# Patient Record
Sex: FEMALE | Race: WHITE | NOT HISPANIC OR LATINO | Employment: FULL TIME | URBAN - METROPOLITAN AREA
[De-identification: names, ages, dates, MRNs, and addresses within clinical notes are randomized per-mention and may not be internally consistent; named-entity substitution may affect disease eponyms.]

---
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
## 2019-01-02 ENCOUNTER — NON-PROVIDER VISIT (OUTPATIENT)
Dept: MEDICAL GROUP | Facility: PHYSICIAN GROUP | Age: 19
End: 2019-01-02
Payer: COMMERCIAL

## 2019-01-02 ENCOUNTER — HOSPITAL ENCOUNTER (OUTPATIENT)
Dept: LAB | Facility: MEDICAL CENTER | Age: 19
End: 2019-01-02
Attending: INTERNAL MEDICINE
Payer: COMMERCIAL

## 2019-01-02 DIAGNOSIS — E16.2 HYPOGLYCEMIA: ICD-10-CM

## 2019-01-02 DIAGNOSIS — Z30.42 ENCOUNTER FOR SURVEILLANCE OF INJECTABLE CONTRACEPTIVE: ICD-10-CM

## 2019-01-02 DIAGNOSIS — E53.8 VITAMIN B 12 DEFICIENCY: ICD-10-CM

## 2019-01-02 DIAGNOSIS — E55.9 VITAMIN D DEFICIENCY: ICD-10-CM

## 2019-01-02 LAB
25(OH)D3 SERPL-MCNC: 17 NG/ML (ref 30–100)
VIT B12 SERPL-MCNC: 1078 PG/ML (ref 211–911)

## 2019-01-02 PROCEDURE — 82306 VITAMIN D 25 HYDROXY: CPT

## 2019-01-02 PROCEDURE — 82607 VITAMIN B-12: CPT

## 2019-01-02 PROCEDURE — 36415 COLL VENOUS BLD VENIPUNCTURE: CPT

## 2019-01-02 PROCEDURE — G0481 DRUG TEST DEF 8-14 CLASSES: HCPCS

## 2019-01-02 PROCEDURE — 96372 THER/PROPH/DIAG INJ SC/IM: CPT | Performed by: NURSE PRACTITIONER

## 2019-01-02 RX ORDER — MEDROXYPROGESTERONE ACETATE 150 MG/ML
150 INJECTION, SUSPENSION INTRAMUSCULAR ONCE
Status: COMPLETED | OUTPATIENT
Start: 2019-01-02 | End: 2019-01-02

## 2019-01-02 RX ADMIN — MEDROXYPROGESTERONE ACETATE 150 MG: 150 INJECTION, SUSPENSION INTRAMUSCULAR at 15:11

## 2019-01-02 NOTE — TELEPHONE ENCOUNTER
Spoke to pt. Reports she could only wear the Zio for 4 days and is not sure it caught any fast heart rates. She continues to have them about every other day.     As per most recent visit:  After the ZioPatch (or jail through if you feel you have captured enough fast heart rate events), start metoprolol tartrate 12.5 mg once daily (1/2 of the 25 mg), can take twice a day or an whole tablet 1-2 times per day depending on your response.  If you feel worse, stop taking it and let me know.    To Dr. Jack--please advise if you would like pt to start metoprolol at this time?

## 2019-01-02 NOTE — TELEPHONE ENCOUNTER
Spoke with the patient today in regards to her Zio patch that was placed on 12/21/2018 in the [facility].  The patient stated that the monitor only lasted about 4 days and she will be sending the unit back today as the post office was closed.  I notified the patient that we would need to see the results before we could tell if she needed to have another patch.  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  The patient also stated that she was told to start a medication at the 7 day sharifa that she did not make it to. Notes to the nurse to follow up on.

## 2019-01-02 NOTE — NON-PROVIDER
Malena MckeeleathaMarguerite Bennett is a 18 y.o. here for a Depo Provera Injection.     Date of last Depo Provera Injection: 10/17/18  Current date within therapeutic range?: Yes   Urine pregnancy test done (needed if out of date range): not performed  Date of office visit:1/2/19  Date of last pap (if > 21 years old)/ GYN exam: N/A  Dx: Contraceptive use    Order and dose verified by: Nu AMBRIZ  Patient tolerated injection and no adverse effects were observed or reported: Yes    # of Administrations remaining in MAR: 0  Next injection due between 3/20/19 and 4/3/19.

## 2019-01-03 NOTE — TELEPHONE ENCOUNTER
Spoke to pt and notified of recommendation to start metoprolol as per Dr. Jack's instructions. Educated re: medication and side effects. Advised pt track HR/BP and encouraged to call w/questions or concerns.

## 2019-01-04 ENCOUNTER — TELEPHONE (OUTPATIENT)
Dept: ENDOCRINOLOGY | Facility: MEDICAL CENTER | Age: 19
End: 2019-01-04

## 2019-01-04 RX ORDER — LANCETS 30 GAUGE
EACH MISCELLANEOUS
Qty: 100 EACH | Refills: 3 | Status: SHIPPED | OUTPATIENT
Start: 2019-01-04 | End: 2022-03-21

## 2019-01-05 NOTE — TELEPHONE ENCOUNTER
VOICEMAIL  1. Caller Name: Malena Bennett                        Call Back Number: [phone] (home)     2. Message: Pt needs TS called into pharmacy. She wanted to know if she needs to change her dosage of her vitamin D?    3. Patient approves office to leave a detailed voicemail/MyChart message: yes      Dr. Salter sent in refill for TS today.     Phone Number Called: [phone] (home)     Message: Called Ptto inform her the the TS had been sent to her pharmacy. Pt was please and will get them filled. Asked her why she might need to change her Vit. D rx. She stated that she did labs on 1/2/19 and noticed that her Vit D levels have dropped and wanted to knwo if she needs to increase her dosage again. Told Pt I would ask Dr. salter and lula becerra back to her when we have more information    Left Message for patient to call back: N\A      Labs are in her chart. Dr. Salter, please advise.

## 2019-01-07 ENCOUNTER — PATIENT MESSAGE (OUTPATIENT)
Dept: MEDICAL GROUP | Facility: PHYSICIAN GROUP | Age: 19
End: 2019-01-07

## 2019-01-07 DIAGNOSIS — Z11.1 SCREENING FOR TUBERCULOSIS: ICD-10-CM

## 2019-01-07 DIAGNOSIS — E55.9 VITAMIN D DEFICIENCY: ICD-10-CM

## 2019-01-07 LAB
ACETOHEXAMIDE SERPL QL: NOT DETECTED NG/ML
CHLORPROPAMIDE SERPL QL: NOT DETECTED NG/ML
GLIMEPIRIDE SERPL QL: NOT DETECTED NG/ML
GLIPIZIDE SERPL QL: NOT DETECTED NG/ML
GLYBURIDE SERPL QL: NOT DETECTED NG/ML
NATEGLINIDE SERPL QL: NOT DETECTED NG/ML
REPAGLINIDE SERPL QL: NOT DETECTED NG/ML
TOLAZAMIDE SERPL QL: NOT DETECTED NG/ML
TOLBUTAMIDE SERPL QL: NOT DETECTED NG/ML

## 2019-01-07 RX ORDER — ERGOCALCIFEROL 1.25 MG/1
50000 CAPSULE ORAL
Qty: 18 CAP | Refills: 1 | Status: SHIPPED | OUTPATIENT
Start: 2019-01-07 | End: 2020-06-08

## 2019-01-07 NOTE — TELEPHONE ENCOUNTER
Phone Number Called: [phone] (home)      Message: Called pt, informed her that Dr. Lora changed her directions from every week to every 3 days. She will make the change aad  the new Rx.     Left Message for patient to call back: yes-Pt will call back if she has any other questions

## 2019-01-08 ENCOUNTER — HOSPITAL ENCOUNTER (OUTPATIENT)
Dept: LAB | Facility: MEDICAL CENTER | Age: 19
End: 2019-01-08
Attending: NURSE PRACTITIONER
Payer: COMMERCIAL

## 2019-01-08 DIAGNOSIS — Z11.1 SCREENING FOR TUBERCULOSIS: ICD-10-CM

## 2019-01-08 NOTE — TELEPHONE ENCOUNTER
From: Malena Bennett  To: EVENS Vo  Sent: 1/7/2019 6:03 PM PST  Subject: Non-Urgent Medical Question    So sorry to still be reaching out. I have a appointment with  but have not been seen yet, so have not established anything. Can you please order a tb blood test? I need it for nursing school. Thank you so much for everything!   -Nell

## 2019-01-09 ENCOUNTER — HOSPITAL ENCOUNTER (OUTPATIENT)
Dept: LAB | Facility: MEDICAL CENTER | Age: 19
End: 2019-01-09
Attending: NURSE PRACTITIONER
Payer: COMMERCIAL

## 2019-01-09 PROCEDURE — 86480 TB TEST CELL IMMUN MEASURE: CPT

## 2019-01-09 PROCEDURE — 0298T PR EXT ECG > 48HR TO 21 DAY REVIEW AND INTERPRETATN: CPT | Performed by: INTERNAL MEDICINE

## 2019-01-09 PROCEDURE — 0296T PR EXT ECG > 48HR TO 21 DAY RCRD W/CONECT INTL RCRD: CPT | Performed by: INTERNAL MEDICINE

## 2019-01-09 PROCEDURE — 36415 COLL VENOUS BLD VENIPUNCTURE: CPT

## 2019-01-10 ENCOUNTER — TELEPHONE (OUTPATIENT)
Dept: CARDIOLOGY | Facility: MEDICAL CENTER | Age: 19
End: 2019-01-10

## 2019-01-10 DIAGNOSIS — R55 SYNCOPE AND COLLAPSE: ICD-10-CM

## 2019-01-10 NOTE — TELEPHONE ENCOUNTER
Malena would like to repeat the Zio because it was only on for 4 days and it did not work correctly.  She did not have any symptoms during that 4 day period.  I asked her to call the company back and see if they will send her a replacement and, if not, we will proceed with getting this resolved.

## 2019-01-10 NOTE — TELEPHONE ENCOUNTER
She said that the codetag company told her that it would have to be ordered through our office, but [employer] will send it to her college address so she doesn't need to come in.  Malena is sending her college address through MY CHART.  I ordered the Zio for her and faxed it with this message to Mariann GIL

## 2019-01-11 ENCOUNTER — NON-PROVIDER VISIT (OUTPATIENT)
Dept: CARDIOLOGY | Facility: MEDICAL CENTER | Age: 19
End: 2019-01-11
Payer: COMMERCIAL

## 2019-01-11 ENCOUNTER — TELEPHONE (OUTPATIENT)
Dept: CARDIOLOGY | Facility: MEDICAL CENTER | Age: 19
End: 2019-01-11

## 2019-01-11 ENCOUNTER — PATIENT MESSAGE (OUTPATIENT)
Dept: CARDIOLOGY | Facility: MEDICAL CENTER | Age: 19
End: 2019-01-11

## 2019-01-11 DIAGNOSIS — R00.0 SINUS TACHYCARDIA: ICD-10-CM

## 2019-01-11 LAB
GAMMA INTERFERON BACKGROUND BLD IA-ACNC: 0.03 IU/ML
M TB IFN-G BLD-IMP: NEGATIVE
M TB IFN-G CD4+ BCKGRND COR BLD-ACNC: 0 IU/ML
MITOGEN IGNF BCKGRD COR BLD-ACNC: >10 IU/ML
QFT TB2 - NIL TBQ2: 0 IU/ML

## 2019-01-11 NOTE — PATIENT COMMUNICATION
Nell is coming in today to [facility] to have a Zio patch put on.  We spoke to Paz and [facility] will cover.

## 2019-01-11 NOTE — TELEPHONE ENCOUNTER
Patient is coming into the office at [facility] to have the Zio put on today (my original note from today is in a MY CHART message.  She is leaving for [place] today- and is not on Metoprolol.

## 2019-02-08 ENCOUNTER — TELEPHONE (OUTPATIENT)
Dept: ENDOCRINOLOGY | Facility: MEDICAL CENTER | Age: 19
End: 2019-02-08

## 2019-02-08 NOTE — TELEPHONE ENCOUNTER
Patient called with questions about her Tresiba and how she should be taking it. I informed her that she needs to ne taking it everyday for it to work. She said that she thinks she is supposed to be taking it differently.

## 2019-02-15 ENCOUNTER — TELEPHONE (OUTPATIENT)
Dept: ENDOCRINOLOGY | Facility: MEDICAL CENTER | Age: 19
End: 2019-02-15

## 2019-02-15 NOTE — TELEPHONE ENCOUNTER
1. Caller Name: Malena                                         Call Back Number: [phone] (home)         Patient approves a detailed voicemail message: no    Spoke to patient about blood sugar numbers she said they have been pretty high lately and was wondering about what she should be taking. She sent her BGL on [facility] so they are in media. The other night she was at 500 took the tresiba and was down to 300 by morning. Wondering what that number needs to be fore her to be taking tresiba.    Tuesday and Wednesday has low blood sugar numbers   30's. Ate something and was ok.     High priority since she has been sending messages and messaging us for this answer since 1/25.    Please let her know

## 2019-02-16 NOTE — TELEPHONE ENCOUNTER
Patient called back worries about her lows in the 30's said she did not think the Derrick was very accurate. Told about the decom. She agreed to have her info sent to [employer] to see how much it would be for her.  Sent in 2/15/19

## 2019-02-20 NOTE — TELEPHONE ENCOUNTER
Zio patch enrollment was never sent to be enrolled when the monitor was placed on the patient.  I was contacted by [employer] to let me know that the patient's monitor has been received but they cannot download the information until the enrollment has been sent through.  I enrolled the patient today and they will have the report posted in 2-3 days.Notes to the nurse.

## 2019-02-21 ENCOUNTER — TELEPHONE (OUTPATIENT)
Dept: CARDIOLOGY | Facility: MEDICAL CENTER | Age: 19
End: 2019-02-21

## 2019-02-21 ENCOUNTER — TELEPHONE (OUTPATIENT)
Dept: ENDOCRINOLOGY | Facility: MEDICAL CENTER | Age: 19
End: 2019-02-21

## 2019-02-22 PROCEDURE — 0298T PR EXT ECG > 48HR TO 21 DAY REVIEW AND INTERPRETATN: CPT | Performed by: INTERNAL MEDICINE

## 2019-02-22 PROCEDURE — 0296T PR EXT ECG > 48HR TO 21 DAY RCRD W/CONECT INTL RCRD: CPT | Performed by: INTERNAL MEDICINE

## 2019-02-22 NOTE — TELEPHONE ENCOUNTER
Spoke to pt and advised of updated Zio results. Pt verbalized understanding. Requesting report copy go to [facility] address. Mailed today.

## 2019-02-22 NOTE — TELEPHONE ENCOUNTER
Nothing concerning on her Alcira patch.  Please give her the report.  Ask her to start metoprolol to see if it helps.  Thank you

## 2019-02-22 NOTE — TELEPHONE ENCOUNTER
I talked to the mom today. She is having lows and highs.  When she was on 10 units a day she felt the best she had ever felt and no lows or highs being on the insulin.      I will have her start at 6 units each night.  Hopefully this will help her stay stable until she is seen in the office again.  We can have her talk to a nurse educator to learn what to do with low BG numbers.     Student at the [employer]

## 2019-02-25 ENCOUNTER — TELEPHONE (OUTPATIENT)
Dept: ENDOCRINOLOGY | Facility: MEDICAL CENTER | Age: 19
End: 2019-02-25

## 2019-02-25 DIAGNOSIS — R55 SYNCOPE AND COLLAPSE: ICD-10-CM

## 2019-02-26 NOTE — TELEPHONE ENCOUNTER
Spoke to the patient and she reports blood sugars fluctuating between  range.; it is not clear to me her trend. She has an appointment in March and will discuss the further details at that point

## 2019-03-04 NOTE — PROGRESS NOTES
Endocrinology Clinic Progress Note  PCP: EVENS Vo    HPI:  Malena Bennett is a 18 y.o. old patient who comes in today for review of endocrine problems.    Vitamin D deficiency:  Currently on Vitamin D 50,000 units every 3 days.     Vitamin B 12 deficiency:  Currently taking Vitamin B 12 1000 mcg IM every 10 days.    Hyperglycemia:  She has been having blood sugars that are fluctuating between LO-300. She did not want the Free Style Derrick put on again.  She states she has done it several times already. She may have [place] Diabetes or monogenic diabetes (MOD Y, maturity onset diabetes of young).  She is currently not taking Tresiba since February.    ROS:  Constitutional: No unintentional weight loss  Endo: Denies excessive thirst or frequent urination  All other systems were reviewed and were negative.    Past Medical History:  Patient Active Problem List    Diagnosis Date Noted   • Palpitations 12/17/2018   • POTS (postural orthostatic tachycardia syndrome) 12/17/2018   • Hyperglycemia 12/14/2018   • Other irritable bowel syndrome 08/10/2018   • Adrenal insufficiency (HCC) 08/10/2018   • Syncope and collapse 05/24/2018   • Diagnosis unknown 05/24/2018   • Salt craving 02/02/2018   • Hypoglycemia 02/02/2018   • Blood in stool 11/22/2017   • Urinary tract infection without hematuria 11/10/2017   • At risk for knowledge deficit Health problems 10/16/2017   • Appendicitis 07/01/2017   • Overweight, pediatric, BMI (body mass index) 95-99% for age 05/19/2017   • Biliary dyskinesia 01/30/2017   • Right upper quadrant pain 11/09/2016   • Chronic tension-type headache, not intractable 11/09/2016   • Acute gastritis without hemorrhage 11/09/2016   • Mittelschmerz 09/19/2016   • Tourette syndrome 09/19/2016   • OCD (obsessive compulsive disorder) 09/19/2016   • Factor V Leiden (HCC)    • Pelvic pain 06/28/2016       Medications:    Current Outpatient Prescriptions:   •  Semaglutide (OZEMPIC)  "0.25 or 0.5 MG/DOSE Solution Pen-injector, Inject 0.5 mg as instructed every 7 days., Disp: 6 PEN, Rfl: 3  •  vitamin D, Ergocalciferol, (DRISDOL) 79805 units Cap capsule, Take 1 Cap by mouth every 3 days., Disp: 18 Cap, Rfl: 1  •  Blood Glucose Test Strips, Test strips order: Test strips for meter. Sig: use tid and prn ssx high or low sugar #100 RF x 3, Disp: 100 Strip, Rfl: 3  •  Lancets, Lancets order: Lancets for glucometer. Sig: use tid and prn ssx high or low sugar. #100 RF x 3, Disp: 100 Each, Rfl: 3  •  Insulin Degludec (TRESIBA FLEXTOUCH) 200 UNIT/ML Solution Pen-injector, Inject 20 Units as instructed every day., Disp: 2 PEN, Rfl: 3  •  cyanocobalamin (VITAMIN B-12) 1000 MCG/ML Solution, 1 mL by Intramuscular route every 10 days., Disp: 9 mL, Rfl: 3  •  ondansetron (ZOFRAN ODT) 8 MG TABLET DISPERSIBLE, Take 1 Tab by mouth every 8 hours as needed for Nausea., Disp: 30 Tab, Rfl: 1  •  LINZESS 145 MCG Cap, Take 145 mcg by mouth every day., Disp: , Rfl:   •  glucose monitoring kit (FREESTYLE) monitoring kit, Use to measure blood glucose regularly., Disp: 1 Kit, Rfl: 0  •  clindamycin (CLEOCIN) 300 MG Cap, , Disp: , Rfl:   •  Continuous Blood Gluc Sensor (FREESTYLE VICK SENSOR SYSTEM) Misc, 1 Applicator by Does not apply route every 14 days. (Patient not taking: Reported on 3/5/2019), Disp: 2 Each, Rfl: 11  •  metoprolol (LOPRESSOR) 25 MG Tab, Take 0.5 Tabs by mouth 2 times a day. (Patient not taking: Reported on 3/5/2019), Disp: 30 Tab, Rfl: 0  •  Syringe/Needle, Disp, (SYRINGE 3CC/49MB7-4/2\") 22G X 1-1/2\" 3 ML Misc, For use with Intra-muscular or subcutaneous B 12  injection every 2 weeks., Disp: 12 Each, Rfl: 1    Labs: Reviewed    Physical Examination:  Vital signs: /68 (BP Location: Right arm, Patient Position: Sitting, BP Cuff Size: Adult)   Pulse (!) 119   Ht 1.6 m (5' 2.99\")   Wt 97.5 kg (215 lb)   SpO2 95%   BMI 38.10 kg/m²  Body mass index is 38.1 kg/m². Patient's body mass index is 38.1 " kg/m². Exercise and nutrition counseling were performed at this visit.  General: No apparent distress, cooperative  Eyes: No scleral icterus, no discharge, normal eyelids  Neck: No abnormal masses on inspection, normal thyroid exam  Resp: Normal effort, clear to auscultation bilaterally  CVS: Regular rate and rhythm, S1 S2 normal, no murmur  Extremities: No lower extremity edema  Abdomen: abdominal obesity present  Musculoskeletal: Normal digits and nails  Skin: No rash on visible skin  Psych: Alert and oriented, normal mood and affect, intact memory and able to make informed decisions.    Assessment and Plan:    1. Vitamin D deficiency  Continue vitamin D supplementation    2. Vitamin B 12 deficiency  Continue B12 supplementation    3.  Uncontrolled type 2 diabetes with hyperglycemia  Start [place] 0.25 mg once a week.  NovoLog 2 units for 50 points above 150 for correction for high blood sugars    Return in about 2 months (around 5/5/2019).    Total face to face time spent with patient equals 40 minutes. 25/40 minutes were spent on counseling the patient about the pathophysiology of monogenic/maturity onset diabetes of young, sIde effects and benefits of Vit D and Vit B12 replacement and dose ozempic therapy were discussed with patient in great detail    Thank you for allowing me to participate in the care of this patient.    Teja Lora M.D.  This note was scribed by Renee Morton RN CDE  CC:   Esha Aguilar, A.P.ESTRELLA.    This note was created using voice recognition software (Dragon). The accuracy of the dictation is limited by the abilities of the software. I have reviewed the note prior to signing, however some errors in grammar and context are still possible. If you have any questions related to this note please do not hesitate to contact our office.

## 2019-03-05 ENCOUNTER — OFFICE VISIT (OUTPATIENT)
Dept: ENDOCRINOLOGY | Facility: MEDICAL CENTER | Age: 19
End: 2019-03-05
Payer: COMMERCIAL

## 2019-03-05 VITALS
HEART RATE: 119 BPM | OXYGEN SATURATION: 95 % | BODY MASS INDEX: 38.09 KG/M2 | HEIGHT: 63 IN | SYSTOLIC BLOOD PRESSURE: 116 MMHG | WEIGHT: 215 LBS | DIASTOLIC BLOOD PRESSURE: 68 MMHG

## 2019-03-05 DIAGNOSIS — E55.9 VITAMIN D DEFICIENCY: ICD-10-CM

## 2019-03-05 DIAGNOSIS — R73.9 HYPERGLYCEMIA: ICD-10-CM

## 2019-03-05 DIAGNOSIS — E53.8 VITAMIN B 12 DEFICIENCY: ICD-10-CM

## 2019-03-05 DIAGNOSIS — E11.65 UNCONTROLLED TYPE 2 DIABETES MELLITUS WITH HYPERGLYCEMIA (HCC): ICD-10-CM

## 2019-03-05 LAB
HBA1C MFR BLD: 5.1 % (ref ?–5.8)
INT CON NEG: NORMAL
INT CON POS: NORMAL

## 2019-03-05 PROCEDURE — 99215 OFFICE O/P EST HI 40 MIN: CPT | Performed by: INTERNAL MEDICINE

## 2019-03-05 PROCEDURE — 83036 HEMOGLOBIN GLYCOSYLATED A1C: CPT | Performed by: INTERNAL MEDICINE

## 2019-03-05 RX ORDER — CLINDAMYCIN HYDROCHLORIDE 300 MG/1
CAPSULE ORAL
COMMUNITY
Start: 2018-12-19 | End: 2019-05-06

## 2019-03-22 ENCOUNTER — PATIENT MESSAGE (OUTPATIENT)
Dept: MEDICAL GROUP | Facility: PHYSICIAN GROUP | Age: 19
End: 2019-03-22

## 2019-03-22 RX ORDER — MEDROXYPROGESTERONE ACETATE 150 MG/ML
150 INJECTION, SUSPENSION INTRAMUSCULAR ONCE
Qty: 1 ML | Refills: 0 | Status: SHIPPED | OUTPATIENT
Start: 2019-03-22 | End: 2019-08-01 | Stop reason: SDUPTHER

## 2019-03-22 NOTE — PATIENT COMMUNICATION
I know this is no longer a patient of yours since she is now 18, but she has not seen  yet and she has an apt scheduled to see her in June.  im not sure what to tell the patient or who I should route this message too?  Please advise

## 2019-04-01 DIAGNOSIS — E11.649 UNCONTROLLED TYPE 2 DIABETES MELLITUS WITH HYPOGLYCEMIA WITHOUT COMA (HCC): ICD-10-CM

## 2019-05-02 NOTE — PROGRESS NOTES
Endocrinology Clinic Progress Note  PCP: Jayme Pastrana M.D.    HPI:  Malena Bennett is a 18 y.o. old patient who comes in today for review of endocrine problems.     Vitamin D deficiency:  Currently on Vitamin D 50,000 units every 3 days.      Vitamin B 12 deficiency:  Currently taking Vitamin B 12 1000 mcg IM every 10 days.     Hyperglycemia:  She has been having blood sugars that are fluctuating between LO-300.  She states she has been having more lows than highs She may have [place] Diabetes or monogenic diabetes (MOD Y, maturity onset diabetes of young).   Most Recent HbA1c:   Lab Results   Component Value Date/Time    HBA1C 5.1 03/05/2019 12:43 PM        Current Diabetes Regimen:  Ozempic 0.25 mg once a week  Humalog correction scale of 2 units for every 50 points above 150     Has Dexcom CGM, CGM downloaded and reports reviewed in great detail with patient    Hypoglycemia:  Frequent    ROS:  Constitutional: weight loss  Cardiac: No palpitations or racing heart  Resp: No shortness of breath  Neuro: No numbness or tinging in feet  Endo: No heat or cold intolerance, no polyuria or polydipsia  All other systems were reviewed and were negative.    Past Medical History:  Patient Active Problem List    Diagnosis Date Noted   • Palpitations 12/17/2018   • POTS (postural orthostatic tachycardia syndrome) 12/17/2018   • Hyperglycemia 12/14/2018   • Other irritable bowel syndrome 08/10/2018   • Adrenal insufficiency (HCC) 08/10/2018   • Syncope and collapse 05/24/2018   • Diagnosis unknown 05/24/2018   • Salt craving 02/02/2018   • Hypoglycemia 02/02/2018   • Blood in stool 11/22/2017   • Urinary tract infection without hematuria 11/10/2017   • At risk for knowledge deficit Health problems 10/16/2017   • Appendicitis 07/01/2017   • Overweight, pediatric, BMI (body mass index) 95-99% for age 05/19/2017   • Biliary dyskinesia 01/30/2017   • Right upper quadrant pain 11/09/2016   • Chronic tension-type  headache, not intractable 11/09/2016   • Acute gastritis without hemorrhage 11/09/2016   • Mittelschmerz 09/19/2016   • Tourette syndrome 09/19/2016   • OCD (obsessive compulsive disorder) 09/19/2016   • Factor V Leiden (HCC)    • Pelvic pain 06/28/2016       Past Surgical History:  Past Surgical History:   Procedure Laterality Date   • APPENDECTOMY LAPAROSCOPIC  7/1/2017    Procedure: APPENDECTOMY LAPAROSCOPIC;  Surgeon: Derick Arthur M.D.;  Location: SURGERY [facility] ORS;  Service:    • ELIGIO BY LAPAROSCOPY  1/30/2017    Procedure: ELIGIO BY LAPAROSCOPY;  Surgeon: Fina Perkins M.D.;  Location: [facility];  Service:        Allergies:  Cefdinir and Erythromycin    Social History:  Social History     Social History   • Marital status: Single     Spouse name: N/A   • Number of children: N/A   • Years of education: N/A     Occupational History   • Not on file.     Social History Main Topics   • Smoking status: Never Smoker   • Smokeless tobacco: Never Used   • Alcohol use No   • Drug use: No   • Sexual activity: No     Other Topics Concern   • Not on file     Social History Narrative   • No narrative on file       Family History:  Family History   Problem Relation Age of Onset   • Other Maternal Grandfather         Parkinson's   • Heart Disease Unknown    • Cancer Unknown         bile duct cancer   • Other Mother         sphincter of salud, pancreatic insufficiency   • Cancer Unknown         great uncle brain       Medications:    Current Outpatient Prescriptions:   •  insulin aspart (NOVOLOG) 100 UNIT/ML Solution, Inject  as instructed 3 times a day before meals., Disp: , Rfl:   •  Semaglutide (OZEMPIC) 0.25 or 0.5 MG/DOSE Solution Pen-injector, Inject 0.5 mg as instructed every 7 days. (Patient taking differently: Inject 0.25 mg as instructed every 7 days.), Disp: 6 PEN, Rfl: 3  •  vitamin D, Ergocalciferol, (DRISDOL) 10620 units Cap capsule, Take 1 Cap by mouth every 3 days., Disp: 18 Cap,  "Rfl: 1  •  cyanocobalamin (VITAMIN B-12) 1000 MCG/ML Solution, 1 mL by Intramuscular route every 10 days., Disp: 9 mL, Rfl: 3  •  LINZESS 145 MCG Cap, Take 145 mcg by mouth every day., Disp: , Rfl:   •  Insulin Pen Needle 32 G x 4 mm, 1 Each by Does not apply route 3 times a day., Disp: 100 Each, Rfl: 11  •  Glucagon, rDNA, 1 MG Kit, Use one pen prn severe hypoglycemia, Disp: 1 Kit, Rfl: 1  •  Blood Glucose Test Strips, Test strips order: Test strips for meter. Sig: use tid and prn ssx high or low sugar #100 RF x 3, Disp: 100 Strip, Rfl: 3  •  Lancets, Lancets order: Lancets for glucometer. Sig: use tid and prn ssx high or low sugar. #100 RF x 3, Disp: 100 Each, Rfl: 3  •  Insulin Degludec (TRESIBA FLEXTOUCH) 200 UNIT/ML Solution Pen-injector, Inject 20 Units as instructed every day. (Patient not taking: Reported on 5/6/2019), Disp: 2 PEN, Rfl: 3  •  Continuous Blood Gluc Sensor (FREESTYLE VICK SENSOR SYSTEM) Misc, 1 Applicator by Does not apply route every 14 days. (Patient not taking: Reported on 3/5/2019), Disp: 2 Each, Rfl: 11  •  metoprolol (LOPRESSOR) 25 MG Tab, Take 0.5 Tabs by mouth 2 times a day. (Patient not taking: Reported on 3/5/2019), Disp: 30 Tab, Rfl: 0  •  ondansetron (ZOFRAN ODT) 8 MG TABLET DISPERSIBLE, Take 1 Tab by mouth every 8 hours as needed for Nausea., Disp: 30 Tab, Rfl: 1  •  Syringe/Needle, Disp, (SYRINGE 3CC/95NO4-2/2\") 22G X 1-1/2\" 3 ML Misc, For use with Intra-muscular or subcutaneous B 12  injection every 2 weeks., Disp: 12 Each, Rfl: 1  •  glucose monitoring kit (FREESTYLE) monitoring kit, Use to measure blood glucose regularly., Disp: 1 Kit, Rfl: 0    Labs: Reviewed    Physical Examination:  Vital signs: BP (!) 98/64   Pulse 68   Ht 1.702 m (5' 7\")   Wt 93.4 kg (206 lb)   SpO2 100%   BMI 32.26 kg/m²  Body mass index is 32.26 kg/m².  General: No apparent distress, cooperative  Eyes: No scleral icterus or discharge  ENMT: Normal on external inspection of nose, lips, normal " thyroid exam  Neck: No abnormal masses on inspection  Resp: Normal effort, clear to auscultation bilaterally   CVS: Regular rate and rhythm, S1 S2 normal, no murmur   Extremities: No edema  Abdomen: abdominal obesity present  Neuro: Alert and oriented  Skin: No rash  Psych: Normal mood and affect, intact memory and able to make informed decisions    Assessment and Plan:    1. Hyperglycemia and STEW  The hyperglycemic peaks have been very nicely controlled with a GLP-1 receptor agent.  Continues to have hypoglycemia and reports to being in 40s couple of times a week.  The patient's Continuous Glucose Monitor was downloaded and reviewed in great detail with the patient.  You can find report scanned into media .  Discussed this continuous glucose monitoring data with patient in great detail.    2. Hypoglycemia and STEW  Discussed this in detail.   Reviewed glucagon in depth with both parents.  She reports to have low blood sugar in 40s at least couple of times a week.  I personally think that she will benefit from referral to [facility] where they may have dedicated experts on STEW.  We have made a diagnosis of a modified based on her clinical symptomatology and her glycemic trends however we have not done any formal testing as the testing appears to be quite expensive.  The mother states that when inquired the testing was in the cost of $8000 and they did not want to pursue it as the management is not likely to change as much.  Did speak to the patient and both parents that they will benefit from referral to [facility] and hopefully they probably have better access to MOD by testing and dedicated experts.  They both agree for it along with the patient    3. Vitamin B 12 deficiency  Continue vitamin B12    4. Vitamin D deficiency  Vitamin D    Return in about 2 months (around 7/6/2019).    Thank you for allowing me to participate in the care of this patient.    Teja Lora  05/02/19    CC:    Jayme Pastrana M.D.    This note was created using voice recognition software (Dragon). The accuracy of the dictation is limited by the abilities of the software. I have reviewed the note prior to signing, however some errors in grammar and context are still possible. If you have any questions related to this note please do not hesitate to contact our office.   This note was scribed by Lizzie Coleman RN, CDE

## 2019-05-06 ENCOUNTER — TELEPHONE (OUTPATIENT)
Dept: ENDOCRINOLOGY | Facility: MEDICAL CENTER | Age: 19
End: 2019-05-06

## 2019-05-06 ENCOUNTER — OFFICE VISIT (OUTPATIENT)
Dept: ENDOCRINOLOGY | Facility: MEDICAL CENTER | Age: 19
End: 2019-05-06
Payer: COMMERCIAL

## 2019-05-06 VITALS
DIASTOLIC BLOOD PRESSURE: 64 MMHG | WEIGHT: 206 LBS | HEIGHT: 67 IN | BODY MASS INDEX: 32.33 KG/M2 | SYSTOLIC BLOOD PRESSURE: 98 MMHG | HEART RATE: 68 BPM | OXYGEN SATURATION: 100 %

## 2019-05-06 DIAGNOSIS — E53.8 VITAMIN B 12 DEFICIENCY: ICD-10-CM

## 2019-05-06 DIAGNOSIS — E55.9 VITAMIN D DEFICIENCY: ICD-10-CM

## 2019-05-06 DIAGNOSIS — R73.9 HYPERGLYCEMIA: ICD-10-CM

## 2019-05-06 DIAGNOSIS — E16.2 HYPOGLYCEMIA: ICD-10-CM

## 2019-05-06 DIAGNOSIS — E13.9 MODY (MATURITY ONSET DIABETES MELLITUS IN YOUNG) (HCC): ICD-10-CM

## 2019-05-06 DIAGNOSIS — E13.9 OTHER SPECIFIED DIABETES MELLITUS WITHOUT COMPLICATION, WITHOUT LONG-TERM CURRENT USE OF INSULIN (HCC): ICD-10-CM

## 2019-05-06 PROCEDURE — 95251 CONT GLUC MNTR ANALYSIS I&R: CPT | Performed by: INTERNAL MEDICINE

## 2019-05-06 PROCEDURE — 99214 OFFICE O/P EST MOD 30 MIN: CPT | Performed by: INTERNAL MEDICINE

## 2019-05-06 NOTE — TELEPHONE ENCOUNTER
Was the patient seen in the last year in this department? Yes    Does patient have an active prescription for medications requested? Yes    Received Request Via: Patient     New prescription for pen needles

## 2019-05-13 ENCOUNTER — PATIENT MESSAGE (OUTPATIENT)
Dept: MEDICAL GROUP | Facility: PHYSICIAN GROUP | Age: 19
End: 2019-05-13

## 2019-06-10 DIAGNOSIS — E13.9 MODY (MATURITY ONSET DIABETES MELLITUS IN YOUNG) (HCC): ICD-10-CM

## 2019-06-13 ENCOUNTER — TELEPHONE (OUTPATIENT)
Dept: ENDOCRINOLOGY | Facility: MEDICAL CENTER | Age: 19
End: 2019-06-13

## 2019-06-13 NOTE — TELEPHONE ENCOUNTER
VOICEMAIL  1. Caller Name: Malena                      Call Back Number: [phone]    2. Message: patient is request a refill of Depo sent to the [facility] in [place]. Patient was seeing Esha but is switching care to Dr Pastrana d/t solitario on 8/12/19. Thanks!    3. Patient approves office to leave a detailed voicemail/MyChart message: yes

## 2019-06-14 RX ORDER — MEDROXYPROGESTERONE ACETATE 150 MG/ML
150 INJECTION, SUSPENSION INTRAMUSCULAR ONCE
Qty: 1 ML | Refills: 0 | OUTPATIENT
Start: 2019-06-14 | End: 2019-06-14

## 2019-06-14 NOTE — TELEPHONE ENCOUNTER
VOICEMAIL    1. Caller Name: Fifi                          Call Back Number: [phone]    2. Message: Patient's mother left a VM stating she requested a referral for her daughter to be seen at [facility] Dr. Galvan. She just received a letter from [facility] stating the referral was sent out to [facility] Dr. Salas. She wants to clarify why she was not referred to [facility].    3. Patient approves office to leave a detailed voicemail/MyChart message: N\A    I called Fifi back to let her know that Dr. Lora did order the referral to [facility] with Dr. Galvan. I am not sure why she got that information in the mail from [facility]. I did give her the referral dept line and transferred her over to get clarification.

## 2019-07-10 ENCOUNTER — TELEPHONE (OUTPATIENT)
Dept: MEDICAL GROUP | Facility: PHYSICIAN GROUP | Age: 19
End: 2019-07-10

## 2019-07-10 NOTE — TELEPHONE ENCOUNTER
VOICEMAIL  1. Caller Name: Malena                       Call Back Number: [phone]     2. Message: patient would like a refill of Depo sent to the pharmacy. Called and LM for patient to find out what pharmacy to send it to.     3. Patient approves office to leave a detailed voicemail/MyChart message: yes

## 2019-07-19 ENCOUNTER — TELEPHONE (OUTPATIENT)
Dept: ENDOCRINOLOGY | Facility: MEDICAL CENTER | Age: 19
End: 2019-07-19

## 2019-07-19 NOTE — TELEPHONE ENCOUNTER
Patient called in today complaining of higher blood sugars over the last 2 weeks and having increased difficulty controlling them with her insulin.     Patient has changed her insulin pen. We discussed increasing her insulin dosing to     2 units for every 50 above 125 and tightening her carb ration to 1:10 in lieu of 1:15.     She is in agreement at this time and [facility] Will discuss with Dr CASTANO

## 2019-07-29 ENCOUNTER — TELEPHONE (OUTPATIENT)
Dept: ENDOCRINOLOGY | Facility: MEDICAL CENTER | Age: 19
End: 2019-07-29

## 2019-07-29 DIAGNOSIS — E13.9 MODY (MATURITY ONSET DIABETES MELLITUS IN YOUNG) (HCC): ICD-10-CM

## 2019-07-29 DIAGNOSIS — E66.9 CLASS 1 OBESITY WITHOUT SERIOUS COMORBIDITY WITH BODY MASS INDEX (BMI) OF 32.0 TO 32.9 IN ADULT, UNSPECIFIED OBESITY TYPE: ICD-10-CM

## 2019-07-29 NOTE — TELEPHONE ENCOUNTER
1. Caller Name: Malena Bennett                                              Call Back Number: [phone] (home)       Patient approves a detailed voicemail message: yes    Patient question      She would like a referral to the [facility] (HIP)    She also made the changes that were suggested in the phone encounter/[employer] message on 07/19/19 and has not noticed much of a difference in her BG levels. She is still staying in the 200's and will spike in the afternoon to 300 daily. She is wondering what she should do now?    She currently has a Dexcom and is liking it. I suggested that we get her connected to ours in the office so that we can see her levels when she can not come into the office. She would like to get that set up at her next appointment on 08/12/19.        Dr. Lora: please send in a referral for the diabetic nutritionist and advise on BG levels.

## 2019-08-01 ENCOUNTER — TELEPHONE (OUTPATIENT)
Dept: URGENT CARE | Facility: PHYSICIAN GROUP | Age: 19
End: 2019-08-01

## 2019-08-01 NOTE — TELEPHONE ENCOUNTER
Patient is requesting a refill of her depo injections be sent to [facility] [address] [facility] [place], [place]. Their phone number is [phone]. Patient phone number is  [phone]. Thank you.

## 2019-08-01 NOTE — TELEPHONE ENCOUNTER
Was the patient seen in the last year in this department? Yes    Does patient have an active prescription for medications requested? No     Received Request Via: Pharmacy      Pt met protocol?: Yes, OV 8/18, due for wellness appt

## 2019-08-01 NOTE — TELEPHONE ENCOUNTER
Spoke with patient- advised patient that we sent over a rx on 6/14/19. I called pharmacy to confirm but they never received it. I called in the depo to the pharmacy in [place]

## 2019-08-02 RX ORDER — MEDROXYPROGESTERONE ACETATE 150 MG/ML
INJECTION, SUSPENSION INTRAMUSCULAR
Qty: 1 ML | Refills: 0 | Status: SHIPPED | OUTPATIENT
Start: 2019-08-02 | End: 2019-08-12

## 2019-08-12 ENCOUNTER — OFFICE VISIT (OUTPATIENT)
Dept: ENDOCRINOLOGY | Facility: MEDICAL CENTER | Age: 19
End: 2019-08-12
Payer: COMMERCIAL

## 2019-08-12 VITALS
HEIGHT: 67 IN | DIASTOLIC BLOOD PRESSURE: 72 MMHG | HEART RATE: 104 BPM | OXYGEN SATURATION: 100 % | SYSTOLIC BLOOD PRESSURE: 104 MMHG | WEIGHT: 202.6 LBS | BODY MASS INDEX: 31.8 KG/M2

## 2019-08-12 DIAGNOSIS — E53.8 VITAMIN B 12 DEFICIENCY: ICD-10-CM

## 2019-08-12 DIAGNOSIS — E10.65 UNCONTROLLED TYPE 1 DIABETES MELLITUS WITH HYPERGLYCEMIA (HCC): ICD-10-CM

## 2019-08-12 DIAGNOSIS — E55.9 VITAMIN D DEFICIENCY: ICD-10-CM

## 2019-08-12 DIAGNOSIS — E13.9 MODY (MATURITY ONSET DIABETES MELLITUS IN YOUNG) (HCC): ICD-10-CM

## 2019-08-12 DIAGNOSIS — E03.9 HYPOTHYROIDISM, UNSPECIFIED TYPE: ICD-10-CM

## 2019-08-12 DIAGNOSIS — E11.65 UNCONTROLLED TYPE 2 DIABETES MELLITUS WITH HYPERGLYCEMIA (HCC): ICD-10-CM

## 2019-08-12 LAB
HBA1C MFR BLD: 5 % (ref 0–5.6)
INT CON NEG: NORMAL
INT CON POS: NORMAL

## 2019-08-12 PROCEDURE — 99214 OFFICE O/P EST MOD 30 MIN: CPT | Performed by: INTERNAL MEDICINE

## 2019-08-12 PROCEDURE — 95251 CONT GLUC MNTR ANALYSIS I&R: CPT | Performed by: INTERNAL MEDICINE

## 2019-08-12 PROCEDURE — 83036 HEMOGLOBIN GLYCOSYLATED A1C: CPT | Performed by: INTERNAL MEDICINE

## 2019-08-12 NOTE — PROGRESS NOTES
"Endocrinology Clinic Progress Note  PCP: Jayme Pastrana M.D.    HPI:  Malena Bennett is a 18 y.o. old patient who comes in today for review of her type 2 diabetes, vitamin d deficiency and vitamin b 12 deficiency.   She is currently on Vitamin D 46089 iu per week, her most recent vitamin d level was:  17 on 1/2/19  Her most recent vitamin b 12 value was 1078 on 1/2/19, she is currently taking Vitamin B 12 injections of 1000 mg every 10 days.     Most Recent HbA1c:   Lab Results   Component Value Date/Time    HBA1C 5.0 08/12/2019 03:55 PM    Previous A1c was 5.1 on 3/5/19    Current Diabetes Regimen:  Tresiba 10 units at   Novolog using a 1:15 insulin to carbohydrate ratio and a 1 unit for every 25 points above 150 correction factor at meals   Ozempic 0 5mg per week  She is on a Dexcom G6 CGM, she did not bring her  with her to this appointment for download.  She did download and bring in several days worth of reading although they were not marked with the date.   She states her blood sugars were running extremely low up until July.  State frequently in the 40-50 range in May and June.  States starting in July her blood sugars started running higher in the mid 100 and higher with not as many lows.   Please see CGM downloads the patient brought in and scanned into her chart.       Exercise: strenuous regular exercise, aerobic > 3 hours a week, has been running several times a week.   Diet: \"healthy\" diet  in general  Following Celiac diet  Last Retinal Exam: current and records on chart  Daily Foot Exam: yes denies problems.   Routine Dental Exams: yes      ROS:  Constitutional: No weight loss  Cardiac: No palpitations or racing heart  Resp: No shortness of breath  Neuro: No numbness or tinging in feet  Endo: No heat or cold intolerance, no polyuria or polydipsia  All other systems were reviewed and were negative.    Past Medical History:  Patient Active Problem List    Diagnosis Date Noted "   • Uncontrolled type 2 diabetes mellitus with hyperglycemia (HCC) 08/12/2019   • Palpitations 12/17/2018   • POTS (postural orthostatic tachycardia syndrome) 12/17/2018   • Hyperglycemia 12/14/2018   • Other irritable bowel syndrome 08/10/2018   • Adrenal insufficiency (HCC) 08/10/2018   • Syncope and collapse 05/24/2018   • Diagnosis unknown 05/24/2018   • Salt craving 02/02/2018   • Hypoglycemia 02/02/2018   • Blood in stool 11/22/2017   • Urinary tract infection without hematuria 11/10/2017   • At risk for knowledge deficit Health problems 10/16/2017   • Appendicitis 07/01/2017   • Overweight, pediatric, BMI (body mass index) 95-99% for age 05/19/2017   • Biliary dyskinesia 01/30/2017   • Right upper quadrant pain 11/09/2016   • Chronic tension-type headache, not intractable 11/09/2016   • Acute gastritis without hemorrhage 11/09/2016   • Mittelschmerz 09/19/2016   • Tourette syndrome 09/19/2016   • OCD (obsessive compulsive disorder) 09/19/2016   • Factor V Leiden ([facility])    • Pelvic pain 06/28/2016       Past Surgical History:  Past Surgical History:   Procedure Laterality Date   • APPENDECTOMY LAPAROSCOPIC  7/1/2017    Procedure: APPENDECTOMY LAPAROSCOPIC;  Surgeon: Derick Arthur M.D.;  Location: SURGERY [facility];  Service:    • ELIGIO BY LAPAROSCOPY  1/30/2017    Procedure: ELIGIO BY LAPAROSCOPY;  Surgeon: Fina Perkins M.D.;  Location: SURGERY SAME DAY [facility] ORS;  Service:    • APPENDECTOMY         Allergies:  Cefdinir and Erythromycin    Social History:  Social History     Socioeconomic History   • Marital status: Single     Spouse name: Not on file   • Number of children: Not on file   • Years of education: Not on file   • Highest education level: Not on file   Occupational History   • Not on file   Social Needs   • Financial resource strain: Not on file   • Food insecurity:     Worry: Not on file     Inability: Not on file   • Transportation needs:     Medical: Not on file     Non-medical:  Not on file   Tobacco Use   • Smoking status: Never Smoker   • Smokeless tobacco: Never Used   Substance and Sexual Activity   • Alcohol use: No     Alcohol/week: 0.0 oz   • Drug use: No   • Sexual activity: Never   Lifestyle   • Physical activity:     Days per week: Not on file     Minutes per session: Not on file   • Stress: Not on file   Relationships   • Social connections:     Talks on phone: Not on file     Gets together: Not on file     Attends Restorationist service: Not on file     Active member of club or organization: Not on file     Attends meetings of clubs or organizations: Not on file     Relationship status: Not on file   • Intimate partner violence:     Fear of current or ex partner: Not on file     Emotionally abused: Not on file     Physically abused: Not on file     Forced sexual activity: Not on file   Other Topics Concern   • Behavioral problems Not Asked   • Interpersonal relationships Not Asked   • Sad or not enjoying activities Not Asked   • Suicidal thoughts Not Asked   • Poor school performance Not Asked   • Reading difficulties Not Asked   • Speech difficulties Not Asked   • Writing difficulties Not Asked   • Inadequate sleep Not Asked   • Excessive TV viewing Not Asked   • Excessive video game use Not Asked   • Inadequate exercise Not Asked   • Sports related Not Asked   • Poor diet Not Asked   • Family concerns for drug/alcohol abuse Not Asked   • Poor oral hygiene Not Asked   • Bike safety Not Asked   • Family concerns vehicle safety Not Asked   Social History Narrative   • Not on file       Family History:  Family History   Problem Relation Age of Onset   • Other Maternal Grandfather         Parkinson's   • Genetic Disorder Maternal Grandfather         Parkinsons, and crouzon syndrome   • Cancer Maternal Grandfather         Skin Cancer   • Heart Disease Unknown    • Cancer Unknown         bile duct cancer   • Other Mother         sphincter of salud, pancreatic insufficiency   • Cancer  Unknown         great uncle brain   • Arthritis Maternal Grandmother    • Cancer Maternal Grandmother         Skin Cancer   • Heart Disease Maternal Grandmother    • Hypertension Maternal Grandmother    • Hyperlipidemia Maternal Grandmother    • Stroke Maternal Grandmother    • Genetic Disorder Father         Factor V Liden and Tourettes   • Heart Disease Father    • Hyperlipidemia Father    • Genetic Disorder Maternal Uncle         Crouzon Syndrome   • Cancer Maternal Uncle         Bile Duct, and Skin cancer   • Cancer Maternal Aunt         Multiple Aunts and Uncles passed from cancer   • Heart Disease Maternal Aunt         Open heart surgery occuring   • Heart Disease Paternal Grandmother    • Hypertension Paternal Grandmother    • Stroke Paternal Grandmother    • Heart Disease Maternal Uncle         multiple uncles.       Medications:    Current Outpatient Medications:   •  Semaglutide (OZEMPIC) 1 MG/DOSE Solution Pen-injector, Inject 1 mg as instructed every 7 days., Disp: 6 PEN, Rfl: 3  •  NOVOLOG, insulin aspart, (NOVOLOG FLEXPEN) 100 UNIT/ML Solution Pen-injector injection, Inject 10 Units as instructed 3 times a day before meals., Disp: 3 PEN, Rfl: 3  •  vitamin D, Ergocalciferol, (DRISDOL) 73688 units Cap capsule, Take 1 Cap by mouth every 3 days., Disp: 18 Cap, Rfl: 1  •  Insulin Degludec (TRESIBA FLEXTOUCH) 200 UNIT/ML Solution Pen-injector, Inject 20 Units as instructed every day. (Patient taking differently: Inject 10 Units as instructed every day.), Disp: 2 PEN, Rfl: 3  •  cyanocobalamin (VITAMIN B-12) 1000 MCG/ML Solution, 1 mL by Intramuscular route every 10 days., Disp: 9 mL, Rfl: 3  •  ondansetron (ZOFRAN ODT) 8 MG TABLET DISPERSIBLE, Take 1 Tab by mouth every 8 hours as needed for Nausea., Disp: 30 Tab, Rfl: 1  •  glucose monitoring kit (FREESTYLE) monitoring kit, Use to measure blood glucose regularly., Disp: 1 Kit, Rfl: 0  •  insulin aspart (NOVOLOG) 100 UNIT/ML Solution, Inject  as instructed 3  "times a day before meals. Using a 1:15 cho ratio and 1:25 over 150 correction ratio at meals, Disp: , Rfl:   •  Insulin Pen Needle 32 G x 4 mm, 1 Each by Does not apply route 3 times a day., Disp: 100 Each, Rfl: 11  •  Glucagon, rDNA, 1 MG Kit, Use one pen prn severe hypoglycemia, Disp: 1 Kit, Rfl: 1  •  Blood Glucose Test Strips, Test strips order: Test strips for meter. Sig: use tid and prn ssx high or low sugar #100 RF x 3, Disp: 100 Strip, Rfl: 3  •  Lancets, Lancets order: Lancets for glucometer. Sig: use tid and prn ssx high or low sugar. #100 RF x 3, Disp: 100 Each, Rfl: 3  •  Syringe/Needle, Disp, (SYRINGE 3CC/37IE7-0/2\") 22G X 1-1/2\" 3 ML Misc, For use with Intra-muscular or subcutaneous B 12  injection every 2 weeks., Disp: 12 Each, Rfl: 1  •  LINZESS 145 MCG Cap, Take 145 mcg by mouth every day., Disp: , Rfl:     Labs: Reviewed    Physical Examination:  Vital signs: /72 (BP Location: Left arm, Patient Position: Sitting)   Pulse (!) 104   Ht 1.702 m (5' 7\")   Wt 91.9 kg (202 lb 9.6 oz)   SpO2 100%   BMI 31.73 kg/m²  Body mass index is 31.73 kg/m².  General: No apparent distress, cooperative  Eyes: No scleral icterus or discharge  ENMT: Normal on external inspection of nose, lips, normal thyroid exam  Neck: No abnormal masses on inspection  Resp: Normal effort, clear to auscultation bilaterally   CVS: Regular rate and rhythm, S1 S2 normal, no murmur   Extremities: No edema  Abdomen: abdominal obesity present  Neuro: Alert and oriented  Skin: No rash  Psych: Normal mood and affect, intact memory and able to make informed decisions    Assessment and Plan:  1. Uncontrolled type 2 diabetes mellitus with hyperglycemia (HCC)  Unclear if she has STEW vs atypical type 1 diabetes;increase ozempic to 1mg weekly to reduce hyperglycemia; glucagon for hypoglycemia as and when needed.     - Discussed diabetic diet, encouraged portion control.   - Discussed importance of testing blood sugars and keeping logs. "   - Discussed importance of daily exercise, recommended 30 minutes per day  - Reviewed medications and advised how to take.  - Discussed importance of immunizations and yearly eye exams.   - Advised daily foot  exams. Educated on signs of infection.   - Educated on need to stay well hydrated with water.  - Educated to call with any questions or problems.  The patient's Continuous Glucose Monitor download and reviewed in great detail with the patient.  You can find report scanned into media     2. Vitamin D deficiency  Cont D.     3. Vitamin B 12 deficiency  Cont B 12.       Return in about 3 months (around 11/12/2019).    Thank you for allowing me to participate in the care of this patient.    Teja Lora M.D.  08/12/19    CC:   Jayme Pastrana M.D.    This note was created using voice recognition software (Dragon). The accuracy of the dictation is limited by the abilities of the software. I have reviewed the note prior to signing, however some errors in grammar and context are still possible. If you have any questions related to this note please do not hesitate to contact our office.   This note was scribed by Lizzie Coleman RN, CDE

## 2019-08-12 NOTE — LETTER
Teja Lora M.D.  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     Thank you for your understanding in this matter. If you have any questions or concerns, please don't hesitate to call.    Sincerely,    Teja Lora M.D.  Electronically signed

## 2019-08-13 ENCOUNTER — TELEPHONE (OUTPATIENT)
Dept: ENDOCRINOLOGY | Facility: MEDICAL CENTER | Age: 19
End: 2019-08-13

## 2019-08-13 NOTE — TELEPHONE ENCOUNTER
1. Caller Name: Malena Bennett                                             Call Back Number: [phone] (home)         Patient approves a detailed voicemail message: yes    2. SPECIFIC Action To Be Taken: Please place Hep B with titer lab order. 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    3. Diagnosis/Clinical Reason for Request: For nursing school    4. Specialty & Provider Name/Lab/Imaging Location: Unknown    5. Is appointment scheduled for requested order/referral: no    Patient was informed they will receive a return phone call from the office ONLY if there are any questions before processing their request. Advised to call back if they haven't received a call from the referral department in 5 days.

## 2019-08-14 NOTE — TELEPHONE ENCOUNTER
Patient called again to see if you can put that in today since she has an appointment tomorrow to get labs done

## 2019-08-15 ENCOUNTER — NON-PROVIDER VISIT (OUTPATIENT)
Dept: HEALTH INFORMATION MANAGEMENT | Facility: MEDICAL CENTER | Age: 19
End: 2019-08-15
Payer: COMMERCIAL

## 2019-08-15 ENCOUNTER — HOSPITAL ENCOUNTER (OUTPATIENT)
Dept: LAB | Facility: MEDICAL CENTER | Age: 19
End: 2019-08-15
Attending: INTERNAL MEDICINE
Payer: COMMERCIAL

## 2019-08-15 DIAGNOSIS — E10.65 UNCONTROLLED TYPE 1 DIABETES MELLITUS WITH HYPERGLYCEMIA (HCC): ICD-10-CM

## 2019-08-15 DIAGNOSIS — E13.9 MODY (MATURITY ONSET DIABETES MELLITUS IN YOUNG) (HCC): ICD-10-CM

## 2019-08-15 DIAGNOSIS — E03.9 HYPOTHYROIDISM, UNSPECIFIED TYPE: ICD-10-CM

## 2019-08-15 DIAGNOSIS — E66.9 CLASS 1 OBESITY WITHOUT SERIOUS COMORBIDITY WITH BODY MASS INDEX (BMI) OF 32.0 TO 32.9 IN ADULT, UNSPECIFIED OBESITY TYPE: ICD-10-CM

## 2019-08-15 DIAGNOSIS — E11.65 UNCONTROLLED TYPE 2 DIABETES MELLITUS WITH HYPERGLYCEMIA (HCC): ICD-10-CM

## 2019-08-15 DIAGNOSIS — E53.8 VITAMIN B 12 DEFICIENCY: ICD-10-CM

## 2019-08-15 DIAGNOSIS — E55.9 VITAMIN D DEFICIENCY: ICD-10-CM

## 2019-08-15 LAB
25(OH)D3 SERPL-MCNC: 51 NG/ML (ref 30–100)
B-OH-BUTYR SERPL-MCNC: 0.13 MMOL/L (ref 0.02–0.27)
CREAT UR-MCNC: 188.8 MG/DL
MICROALBUMIN UR-MCNC: 2.1 MG/DL
MICROALBUMIN/CREAT UR: 11 MG/G (ref 0–30)
T3 SERPL-MCNC: 116.5 NG/DL (ref 60–181)
T3FREE SERPL-MCNC: 3.89 PG/ML (ref 2.4–4.2)
T4 FREE SERPL-MCNC: 0.92 NG/DL (ref 0.53–1.43)
THYROPEROXIDASE AB SERPL-ACNC: 0.8 IU/ML (ref 0–9)
TSH SERPL DL<=0.005 MIU/L-ACNC: 1.92 UIU/ML (ref 0.38–5.33)
VIT B12 SERPL-MCNC: 521 PG/ML (ref 211–911)

## 2019-08-15 PROCEDURE — 84681 ASSAY OF C-PEPTIDE: CPT

## 2019-08-15 PROCEDURE — 36415 COLL VENOUS BLD VENIPUNCTURE: CPT

## 2019-08-15 PROCEDURE — 84206 ASSAY OF PROINSULIN: CPT

## 2019-08-15 PROCEDURE — 82570 ASSAY OF URINE CREATININE: CPT

## 2019-08-15 PROCEDURE — 84443 ASSAY THYROID STIM HORMONE: CPT

## 2019-08-15 PROCEDURE — 82010 KETONE BODYS QUAN: CPT

## 2019-08-15 PROCEDURE — 82306 VITAMIN D 25 HYDROXY: CPT

## 2019-08-15 PROCEDURE — 82043 UR ALBUMIN QUANTITATIVE: CPT

## 2019-08-15 PROCEDURE — 97802 MEDICAL NUTRITION INDIV IN: CPT | Performed by: DIETITIAN, REGISTERED

## 2019-08-15 PROCEDURE — 80061 LIPID PANEL: CPT

## 2019-08-15 PROCEDURE — 84481 FREE ASSAY (FT-3): CPT

## 2019-08-15 PROCEDURE — 82607 VITAMIN B-12: CPT

## 2019-08-15 PROCEDURE — 84480 ASSAY TRIIODOTHYRONINE (T3): CPT

## 2019-08-15 PROCEDURE — 86341 ISLET CELL ANTIBODY: CPT

## 2019-08-15 PROCEDURE — 84439 ASSAY OF FREE THYROXINE: CPT

## 2019-08-15 PROCEDURE — 86376 MICROSOMAL ANTIBODY EACH: CPT

## 2019-08-16 LAB
CHOLEST SERPL-MCNC: 168 MG/DL (ref 100–199)
FASTING STATUS PATIENT QL REPORTED: NORMAL
HDLC SERPL-MCNC: 39 MG/DL
LDLC SERPL CALC-MCNC: 113 MG/DL
TRIGL SERPL-MCNC: 79 MG/DL (ref 0–149)

## 2019-08-16 NOTE — PROGRESS NOTES
"8/16/2019    Jayme Pastrana M.D.  18 y.o.   Time in/out: 1501 - 1602    Subjective:  -Patient wants to control DM better  -States that she has been \"high since July\"  -Has hypoglycemic events where her BG is ~40 mg/dL for 3-4 hours and she cannot bring it up, usually 2x/week  -Currently taking Novolog at 1:10 ICR and 1:25>125 Correction  -States that she has gained ~30# and would like to lose it    Nutrition Diagnosis (PES Statement)    Altered nutrition-related lab values related to endocrine dysfunction as evidenced by the patient needing to take basal and bolus insulin to control BG.    Biochemical data, medical test and procedures  Lab Results   Component Value Date/Time    HBA1C 5.0 08/12/2019 03:55 PM     Lab Results   Component Value Date/Time    CHOLSTRLTOT 168 08/15/2019 11:51 AM     (H) 08/15/2019 11:51 AM    HDL 39 (A) 08/15/2019 11:51 AM    TRIGLYCERIDE 79 08/15/2019 11:51 AM         Nutrition Intervention  Meal and Snack  Recommend a general/healthful diet    Comprehensive Nutrition education Instruction or training leading to in-depth nutrition related knowledge about:  Theraputic diet for CHO counting    Monitoring & Evaluation Plan  Behavioral-Environmental:  Behavior:  CHO count more effectively and dose insulin based upon given ICR and correction at meals    Food / Nutrient Intake:  Food intake:  Use the plate method recommendations for portions/balance at meals  Macronutrient intake:  45 grams CHO or less per meal    Physical Signs / Symptoms:  BG: fasting/ 2 hr pp:  Elimination of frequent hypoglycemic events  HbA1c profiles:  Within ADA guidelines  Weight change:  Weight loss to goal      Assessment Notes:  \"Nell\" is not CHO counting effectively and this could be why her BG have been so high for the last month and why she has low BG events that are difficult to bring up.  Her first goal is to CHO count more effectively using food labels, apps, the [employer] book, and the general " rules of thumb I gave her and dose her insulin based upon the CHO she is eating.  She admits that she is likely under-bolusing and needs to do a better job of counting CHO.    I showed her the plate method since she wants to lose ~30# and gave her goal of ~1 cup of CHO at meals, or 45 grams CHO, or less.  She will strive for this and will also work on eating more consistently because she does not eat consistently during the day d/t school and work.      She will stay in contact with me with questions.  F/u prn.

## 2019-08-18 LAB — ACETONE SERPL-MCNC: <5 MG/DL

## 2019-08-19 LAB — PROINSULIN P 12H FAST SERPL-SCNC: 2.5 PMOL/L

## 2019-08-20 LAB — GAD65 AB SER IA-ACNC: <5 IU/ML (ref 0–5)

## 2019-08-21 LAB — C PEPTIDE SERPL-MCNC: 2.7 NG/ML (ref 0.8–3.5)

## 2019-08-22 ENCOUNTER — OFFICE VISIT (OUTPATIENT)
Dept: MEDICAL GROUP | Facility: PHYSICIAN GROUP | Age: 19
End: 2019-08-22
Payer: COMMERCIAL

## 2019-08-22 VITALS
BODY MASS INDEX: 32.33 KG/M2 | SYSTOLIC BLOOD PRESSURE: 110 MMHG | WEIGHT: 206 LBS | OXYGEN SATURATION: 97 % | DIASTOLIC BLOOD PRESSURE: 80 MMHG | RESPIRATION RATE: 16 BRPM | TEMPERATURE: 100 F | HEART RATE: 117 BPM | HEIGHT: 67 IN

## 2019-08-22 DIAGNOSIS — K58.8 OTHER IRRITABLE BOWEL SYNDROME: ICD-10-CM

## 2019-08-22 DIAGNOSIS — Z90.49 HISTORY OF CHOLECYSTECTOMY: ICD-10-CM

## 2019-08-22 DIAGNOSIS — D68.318 FACTOR VIII INHIBITOR DISORDER (HCC): ICD-10-CM

## 2019-08-22 DIAGNOSIS — K90.0 CELIAC DISEASE: ICD-10-CM

## 2019-08-22 DIAGNOSIS — Z01.84 IMMUNITY STATUS TESTING: ICD-10-CM

## 2019-08-22 DIAGNOSIS — G90.A POTS (POSTURAL ORTHOSTATIC TACHYCARDIA SYNDROME): ICD-10-CM

## 2019-08-22 DIAGNOSIS — F95.2 TOURETTE SYNDROME: ICD-10-CM

## 2019-08-22 DIAGNOSIS — N94.0 MITTELSCHMERZ: ICD-10-CM

## 2019-08-22 DIAGNOSIS — E11.65 UNCONTROLLED TYPE 2 DIABETES MELLITUS WITH HYPERGLYCEMIA (HCC): ICD-10-CM

## 2019-08-22 DIAGNOSIS — G44.229 CHRONIC TENSION-TYPE HEADACHE, NOT INTRACTABLE: ICD-10-CM

## 2019-08-22 DIAGNOSIS — D68.51 FACTOR V LEIDEN (HCC): Chronic | ICD-10-CM

## 2019-08-22 DIAGNOSIS — F42.9 OBSESSIVE-COMPULSIVE DISORDER, UNSPECIFIED TYPE: ICD-10-CM

## 2019-08-22 PROBLEM — Z91.89 AT RISK FOR KNOWLEDGE DEFICIT: Status: RESOLVED | Noted: 2017-10-16 | Resolved: 2019-08-22

## 2019-08-22 PROBLEM — K82.8 BILIARY DYSKINESIA: Status: RESOLVED | Noted: 2017-01-30 | Resolved: 2019-08-22

## 2019-08-22 PROBLEM — R55 SYNCOPE AND COLLAPSE: Status: RESOLVED | Noted: 2018-05-24 | Resolved: 2019-08-22

## 2019-08-22 PROBLEM — R00.2 PALPITATIONS: Status: RESOLVED | Noted: 2018-12-17 | Resolved: 2019-08-22

## 2019-08-22 PROBLEM — K92.1 BLOOD IN STOOL: Status: RESOLVED | Noted: 2017-11-22 | Resolved: 2019-08-22

## 2019-08-22 PROBLEM — R69 DIAGNOSIS UNKNOWN: Status: RESOLVED | Noted: 2018-05-24 | Resolved: 2019-08-22

## 2019-08-22 PROBLEM — N39.0 URINARY TRACT INFECTION WITHOUT HEMATURIA: Status: RESOLVED | Noted: 2017-11-10 | Resolved: 2019-08-22

## 2019-08-22 PROBLEM — E27.40 ADRENAL INSUFFICIENCY (HCC): Status: RESOLVED | Noted: 2018-08-10 | Resolved: 2019-08-22

## 2019-08-22 PROBLEM — K37 APPENDICITIS: Status: RESOLVED | Noted: 2017-07-01 | Resolved: 2019-08-22

## 2019-08-22 PROBLEM — E66.3 OVERWEIGHT, PEDIATRIC, BMI (BODY MASS INDEX) 95-99% FOR AGE: Status: RESOLVED | Noted: 2017-05-19 | Resolved: 2019-08-22

## 2019-08-22 PROCEDURE — 99214 OFFICE O/P EST MOD 30 MIN: CPT | Performed by: FAMILY MEDICINE

## 2019-08-22 RX ORDER — MEDROXYPROGESTERONE ACETATE 150 MG/ML
INJECTION, SUSPENSION INTRAMUSCULAR
COMMUNITY
Start: 2019-08-02 | End: 2019-10-18

## 2019-08-22 RX ORDER — PROCHLORPERAZINE 25 MG/1
SUPPOSITORY RECTAL
COMMUNITY
End: 2021-02-26

## 2019-08-22 RX ORDER — BLOOD-GLUCOSE METER
KIT MISCELLANEOUS
COMMUNITY
Start: 2019-08-17 | End: 2022-03-21

## 2019-08-22 ASSESSMENT — PATIENT HEALTH QUESTIONNAIRE - PHQ9: CLINICAL INTERPRETATION OF PHQ2 SCORE: 0

## 2019-08-22 NOTE — PROGRESS NOTES
Subjective:   Malena Bennett is a 18 y.o. female here today for evaluation and management of:     Uncontrolled type 2 diabetes mellitus with hyperglycemia (HCC)  Last A1c 5.0  LDL mildly elevated at 113   patient is 18 years old BMI is 32  Patient is on on ozempic 1 mg every 7 days, Tresiba and NovoLog  Patient is followed by her endocrinologist routinely for this.  She has her eye exams done annually and she has no symptoms of neuropathy.  In addition patient was seen by her cardiologist and was medically cleared.  She does not need to follow-up with them.    Tourette syndrome  Patient is currently in nursing school at [employer], paperwork for accommodations in the academic setting provided. Scanned in chart.   She does better in fast paced clinical environments.   She is followed by Dr. Huber at the [facility]  Patient controls her symptoms with techniques that she has learned and been taught she is not currently on any medications for this.    Factor V Leiden ([facility])  Patient was diagnosed when she is about 15 or 16 this was done after her father had 2 DVTs.  Patient has not had any blood clots.  Patient is not currently on any anticoagulation or blood thinners. She is on depo and should avoid estrogen to reduce risk of clots. She does not smoke. Was evaluated by hematology in the past. Her father also has factor V.  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  Referral to hematology done today for surveillance basically to see if patient needs to be on any blood thinners.    Factor VIII inhibitor disorder (HCC)  Patient was diagnosed with factor V Leiden disorder and factor VIII disorder about 2 years ago when her father was diagnosed with factor V Leiden disorder after having 2 blood clots.  Patient has no history of blood clots.  She is currently not on aspirin or any blood thinners.  Will refer patient to hematology as she has  petechiae bilateral LE      Chronic tension-type headache, not intractable  Patient has chronic headaches.  She does get them with low blood sugars but also at other times.  She does not have photophobia or vomiting with the headaches.  More headaches and this was normal.  Patient does have some anxiety levels that are higher indicated by elevated pulse rate at today's clinic visit at 117.  She has been cleared by cardiologist in the past.  So the anxiety could also be contributing to her tension headaches.  Occasionally she takes ibuprofen and Tylenol which does help but in general she would like to avoid taking medications for headaches.    Mittelschmerz  Patient continues on Depo-Provera and this has helped immensely with pain during ovulation.    OCD (obsessive compulsive disorder)  Patient has OCD.  She has her techniques that she uses to help cope with this.  Paperwork provided for school accommodations.  Patient is not currently interested in referral to psychiatry or behavioral therapy as she manages the condition herself that she has had it for many years.    Celiac disease  This was confirmed by patient's blood work as well as biopsies when she had her EGD done by Dr. Cuellar.  Patient controls her symptoms by avoiding gluten.    Other irritable bowel syndrome  Intermittent symptoms mostly diarrhea.  Symptoms occur randomly not necessarily triggered by stress.  Patient does also have celiac disease currently no blood in the stool and no weight loss.    POTS (postural orthostatic tachycardia syndrome)  Patient's heart rate today is 117.         Current medicines (including changes today)  Current Outpatient Medications   Medication Sig Dispense Refill   • FREESTYLE LITE strip      • medroxyPROGESTERone (DEPO-PROVERA) 150 MG/ML Suspension      • Continuous Blood Gluc Sensor (DEXCOM G6 SENSOR) Misc by Does not apply route.     • Semaglutide (OZEMPIC) 1 MG/DOSE Solution Pen-injector Inject 1 mg as instructed  "every 7 days. 6 PEN 3   • NOVOLOG, insulin aspart, (NOVOLOG FLEXPEN) 100 UNIT/ML Solution Pen-injector injection Inject 10 Units as instructed 3 times a day before meals. 3 PEN 3   • insulin aspart (NOVOLOG) 100 UNIT/ML Solution Inject  as instructed 3 times a day before meals. Using a 1:15 cho ratio and 1:25 over 150 correction ratio at meals     • Insulin Pen Needle 32 G x 4 mm 1 Each by Does not apply route 3 times a day. 100 Each 11   • Glucagon, rDNA, 1 MG Kit Use one pen prn severe hypoglycemia 1 Kit 1   • vitamin D, Ergocalciferol, (DRISDOL) 10408 units Cap capsule Take 1 Cap by mouth every 3 days. 18 Cap 1   • Blood Glucose Test Strips Test strips order: Test strips for meter. Sig: use tid and prn ssx high or low sugar #100 RF x 3 100 Strip 3   • Lancets Lancets order: Lancets for glucometer. Sig: use tid and prn ssx high or low sugar. #100 RF x 3 100 Each 3   • Insulin Degludec (TRESIBA FLEXTOUCH) 200 UNIT/ML Solution Pen-injector Inject 20 Units as instructed every day. (Patient taking differently: Inject 10 Units as instructed every day.) 2 PEN 3   • cyanocobalamin (VITAMIN B-12) 1000 MCG/ML Solution 1 mL by Intramuscular route every 10 days. 9 mL 3   • ondansetron (ZOFRAN ODT) 8 MG TABLET DISPERSIBLE Take 1 Tab by mouth every 8 hours as needed for Nausea. 30 Tab 1   • Syringe/Needle, Disp, (SYRINGE 3CC/45LD7-1/2\") 22G X 1-1/2\" 3 ML Misc For use with Intra-muscular or subcutaneous B 12  injection every 2 weeks. 12 Each 1   • glucose monitoring kit (FREESTYLE) monitoring kit Use to measure blood glucose regularly. 1 Kit 0   • LINZESS 145 MCG Cap Take 145 mcg by mouth every day.       No current facility-administered medications for this visit.      She  has a past medical history of Asthma, Clotting disorder ([facility]), Factor 5 Leiden mutation, heterozygous (HCC), OCD (obsessive compulsive disorder), and Tourette disease.    ROS  No chest pain, no shortness of breath, no abdominal pain       Objective:     BP " "110/80 (BP Location: Right arm, Patient Position: Sitting, BP Cuff Size: Adult)   Pulse (!) 117   Temp 37.8 °C (100 °F) (Temporal)   Resp 16   Ht 1.689 m (5' 6.5\")   Wt 93.4 kg (206 lb)   SpO2 97%  Body mass index is 32.75 kg/m².   Physical Exam:  Constitutional: Alert, no distress.  Skin: Warm, dry, good turgor, no rashes in visible areas.  Eye: Equal, round and reactive, conjunctiva clear, lids normal.  ENMT: Lips without lesions, good dentition, oropharynx clear.  Neck: Trachea midline, no masses, no thyromegaly. No cervical or supraclavicular lymphadenopathy  Respiratory: Unlabored respiratory effort, lungs clear to auscultation, no wheezes, no ronchi.  Cardiovascular: Normal S1, S2, no murmur, no edema.  Abdomen: Soft, non-tender, no masses, no hepatosplenomegaly.  Psych: Alert and oriented x3, normal affect and mood.        Assessment and Plan:   The following treatment plan was discussed    1. Uncontrolled type 2 diabetes mellitus with hyperglycemia (HCC)  No criteria for Type 2 DM  Follow up with endocrinology    2. Tourette syndrome  Stable controlled    3. Factor V Leiden (HCC)  - REFERRAL TO HEMATOLOGY ONCOLOGY Referral to? [facility]    4. Factor VIII inhibitor disorder (HCC)  - REFERRAL TO HEMATOLOGY ONCOLOGY Referral to? [facility]    5. History of cholecystectomy  No acute changes    6. Immunity status testing  - HEPATITIS B SURF AB QUANT    7. Chronic tension-type headache, not intractable  No acute changes    8. Mittelschmerz  Well-controlled with Depo-Provera    9. Obsessive-compulsive disorder, unspecified type  Stable controlled    10. Celiac disease  Controlled with avoidance of gluten    11. Other irritable bowel syndrome  No acute changes    12. POTS (postural orthostatic tachycardia syndrome)  Chronic condition stable      Followup: Return in about 1 year (around 8/22/2020), or if symptoms worsen or fail to improve.         "

## 2019-08-22 NOTE — ASSESSMENT & PLAN NOTE
Patient was diagnosed with factor V Leiden disorder and factor VIII disorder about 2 years ago when her father was diagnosed with factor V Leiden disorder after having 2 blood clots.  Patient has no history of blood clots.  She is currently not on aspirin or any blood thinners.  Will refer patient to hematology as she has petechiae bilateral LE

## 2019-08-22 NOTE — ASSESSMENT & PLAN NOTE
This was confirmed by patient's blood work as well as biopsies when she had her EGD done by Dr. Cuellar.  Patient controls her symptoms by avoiding gluten.

## 2019-08-22 NOTE — ASSESSMENT & PLAN NOTE
Patient has chronic headaches.  She does get them with low blood sugars but also at other times.  She does not have photophobia or vomiting with the headaches.  More headaches and this was normal.  Patient does have some anxiety levels that are higher indicated by elevated pulse rate at today's clinic visit at 117.  She has been cleared by cardiologist in the past.  So the anxiety could also be contributing to her tension headaches.  Occasionally she takes ibuprofen and Tylenol which does help but in general she would like to avoid taking medications for headaches.

## 2019-08-22 NOTE — LETTER
August 22, 2019 August 22, 2019    To:    [employer]   [employer]    Malena Bennett is my patient in clinic. Her disability does not prevent her from working in a high-intensity clinical placement. Tics are mitigated when Malena is engaged in fast-paced environments and situations where she feels the most comfortable fast paced.     Tics are exacerbated in response to certain environmental triggers due to sensory integration issues seen with Tourette's Syndrome. Malena is cognizant of various triggers and knows from past experience that a nursing home setting is one of her triggers.     Patient has no cardiac issues per her cardiologist.     Thank you for your understanding.     Please contact me with any questions.                   Jayme Pastrana M.D.

## 2019-08-22 NOTE — ASSESSMENT & PLAN NOTE
Patient has OCD.  She has her techniques that she uses to help cope with this.  Paperwork provided for school accommodations.  Patient is not currently interested in referral to psychiatry or behavioral therapy as she manages the condition herself that she has had it for many years.

## 2019-08-22 NOTE — ASSESSMENT & PLAN NOTE
Intermittent symptoms mostly diarrhea.  Symptoms occur randomly not necessarily triggered by stress.  Patient does also have celiac disease currently no blood in the stool and no weight loss.

## 2019-08-22 NOTE — ASSESSMENT & PLAN NOTE
Last A1c 5.0  LDL mildly elevated at 113   patient is 18 years old BMI is 32  Patient is on on ozempic 1 mg every 7 days, Tresiba and NovoLog  Patient is followed by her endocrinologist routinely for this.  She has her eye exams done annually and she has no symptoms of neuropathy.  In addition patient was seen by her cardiologist and was medically cleared.  She does not need to follow-up with them.

## 2019-08-22 NOTE — ASSESSMENT & PLAN NOTE
Patient is currently in nursing school at [employer], paperwork for accommodations in the academic setting provided. Scanned in chart.   She does better in fast paced clinical environments.   She is followed by Dr. Huber at the [facility]  Patient controls her symptoms with techniques that she has learned and been taught she is not currently on any medications for this.

## 2019-08-22 NOTE — ASSESSMENT & PLAN NOTE
Patient was diagnosed when she is about 15 or 16 this was done after her father had 2 DVTs.  Patient has not had any blood clots.  Patient is not currently on any anticoagulation or blood thinners. She is on depo and should avoid estrogen to reduce risk of clots. She does not smoke. Was evaluated by hematology in the past. Her father also has factor V.  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  Referral to hematology done today for surveillance basically to see if patient needs to be on any blood thinners.

## 2019-08-23 ENCOUNTER — HOSPITAL ENCOUNTER (OUTPATIENT)
Dept: LAB | Facility: MEDICAL CENTER | Age: 19
End: 2019-08-23
Attending: FAMILY MEDICINE
Payer: COMMERCIAL

## 2019-08-23 LAB — HBV SURFACE AB SERPL IA-ACNC: >1000 MIU/ML (ref 0–10)

## 2019-08-23 PROCEDURE — 86706 HEP B SURFACE ANTIBODY: CPT

## 2019-08-23 PROCEDURE — 36415 COLL VENOUS BLD VENIPUNCTURE: CPT

## 2019-09-11 ENCOUNTER — TELEPHONE (OUTPATIENT)
Dept: ENDOCRINOLOGY | Facility: MEDICAL CENTER | Age: 19
End: 2019-09-11

## 2019-09-11 NOTE — TELEPHONE ENCOUNTER
FINAL PRIOR AUTHORIZATION STATUS:    1.  Name of Medication & Dose: Ozempic 1mg     2. Prior Auth Status (if approved--length of approval):  No PA required    3. Action Taken: Pharmacy Notified: yes    Documentation was scanned into media and attached to this telephone encounter.

## 2019-09-23 ENCOUNTER — TELEPHONE (OUTPATIENT)
Dept: ENDOCRINOLOGY | Facility: MEDICAL CENTER | Age: 19
End: 2019-09-23

## 2019-09-23 NOTE — TELEPHONE ENCOUNTER
1. Caller Name: Malena Bennett                                         Call Back Number: [phone] (home)       Patient approves a detailed voicemail message: yes    BS readings   She has noticed more and more lows and she is unsure of what to do at this point. She has increased her eating but she feels like she is constantly eating and her levels are still going low. She is going below 80 and she stated that she is having a harder time feeling when she is going low. She is also having highs in the middle of the night around 3am-4am. She is unsure of what to do when that happens at night. She has been watching what she eats an was seen at [facility] but she needs some direction on what to do about her constant lows.   I told her that I would send a message to the provider and his nurses and we will get back to her as soon as we can.

## 2019-09-24 ENCOUNTER — TELEPHONE (OUTPATIENT)
Dept: ENDOCRINOLOGY | Facility: MEDICAL CENTER | Age: 19
End: 2019-09-24

## 2019-09-24 NOTE — TELEPHONE ENCOUNTER
1. Caller Name: Malena                                         Call Back Number: [phone] (home)         Patient approves a detailed voicemail message: no    PAtient called frustrated that she has been off of all insulin completely for a few weeks and still experiencing 8-12 lows (going below 80) during the day. Please call her when you get a chance.

## 2019-09-25 NOTE — TELEPHONE ENCOUNTER
Spoke to the patient over the cell phone in detail.  She states that despite being off insulin for several days her blood sugar keeps dropping to below 80 and also below 40 range.  Have advised her to take glucagon as and when needed.  She also states that she has stopped Ozempic for the last few weeks.  I advised her to obtain and/or establish care from the endocrinologist in [facility].  Patient currently is in [place] and not in our area.  She goes to school in [place].  Discussed the need to have an endocrinologist to help her out and while she is in [place].    I have sent the prescription for nasal glucagon to her [facility] pharmacy.  From there her parents will be able to ship it to her place in [place]    Patient also states that all the testing for STEW by was being sent to the center in [place] who is trying to do an extensive STEW work up.     She states that that she has entered the code that we had given to her in her DEXCOM; so that we can see her continuous glucose monitoring data over the past few days and we will look at it once we have access to it and try to figure out the trends.    And advised to go to the urgent care or emergency care if needed for low blood sugar

## 2019-10-18 ENCOUNTER — NON-PROVIDER VISIT (OUTPATIENT)
Dept: MEDICAL GROUP | Facility: PHYSICIAN GROUP | Age: 19
End: 2019-10-18
Payer: COMMERCIAL

## 2019-10-18 DIAGNOSIS — Z30.42 ENCOUNTER FOR SURVEILLANCE OF INJECTABLE CONTRACEPTIVE: ICD-10-CM

## 2019-10-18 LAB
INT CON NEG: NEGATIVE
INT CON POS: POSITIVE
POC URINE PREGNANCY TEST: NORMAL

## 2019-10-18 PROCEDURE — 81025 URINE PREGNANCY TEST: CPT | Performed by: NURSE PRACTITIONER

## 2019-10-18 PROCEDURE — 96372 THER/PROPH/DIAG INJ SC/IM: CPT | Performed by: NURSE PRACTITIONER

## 2019-10-18 RX ORDER — MEDROXYPROGESTERONE ACETATE 150 MG/ML
150 INJECTION, SUSPENSION INTRAMUSCULAR ONCE
Status: COMPLETED | OUTPATIENT
Start: 2019-10-18 | End: 2019-10-18

## 2019-10-18 RX ADMIN — MEDROXYPROGESTERONE ACETATE 150 MG: 150 INJECTION, SUSPENSION INTRAMUSCULAR at 14:48

## 2019-12-20 ENCOUNTER — TELEPHONE (OUTPATIENT)
Dept: ENDOCRINOLOGY | Facility: MEDICAL CENTER | Age: 19
End: 2019-12-20

## 2019-12-20 DIAGNOSIS — E10.9 KETOSIS-PRONE DIABETES MELLITUS (HCC): ICD-10-CM

## 2019-12-20 DIAGNOSIS — E27.49 SECONDARY ADRENAL INSUFFICIENCY (HCC): ICD-10-CM

## 2019-12-20 DIAGNOSIS — E66.9 CLASS 1 OBESITY WITHOUT SERIOUS COMORBIDITY WITH BODY MASS INDEX (BMI) OF 32.0 TO 32.9 IN ADULT, UNSPECIFIED OBESITY TYPE: ICD-10-CM

## 2019-12-20 DIAGNOSIS — E11.65 UNCONTROLLED TYPE 2 DIABETES MELLITUS WITH HYPERGLYCEMIA (HCC): ICD-10-CM

## 2019-12-20 DIAGNOSIS — E13.9 MODY (MATURITY ONSET DIABETES MELLITUS IN YOUNG) (HCC): ICD-10-CM

## 2019-12-20 NOTE — TELEPHONE ENCOUNTER
Patient would like oral tolerance glucose test, insulin antibodies, Achr antibody ,  isolate cell antibodies,  JANEEN  antibodies and c peptide ordered. Patient wants these labs  ordered before insurance rolls over next month. Patient states these labs are ordered by a doctor in [place] and that Dr. Lora is aware she sees this Endocrinologist as well. I've scanned the lab request also.     Thank You!     [phone]

## 2019-12-23 NOTE — TELEPHONE ENCOUNTER
Phone Number Called: [phone] (home)     Call outcome: left message for patient to call back regarding message below    Message: Contacted patient to let her know labs were ordered by Dr. Kelley because Dr. Lora is out of the office. I let the patient know that if she went to a [facility] lab they were there ready for her and if she preferred going elsewhere I needed to know the name and address of the facility so that I can fax it to them.

## 2019-12-30 ENCOUNTER — HOSPITAL ENCOUNTER (OUTPATIENT)
Dept: LAB | Facility: MEDICAL CENTER | Age: 19
End: 2019-12-30
Attending: INTERNAL MEDICINE
Payer: COMMERCIAL

## 2019-12-30 ENCOUNTER — HOSPITAL ENCOUNTER (OUTPATIENT)
Facility: MEDICAL CENTER | Age: 19
End: 2019-12-30
Attending: INTERNAL MEDICINE
Payer: COMMERCIAL

## 2019-12-30 DIAGNOSIS — E10.9 KETOSIS-PRONE DIABETES MELLITUS (HCC): ICD-10-CM

## 2019-12-30 DIAGNOSIS — E66.9 CLASS 1 OBESITY WITHOUT SERIOUS COMORBIDITY WITH BODY MASS INDEX (BMI) OF 32.0 TO 32.9 IN ADULT, UNSPECIFIED OBESITY TYPE: ICD-10-CM

## 2019-12-30 DIAGNOSIS — E11.65 UNCONTROLLED TYPE 2 DIABETES MELLITUS WITH HYPERGLYCEMIA (HCC): ICD-10-CM

## 2019-12-30 DIAGNOSIS — E27.49 SECONDARY ADRENAL INSUFFICIENCY (HCC): ICD-10-CM

## 2019-12-30 LAB
ALBUMIN SERPL BCP-MCNC: 4.5 G/DL (ref 3.2–4.9)
ALBUMIN/GLOB SERPL: 1.3 G/DL
ALP SERPL-CCNC: 79 U/L (ref 30–99)
ALT SERPL-CCNC: 24 U/L (ref 2–50)
ANION GAP SERPL CALC-SCNC: 9 MMOL/L (ref 0–11.9)
APTT PPP: 25.6 SEC (ref 24.7–36)
AST SERPL-CCNC: 18 U/L (ref 12–45)
BASOPHILS # BLD AUTO: 0.5 % (ref 0–1.8)
BASOPHILS # BLD: 0.03 K/UL (ref 0–0.12)
BILIRUB SERPL-MCNC: 0.5 MG/DL (ref 0.1–1.5)
BUN SERPL-MCNC: 12 MG/DL (ref 8–22)
CALCIUM SERPL-MCNC: 9.4 MG/DL (ref 8.5–10.5)
CHLORIDE SERPL-SCNC: 105 MMOL/L (ref 96–112)
CO2 SERPL-SCNC: 25 MMOL/L (ref 20–33)
CREAT SERPL-MCNC: 0.85 MG/DL (ref 0.5–1.4)
EOSINOPHIL # BLD AUTO: 0.11 K/UL (ref 0–0.51)
EOSINOPHIL NFR BLD: 1.9 % (ref 0–6.9)
ERYTHROCYTE [DISTWIDTH] IN BLOOD BY AUTOMATED COUNT: 41.1 FL (ref 35.9–50)
FIBRINOGEN PPP-MCNC: 402 MG/DL (ref 215–460)
GLOBULIN SER CALC-MCNC: 3.5 G/DL (ref 1.9–3.5)
GLUCOSE 1.5H P CHAL SERPL-MCNC: 96 MG/DL (ref 65–139)
GLUCOSE 1H P CHAL SERPL-MCNC: 91 MG/DL (ref 65–199)
GLUCOSE 2H P CHAL SERPL-MCNC: 101 MG/DL (ref 65–139)
GLUCOSE 30M P CHAL SERPL-MCNC: 120 MG/DL (ref 65–199)
GLUCOSE BS SERPL-MCNC: 94 MG/DL (ref 65–99)
GLUCOSE SERPL-MCNC: 93 MG/DL (ref 65–99)
HCT VFR BLD AUTO: 45.1 % (ref 37–47)
HGB BLD-MCNC: 14.9 G/DL (ref 12–16)
IMM GRANULOCYTES # BLD AUTO: 0.02 K/UL (ref 0–0.11)
IMM GRANULOCYTES NFR BLD AUTO: 0.3 % (ref 0–0.9)
INR PPP: 1.01 (ref 0.87–1.13)
LYMPHOCYTES # BLD AUTO: 1.65 K/UL (ref 1–4.8)
LYMPHOCYTES NFR BLD: 28.6 % (ref 22–41)
MCH RBC QN AUTO: 30.6 PG (ref 27–33)
MCHC RBC AUTO-ENTMCNC: 33 G/DL (ref 33.6–35)
MCV RBC AUTO: 92.6 FL (ref 81.4–97.8)
MEDICATIONS NOTED 1688: NORMAL
MONOCYTES # BLD AUTO: 0.32 K/UL (ref 0–0.85)
MONOCYTES NFR BLD AUTO: 5.5 % (ref 0–13.4)
NEUTROPHILS # BLD AUTO: 3.64 K/UL (ref 2–7.15)
NEUTROPHILS NFR BLD: 63.2 % (ref 44–72)
NRBC # BLD AUTO: 0 K/UL
NRBC BLD-RTO: 0 /100 WBC
PLATELET # BLD AUTO: 241 K/UL (ref 164–446)
PLT FUNCTION COL/EPI  1661: 112 SEC (ref 83–170)
PMV BLD AUTO: 10.5 FL (ref 9–12.9)
POTASSIUM SERPL-SCNC: 3.8 MMOL/L (ref 3.6–5.5)
PROT SERPL-MCNC: 8 G/DL (ref 6–8.2)
PROTHROMBIN TIME: 13.5 SEC (ref 12–14.6)
RBC # BLD AUTO: 4.87 M/UL (ref 4.2–5.4)
SODIUM SERPL-SCNC: 139 MMOL/L (ref 135–145)
WBC # BLD AUTO: 5.8 K/UL (ref 4.8–10.8)

## 2019-12-30 PROCEDURE — 85610 PROTHROMBIN TIME: CPT

## 2019-12-30 PROCEDURE — 85246 CLOT FACTOR VIII VW ANTIGEN: CPT

## 2019-12-30 PROCEDURE — 83519 RIA NONANTIBODY: CPT

## 2019-12-30 PROCEDURE — 36415 COLL VENOUS BLD VENIPUNCTURE: CPT

## 2019-12-30 PROCEDURE — 84681 ASSAY OF C-PEPTIDE: CPT

## 2019-12-30 PROCEDURE — 86337 INSULIN ANTIBODIES: CPT

## 2019-12-30 PROCEDURE — 85576 BLOOD PLATELET AGGREGATION: CPT

## 2019-12-30 PROCEDURE — 82951 GLUCOSE TOLERANCE TEST (GTT): CPT

## 2019-12-30 PROCEDURE — 85240 CLOT FACTOR VIII AHG 1 STAGE: CPT

## 2019-12-30 PROCEDURE — 85025 COMPLETE CBC W/AUTO DIFF WBC: CPT

## 2019-12-30 PROCEDURE — 85384 FIBRINOGEN ACTIVITY: CPT

## 2019-12-30 PROCEDURE — 85245 CLOT FACTOR VIII VW RISTOCTN: CPT

## 2019-12-30 PROCEDURE — 86341 ISLET CELL ANTIBODY: CPT

## 2019-12-30 PROCEDURE — 80053 COMPREHEN METABOLIC PANEL: CPT

## 2019-12-30 PROCEDURE — 85730 THROMBOPLASTIN TIME PARTIAL: CPT

## 2019-12-30 PROCEDURE — 82952 GTT-ADDED SAMPLES: CPT

## 2020-01-01 LAB
ACHR BIND AB SER-SCNC: 0.3 NMOL/L (ref 0–0.4)
C PEPTIDE SERPL-MCNC: 2.8 NG/ML (ref 0.8–3.5)

## 2020-01-02 ENCOUNTER — TELEPHONE (OUTPATIENT)
Dept: MEDICAL GROUP | Facility: PHYSICIAN GROUP | Age: 20
End: 2020-01-02

## 2020-01-02 LAB
FACT VIII ACT/NOR PPP: 136 % (ref 56–191)
GAD65 AB SER IA-ACNC: <5 IU/ML (ref 0–5)
VWF AG ACT/NOR PPP IA: 127 % (ref 52–214)
VWF:RCO ACT/NOR PPP PL AGG: 112 % (ref 51–215)

## 2020-01-02 NOTE — TELEPHONE ENCOUNTER
Patient saw Hem/onc and she did not like the Doctor. Can we refer her to a new doctor? She saw [facility] last time. Thanks

## 2020-01-03 ENCOUNTER — NON-PROVIDER VISIT (OUTPATIENT)
Dept: MEDICAL GROUP | Facility: PHYSICIAN GROUP | Age: 20
End: 2020-01-03
Payer: COMMERCIAL

## 2020-01-03 DIAGNOSIS — D68.318 FACTOR VIII INHIBITOR DISORDER (HCC): ICD-10-CM

## 2020-01-03 DIAGNOSIS — D68.51 FACTOR V LEIDEN (HCC): Chronic | ICD-10-CM

## 2020-01-03 LAB — INSULIN HUMAN AB SER-ACNC: <0.4 U/ML (ref 0–0.4)

## 2020-01-03 PROCEDURE — 96372 THER/PROPH/DIAG INJ SC/IM: CPT | Performed by: NURSE PRACTITIONER

## 2020-01-03 RX ORDER — MEDROXYPROGESTERONE ACETATE 150 MG/ML
150 INJECTION, SUSPENSION INTRAMUSCULAR ONCE
Status: CANCELLED | OUTPATIENT
Start: 2020-01-03 | End: 2020-01-03

## 2020-01-03 RX ORDER — MEDROXYPROGESTERONE ACETATE 150 MG/ML
150 INJECTION, SUSPENSION INTRAMUSCULAR ONCE
Status: COMPLETED | OUTPATIENT
Start: 2020-01-03 | End: 2020-01-03

## 2020-01-03 RX ADMIN — MEDROXYPROGESTERONE ACETATE 150 MG: 150 INJECTION, SUSPENSION INTRAMUSCULAR at 11:49

## 2020-01-03 NOTE — NON-PROVIDER
Malena YakelinMarguerite Pateljimmie is a 19 y.o. here for a Depo Provera Injection.     Date of last Depo Provera Injection: 10/18/19  Current date within therapeutic range?: Yes   Urine pregnancy test done (needed if out of date range): not performed  Date of office visit:1/3/20  Date of last pap (if > 21 years old)/ GYN exam: na  Dx: Contraceptive use    Order and dose verified by: Chance  Patient tolerated injection and no adverse effects were observed or reported: Yes    # of Administrations remaining in MAR: 0  Next injection due between 3/17/20 and 4/21/20.

## 2020-01-06 ENCOUNTER — NON-PROVIDER VISIT (OUTPATIENT)
Dept: URGENT CARE | Facility: CLINIC | Age: 20
End: 2020-01-06

## 2020-01-06 DIAGNOSIS — Z11.1 PPD SCREENING TEST: ICD-10-CM

## 2020-01-06 PROCEDURE — 86580 TB INTRADERMAL TEST: CPT | Performed by: FAMILY MEDICINE

## 2020-01-08 ENCOUNTER — NON-PROVIDER VISIT (OUTPATIENT)
Dept: URGENT CARE | Facility: PHYSICIAN GROUP | Age: 20
End: 2020-01-08

## 2020-01-08 DIAGNOSIS — Z11.1 PPD SCREENING TEST: ICD-10-CM

## 2020-01-08 LAB — TB WHEAL 3D P 5 TU DIAM: NEGATIVE MM

## 2020-01-09 ENCOUNTER — OFFICE VISIT (OUTPATIENT)
Dept: HEMATOLOGY ONCOLOGY | Facility: MEDICAL CENTER | Age: 20
End: 2020-01-09
Payer: COMMERCIAL

## 2020-01-09 VITALS
OXYGEN SATURATION: 97 % | HEART RATE: 66 BPM | RESPIRATION RATE: 14 BRPM | SYSTOLIC BLOOD PRESSURE: 116 MMHG | DIASTOLIC BLOOD PRESSURE: 64 MMHG | HEIGHT: 66 IN | WEIGHT: 213.74 LBS | TEMPERATURE: 97.1 F | BODY MASS INDEX: 34.35 KG/M2

## 2020-01-09 DIAGNOSIS — R23.3 EASY BRUISABILITY: ICD-10-CM

## 2020-01-09 PROCEDURE — 99204 OFFICE O/P NEW MOD 45 MIN: CPT | Performed by: INTERNAL MEDICINE

## 2020-01-09 NOTE — LETTER
[facility]   [address] [place] [address], [address]  [place] [address]  Phone: [phone]  Fax: [phone]              Toshaisacc Ashley Bennett  2000    Encounter Date: 1/9/2020    Hakan Milner M.D.          PROGRESS NOTE:  No notes on file      Jayme Pastrana M.D.  [address] [place] [id number]  VIA In Basket

## 2020-01-09 NOTE — LETTER
[facility]   [address], [address]  [place] [address]  Phone: [phone]  Fax: [phone]              Malena Bennett  2000    Encounter Date: 1/9/2020    Hakan Milner M.D.          PROGRESS NOTE:  01/09/20    Referring Provider: Jayme Pastrana M.D.  [address] [place] [address]     Reason for Consultation/CC:    Factor V Leiden heterozygosity.  Elevated factor VIII.  Recently has been the has developed easy bruisability and petechiae.    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  Not clear whether she has type 1 diabetes or some other type.  She is being followed at [facility], and also [facility] to try to better characterize and manage her diabetes.  She has drastic elevations and falls of her sugar.  She has lost consciousness because of this.  She was only diagnosed in the spring 2018.  Controls her sugars with an insulin pen.    In 2015 2016 patient's father had 2 blood clots.  He was found to have factor V Leiden.  Patient herself was tested.  She was found to be heterozygous for factor V Leiden and also had to have an elevated factor VIII.  He has not personally had any venous thromboembolic disease.  Uses Depo-Provera mainly to control her menstruation.  No family history of bleeding.    In September 2019 she was noted to have some petechiae.  These were in various places including arms legs shoulder and chest.  He said she gets an area of redness, which progresses into petechiae, which progresses into a bruise.  This occurs spontaneously.  This is occurred multiple times since September.  These last 1 or 2 weeks.  He says they occur spontaneously in many cases.  But even very light trauma can cause it.  Being in the shower can cause it.  Being in heavy rain can cause it.  She says she has about 2 years of periodic bright red blood per rectum.  This is being followed by GI.  She reports  she had a colonoscopy and endoscopy.  She reports that by endoscopy they discovered celiac disease and lots of irritation.  No abnormalities per her report were found on the colonoscopy.  She no longer menstruates because of the Depakote.  She has been having periodic nosebleeds in September or October of 2019.. Also bleeds when she brushes her teeth, which is new.    She is never had her labs done while the petechiae or bruises were present.  Viewing her labs in the EMR, we have platelet counts going back as far as June 2016.  If consistently been in the mid 200,000 range.  The remainder of her CBC and differential are unremarkable.  On December 30, 2019 she did have a PT PTT and fibrinogen performed.  These were all normal.  At that same time she also had von Willebrand screen done.  Von Willebrand factor activity and antigen were both within the normal range.  Factor VIII activity at that time was 136, also in the normal range.  Looking back she has slightly elevated factor VIII level in August 2016 of 172 (upper limit of normal 145).  She also had a PFA-100 done on December 30, 2019 and was normal.    She recently saw Dr. Mnotesinos.  This was about 2 weeks ago.  Have no documentation.  Patient reports her diagnosis was scratching to hard. This is especially the case because she says she does not itch and does not scratch.  Neither she nor her mother believe that this is the correct diagnosis and they are here for second opinion.      Her only medications are Depo-Provera, vitamin D, vitamin B12 injections, and Zofran.  Does not know why she has nausea.  She reports a history of B12 deficiency, as well as vitamin D deficiency.    Review of Systems   Constitutional:        No fevers, chills, night sweats, or weight loss   HENT:        No ear pain, sore throat, or lumps in neck   Eyes:        No yellowing of the sclera.   Respiratory:        No SOB or cough   Cardiovascular:        No chest pain.  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 No LE edema   Gastrointestinal:        [place], vomiting, diarrhea X 2 years.  Improved with diagnosis of celiac disease and gluten free diet, but still present.   Genitourinary:        No dysuria, blood in urine, or flank pain   Musculoskeletal:        No muscle aches or atrophy   Neurological:        No changes to strength, sensation, or coordination   Endo/Heme/Allergies:        No lumps under the arm or in the groin   Psychiatric/Behavioral:        No depressed mood or hallucinations        Allergies   Allergen Reactions   • Cefdinir Rash     rash   • Erythromycin Vomiting and Nausea       Past Medical History:   Diagnosis Date   • Asthma     resolved   • Clotting disorder (HCC)    • Factor 5 Leiden mutation, heterozygous (HCC)    • OCD (obsessive compulsive disorder)    • Tourette disease        Past Surgical History:   Procedure Laterality Date   • APPENDECTOMY LAPAROSCOPIC  7/1/2017    Procedure: APPENDECTOMY LAPAROSCOPIC;  Surgeon: Derick Arthur M.D.;  Location: SURGERY [facility];  Service:    • ELIGIO BY LAPAROSCOPY  1/30/2017    Procedure: ELIGIO BY LAPAROSCOPY;  Surgeon: Fina Perkins M.D.;  Location: SURGERY SAME DAY [facility];  Service:    • APPENDECTOMY           Current Outpatient Medications:   •  FREESTYLE LITE strip, , Disp: , Rfl:   •  Continuous Blood Gluc Sensor (DEXCOM G6 SENSOR) Misc, by Does not apply route., Disp: , Rfl:   •  NOVOLOG, insulin aspart, (NOVOLOG FLEXPEN) 100 UNIT/ML Solution Pen-injector injection, Inject 10 Units as instructed 3 times a day before meals., Disp: 3 PEN, Rfl: 3  •  insulin aspart (NOVOLOG) 100 UNIT/ML Solution, Inject  as instructed 3 times a day before meals. Using a 1:15 cho ratio and 1:25 over 150 correction ratio at meals, Disp: , Rfl:   •  Insulin Pen Needle 32 G x 4 mm, 1 Each by Does not apply route 3 times a day., Disp: 100 Each, Rfl: 11  •   "Glucagon, rDNA, 1 MG Kit, Use one pen prn severe hypoglycemia, Disp: 1 Kit, Rfl: 1  •  vitamin D, Ergocalciferol, (DRISDOL) 55691 units Cap capsule, Take 1 Cap by mouth every 3 days., Disp: 18 Cap, Rfl: 1  •  Blood Glucose Test Strips, Test strips order: Test strips for meter. Sig: use tid and prn ssx high or low sugar #100 RF x 3, Disp: 100 Strip, Rfl: 3  •  Lancets, Lancets order: Lancets for glucometer. Sig: use tid and prn ssx high or low sugar. #100 RF x 3, Disp: 100 Each, Rfl: 3  •  cyanocobalamin (VITAMIN B-12) 1000 MCG/ML Solution, 1 mL by Intramuscular route every 10 days., Disp: 9 mL, Rfl: 3  •  ondansetron (ZOFRAN ODT) 8 MG TABLET DISPERSIBLE, Take 1 Tab by mouth every 8 hours as needed for Nausea., Disp: 30 Tab, Rfl: 1  •  Syringe/Needle, Disp, (SYRINGE 3CC/75AI3-6/2\") 22G X 1-1/2\" 3 ML Misc, For use with Intra-muscular or subcutaneous B 12  injection every 2 weeks., Disp: 12 Each, Rfl: 1  •  glucose monitoring kit (FREESTYLE) monitoring kit, Use to measure blood glucose regularly., Disp: 1 Kit, Rfl: 0  •  Semaglutide (OZEMPIC) 1 MG/DOSE Solution Pen-injector, Inject 1 mg as instructed every 7 days. (Patient not taking: Reported on 1/9/2020), Disp: 6 PEN, Rfl: 3  •  Insulin Degludec (TRESIBA FLEXTOUCH) 200 UNIT/ML Solution Pen-injector, Inject 20 Units as instructed every day. (Patient not taking: Reported on 1/9/2020), Disp: 2 PEN, Rfl: 3  •  LINZESS 145 MCG Cap, Take 145 mcg by mouth every day., Disp: , Rfl:      Social History     Socioeconomic History   • Marital status: Single     Spouse name: Not on file   • Number of children: Not on file   • Years of education: Not on file   • Highest education level: Not on file   Occupational History   • Not on file   Social Needs   • Financial resource strain: Not on file   • Food insecurity:     Worry: Not on file     Inability: Not on file   • Transportation needs:     Medical: Not on file     Non-medical: Not on file   Tobacco Use   • Smoking status: Never " Smoker   • Smokeless tobacco: Never Used   Substance and Sexual Activity   • Alcohol use: No     Alcohol/week: 0.0 oz   • Drug use: No   • Sexual activity: Never   Lifestyle   • Physical activity:     Days per week: Not on file     Minutes per session: Not on file   • Stress: Not on file   Relationships   • Social connections:     Talks on phone: Not on file     Gets together: Not on file     Attends Hindu service: Not on file     Active member of club or organization: Not on file     Attends meetings of clubs or organizations: Not on file     Relationship status: Not on file   • Intimate partner violence:     Fear of current or ex partner: Not on file     Emotionally abused: Not on file     Physically abused: Not on file     Forced sexual activity: Not on file   Other Topics Concern   • Behavioral problems Not Asked   • Interpersonal relationships Not Asked   • Sad or not enjoying activities Not Asked   • Suicidal thoughts Not Asked   • Poor school performance Not Asked   • Reading difficulties Not Asked   • Speech difficulties Not Asked   • Writing difficulties Not Asked   • Inadequate sleep Not Asked   • Excessive TV viewing Not Asked   • Excessive video game use Not Asked   • Inadequate exercise Not Asked   • Sports related Not Asked   • Poor diet Not Asked   • Family concerns for drug/alcohol abuse Not Asked   • Poor oral hygiene Not Asked   • Bike safety Not Asked   • Family concerns vehicle safety Not Asked   Social History Narrative   • Not on file       Family History   Problem Relation Age of Onset   • Other Maternal Grandfather         Parkinson's   • Genetic Disorder Maternal Grandfather         Parkinsons, and crouzon syndrome   • Cancer Maternal Grandfather         Skin Cancer   • Heart Disease Other    • Cancer Other         bile duct cancer   • Other Mother         sphincter of salud, pancreatic insufficiency   • Cancer Other         great uncle brain   • Arthritis Maternal Grandmother    • Cancer  "Maternal Grandmother         Skin Cancer   • Heart Disease Maternal Grandmother    • Hypertension Maternal Grandmother    • Hyperlipidemia Maternal Grandmother    • Stroke Maternal Grandmother    • Genetic Disorder Father         Factor V Liden and Tourettes   • Heart Disease Father    • Hyperlipidemia Father    • Genetic Disorder Maternal Uncle         Crouzon Syndrome   • Cancer Maternal Uncle         Bile Duct, and Skin cancer   • Cancer Maternal Aunt         Multiple Aunts and Uncles passed from cancer   • Heart Disease Maternal Aunt         Open heart surgery occuring   • Heart Disease Paternal Grandmother    • Hypertension Paternal Grandmother    • Stroke Paternal Grandmother    • Heart Disease Maternal Uncle         multiple uncles.         Vitals:    /64 (BP Location: Right arm, Patient Position: Sitting, BP Cuff Size: Adult)   Pulse 66   Temp 36.2 °C (97.1 °F) (Temporal)   Resp 14   Ht 1.676 m (5' 6\")   Wt 97 kg (213 lb 11.8 oz)   SpO2 97%   BMI 34.50 kg/m²        Physical Exam   Constitutional: She is oriented to person, place, and time and well-developed, well-nourished, and in no distress.   HENT:   Head: Normocephalic and atraumatic.   Mouth/Throat: Oropharynx is clear and moist.   Eyes: Pupils are equal, round, and reactive to light. No scleral icterus.   Neck: Neck supple.   Cardiovascular: Normal rate and regular rhythm.   No murmur heard.  No lower extremity edema.   Pulmonary/Chest: Effort normal. She has no wheezes. She has no rales.   Abdominal: Soft. She exhibits no distension and no mass. There is no tenderness. There is no rebound and no guarding.   Right sided glucose monitor attached.   Genitourinary:    Genitourinary Comments: No CVA tenderness, no suprapubic tenderness     Musculoskeletal:      Comments: No muscular atrophy or tenderness   Lymphadenopathy:        Head (right side): No submental, no submandibular, no tonsillar, no preauricular, no posterior auricular and no " occipital adenopathy present.        Head (left side): No submental, no submandibular, no tonsillar, no preauricular, no posterior auricular and no occipital adenopathy present.     She has no cervical adenopathy.        Right cervical: No superficial cervical, no deep cervical and no posterior cervical adenopathy present.       Left cervical: No superficial cervical, no deep cervical and no posterior cervical adenopathy present.     She has no axillary adenopathy.        Right axillary: No pectoral and no lateral adenopathy present.        Left axillary: No pectoral and no lateral adenopathy present.       Right: No inguinal, no supraclavicular and no epitrochlear adenopathy present.        Left: No inguinal, no supraclavicular and no epitrochlear adenopathy present.   Neurological: She is alert and oriented to person, place, and time. Gait normal. Coordination normal.   Grossly normal strength   Skin: Skin is dry. No erythema.   No bruising or petechiae.  She does have  blanchable, confluent erythema of the upper chest and neck, without papules or other raised entities.   Psychiatric:   Affect appropriate, no evident anxiety.  Thought nontangential.        Labs:     Results for DOMINIQUE GRAMAJO (MRN [id number]) as of 1/9/2020 14:51   Ref. Range 12/30/2019 09:05   WBC Latest Ref Range: 4.8 - 10.8 K/uL 5.8   RBC Latest Ref Range: 4.20 - 5.40 M/uL 4.87   Hemoglobin Latest Ref Range: 12.0 - 16.0 g/dL 14.9   Hematocrit Latest Ref Range: 37.0 - 47.0 % 45.1   MCV Latest Ref Range: 81.4 - 97.8 fL 92.6   MCH Latest Ref Range: 27.0 - 33.0 pg 30.6   MCHC Latest Ref Range: 33.6 - 35.0 g/dL 33.0 (L)   RDW Latest Ref Range: 35.9 - 50.0 fL 41.1   Platelet Count Latest Ref Range: 164 - 446 K/uL 241   MPV Latest Ref Range: 9.0 - 12.9 fL 10.5   Neutrophils-Polys Latest Ref Range: 44.00 - 72.00 % 63.20   Neutrophils (Absolute) Latest Ref Range: 2.00 - 7.15 K/uL 3.64   Lymphocytes Latest Ref Range: 22.00 - 41.00 % 28.60      Lymphs (Absolute) Latest Ref Range: 1.00 - 4.80 K/uL 1.65   Monocytes Latest Ref Range: 0.00 - 13.40 % 5.50   Monos (Absolute) Latest Ref Range: 0.00 - 0.85 K/uL 0.32   Eosinophils Latest Ref Range: 0.00 - 6.90 % 1.90   Eos (Absolute) Latest Ref Range: 0.00 - 0.51 K/uL 0.11   Basophils Latest Ref Range: 0.00 - 1.80 % 0.50   Baso (Absolute) Latest Ref Range: 0.00 - 0.12 K/uL 0.03   Immature Granulocytes Latest Ref Range: 0.00 - 0.90 % 0.30   Immature Granulocytes (abs) Latest Ref Range: 0.00 - 0.11 K/uL 0.02   Nucleated RBC Latest Units: /100 WBC 0.00   NRBC (Absolute) Latest Units: K/uL 0.00         Radiology:     12/28/19    Negative MRI of abdomen.  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  She also has a diagnosis of celiac disease.  In September 2019 she began to develop a constellation of symptoms which included a rash, followed by what she and others reportedly have described as petechiae, followed next by confluence of the so-called petechiae into bruising. She did show me some pictures of these.  I am able to appreciate an erythematous rash.  I am able to appreciate the bruises.  However I do not appreciate any findings that look to me to be petechiae.     She says she has about 2 years of periodic bright red blood per rectum.  This is being followed by GI.  She reports she had a colonoscopy and endoscopy.  She reports that by endoscopy they discovered celiac disease and lots of irritation.  No abnormalities per her report were found on the colonoscopy.  She no longer menstruates because of the  DepotProvera.  She has been having periodic nosebleeds since September or October of 2019. Also gum bleeding when she brushes her teeth, which is new.     She was recently evaluated by Dr. Montesinos.   "This apparently included lab work which showed normal PT, normal PTT, normal fibrinogen, normal PFA-100, normal von Willebrand factor antigen, normal von Willebrand factor activity, slightly elevated factor VIII. Per patient's report, Dr. Henry felt that her rashes/petechiae/brusing was due to scratching too hard.  But patient reports that she neither itches or scratches.    On exam today I appreciated a confluent rash on her upper chest and neck which is erythematous without any palpable abnormality.  This is blanchable.  Appreciated no petechiae.  No bruising.  No adenopathy.  No splenomegaly.   CT of the abdomen done 11/27/17 showed no splenomegaly or adenopathy.    Based on notes in the chart, I thought she was here for management of contraceptives in the setting of factor V Leiden heterozygosity.  However patient and her mother state they are here follow-up on the petechiae and bruising.      Assessment:      Easy brusibility  Bleeding per rectum  Gum bleeding  Petechiae  Heterozygous Factor V Leiden  Diabetes of unknown classification  Celiac Disease  Vitamin B12 deficiency,secondary to celiac disease  Vitamin D deficiency  Progestin Contraceptive    Plan:     1. Easy bruisability, possible petechiae, and bleeding.  Extensive lab work-up has already been done.  See summary above.  Patient has no petechiae or bruising on exam today.  I am not convinced, based on the images she showed me, that she had petechiae.  In any case, I will complete the bruising/bleeding evaluation with factor XIII level, euglobulin clot lysis, and vitamin C.  Does have diabetes diagnosed at age 17 which may be autoimmune.  She does have celiac disease. Given that history, I will send off SHANI, antiphospholipid antibody panel, and cryoglobulins in case this is a vasculitic process.   I am somewhat skeptical about the latter because she says the \"petechiae\" are not palpable.  However she does an erythematous, blanchable \"rash\" on her chest " and neck.  HHT unlikely given GI evaluation as above.    2. Contraception.  Presently she is on Depo-Provera.  Recommendations for the acceptability of hormonal contraception in patients with no personal history of VTE, but a personal and family history of factor V Leiden, are somewhat equivocal.  It is probably prudent to avoid estrogens, which she has done.  Some experts suggest that oral progestin only contraceptives or progestin rings are safer than depot formulations.   Says she takes these more to control her menses than for contraception.  I recommend she discuss this with her gynecologist.    3. Follow up.  She will be out of town till the end of February.  Schedule her follow-up then.  Will call her in the interim with results of studies ordered.        Orders Placed This Encounter   • SHANI W/REFLEX IF POSITIVE   • ANTICARDIOLIPIN AB, IGG, QN   • ANTICARDIOLIPIN AB, IGM, QN   • BETA-2 GLYCOPROTEIN I AB,G/M   • LUPUS ANTICOAGULANT   • VITAMIN C SERUM   • FACTOR XIII   • MISCELLANEOUS LAB TEST ([facility]/Other)   • CRYOGLOBULIN QL SERUM RFLX         ADDENDUM  1/21/20:    Did discuss the patient's lab work with her.  Overall, including the blood work that was done before she came to see me, and the studies that I ordered, there is no indication of bleeding diastases.  She had normal PT, PTT, platelet count, PFA-100, von Willebrand screen, euglobulin clot lysis, and factor XIII level.  Vitamin C level was slightly low at 21 (lower limit of normal 23).  I did encourage her to increase her intake of vitamin C. She is diabetic, so orange juice or other fruit juices may not be a good idea.  She could discuss that with her endocrinologist. She could supplement with pills.    Because I question whether or not the lesions she had (none of which were visible to me at the time of exam) were petechiae, I did send off some studies to rule out vasculitis.  Cryoglobulin was negative.  Antiphospholipid antibody studies negative.   However SHANI was markedly elevated.  I did offer to refer her to rheumatology, though I acknowledged that I do not have a working relationship with any of the rheumatologists in the area.  She opted instead to talk to her primary care doctor in hopes of getting a referral from them.    She has no further follow-up scheduled with hematology.  We will be happy to see her back if she or her other providers think there is any indication.        Please note that this dictation was created using voice recognition software. I have made every reasonable attempt to correct obvious errors, but there may there may nonetheless be errors of grammar and possibly content that I did not discover before finalizing the note. If you have any questions related to this note, please do not hesitate to contact our office.    Hakan Milner M.D.      Jayme Pastrana M.D.  [address] [place] [address]  VIA In Basket

## 2020-01-09 NOTE — PROGRESS NOTES
01/09/20    Referring Provider: Jayme Pastrana M.D.  [address] [place] [address]     Reason for Consultation/CC:    Factor V Leiden heterozygosity.  Elevated factor VIII.  Recently has been the has developed easy bruisability and petechiae.    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  Not clear whether she has type 1 diabetes or some other type.  She is being followed at [facility], and also [facility] to try to better characterize and manage her diabetes.  She has drastic elevations and falls of her sugar.  She has lost consciousness because of this.  She was only diagnosed in the spring 2018.  Controls her sugars with an insulin pen.    In 2015 2016 patient's father had 2 blood clots.  He was found to have factor V Leiden.  Patient herself was tested.  She was found to be heterozygous for factor V Leiden and also had to have an elevated factor VIII.  He has not personally had any venous thromboembolic disease.  Uses Depo-Provera mainly to control her menstruation.  No family history of bleeding.    In September 2019 she was noted to have some petechiae.  These were in various places including arms legs shoulder and chest.  He said she gets an area of redness, which progresses into petechiae, which progresses into a bruise.  This occurs spontaneously.  This is occurred multiple times since September.  These last 1 or 2 weeks.  He says they occur spontaneously in many cases.  But even very light trauma can cause it.  Being in the shower can cause it.  Being in heavy rain can cause it.  She says she has about 2 years of periodic bright red blood per rectum.  This is being followed by GI.  She reports she had a colonoscopy and endoscopy.  She reports that by endoscopy they discovered celiac disease and lots of irritation.  No abnormalities per her report were found on the colonoscopy.  She no longer menstruates because of the Depakote.  She  has been having periodic nosebleeds in September or October of 2019.. Also bleeds when she brushes her teeth, which is new.    She is never had her labs done while the petechiae or bruises were present.  Viewing her labs in the EMR, we have platelet counts going back as far as June 2016.  If consistently been in the mid 200,000 range.  The remainder of her CBC and differential are unremarkable.  On December 30, 2019 she did have a PT PTT and fibrinogen performed.  These were all normal.  At that same time she also had von Willebrand screen done.  Von Willebrand factor activity and antigen were both within the normal range.  Factor VIII activity at that time was 136, also in the normal range.  Looking back she has slightly elevated factor VIII level in August 2016 of 172 (upper limit of normal 145).  She also had a PFA-100 done on December 30, 2019 and was normal.    She recently saw Dr. Montesinos.  This was about 2 weeks ago.  Have no documentation.  Patient reports her diagnosis was scratching to hard. This is especially the case because she says she does not itch and does not scratch.  Neither she nor her mother believe that this is the correct diagnosis and they are here for second opinion.      Her only medications are Depo-Provera, vitamin D, vitamin B12 injections, and Zofran.  Does not know why she has nausea.  She reports a history of B12 deficiency, as well as vitamin D deficiency.    Review of Systems   Constitutional:        No fevers, chills, night sweats, or weight loss   HENT:        No ear pain, sore throat, or lumps in neck   Eyes:        No yellowing of the sclera.   Respiratory:        No SOB or cough   Cardiovascular:        No chest pain.  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 No LE edema   Gastrointestinal:        Nuasea, vomiting, diarrhea X 2 years.  Improved with diagnosis of celiac disease and gluten free diet,  but still present.   Genitourinary:        No dysuria, blood in urine, or flank pain   Musculoskeletal:        No muscle aches or atrophy   Neurological:        No changes to strength, sensation, or coordination   Endo/Heme/Allergies:        No lumps under the arm or in the groin   Psychiatric/Behavioral:        No depressed mood or hallucinations        Allergies   Allergen Reactions   • Cefdinir Rash     rash   • Erythromycin Vomiting and Nausea       Past Medical History:   Diagnosis Date   • Asthma     resolved   • Clotting disorder (HCC)    • Factor 5 Leiden mutation, heterozygous (HCC)    • OCD (obsessive compulsive disorder)    • Tourette disease        Past Surgical History:   Procedure Laterality Date   • APPENDECTOMY LAPAROSCOPIC  7/1/2017    Procedure: APPENDECTOMY LAPAROSCOPIC;  Surgeon: Derick Arthur M.D.;  Location: SURGERY [facility];  Service:    • ELIGIO BY LAPAROSCOPY  1/30/2017    Procedure: ELIGIO BY LAPAROSCOPY;  Surgeon: Fina Perkins M.D.;  Location: SURGERY SAME DAY [facility];  Service:    • APPENDECTOMY           Current Outpatient Medications:   •  FREESTYLE LITE strip, , Disp: , Rfl:   •  Continuous Blood Gluc Sensor (DEXCOM G6 SENSOR) Misc, by Does not apply route., Disp: , Rfl:   •  NOVOLOG, insulin aspart, (NOVOLOG FLEXPEN) 100 UNIT/ML Solution Pen-injector injection, Inject 10 Units as instructed 3 times a day before meals., Disp: 3 PEN, Rfl: 3  •  insulin aspart (NOVOLOG) 100 UNIT/ML Solution, Inject  as instructed 3 times a day before meals. Using a 1:15 cho ratio and 1:25 over 150 correction ratio at meals, Disp: , Rfl:   •  Insulin Pen Needle 32 G x 4 mm, 1 Each by Does not apply route 3 times a day., Disp: 100 Each, Rfl: 11  •  Glucagon, rDNA, 1 MG Kit, Use one pen prn severe hypoglycemia, Disp: 1 Kit, Rfl: 1  •  vitamin D, Ergocalciferol, (DRISDOL) 09760 units Cap capsule, Take 1 Cap by mouth every 3 days., Disp: 18 Cap, Rfl: 1  •  Blood Glucose Test Strips, Test  "strips order: Test strips for meter. Sig: use tid and prn ssx high or low sugar #100 RF x 3, Disp: 100 Strip, Rfl: 3  •  Lancets, Lancets order: Lancets for glucometer. Sig: use tid and prn ssx high or low sugar. #100 RF x 3, Disp: 100 Each, Rfl: 3  •  cyanocobalamin (VITAMIN B-12) 1000 MCG/ML Solution, 1 mL by Intramuscular route every 10 days., Disp: 9 mL, Rfl: 3  •  ondansetron (ZOFRAN ODT) 8 MG TABLET DISPERSIBLE, Take 1 Tab by mouth every 8 hours as needed for Nausea., Disp: 30 Tab, Rfl: 1  •  Syringe/Needle, Disp, (SYRINGE 3CC/55AQ6-2/2\") 22G X 1-1/2\" 3 ML Misc, For use with Intra-muscular or subcutaneous B 12  injection every 2 weeks., Disp: 12 Each, Rfl: 1  •  glucose monitoring kit (FREESTYLE) monitoring kit, Use to measure blood glucose regularly., Disp: 1 Kit, Rfl: 0  •  Semaglutide (OZEMPIC) 1 MG/DOSE Solution Pen-injector, Inject 1 mg as instructed every 7 days. (Patient not taking: Reported on 1/9/2020), Disp: 6 PEN, Rfl: 3  •  Insulin Degludec (TRESIBA FLEXTOUCH) 200 UNIT/ML Solution Pen-injector, Inject 20 Units as instructed every day. (Patient not taking: Reported on 1/9/2020), Disp: 2 PEN, Rfl: 3  •  LINZESS 145 MCG Cap, Take 145 mcg by mouth every day., Disp: , Rfl:      Social History     Socioeconomic History   • Marital status: Single     Spouse name: Not on file   • Number of children: Not on file   • Years of education: Not on file   • Highest education level: Not on file   Occupational History   • Not on file   Social Needs   • Financial resource strain: Not on file   • Food insecurity:     Worry: Not on file     Inability: Not on file   • Transportation needs:     Medical: Not on file     Non-medical: Not on file   Tobacco Use   • Smoking status: Never Smoker   • Smokeless tobacco: Never Used   Substance and Sexual Activity   • Alcohol use: No     Alcohol/week: 0.0 oz   • Drug use: No   • Sexual activity: Never   Lifestyle   • Physical activity:     Days per week: Not on file     Minutes " per session: Not on file   • Stress: Not on file   Relationships   • Social connections:     Talks on phone: Not on file     Gets together: Not on file     Attends Protestant service: Not on file     Active member of club or organization: Not on file     Attends meetings of clubs or organizations: Not on file     Relationship status: Not on file   • Intimate partner violence:     Fear of current or ex partner: Not on file     Emotionally abused: Not on file     Physically abused: Not on file     Forced sexual activity: Not on file   Other Topics Concern   • Behavioral problems Not Asked   • Interpersonal relationships Not Asked   • Sad or not enjoying activities Not Asked   • Suicidal thoughts Not Asked   • Poor school performance Not Asked   • Reading difficulties Not Asked   • Speech difficulties Not Asked   • Writing difficulties Not Asked   • Inadequate sleep Not Asked   • Excessive TV viewing Not Asked   • Excessive video game use Not Asked   • Inadequate exercise Not Asked   • Sports related Not Asked   • Poor diet Not Asked   • Family concerns for drug/alcohol abuse Not Asked   • Poor oral hygiene Not Asked   • Bike safety Not Asked   • Family concerns vehicle safety Not Asked   Social History Narrative   • Not on file       Family History   Problem Relation Age of Onset   • Other Maternal Grandfather         Parkinson's   • Genetic Disorder Maternal Grandfather         Parkinsons, and crouzon syndrome   • Cancer Maternal Grandfather         Skin Cancer   • Heart Disease Other    • Cancer Other         bile duct cancer   • Other Mother         sphincter of salud, pancreatic insufficiency   • Cancer Other         great uncle brain   • Arthritis Maternal Grandmother    • Cancer Maternal Grandmother         Skin Cancer   • Heart Disease Maternal Grandmother    • Hypertension Maternal Grandmother    • Hyperlipidemia Maternal Grandmother    • Stroke Maternal Grandmother    • Genetic Disorder Father         Factor V  "Lea and Tourmercedez   • Heart Disease Father    • Hyperlipidemia Father    • Genetic Disorder Maternal Uncle         Crouzon Syndrome   • Cancer Maternal Uncle         Bile Duct, and Skin cancer   • Cancer Maternal Aunt         Multiple Aunts and Uncles passed from cancer   • Heart Disease Maternal Aunt         Open heart surgery occuring   • Heart Disease Paternal Grandmother    • Hypertension Paternal Grandmother    • Stroke Paternal Grandmother    • Heart Disease Maternal Uncle         multiple uncles.         Vitals:    /64 (BP Location: Right arm, Patient Position: Sitting, BP Cuff Size: Adult)   Pulse 66   Temp 36.2 °C (97.1 °F) (Temporal)   Resp 14   Ht 1.676 m (5' 6\")   Wt 97 kg (213 lb 11.8 oz)   SpO2 97%   BMI 34.50 kg/m²       Physical Exam   Constitutional: She is oriented to person, place, and time and well-developed, well-nourished, and in no distress.   HENT:   Head: Normocephalic and atraumatic.   Mouth/Throat: Oropharynx is clear and moist.   Eyes: Pupils are equal, round, and reactive to light. No scleral icterus.   Neck: Neck supple.   Cardiovascular: Normal rate and regular rhythm.   No murmur heard.  No lower extremity edema.   Pulmonary/Chest: Effort normal. She has no wheezes. She has no rales.   Abdominal: Soft. She exhibits no distension and no mass. There is no tenderness. There is no rebound and no guarding.   Right sided glucose monitor attached.   Genitourinary:    Genitourinary Comments: No CVA tenderness, no suprapubic tenderness     Musculoskeletal:      Comments: No muscular atrophy or tenderness   Lymphadenopathy:        Head (right side): No submental, no submandibular, no tonsillar, no preauricular, no posterior auricular and no occipital adenopathy present.        Head (left side): No submental, no submandibular, no tonsillar, no preauricular, no posterior auricular and no occipital adenopathy present.     She has no cervical adenopathy.        Right cervical: No " superficial cervical, no deep cervical and no posterior cervical adenopathy present.       Left cervical: No superficial cervical, no deep cervical and no posterior cervical adenopathy present.     She has no axillary adenopathy.        Right axillary: No pectoral and no lateral adenopathy present.        Left axillary: No pectoral and no lateral adenopathy present.       Right: No inguinal, no supraclavicular and no epitrochlear adenopathy present.        Left: No inguinal, no supraclavicular and no epitrochlear adenopathy present.   Neurological: She is alert and oriented to person, place, and time. Gait normal. Coordination normal.   Grossly normal strength   Skin: Skin is dry. No erythema.   No bruising or petechiae.  She does have  blanchable, confluent erythema of the upper chest and neck, without papules or other raised entities.   Psychiatric:   Affect appropriate, no evident anxiety.  Thought nontangential.        Labs:     Results for DOMINIQUE GRAMAJO (MRN [id number]) as of 1/9/2020 14:51   Ref. Range 12/30/2019 09:05   WBC Latest Ref Range: 4.8 - 10.8 K/uL 5.8   RBC Latest Ref Range: 4.20 - 5.40 M/uL 4.87   Hemoglobin Latest Ref Range: 12.0 - 16.0 g/dL 14.9   Hematocrit Latest Ref Range: 37.0 - 47.0 % 45.1   MCV Latest Ref Range: 81.4 - 97.8 fL 92.6   MCH Latest Ref Range: 27.0 - 33.0 pg 30.6   MCHC Latest Ref Range: 33.6 - 35.0 g/dL 33.0 (L)   RDW Latest Ref Range: 35.9 - 50.0 fL 41.1   Platelet Count Latest Ref Range: 164 - 446 K/uL 241   MPV Latest Ref Range: 9.0 - 12.9 fL 10.5   Neutrophils-Polys Latest Ref Range: 44.00 - 72.00 % 63.20   Neutrophils (Absolute) Latest Ref Range: 2.00 - 7.15 K/uL 3.64   Lymphocytes Latest Ref Range: 22.00 - 41.00 % 28.60   Lymphs (Absolute) Latest Ref Range: 1.00 - 4.80 K/uL 1.65   Monocytes Latest Ref Range: 0.00 - 13.40 % 5.50   Monos (Absolute) Latest Ref Range: 0.00 - 0.85 K/uL 0.32   Eosinophils Latest Ref Range: 0.00 - 6.90 % 1.90   Eos (Absolute) Latest  Ref Range: 0.00 - 0.51 K/uL 0.11   Basophils Latest Ref Range: 0.00 - 1.80 % 0.50   Baso (Absolute) Latest Ref Range: 0.00 - 0.12 K/uL 0.03   Immature Granulocytes Latest Ref Range: 0.00 - 0.90 % 0.30   Immature Granulocytes (abs) Latest Ref Range: 0.00 - 0.11 K/uL 0.02   Nucleated RBC Latest Units: /100 WBC 0.00   NRBC (Absolute) Latest Units: K/uL 0.00         Radiology:     12/28/19    Negative MRI of abdomen.  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  She also has a diagnosis of celiac disease.  In September 2019 she began to develop a constellation of symptoms which included a rash, followed by what she and others reportedly have described as petechiae, followed next by confluence of the so-called petechiae into bruising. She did show me some pictures of these.  I am able to appreciate an erythematous rash.  I am able to appreciate the bruises.  However I do not appreciate any findings that look to me to be petechiae.     She says she has about 2 years of periodic bright red blood per rectum.  This is being followed by GI.  She reports she had a colonoscopy and endoscopy.  She reports that by endoscopy they discovered celiac disease and lots of irritation.  No abnormalities per her report were found on the colonoscopy.  She no longer menstruates because of the  DepotProvera.  She has been having periodic nosebleeds since September or October of 2019. Also gum bleeding when she brushes her teeth, which is new.     She was recently evaluated by Dr. Montesinos.  This apparently included lab work which showed normal PT, normal PTT, normal fibrinogen, normal PFA-100, normal von Willebrand factor antigen, normal von Willebrand factor activity, slightly elevated factor VIII. Per patient's report, Dr. Henry  "felt that her rashes/petechiae/brusing was due to scratching too hard.  But patient reports that she neither itches or scratches.    On exam today I appreciated a confluent rash on her upper chest and neck which is erythematous without any palpable abnormality.  This is blanchable.  Appreciated no petechiae.  No bruising.  No adenopathy.  No splenomegaly.   CT of the abdomen done 11/27/17 showed no splenomegaly or adenopathy.    Based on notes in the chart, I thought she was here for management of contraceptives in the setting of factor V Leiden heterozygosity.  However patient and her mother state they are here follow-up on the petechiae and bruising.      Assessment:      Easy brusibility  Bleeding per rectum  Gum bleeding  Petechiae  Heterozygous Factor V Leiden  Diabetes of unknown classification  Celiac Disease  Vitamin B12 deficiency,secondary to celiac disease  Vitamin D deficiency  Progestin Contraceptive    Plan:     1. Easy bruisability, possible petechiae, and bleeding.  Extensive lab work-up has already been done.  See summary above.  Patient has no petechiae or bruising on exam today.  I am not convinced, based on the images she showed me, that she had petechiae.  In any case, I will complete the bruising/bleeding evaluation with factor XIII level, euglobulin clot lysis, and vitamin C.  Does have diabetes diagnosed at age 17 which may be autoimmune.  She does have celiac disease. Given that history, I will send off SHANI, antiphospholipid antibody panel, and cryoglobulins in case this is a vasculitic process.   I am somewhat skeptical about the latter because she says the \"petechiae\" are not palpable.  However she does an erythematous, blanchable \"rash\" on her chest and neck.  HHT unlikely given GI evaluation as above.    2. Contraception.  Presently she is on Depo-Provera.  Recommendations for the acceptability of hormonal contraception in patients with no personal history of VTE, but a personal and " family history of factor V Leiden, are somewhat equivocal.  It is probably prudent to avoid estrogens, which she has done.  Some experts suggest that oral progestin only contraceptives or progestin rings are safer than depot formulations.   Says she takes these more to control her menses than for contraception.  I recommend she discuss this with her gynecologist.    3. Follow up.  She will be out of town till the end of February.  Schedule her follow-up then.  Will call her in the interim with results of studies ordered.        Orders Placed This Encounter   • SHANI W/REFLEX IF POSITIVE   • ANTICARDIOLIPIN AB, IGG, QN   • ANTICARDIOLIPIN AB, IGM, QN   • BETA-2 GLYCOPROTEIN I AB,G/M   • LUPUS ANTICOAGULANT   • VITAMIN C SERUM   • FACTOR XIII   • MISCELLANEOUS LAB TEST ([facility]/Other)   • CRYOGLOBULIN QL SERUM RFLX         ADDENDUM  1/21/20:    Did discuss the patient's lab work with her.  Overall, including the blood work that was done before she came to see me, and the studies that I ordered, there is no indication of bleeding diastases.  She had normal PT, PTT, platelet count, PFA-100, von Willebrand screen, euglobulin clot lysis, and factor XIII level.  Vitamin C level was slightly low at 21 (lower limit of normal 23).  I did encourage her to increase her intake of vitamin C. She is diabetic, so orange juice or other fruit juices may not be a good idea.  She could discuss that with her endocrinologist. She could supplement with pills.    Because I question whether or not the lesions she had (none of which were visible to me at the time of exam) were petechiae, I did send off some studies to rule out vasculitis.  Cryoglobulin was negative.  Antiphospholipid antibody studies negative.  However SHANI was markedly elevated.  I did offer to refer her to rheumatology, though I acknowledged that I do not have a working relationship with any of the rheumatologists in the area.  She opted instead to talk to her primary care  doctor in hopes of getting a referral from them.    She has no further follow-up scheduled with hematology.  We will be happy to see her back if she or her other providers think there is any indication.        Please note that this dictation was created using voice recognition software. I have made every reasonable attempt to correct obvious errors, but there may there may nonetheless be errors of grammar and possibly content that I did not discover before finalizing the note. If you have any questions related to this note, please do not hesitate to contact our office.    Hakan Milner M.D.

## 2020-01-10 ENCOUNTER — TELEPHONE (OUTPATIENT)
Dept: ENDOCRINOLOGY | Facility: MEDICAL CENTER | Age: 20
End: 2020-01-10

## 2020-01-10 DIAGNOSIS — E13.9 MODY (MATURITY ONSET DIABETES MELLITUS IN YOUNG) (HCC): ICD-10-CM

## 2020-01-10 NOTE — TELEPHONE ENCOUNTER
VOICEMAIL 01/07/2020  1. Caller Name: Malena Bennett                      Call Back Number: [phone] (home)     2. Message: Novolog is no longer covered. Need a new Rx for Humalog sent in.     3. Patient approves office to leave a detailed voicemail/Subitechart message: yes    Was the patient seen in the last year in this department? Yes LOV: 08/12/19    Does patient have an active prescription for medications requested? No     Received Request Via: Patient     Please send in a new Rx for Humalog to replace previous Rx for Novolog

## 2020-01-11 ENCOUNTER — HOSPITAL ENCOUNTER (OUTPATIENT)
Dept: LAB | Facility: MEDICAL CENTER | Age: 20
End: 2020-01-11
Attending: INTERNAL MEDICINE
Payer: COMMERCIAL

## 2020-01-11 ENCOUNTER — HOSPITAL ENCOUNTER (OUTPATIENT)
Dept: LAB | Facility: MEDICAL CENTER | Age: 20
End: 2020-01-11
Payer: COMMERCIAL

## 2020-01-11 DIAGNOSIS — R23.3 EASY BRUISABILITY: ICD-10-CM

## 2020-01-11 PROCEDURE — 84681 ASSAY OF C-PEPTIDE: CPT | Mod: 91

## 2020-01-11 PROCEDURE — 86235 NUCLEAR ANTIGEN ANTIBODY: CPT

## 2020-01-11 PROCEDURE — 83525 ASSAY OF INSULIN: CPT | Mod: 91

## 2020-01-11 PROCEDURE — 82952 GTT-ADDED SAMPLES: CPT

## 2020-01-11 PROCEDURE — 82951 GLUCOSE TOLERANCE TEST (GTT): CPT

## 2020-01-11 PROCEDURE — 86038 ANTINUCLEAR ANTIBODIES: CPT

## 2020-01-11 PROCEDURE — 86146 BETA-2 GLYCOPROTEIN ANTIBODY: CPT

## 2020-01-11 PROCEDURE — 85613 RUSSELL VIPER VENOM DILUTED: CPT

## 2020-01-11 PROCEDURE — 85730 THROMBOPLASTIN TIME PARTIAL: CPT

## 2020-01-11 PROCEDURE — 85291 CLOT FACTOR XIII FIBRIN SCRN: CPT

## 2020-01-11 PROCEDURE — 86337 INSULIN ANTIBODIES: CPT

## 2020-01-11 PROCEDURE — 85520 HEPARIN ASSAY: CPT

## 2020-01-11 PROCEDURE — 36415 COLL VENOUS BLD VENIPUNCTURE: CPT

## 2020-01-11 PROCEDURE — 82595 ASSAY OF CRYOGLOBULIN: CPT

## 2020-01-11 PROCEDURE — 85360 EUGLOBULIN LYSIS: CPT

## 2020-01-11 PROCEDURE — 86225 DNA ANTIBODY NATIVE: CPT

## 2020-01-11 PROCEDURE — 85610 PROTHROMBIN TIME: CPT

## 2020-01-11 PROCEDURE — 86341 ISLET CELL ANTIBODY: CPT | Mod: 91

## 2020-01-11 PROCEDURE — 86039 ANTINUCLEAR ANTIBODIES (ANA): CPT

## 2020-01-11 PROCEDURE — 86147 CARDIOLIPIN ANTIBODY EA IG: CPT

## 2020-01-11 PROCEDURE — 82180 ASSAY OF ASCORBIC ACID: CPT

## 2020-01-12 LAB
APTT PPP: 25.8 SEC (ref 24.7–36)
INR PPP: 1.02 (ref 0.87–1.13)
LA PPP-IMP: NORMAL
PROTHROMBIN TIME: 13.6 SEC (ref 12–14.6)
SCREEN DRVVT: 44 SEC (ref 28–48)
UFH PPP CHRO-ACNC: <0.1 U/ML

## 2020-01-13 LAB
B2 GLYCOPROT1 IGG SERPL IA-ACNC: 0 SGU (ref 0–20)
B2 GLYCOPROT1 IGM SERPL IA-ACNC: 6 SMU (ref 0–20)
C PEPTIDE SERPL-MCNC: 2.3 NG/ML (ref 0.8–3.5)
C PEPTIDE SERPL-MCNC: 7.2 NG/ML (ref 0.8–3.5)
C PEPTIDE SERPL-MCNC: 8.2 NG/ML (ref 0.8–3.5)
CARDIOLIPIN IGG SER IA-ACNC: 0 GPL (ref 0–14)
CARDIOLIPIN IGM SER IA-ACNC: 12 MPL (ref 0–12)
GLUCOSE 1H P CHAL SERPL-MCNC: 86 MG/DL (ref 65–199)
GLUCOSE 2H P CHAL SERPL-MCNC: 86 MG/DL (ref 65–139)
GLUCOSE BS SERPL-MCNC: 89 MG/DL (ref 65–99)
INSULIN 1H P CHAL SERPL-ACNC: 122 UIU/ML (ref 29–88)
INSULIN 2H P CHAL SERPL-ACNC: 83 UIU/ML (ref 22–79)
INSULIN P FAST SERPL-ACNC: 21 UIU/ML (ref 3–19)
NUCLEAR IGG SER QL IA: DETECTED
PANC ISLET CELL AB TITR SER: NORMAL {TITER}

## 2020-01-14 LAB
ANA INTERPRETIVE COMMENT Q5143: ABNORMAL
ANA PATTERN Q5144: ABNORMAL
ANA TITER Q5145: ABNORMAL
ANTINUCLEAR ANTIBODY (ANA), HEP-2, IGG Q5142: DETECTED
ENA JO1 AB TITR SER: 5 AU/ML (ref 0–40)
ENA SCL70 IGG SER QL: 4 AU/ML (ref 0–40)
ENA SM IGG SER-ACNC: 0 AU/ML (ref 0–40)
ENA SS-B IGG SER IA-ACNC: 0 AU/ML (ref 0–40)
ISLET CELL512 AB SER IA-ACNC: <5.4 U/ML (ref 0–7.4)
SSA52 R0ENA AB IGG Q0420: 1 AU/ML (ref 0–40)
SSA60 R0ENA AB IGG Q0419: 0 AU/ML (ref 0–40)
U1 SNRNP IGG SER QL: 1 AU/ML (ref 0–40)

## 2020-01-15 LAB
DSDNA AB TITR SER CLIF: NORMAL {TITER}
FACT XIII CLOT DIS 24H PPP QL: NORMAL
FACT XIII CLOT DIS 24H PPP QL: NORMAL

## 2020-01-16 LAB
CRYOGLOB SER QL 3D COLD INC: NORMAL
INSULIN HUMAN AB SER-ACNC: <0.4 U/ML (ref 0–0.4)
TEST NAME 95000: NORMAL

## 2020-01-18 LAB — MISCELLANEOUS LAB RESULT MISCLAB: NORMAL

## 2020-01-19 LAB — VIT C SERPL-MCNC: 21 UMOL/L (ref 23–114)

## 2020-01-23 ENCOUNTER — TELEPHONE (OUTPATIENT)
Dept: MEDICAL GROUP | Facility: PHYSICIAN GROUP | Age: 20
End: 2020-01-23

## 2020-01-24 DIAGNOSIS — R53.83 FATIGUE, UNSPECIFIED TYPE: ICD-10-CM

## 2020-01-24 DIAGNOSIS — R76.8 ANA POSITIVE: ICD-10-CM

## 2020-01-24 NOTE — TELEPHONE ENCOUNTER
Patient would like a referral to a Rheumatologist Dr Hopper for her lupus. Patient would also like to see what other rheumatologist she recommends for back up. Thanks

## 2020-01-25 NOTE — TELEPHONE ENCOUNTER
Patient did discuss the labs with oncology and they recommend that she be referred to a rheumatologist. Patient has been having some pain, fatigued and sleeping around 23 hours a day. I advised patient to schedule an appointment

## 2020-01-25 NOTE — TELEPHONE ENCOUNTER
Phone Number Called: [phone]    Call outcome: spoke to patient regarding message below    Message: Please advise patient SHANI is elevated but lupus specific labs: dsDNA and Lopez ab are negative.   No indications of lupus on her labs or history.   The oncologist ordered these labs so the patient should follow up with them in March as scheduled for explanation and interpretation of these labs.   Jayme Pastrana M.D.

## 2020-01-28 ENCOUNTER — TELEPHONE (OUTPATIENT)
Dept: MEDICAL GROUP | Facility: PHYSICIAN GROUP | Age: 20
End: 2020-01-28

## 2020-01-29 NOTE — TELEPHONE ENCOUNTER
I have called and LVM to inform patient we placed a referral however patient encouraged to schedule appointment

## 2020-01-29 NOTE — TELEPHONE ENCOUNTER
I've placed the ref to rheum since oncology recommended this to the patient, but yes I do need to see her for an appointment.   Jayme Pastrana M.D.    Routing comment

## 2020-02-12 ENCOUNTER — TELEPHONE (OUTPATIENT)
Dept: HEMATOLOGY ONCOLOGY | Facility: MEDICAL CENTER | Age: 20
End: 2020-02-12

## 2020-02-12 NOTE — TELEPHONE ENCOUNTER
Patient called in to cancel future appt. Had to change from Dr. Milner to Dr. Milton.  Patient is does not want to be seen until a permanent provider is in the office.

## 2020-02-26 ENCOUNTER — TELEPHONE (OUTPATIENT)
Dept: ENDOCRINOLOGY | Facility: MEDICAL CENTER | Age: 20
End: 2020-02-26

## 2020-02-26 NOTE — TELEPHONE ENCOUNTER
1. Caller Name: Malena Bennett                        Call Back Number: [phone] (home)         How would the patient prefer to be contacted with a response: Phone call do NOT leave a detailed message    Patient called stating she would like to have a Contour Next one order, Patient explained that she has had good feedback in regards to this item.   We have a few meters in office can we give her one?  Please advise.  Thank you

## 2020-03-02 ENCOUNTER — TELEPHONE (OUTPATIENT)
Dept: ENDOCRINOLOGY | Facility: MEDICAL CENTER | Age: 20
End: 2020-03-02

## 2020-03-02 DIAGNOSIS — E11.65 UNCONTROLLED TYPE 2 DIABETES MELLITUS WITH HYPERGLYCEMIA (HCC): ICD-10-CM

## 2020-03-03 ENCOUNTER — OFFICE VISIT (OUTPATIENT)
Dept: MEDICAL GROUP | Facility: PHYSICIAN GROUP | Age: 20
End: 2020-03-03
Payer: COMMERCIAL

## 2020-03-03 VITALS
HEART RATE: 96 BPM | HEIGHT: 67 IN | SYSTOLIC BLOOD PRESSURE: 122 MMHG | BODY MASS INDEX: 32.18 KG/M2 | RESPIRATION RATE: 16 BRPM | OXYGEN SATURATION: 96 % | DIASTOLIC BLOOD PRESSURE: 86 MMHG | WEIGHT: 205 LBS | TEMPERATURE: 99 F

## 2020-03-03 DIAGNOSIS — E55.9 VITAMIN D DEFICIENCY: ICD-10-CM

## 2020-03-03 DIAGNOSIS — R76.8 ELEVATED ANTINUCLEAR ANTIBODY (ANA) LEVEL: ICD-10-CM

## 2020-03-03 DIAGNOSIS — K90.0 CELIAC DISEASE: ICD-10-CM

## 2020-03-03 DIAGNOSIS — E53.8 VITAMIN B 12 DEFICIENCY: ICD-10-CM

## 2020-03-03 DIAGNOSIS — E11.65 UNCONTROLLED TYPE 2 DIABETES MELLITUS WITH HYPERGLYCEMIA (HCC): ICD-10-CM

## 2020-03-03 DIAGNOSIS — Z23 NEED FOR VACCINATION: ICD-10-CM

## 2020-03-03 DIAGNOSIS — D68.51 FACTOR V LEIDEN (HCC): Chronic | ICD-10-CM

## 2020-03-03 LAB
HBA1C MFR BLD: 5 % (ref 0–5.6)
INT CON NEG: NORMAL
INT CON POS: NORMAL

## 2020-03-03 PROCEDURE — 90471 IMMUNIZATION ADMIN: CPT | Performed by: FAMILY MEDICINE

## 2020-03-03 PROCEDURE — 99214 OFFICE O/P EST MOD 30 MIN: CPT | Mod: 25 | Performed by: FAMILY MEDICINE

## 2020-03-03 PROCEDURE — 90732 PPSV23 VACC 2 YRS+ SUBQ/IM: CPT | Performed by: FAMILY MEDICINE

## 2020-03-03 PROCEDURE — 83036 HEMOGLOBIN GLYCOSYLATED A1C: CPT | Performed by: FAMILY MEDICINE

## 2020-03-03 PROCEDURE — 92250 FUNDUS PHOTOGRAPHY W/I&R: CPT | Mod: TC | Performed by: FAMILY MEDICINE

## 2020-03-03 RX ORDER — ONDANSETRON 8 MG/1
8 TABLET, ORALLY DISINTEGRATING ORAL EVERY 8 HOURS PRN
Qty: 30 TAB | Refills: 3 | Status: SHIPPED | OUTPATIENT
Start: 2020-03-03 | End: 2021-02-26 | Stop reason: SDUPTHER

## 2020-03-03 RX ORDER — AMITRIPTYLINE HYDROCHLORIDE 25 MG/1
25 TABLET, FILM COATED ORAL NIGHTLY PRN
Qty: 90 TAB | Refills: 3 | Status: SHIPPED | OUTPATIENT
Start: 2020-03-03 | End: 2020-03-26

## 2020-03-03 ASSESSMENT — PATIENT HEALTH QUESTIONNAIRE - PHQ9: CLINICAL INTERPRETATION OF PHQ2 SCORE: 0

## 2020-03-03 ASSESSMENT — FIBROSIS 4 INDEX: FIB4 SCORE: 0.3

## 2020-03-03 NOTE — PROGRESS NOTES
Subjective:   Malena Bennett is a 19 y.o. female here today for evaluation and management of:     Factor V Leiden (CMS-HCC)  Continues with no DVTs, does not smoke,   Uses depo for contraception avoids estrogen. Has gyn referral  Does not use any blood thinners or anticoagulation.       Celiac disease  Controlled with diet.       Elevated antinuclear antibody (SHANI) level  Was evaluated with labs by her hematologist for lupus  Shani positive 1:320 and lupus anticoagulant negative. She has episodes of a malar butterfly rash on face and chest.   Also with wrist and knee pain, chronic fatigue.   Gets a rash if she goes in the sun for too long.          Current medicines (including changes today)  Current Outpatient Medications   Medication Sig Dispense Refill   • amitriptyline (ELAVIL) 25 MG Tab Take 1 Tab by mouth at bedtime as needed. 90 Tab 3   • ondansetron (ZOFRAN ODT) 8 MG TABLET DISPERSIBLE Take 1 Tab by mouth every 8 hours as needed for Nausea. 30 Tab 3   • Blood Glucose Test Strips Test strips order: Test strips for meter. Sig: use tid and prn ssx high or low sugar #100 RF x 3 400 Strip 3   • insulin lispro, Human, (HUMALOG KWIKPEN) 100 UNIT/ML injection PEN Inject 2-10 Units as instructed 3 times a day before meals. 15 mL 6   • FREESTYLE LITE strip      • Continuous Blood Gluc Sensor (DEXCOM G6 SENSOR) Misc by Does not apply route.     • NOVOLOG, insulin aspart, (NOVOLOG FLEXPEN) 100 UNIT/ML Solution Pen-injector injection Inject 10 Units as instructed 3 times a day before meals. 3 PEN 3   • insulin aspart (NOVOLOG) 100 UNIT/ML Solution Inject  as instructed 3 times a day before meals. Using a 1:15 cho ratio and 1:25 over 150 correction ratio at meals     • Insulin Pen Needle 32 G x 4 mm 1 Each by Does not apply route 3 times a day. 100 Each 11   • Glucagon, rDNA, 1 MG Kit Use one pen prn severe hypoglycemia 1 Kit 1   • vitamin D, Ergocalciferol, (DRISDOL) 94355 units Cap capsule Take 1 Cap by  "mouth every 3 days. 18 Cap 1   • Lancets Lancets order: Lancets for glucometer. Sig: use tid and prn ssx high or low sugar. #100 RF x 3 100 Each 3   • cyanocobalamin (VITAMIN B-12) 1000 MCG/ML Solution 1 mL by Intramuscular route every 10 days. 9 mL 3   • Syringe/Needle, Disp, (SYRINGE 3CC/90VV6-8/2\") 22G X 1-1/2\" 3 ML Misc For use with Intra-muscular or subcutaneous B 12  injection every 2 weeks. 12 Each 1   • glucose monitoring kit (FREESTYLE) monitoring kit Use to measure blood glucose regularly. 1 Kit 0     No current facility-administered medications for this visit.      She  has a past medical history of Asthma, Clotting disorder (HCC), Factor 5 Leiden mutation, heterozygous (HCC), OCD (obsessive compulsive disorder), and Tourette disease.    ROS  No chest pain, no shortness of breath, no abdominal pain       Objective:     /86   Pulse 96   Temp 37.2 °C (99 °F) (Temporal)   Resp 16   Ht 1.702 m (5' 7\")   Wt 93 kg (205 lb)   SpO2 96%  Body mass index is 32.11 kg/m².   Physical Exam:  Constitutional: Alert, no distress.  Skin: Warm, dry, good turgor, no rashes in visible areas.  Eye: Equal, round and reactive, conjunctiva clear, lids normal.  ENMT: Lips without lesions, good dentition, oropharynx clear.  Neck: Trachea midline, no masses, no thyromegaly. No cervical or supraclavicular lymphadenopathy  Respiratory: Unlabored respiratory effort, lungs clear to auscultation, no wheezes, no ronchi.  Cardiovascular: Normal S1, S2, no murmur, no edema.  Abdomen: Soft, non-tender, no masses, no hepatosplenomegaly.  Psych: Alert and oriented x3, normal affect and mood.        Assessment and Plan:   The following treatment plan was discussed    1. Uncontrolled type 2 diabetes mellitus with hyperglycemia (HCC)  Controlled. A1c in clinic is 5  - Diabetic Monofilament LE Exam  - POCT A1C    2. Need for vaccination  - Pneumovax Vaccine (PPSV23)    3. Factor V Leiden (HCC)  Stable.     4. Celiac disease  Controlled " with diet.     5. Elevated antinuclear antibody (SHANI) level  Follow up with rheumatology as scheduled patient has an appointment in May.       Followup: Return in about 6 months (around 9/3/2020) for DM2, factor V, celiac, elevated SHANI.

## 2020-03-03 NOTE — ASSESSMENT & PLAN NOTE
Continues with no DVTs, does not smoke,   Uses depo for contraception avoids estrogen. Has gyn referral  Does not use any blood thinners or anticoagulation.

## 2020-03-03 NOTE — ASSESSMENT & PLAN NOTE
Was evaluated with labs by her hematologist for lupus  Valorie positive 1:320 and lupus anticoagulant negative. She has episodes of a malar butterfly rash on face and chest.   Also with wrist and knee pain, chronic fatigue.   Gets a rash if she goes in the sun for too long.    Low Risk

## 2020-03-03 NOTE — TELEPHONE ENCOUNTER
Received request via: Patient    Was the patient seen in the last year in this department? Yes    Does the patient have an active prescription (recently filled or refills available) for medication(s) requested? No     Need Contour next one Test strips sent to pharmacy

## 2020-03-16 ENCOUNTER — HOSPITAL ENCOUNTER (OUTPATIENT)
Dept: LAB | Facility: MEDICAL CENTER | Age: 20
End: 2020-03-16
Attending: FAMILY MEDICINE
Payer: COMMERCIAL

## 2020-03-16 DIAGNOSIS — E55.9 VITAMIN D DEFICIENCY: ICD-10-CM

## 2020-03-16 DIAGNOSIS — E53.8 VITAMIN B 12 DEFICIENCY: ICD-10-CM

## 2020-03-16 LAB
25(OH)D3 SERPL-MCNC: 22 NG/ML (ref 30–100)
VIT B12 SERPL-MCNC: 366 PG/ML (ref 211–911)

## 2020-03-16 PROCEDURE — 36415 COLL VENOUS BLD VENIPUNCTURE: CPT

## 2020-03-16 PROCEDURE — 82607 VITAMIN B-12: CPT

## 2020-03-16 PROCEDURE — 82306 VITAMIN D 25 HYDROXY: CPT

## 2020-03-17 ENCOUNTER — TELEPHONE (OUTPATIENT)
Dept: ENDOCRINOLOGY | Facility: MEDICAL CENTER | Age: 20
End: 2020-03-17

## 2020-03-17 NOTE — RESULT ENCOUNTER NOTE
Malena,  Your vitamin D is low so take a vitamin D supplement daily, if you're taking one you can double it.  B12 level is normal.  Jayme Pastrana M.D.

## 2020-03-17 NOTE — TELEPHONE ENCOUNTER
Patient called wanted to talk to RN of Dr. Lora about her lab results for Vit 12 - Vit d 25 Hydroxy  Forward to Renee to review results okay to leave a detailed voicemail for pt.    Thanks

## 2020-03-24 ENCOUNTER — NON-PROVIDER VISIT (OUTPATIENT)
Dept: MEDICAL GROUP | Facility: PHYSICIAN GROUP | Age: 20
End: 2020-03-24
Payer: COMMERCIAL

## 2020-03-24 ENCOUNTER — HOSPITAL ENCOUNTER (OUTPATIENT)
Dept: LAB | Facility: MEDICAL CENTER | Age: 20
End: 2020-03-24
Attending: INTERNAL MEDICINE
Payer: COMMERCIAL

## 2020-03-24 VITALS — BODY MASS INDEX: 32.18 KG/M2 | WEIGHT: 205 LBS | HEIGHT: 67 IN

## 2020-03-24 LAB
ALBUMIN SERPL BCP-MCNC: 4.7 G/DL (ref 3.2–4.9)
ALBUMIN/GLOB SERPL: 1.4 G/DL
ALP SERPL-CCNC: 89 U/L (ref 30–99)
ALT SERPL-CCNC: 21 U/L (ref 2–50)
ANION GAP SERPL CALC-SCNC: 14 MMOL/L (ref 7–16)
APPEARANCE UR: CLEAR
AST SERPL-CCNC: 21 U/L (ref 12–45)
BASOPHILS # BLD AUTO: 0.6 % (ref 0–1.8)
BASOPHILS # BLD: 0.04 K/UL (ref 0–0.12)
BILIRUB SERPL-MCNC: 0.4 MG/DL (ref 0.1–1.5)
BILIRUB UR QL STRIP.AUTO: NEGATIVE
BUN SERPL-MCNC: 10 MG/DL (ref 8–22)
C3 SERPL-MCNC: 143.8 MG/DL (ref 87–200)
C4 SERPL-MCNC: 24.5 MG/DL (ref 19–52)
CALCIUM SERPL-MCNC: 9.6 MG/DL (ref 8.5–10.5)
CHLORIDE SERPL-SCNC: 103 MMOL/L (ref 96–112)
CO2 SERPL-SCNC: 21 MMOL/L (ref 20–33)
COLOR UR: YELLOW
CREAT SERPL-MCNC: 0.75 MG/DL (ref 0.5–1.4)
CREAT UR-MCNC: 163.7 MG/DL
EOSINOPHIL # BLD AUTO: 0.05 K/UL (ref 0–0.51)
EOSINOPHIL NFR BLD: 0.7 % (ref 0–6.9)
ERYTHROCYTE [DISTWIDTH] IN BLOOD BY AUTOMATED COUNT: 38.5 FL (ref 35.9–50)
GLOBULIN SER CALC-MCNC: 3.4 G/DL (ref 1.9–3.5)
GLUCOSE SERPL-MCNC: 144 MG/DL (ref 65–99)
GLUCOSE UR STRIP.AUTO-MCNC: NEGATIVE MG/DL
HCT VFR BLD AUTO: 41.8 % (ref 37–47)
HGB BLD-MCNC: 14.1 G/DL (ref 12–16)
IMM GRANULOCYTES # BLD AUTO: 0.01 K/UL (ref 0–0.11)
IMM GRANULOCYTES NFR BLD AUTO: 0.1 % (ref 0–0.9)
KETONES UR STRIP.AUTO-MCNC: ABNORMAL MG/DL
LEUKOCYTE ESTERASE UR QL STRIP.AUTO: NEGATIVE
LYMPHOCYTES # BLD AUTO: 1.79 K/UL (ref 1–4.8)
LYMPHOCYTES NFR BLD: 26.8 % (ref 22–41)
MCH RBC QN AUTO: 30.5 PG (ref 27–33)
MCHC RBC AUTO-ENTMCNC: 33.7 G/DL (ref 33.6–35)
MCV RBC AUTO: 90.3 FL (ref 81.4–97.8)
MICRO URNS: ABNORMAL
MONOCYTES # BLD AUTO: 0.4 K/UL (ref 0–0.85)
MONOCYTES NFR BLD AUTO: 6 % (ref 0–13.4)
NEUTROPHILS # BLD AUTO: 4.38 K/UL (ref 2–7.15)
NEUTROPHILS NFR BLD: 65.8 % (ref 44–72)
NITRITE UR QL STRIP.AUTO: NEGATIVE
NRBC # BLD AUTO: 0 K/UL
NRBC BLD-RTO: 0 /100 WBC
PH UR STRIP.AUTO: 5.5 [PH] (ref 5–8)
PLATELET # BLD AUTO: 262 K/UL (ref 164–446)
PMV BLD AUTO: 10.2 FL (ref 9–12.9)
POTASSIUM SERPL-SCNC: 3.7 MMOL/L (ref 3.6–5.5)
PROT SERPL-MCNC: 8.1 G/DL (ref 6–8.2)
PROT UR QL STRIP: NEGATIVE MG/DL
PROT UR-MCNC: 6 MG/DL (ref 0–15)
RBC # BLD AUTO: 4.63 M/UL (ref 4.2–5.4)
RBC UR QL AUTO: NEGATIVE
SODIUM SERPL-SCNC: 138 MMOL/L (ref 135–145)
SP GR UR STRIP.AUTO: 1.02
UROBILINOGEN UR STRIP.AUTO-MCNC: 0.2 MG/DL
WBC # BLD AUTO: 6.7 K/UL (ref 4.8–10.8)

## 2020-03-24 PROCEDURE — 36415 COLL VENOUS BLD VENIPUNCTURE: CPT

## 2020-03-24 PROCEDURE — 85025 COMPLETE CBC W/AUTO DIFF WBC: CPT

## 2020-03-24 PROCEDURE — 80053 COMPREHEN METABOLIC PANEL: CPT

## 2020-03-24 PROCEDURE — 86160 COMPLEMENT ANTIGEN: CPT

## 2020-03-24 PROCEDURE — 81003 URINALYSIS AUTO W/O SCOPE: CPT

## 2020-03-24 PROCEDURE — 82570 ASSAY OF URINE CREATININE: CPT

## 2020-03-24 PROCEDURE — 84156 ASSAY OF PROTEIN URINE: CPT

## 2020-03-24 RX ORDER — MEDROXYPROGESTERONE ACETATE 150 MG/ML
150 INJECTION, SUSPENSION INTRAMUSCULAR ONCE
Status: CANCELLED | OUTPATIENT
Start: 2020-03-24 | End: 2020-03-24

## 2020-03-24 ASSESSMENT — FIBROSIS 4 INDEX: FIB4 SCORE: 0.3

## 2020-03-24 NOTE — PROGRESS NOTES
Malena Chulae Sabrina is a 19 y.o. here for a Depo Provera Injection.     Date of last Depo Provera Injection: 1/3/2020  Current date within therapeutic range?: Yes   Urine pregnancy test done (needed if out of date range): no  Date of office visit:3/3/2020  Date of last pap (if > 21 years old)/ GYN exam: NA  Dx: Contraceptive use    Order and dose verified by: AG  Patient tolerated injection and no adverse effects were observed or reported: Yes    # of Administrations remaining in MAR: 0  Next injection due between 6/9/20 and 6/23/20.

## 2020-03-26 RX ORDER — AMITRIPTYLINE HYDROCHLORIDE 50 MG/1
50 TABLET, FILM COATED ORAL
Qty: 90 TAB | Refills: 3 | Status: SHIPPED | OUTPATIENT
Start: 2020-03-26 | End: 2021-01-15

## 2020-03-27 DIAGNOSIS — E53.8 VITAMIN B12 DEFICIENCY: ICD-10-CM

## 2020-03-27 RX ORDER — CYANOCOBALAMIN 1000 UG/ML
INJECTION, SOLUTION INTRAMUSCULAR; SUBCUTANEOUS
Qty: 12 ML | Refills: 0 | Status: SHIPPED | OUTPATIENT
Start: 2020-03-27 | End: 2020-09-14

## 2020-03-27 NOTE — TELEPHONE ENCOUNTER
Received request via: Pharmacy    Was the patient seen in the last year in this department? Yes    Does the patient have an active prescription (recently filled or refills available) for medication(s) requested? No       cyanocobalamin (VITAMIN B-12) 1000 MCG/ML Solution        Sig: Inject 1 mL by Intramuscular route every 14 days.

## 2020-05-11 ENCOUNTER — TELEMEDICINE (OUTPATIENT)
Dept: ENDOCRINOLOGY | Facility: MEDICAL CENTER | Age: 20
End: 2020-05-11
Payer: COMMERCIAL

## 2020-05-11 VITALS — TEMPERATURE: 97.8 F

## 2020-05-11 DIAGNOSIS — E10.65 UNCONTROLLED TYPE 1 DIABETES MELLITUS WITH HYPERGLYCEMIA (HCC): ICD-10-CM

## 2020-05-11 DIAGNOSIS — E55.9 VITAMIN D DEFICIENCY: ICD-10-CM

## 2020-05-11 DIAGNOSIS — E11.65 UNCONTROLLED TYPE 2 DIABETES MELLITUS WITH HYPERGLYCEMIA (HCC): ICD-10-CM

## 2020-05-11 DIAGNOSIS — E53.8 VITAMIN B12 DEFICIENCY: ICD-10-CM

## 2020-05-11 PROCEDURE — 99214 OFFICE O/P EST MOD 30 MIN: CPT | Mod: 25,95,CR | Performed by: INTERNAL MEDICINE

## 2020-05-11 PROCEDURE — 95251 CONT GLUC MNTR ANALYSIS I&R: CPT | Mod: 95,CR | Performed by: INTERNAL MEDICINE

## 2020-05-11 RX ORDER — HYDROXYCHLOROQUINE SULFATE 200 MG/1
400 TABLET, FILM COATED ORAL DAILY
COMMUNITY
End: 2021-08-10 | Stop reason: SDUPTHER

## 2020-05-11 NOTE — PROGRESS NOTES
Endocrinology Clinic Progress Note  PCP: Jayme Pastrana M.D.  This encounter was conducted via Zoom .   Verbal consent was obtained. Patient's identity was verified.  HPI:  Malena Bennett is a 19 y.o. old patient who is seen today for review of her endocrine problems.  States she was recently diagnosed with Lupus without organ involvement and started on hydroxychloroquine 400 mg per day   Vitamin D deficiency: currently on [place] 19536 iu every 3 days with food.      Ref. Range 3/16/2020 14:35   25-Hydroxy   Vitamin D 25 Latest Ref Range: 30 - 100 ng/mL 22 (L)     Vitamin B 12 deficiency: currently on Vitamin B 12 5000 mcg SL daily.      Ref. Range 3/16/2020 14:35   Vitamin B12 -True Cobalamin Latest Ref Range: 211 - 911 pg/mL 366       Most Recent HbA1c:   Lab Results   Component Value Date/Time    HBA1C 5.0 03/03/2020 02:11 PM        Current Diabetes Regimen:  Novolog using a 1:15 cho ratio.  Using 1:20 above 180 correction    Patient using the Dexcom G6 CGM, reports downloaded and scanned into media.     Hypoglycemia:  None          ROS:  Constitutional: No weight loss  Cardiac: No palpitations or racing heart  Resp: No shortness of breath  Neuro: No numbness or tinging in feet  Endo: No heat or cold intolerance, no polyuria or polydipsia  All other systems were reviewed and were negative.    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   • History of cholecystectomy 08/22/2019   • Celiac disease 08/22/2019   • Uncontrolled type 2 diabetes mellitus with hyperglycemia (HCC) 08/12/2019   • POTS (postural orthostatic tachycardia syndrome) 12/17/2018   • Hyperglycemia 12/14/2018   • Other irritable bowel syndrome 08/10/2018   • Salt craving 02/02/2018   • Hypoglycemia 02/02/2018   • Chronic tension-type headache, not intractable 11/09/2016   •  Merritt 09/19/2016   • Tourette syndrome 09/19/2016   • OCD (obsessive compulsive disorder) 09/19/2016   • Factor V Leiden (HCC)        Past Surgical History:  Past Surgical History:   Procedure Laterality Date   • APPENDECTOMY LAPAROSCOPIC  7/1/2017    Procedure: APPENDECTOMY LAPAROSCOPIC;  Surgeon: Derick Arthur M.D.;  Location: SURGERY [facility];  Service:    • ELIGIO BY LAPAROSCOPY  1/30/2017    Procedure: ELIGIO BY LAPAROSCOPY;  Surgeon: Fina Perkins M.D.;  Location: SURGERY SAME DAY [facility];  Service:    • APPENDECTOMY         Allergies:  Cefdinir and Erythromycin    Social History:  Social History     Socioeconomic History   • Marital status: Single     Spouse name: Not on file   • Number of children: Not on file   • Years of education: Not on file   • Highest education level: Not on file   Occupational History   • Not on file   Social Needs   • Financial resource strain: Not on file   • Food insecurity     Worry: Not on file     Inability: Not on file   • Transportation needs     Medical: Not on file     Non-medical: Not on file   Tobacco Use   • Smoking status: Never Smoker   • Smokeless tobacco: Never Used   Substance and Sexual Activity   • Alcohol use: No     Alcohol/week: 0.0 oz   • Drug use: No   • Sexual activity: Never   Lifestyle   • Physical activity     Days per week: Not on file     Minutes per session: Not on file   • Stress: Not on file   Relationships   • Social connections     Talks on phone: Not on file     Gets together: Not on file     Attends Judaism service: Not on file     Active member of club or organization: Not on file     Attends meetings of clubs or organizations: Not on file     Relationship status: Not on file   • Intimate partner violence     Fear of current or ex partner: Not on file     Emotionally abused: Not on file     Physically abused: Not on file     Forced sexual activity: Not on file   Other Topics Concern   • Behavioral problems Not Asked   •  Interpersonal relationships Not Asked   • Sad or not enjoying activities Not Asked   • Suicidal thoughts Not Asked   • Poor school performance Not Asked   • Reading difficulties Not Asked   • Speech difficulties Not Asked   • Writing difficulties Not Asked   • Inadequate sleep Not Asked   • Excessive TV viewing Not Asked   • Excessive video game use Not Asked   • Inadequate exercise Not Asked   • Sports related Not Asked   • Poor diet Not Asked   • Family concerns for drug/alcohol abuse Not Asked   • Poor oral hygiene Not Asked   • Bike safety Not Asked   • Family concerns vehicle safety Not Asked   Social History Narrative   • Not on file       Family History:  Family History   Problem Relation Age of Onset   • Other Maternal Grandfather         Parkinson's   • Genetic Disorder Maternal Grandfather         Parkinsons, and crouzon syndrome   • Cancer Maternal Grandfather         Skin Cancer   • Heart Disease Other    • Cancer Other         bile duct cancer   • Other Mother         sphincter of salud, pancreatic insufficiency   • Cancer Other         great uncle brain   • Arthritis Maternal Grandmother    • Cancer Maternal Grandmother         Skin Cancer   • Heart Disease Maternal Grandmother    • Hypertension Maternal Grandmother    • Hyperlipidemia Maternal Grandmother    • Stroke Maternal Grandmother    • Genetic Disorder Father         Factor V Liden and Tourettes   • Heart Disease Father    • Hyperlipidemia Father    • Genetic Disorder Maternal Uncle         Crouzon Syndrome   • Cancer Maternal Uncle         Bile Duct, and Skin cancer   • Cancer Maternal Aunt         Multiple Aunts and Uncles passed from cancer   • Heart Disease Maternal Aunt         Open heart surgery occuring   • Heart Disease Paternal Grandmother    • Hypertension Paternal Grandmother    • Stroke Paternal Grandmother    • Heart Disease Maternal Uncle         multiple uncles.       Medications:    Current Outpatient Medications:   •   "hydroxychloroquine (PLAQUENIL) 200 MG Tab, Take 400 mg by mouth every day., Disp: , Rfl:   •  cyanocobalamin (VITAMIN B-12) 1000 MCG/ML Solution, Inject 1 mL by Intramuscular route every 14 days., Disp: 12 mL, Rfl: 0  •  amitriptyline (ELAVIL) 50 MG Tab, Take 1 Tab by mouth every bedtime., Disp: 90 Tab, Rfl: 3  •  ondansetron (ZOFRAN ODT) 8 MG TABLET DISPERSIBLE, Take 1 Tab by mouth every 8 hours as needed for Nausea., Disp: 30 Tab, Rfl: 3  •  insulin lispro, Human, (HUMALOG KWIKPEN) 100 UNIT/ML injection PEN, Inject 2-10 Units as instructed 3 times a day before meals., Disp: 15 mL, Rfl: 6  •  vitamin D, Ergocalciferol, (DRISDOL) 42722 units Cap capsule, Take 1 Cap by mouth every 3 days., Disp: 18 Cap, Rfl: 1  •  Blood Glucose Test Strips, Test strips order: Test strips for meter. Sig: use tid and prn ssx high or low sugar #100 RF x 3, Disp: 400 Strip, Rfl: 3  •  FREESTYLE LITE strip, , Disp: , Rfl:   •  Continuous Blood Gluc Sensor (DEXCOM G6 SENSOR) Misc, by Does not apply route., Disp: , Rfl:   •  Insulin Pen Needle 32 G x 4 mm, 1 Each by Does not apply route 3 times a day., Disp: 100 Each, Rfl: 11  •  Glucagon, rDNA, 1 MG Kit, Use one pen prn severe hypoglycemia, Disp: 1 Kit, Rfl: 1  •  Lancets, Lancets order: Lancets for glucometer. Sig: use tid and prn ssx high or low sugar. #100 RF x 3, Disp: 100 Each, Rfl: 3  •  Syringe/Needle, Disp, (SYRINGE 3CC/52SI0-3/2\") 22G X 1-1/2\" 3 ML Misc, For use with Intra-muscular or subcutaneous B 12  injection every 2 weeks., Disp: 12 Each, Rfl: 1  •  glucose monitoring kit (FREESTYLE) monitoring kit, Use to measure blood glucose regularly., Disp: 1 Kit, Rfl: 0    Labs: Reviewed    Physical Examination:  Vital signs: Temp 36.6 °C (97.8 °F)  There is no height or weight on file to calculate BMI.  General: No apparent distress, cooperative  Eyes: No scleral icterus or discharge  ENMT: Normal on external inspection of nose, lips, normal thyroid on inspection  Neck: No abnormal " masses on inspection  Resp: Normal effort  Extremities: No visible edema  Abdomen: visible abdominal obesity  Neuro: Alert and oriented  Skin: No visible rash  Psych: Normal mood and affect, intact memory and able to make informed decisions      Assessment and Plan:  1. Uncontrolled type 2 diabetes mellitus with hyperglycemia (HCC)  Continue current regimen.   The patient's Continuous Glucose Monitor was downloaded and reviewed in great detail with the patient.  You can find report scanned into media     2. Vitamin D deficiency  Cont vit d with food as per HPI    3. Vitamin B12 deficiency  Continue B 12.      Return in about 3 months (around 8/11/2020).    Thank you for allowing me to participate in the care of this patient.    Teja Lora M.D.  05/11/20    CC:   Jayme Pastrana M.D.    This note was created using voice recognition software (Dragon). The accuracy of the dictation is limited by the abilities of the software. I have reviewed the note prior to signing, however some errors in grammar and context are still possible. If you have any questions related to this note please do not hesitate to contact our office.

## 2020-06-07 DIAGNOSIS — E55.9 VITAMIN D DEFICIENCY: ICD-10-CM

## 2020-06-08 RX ORDER — ERGOCALCIFEROL 1.25 MG/1
CAPSULE ORAL
Qty: 12 CAP | Refills: 0 | Status: SHIPPED | OUTPATIENT
Start: 2020-06-08 | End: 2020-09-02

## 2020-06-08 NOTE — TELEPHONE ENCOUNTER
Received request via: Pharmacy    Was the patient seen in the last year in this department? Yes    Does the patient have an active prescription (recently filled or refills available) for medication(s) requested? No       vitamin D, Ergocalciferol, (DRISDOL) 1.25 MG (33802 UT) Cap capsule         Sig: TAKE ONE CAPSULE BY MOUTH WEEKLY

## 2020-06-17 ENCOUNTER — NON-PROVIDER VISIT (OUTPATIENT)
Dept: MEDICAL GROUP | Facility: PHYSICIAN GROUP | Age: 20
End: 2020-06-17
Payer: COMMERCIAL

## 2020-06-17 ENCOUNTER — HOSPITAL ENCOUNTER (OUTPATIENT)
Dept: LAB | Facility: MEDICAL CENTER | Age: 20
End: 2020-06-17
Attending: INTERNAL MEDICINE
Payer: COMMERCIAL

## 2020-06-17 DIAGNOSIS — E55.9 VITAMIN D DEFICIENCY: ICD-10-CM

## 2020-06-17 DIAGNOSIS — E53.8 VITAMIN B12 DEFICIENCY: ICD-10-CM

## 2020-06-17 DIAGNOSIS — E10.65 UNCONTROLLED TYPE 1 DIABETES MELLITUS WITH HYPERGLYCEMIA (HCC): ICD-10-CM

## 2020-06-17 DIAGNOSIS — Z30.42 ENCOUNTER FOR SURVEILLANCE OF INJECTABLE CONTRACEPTIVE: ICD-10-CM

## 2020-06-17 DIAGNOSIS — E11.65 UNCONTROLLED TYPE 2 DIABETES MELLITUS WITH HYPERGLYCEMIA (HCC): ICD-10-CM

## 2020-06-17 LAB
25(OH)D3 SERPL-MCNC: 33 NG/ML (ref 30–100)
ANION GAP SERPL CALC-SCNC: 14 MMOL/L (ref 7–16)
BUN SERPL-MCNC: 8 MG/DL (ref 8–22)
CALCIUM SERPL-MCNC: 9.5 MG/DL (ref 8.5–10.5)
CHLORIDE SERPL-SCNC: 102 MMOL/L (ref 96–112)
CO2 SERPL-SCNC: 22 MMOL/L (ref 20–33)
CREAT SERPL-MCNC: 0.67 MG/DL (ref 0.5–1.4)
CREAT UR-MCNC: 62.12 MG/DL
FASTING STATUS PATIENT QL REPORTED: NORMAL
GLUCOSE SERPL-MCNC: 90 MG/DL (ref 65–99)
INT CON NEG: NEGATIVE
INT CON POS: POSITIVE
MICROALBUMIN UR-MCNC: <1.2 MG/DL
MICROALBUMIN/CREAT UR: NORMAL MG/G (ref 0–30)
POC URINE PREGNANCY TEST: NORMAL
POTASSIUM SERPL-SCNC: 3.9 MMOL/L (ref 3.6–5.5)
SODIUM SERPL-SCNC: 138 MMOL/L (ref 135–145)
VIT B12 SERPL-MCNC: 791 PG/ML (ref 211–911)

## 2020-06-17 PROCEDURE — 83036 HEMOGLOBIN GLYCOSYLATED A1C: CPT

## 2020-06-17 PROCEDURE — 86341 ISLET CELL ANTIBODY: CPT

## 2020-06-17 PROCEDURE — 96372 THER/PROPH/DIAG INJ SC/IM: CPT | Performed by: NURSE PRACTITIONER

## 2020-06-17 PROCEDURE — 82570 ASSAY OF URINE CREATININE: CPT

## 2020-06-17 PROCEDURE — 80048 BASIC METABOLIC PNL TOTAL CA: CPT

## 2020-06-17 PROCEDURE — 82043 UR ALBUMIN QUANTITATIVE: CPT

## 2020-06-17 PROCEDURE — 36415 COLL VENOUS BLD VENIPUNCTURE: CPT

## 2020-06-17 PROCEDURE — 84681 ASSAY OF C-PEPTIDE: CPT

## 2020-06-17 PROCEDURE — 82607 VITAMIN B-12: CPT

## 2020-06-17 PROCEDURE — 81025 URINE PREGNANCY TEST: CPT | Performed by: NURSE PRACTITIONER

## 2020-06-17 PROCEDURE — 82306 VITAMIN D 25 HYDROXY: CPT

## 2020-06-17 RX ORDER — MEDROXYPROGESTERONE ACETATE 150 MG/ML
150 INJECTION, SUSPENSION INTRAMUSCULAR ONCE
Status: COMPLETED | OUTPATIENT
Start: 2020-06-17 | End: 2020-06-17

## 2020-06-17 RX ADMIN — MEDROXYPROGESTERONE ACETATE 150 MG: 150 INJECTION, SUSPENSION INTRAMUSCULAR at 15:41

## 2020-06-17 NOTE — PROGRESS NOTES
Malena PhanMosesMarguerite Bennett is a 19 y.o. female here for a non-provider visit for depo injection.    Reason for injection: birth control  Order in MAR?: Yes  Patient supplied?:No  Minimum interval has been met for this injection (per MAR order): Yes    Order and dose verified by: AA  Patient tolerated injection and no adverse effects were observed or reported: Yes    # of Administrations remaining in MAR: 0

## 2020-06-18 LAB
EST. AVERAGE GLUCOSE BLD GHB EST-MCNC: 97 MG/DL
HBA1C MFR BLD: 5 % (ref 0–5.6)

## 2020-06-19 LAB — C PEPTIDE SERPL-MCNC: 2 NG/ML (ref 0.8–3.5)

## 2020-06-20 LAB — GAD65 AB SER IA-ACNC: <5 IU/ML (ref 0–5)

## 2020-06-22 LAB — ISLET CELL512 AB SER IA-ACNC: <5.4 U/ML (ref 0–7.4)

## 2020-07-17 ENCOUNTER — TELEPHONE (OUTPATIENT)
Dept: MEDICAL GROUP | Facility: PHYSICIAN GROUP | Age: 20
End: 2020-07-17

## 2020-07-17 NOTE — TELEPHONE ENCOUNTER
Patient called and LVM stating that she has had an increase in pain. Should she see her PCP or Rheumatoid doc? Patient has had no appetite as well.   I called and LVM for patient to call back. Patient needs an appointment with Dr Pastrana

## 2020-08-17 ENCOUNTER — APPOINTMENT (OUTPATIENT)
Dept: ENDOCRINOLOGY | Facility: MEDICAL CENTER | Age: 20
End: 2020-08-17
Attending: INTERNAL MEDICINE
Payer: COMMERCIAL

## 2020-09-01 DIAGNOSIS — E55.9 VITAMIN D DEFICIENCY: ICD-10-CM

## 2020-09-01 NOTE — TELEPHONE ENCOUNTER
Received request via: Pharmacy    Was the patient seen in the last year in this department? Yes    Does the patient have an active prescription (recently filled or refills available) for medication(s) requested? No     Vitamin D 50,000 Unit Cap Stri        Will file in chart as: vitamin D, Ergocalciferol, (DRISDOL) 1.25 MG (16222 UT) Cap capsule   Sig: TAKE ONE CAPSULE BY MOUTH EVERY WEEK    Disp:  12 Cap    Refills:  0   Start: 9/1/2020   Class: Normal   Non-formulary For: Vitamin D deficiency   Last ordered: 2 months ago by Teja Lora M.D. Last refill: 6/8/2020   Rx #: [id number]

## 2020-09-02 RX ORDER — ERGOCALCIFEROL 1.25 MG/1
CAPSULE ORAL
Qty: 12 CAP | Refills: 0 | Status: SHIPPED | OUTPATIENT
Start: 2020-09-02 | End: 2020-12-15 | Stop reason: SDUPTHER

## 2020-09-08 ENCOUNTER — HOSPITAL ENCOUNTER (OUTPATIENT)
Dept: LAB | Facility: MEDICAL CENTER | Age: 20
End: 2020-09-08
Attending: INTERNAL MEDICINE
Payer: COMMERCIAL

## 2020-09-08 LAB
ALBUMIN SERPL BCP-MCNC: 4.7 G/DL (ref 3.2–4.9)
ALBUMIN/GLOB SERPL: 1.6 G/DL
ALP SERPL-CCNC: 83 U/L (ref 30–99)
ALT SERPL-CCNC: 18 U/L (ref 2–50)
ANION GAP SERPL CALC-SCNC: 13 MMOL/L (ref 7–16)
APPEARANCE UR: CLEAR
AST SERPL-CCNC: 15 U/L (ref 12–45)
BASOPHILS # BLD AUTO: 0.8 % (ref 0–1.8)
BASOPHILS # BLD: 0.06 K/UL (ref 0–0.12)
BILIRUB SERPL-MCNC: 0.3 MG/DL (ref 0.1–1.5)
BILIRUB UR QL STRIP.AUTO: NEGATIVE
BUN SERPL-MCNC: 12 MG/DL (ref 8–22)
C3 SERPL-MCNC: 139.4 MG/DL (ref 87–200)
C4 SERPL-MCNC: 23.9 MG/DL (ref 19–52)
CALCIUM SERPL-MCNC: 9.7 MG/DL (ref 8.5–10.5)
CHLORIDE SERPL-SCNC: 101 MMOL/L (ref 96–112)
CO2 SERPL-SCNC: 22 MMOL/L (ref 20–33)
COLOR UR: YELLOW
CREAT SERPL-MCNC: 0.79 MG/DL (ref 0.5–1.4)
CREAT UR-MCNC: 262.99 MG/DL
EOSINOPHIL # BLD AUTO: 0.1 K/UL (ref 0–0.51)
EOSINOPHIL NFR BLD: 1.4 % (ref 0–6.9)
ERYTHROCYTE [DISTWIDTH] IN BLOOD BY AUTOMATED COUNT: 38.3 FL (ref 35.9–50)
GLOBULIN SER CALC-MCNC: 3 G/DL (ref 1.9–3.5)
GLUCOSE SERPL-MCNC: 89 MG/DL (ref 65–99)
GLUCOSE UR STRIP.AUTO-MCNC: NEGATIVE MG/DL
HCT VFR BLD AUTO: 43.5 % (ref 37–47)
HGB BLD-MCNC: 14.7 G/DL (ref 12–16)
IMM GRANULOCYTES # BLD AUTO: 0.01 K/UL (ref 0–0.11)
IMM GRANULOCYTES NFR BLD AUTO: 0.1 % (ref 0–0.9)
KETONES UR STRIP.AUTO-MCNC: NEGATIVE MG/DL
LEUKOCYTE ESTERASE UR QL STRIP.AUTO: NEGATIVE
LYMPHOCYTES # BLD AUTO: 2.43 K/UL (ref 1–4.8)
LYMPHOCYTES NFR BLD: 34.1 % (ref 22–41)
MCH RBC QN AUTO: 30.6 PG (ref 27–33)
MCHC RBC AUTO-ENTMCNC: 33.8 G/DL (ref 33.6–35)
MCV RBC AUTO: 90.6 FL (ref 81.4–97.8)
MICRO URNS: NORMAL
MONOCYTES # BLD AUTO: 0.65 K/UL (ref 0–0.85)
MONOCYTES NFR BLD AUTO: 9.1 % (ref 0–13.4)
NEUTROPHILS # BLD AUTO: 3.88 K/UL (ref 2–7.15)
NEUTROPHILS NFR BLD: 54.5 % (ref 44–72)
NITRITE UR QL STRIP.AUTO: NEGATIVE
NRBC # BLD AUTO: 0 K/UL
NRBC BLD-RTO: 0 /100 WBC
PH UR STRIP.AUTO: 6 [PH] (ref 5–8)
PLATELET # BLD AUTO: 251 K/UL (ref 164–446)
PMV BLD AUTO: 11.1 FL (ref 9–12.9)
POTASSIUM SERPL-SCNC: 3.8 MMOL/L (ref 3.6–5.5)
PROT SERPL-MCNC: 7.7 G/DL (ref 6–8.2)
PROT UR QL STRIP: NEGATIVE MG/DL
PROT UR-MCNC: 18 MG/DL (ref 0–15)
PROT/CREAT UR: 68 MG/G (ref 10–107)
RBC # BLD AUTO: 4.8 M/UL (ref 4.2–5.4)
RBC UR QL AUTO: NEGATIVE
SODIUM SERPL-SCNC: 136 MMOL/L (ref 135–145)
SP GR UR REFRACTOMETRY: 1.02
UROBILINOGEN UR STRIP.AUTO-MCNC: 0.2 MG/DL
WBC # BLD AUTO: 7.1 K/UL (ref 4.8–10.8)

## 2020-09-08 PROCEDURE — 81003 URINALYSIS AUTO W/O SCOPE: CPT

## 2020-09-08 PROCEDURE — 80053 COMPREHEN METABOLIC PANEL: CPT

## 2020-09-08 PROCEDURE — 85025 COMPLETE CBC W/AUTO DIFF WBC: CPT

## 2020-09-08 PROCEDURE — 36415 COLL VENOUS BLD VENIPUNCTURE: CPT

## 2020-09-08 PROCEDURE — 86225 DNA ANTIBODY NATIVE: CPT

## 2020-09-08 PROCEDURE — 86160 COMPLEMENT ANTIGEN: CPT

## 2020-09-10 LAB — DSDNA AB TITR SER CLIF: NORMAL {TITER}

## 2020-09-14 DIAGNOSIS — E53.8 VITAMIN B12 DEFICIENCY: ICD-10-CM

## 2020-09-14 RX ORDER — CYANOCOBALAMIN 1000 UG/ML
INJECTION, SOLUTION INTRAMUSCULAR; SUBCUTANEOUS
Qty: 12 ML | Refills: 0 | Status: SHIPPED | OUTPATIENT
Start: 2020-09-14

## 2020-09-14 NOTE — TELEPHONE ENCOUNTER
Received request via: Pharmacy    Was the patient seen in the last year in this department? Yes    Does the patient have an active prescription (recently filled or refills available) for medication(s) requested? No     cyanocobalamin (VITAMIN B-12) 1000 MCG/ML Solution    Sig: Inject 1 mL intramuscularly every 14 days.

## 2020-09-21 ENCOUNTER — HOSPITAL ENCOUNTER (OUTPATIENT)
Dept: RADIOLOGY | Facility: MEDICAL CENTER | Age: 20
End: 2020-09-21
Attending: INTERNAL MEDICINE
Payer: COMMERCIAL

## 2020-09-21 DIAGNOSIS — M54.50 LOW BACK PAIN, UNSPECIFIED BACK PAIN LATERALITY, UNSPECIFIED CHRONICITY, UNSPECIFIED WHETHER SCIATICA PRESENT: ICD-10-CM

## 2020-09-21 DIAGNOSIS — M53.3 DISORDER OF SACRUM: ICD-10-CM

## 2020-09-21 PROCEDURE — 72202 X-RAY EXAM SI JOINTS 3/> VWS: CPT

## 2020-10-22 ENCOUNTER — EMPLOYEE HEALTH (OUTPATIENT)
Dept: OCCUPATIONAL MEDICINE | Facility: CLINIC | Age: 20
End: 2020-10-22

## 2020-10-22 ENCOUNTER — HOSPITAL ENCOUNTER (OUTPATIENT)
Facility: MEDICAL CENTER | Age: 20
End: 2020-10-22
Attending: PREVENTIVE MEDICINE
Payer: COMMERCIAL

## 2020-10-22 ENCOUNTER — EH NON-PROVIDER (OUTPATIENT)
Dept: OCCUPATIONAL MEDICINE | Facility: CLINIC | Age: 20
End: 2020-10-22

## 2020-10-22 VITALS
WEIGHT: 200 LBS | TEMPERATURE: 99.4 F | SYSTOLIC BLOOD PRESSURE: 108 MMHG | HEIGHT: 66 IN | HEART RATE: 109 BPM | BODY MASS INDEX: 32.14 KG/M2 | RESPIRATION RATE: 16 BRPM | DIASTOLIC BLOOD PRESSURE: 68 MMHG | OXYGEN SATURATION: 98 %

## 2020-10-22 DIAGNOSIS — Z02.1 PRE-EMPLOYMENT DRUG SCREENING: Primary | ICD-10-CM

## 2020-10-22 DIAGNOSIS — Z02.1 PRE-EMPLOYMENT HEALTH SCREENING EXAMINATION: ICD-10-CM

## 2020-10-22 DIAGNOSIS — Z02.1 PRE-EMPLOYMENT DRUG SCREENING: ICD-10-CM

## 2020-10-22 LAB
AMP AMPHETAMINE: NORMAL
BAR BARBITURATES: NORMAL
BZO BENZODIAZEPINES: NORMAL
COC COCAINE: NORMAL
INT CON NEG: NORMAL
INT CON POS: NORMAL
MDMA ECSTASY: NORMAL
MET METHAMPHETAMINES: NORMAL
MTD METHADONE: NORMAL
OPI OPIATES: NORMAL
OXY OXYCODONE: NORMAL
PCP PHENCYCLIDINE: NORMAL
POC URINE DRUG SCREEN OCDRS: NEGATIVE
THC: NORMAL

## 2020-10-22 PROCEDURE — 90686 IIV4 VACC NO PRSV 0.5 ML IM: CPT | Performed by: NURSE PRACTITIONER

## 2020-10-22 PROCEDURE — 94375 RESPIRATORY FLOW VOLUME LOOP: CPT | Performed by: PREVENTIVE MEDICINE

## 2020-10-22 PROCEDURE — 80305 DRUG TEST PRSMV DIR OPT OBS: CPT | Performed by: PREVENTIVE MEDICINE

## 2020-10-22 PROCEDURE — 8915 PR COMPREHENSIVE PHYSICAL: Performed by: PREVENTIVE MEDICINE

## 2020-10-22 PROCEDURE — 86480 TB TEST CELL IMMUN MEASURE: CPT | Performed by: PREVENTIVE MEDICINE

## 2020-10-22 ASSESSMENT — VISUAL ACUITY
OS_CC: 20/25
OD_CC: 20/25

## 2020-10-22 ASSESSMENT — FIBROSIS 4 INDEX: FIB4 SCORE: 0.28

## 2020-10-26 ENCOUNTER — TELEPHONE (OUTPATIENT)
Dept: ENDOCRINOLOGY | Facility: MEDICAL CENTER | Age: 20
End: 2020-10-26

## 2020-10-26 LAB
GAMMA INTERFERON BACKGROUND BLD IA-ACNC: 0.02 IU/ML
M TB IFN-G BLD-IMP: NEGATIVE
M TB IFN-G CD4+ BCKGRND COR BLD-ACNC: 0 IU/ML
MITOGEN IGNF BCKGRD COR BLD-ACNC: >10 IU/ML
QFT TB2 - NIL TBQ2: 0 IU/ML

## 2020-10-27 NOTE — TELEPHONE ENCOUNTER
VOICEMAIL  1. Caller Name: Malena Bennett                      Call Back Number: [phone]    2. Message: Patient left a message stating she is very confused on how to dose herself for insulin. Her numbers are either 60 or below after diner because she didn't eat enough or they are above 400 because she ate too much. She would like a call ASAP.    3. Patient approves office to leave a detailed voicemail/MyChart message: yes

## 2020-10-27 NOTE — TELEPHONE ENCOUNTER
Talked with Malena, she states she has been having high blood sugars after 11 am.  She doses her insulin and then often eats more or less then planned.  She would like to take her insulin after she eats.  I encouraged her to take her insulin as soon as possible.  I have emailed her the share code again to allow us to look at her Dexcom.  She may need to change to a 1:10 gram carbohydrate ratio at dinner if her blood sugars remain high.

## 2020-10-28 ENCOUNTER — EH NON-PROVIDER (OUTPATIENT)
Dept: OCCUPATIONAL MEDICINE | Facility: CLINIC | Age: 20
End: 2020-10-28

## 2020-10-28 DIAGNOSIS — Z71.85 IMMUNIZATION COUNSELING: ICD-10-CM

## 2020-10-28 PROCEDURE — 99999 PR NO CHARGE: CPT | Performed by: PREVENTIVE MEDICINE

## 2020-12-14 ENCOUNTER — OFFICE VISIT (OUTPATIENT)
Dept: ENDOCRINOLOGY | Facility: MEDICAL CENTER | Age: 20
End: 2020-12-14
Attending: INTERNAL MEDICINE
Payer: COMMERCIAL

## 2020-12-14 VITALS
HEART RATE: 98 BPM | HEIGHT: 67 IN | OXYGEN SATURATION: 98 % | SYSTOLIC BLOOD PRESSURE: 118 MMHG | WEIGHT: 190 LBS | DIASTOLIC BLOOD PRESSURE: 68 MMHG | BODY MASS INDEX: 29.82 KG/M2

## 2020-12-14 DIAGNOSIS — Z79.4 LONG-TERM INSULIN USE (HCC): ICD-10-CM

## 2020-12-14 DIAGNOSIS — E11.65 UNCONTROLLED TYPE 2 DIABETES MELLITUS WITH HYPERGLYCEMIA (HCC): ICD-10-CM

## 2020-12-14 DIAGNOSIS — E55.9 VITAMIN D DEFICIENCY: ICD-10-CM

## 2020-12-14 DIAGNOSIS — Z79.4 CONTROLLED TYPE 2 DIABETES MELLITUS WITHOUT COMPLICATION, WITH LONG-TERM CURRENT USE OF INSULIN (HCC): ICD-10-CM

## 2020-12-14 DIAGNOSIS — E11.9 CONTROLLED TYPE 2 DIABETES MELLITUS WITHOUT COMPLICATION, WITH LONG-TERM CURRENT USE OF INSULIN (HCC): ICD-10-CM

## 2020-12-14 LAB
HBA1C MFR BLD: 5 % (ref 0–5.6)
INT CON NEG: NORMAL
INT CON POS: NORMAL

## 2020-12-14 PROCEDURE — 99214 OFFICE O/P EST MOD 30 MIN: CPT | Performed by: INTERNAL MEDICINE

## 2020-12-14 PROCEDURE — 99213 OFFICE O/P EST LOW 20 MIN: CPT | Performed by: INTERNAL MEDICINE

## 2020-12-14 PROCEDURE — 83036 HEMOGLOBIN GLYCOSYLATED A1C: CPT | Performed by: INTERNAL MEDICINE

## 2020-12-14 RX ORDER — TIZANIDINE 4 MG/1
8 TABLET ORAL 3 TIMES DAILY
COMMUNITY
End: 2022-03-21

## 2020-12-14 ASSESSMENT — FIBROSIS 4 INDEX: FIB4 SCORE: 0.3

## 2020-12-15 DIAGNOSIS — E55.9 VITAMIN D DEFICIENCY: ICD-10-CM

## 2020-12-15 RX ORDER — ERGOCALCIFEROL 1.25 MG/1
50000 CAPSULE ORAL
Qty: 12 CAP | Refills: 0 | Status: SHIPPED | OUTPATIENT
Start: 2020-12-15 | End: 2022-02-03 | Stop reason: SDUPTHER

## 2020-12-15 NOTE — PROGRESS NOTES
CHIEF COMPLAINT: Patient is here for follow up of Type 2 Diabetes Mellitus    HPI:     Malena Bennett is a 20 y.o. female with Type 2 Diabetes Mellitus here for follow up.    Labs from 12/14/2020 HbA1c is 5.0%    She was previously seen by Dr. Lora.  I just spoke with the diabetes nurse after her visit, Renee Morton, explained to me that the patient has had some issues in the past with possible malingering with regards to her sugars.    She is not really a type I diabetic because her C-peptide levels are detectable.  But she has insisted on being on insulin      Currently she reports that she is on Tresiba 10 units daily Humalog with a carb ratio 15 for breakfast and lunch and 12 for dinner correction is 1 unit for every 30 above 150.    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  I originally suggested an insulin pump or inhaled insulin to to her today but after I spoke with Renee Morton our diabetes educator she informed me that patient has had issues with reliability with regards to the information that she provides      She has a history of vitamin D deficiency but we do not have updated labs        BG Diary:12/14/20  Please see Dexcom G6 CGM download    Weight has been stable    Diabetes Complications   Retinopathy: No known retinopathy.  Last eye exam: Patient is overdue for an eye exam  Neuropathy: Denies paresthesias or numbness in hands or feet. Denies any foot wounds.  Exercise: Minimal.  Diet: Fair.  Patient's medications, allergies, and social histories were reviewed and updated as appropriate.    ROS:     CONS:     No fever, no chills   EYES:     No diplopia, no blurry vision   CV:           No chest pain, no palpitations    PULM:     No SOB, no cough, no hemoptysis.   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)  2019-08: History of cholecystectomy  2019-08: Celiac disease  2019-08: Uncontrolled type 2 diabetes mellitus with hyperglycemia   ([facility])  2018-12: Palpitations  2018-12: POTS (postural orthostatic tachycardia syndrome)  2018-12: Type 1 diabetes mellitus with hypoglycemia unawareness ([facility])  2018-12: Hyperglycemia  2018-08: Other irritable bowel syndrome  2018-08: Adrenal insufficiency ([facility])  2018-05: Syncope and collapse  2018-05: Diagnosis unknown  2018-02: Salt craving  2018-02: Hypoglycemia  2017-11: Blood in stool  2017-11: Urinary tract infection without hematuria  2017-10: At risk for knowledge deficit Health problems  2017-07: Appendicitis  2017-05: Overweight, pediatric, BMI (body mass index) 95-99% for age  2017-01: Biliary dyskinesia  2016-11: Right upper quadrant pain  2016-11: Chronic tension-type headache, not intractable  2016-11: Acute gastritis without hemorrhage  2016-09: Mittelschmerz  2016-09: Tourette syndrome  2016-09: OCD (obsessive compulsive disorder)  2016-06: Pelvic pain  Factor V Leiden ([facility])      Past Surgical History:  Past Surgical History:   Procedure Laterality Date   • APPENDECTOMY LAPAROSCOPIC  7/1/2017    Procedure: APPENDECTOMY LAPAROSCOPIC;  Surgeon: Derick Arthur M.D.;  Location: SURGERY [facility];  Service:    • ELIGIO BY LAPAROSCOPY  1/30/2017    Procedure: ELIGIO BY LAPAROSCOPY;  Surgeon: Fina Perkins M.D.;  Location: SURGERY SAME DAY [facility] [place];  Service:    • APPENDECTOMY          Allergies:  Cefdinir and Erythromycin     Social History:  Social History     Tobacco Use   • Smoking status: Never Smoker   • Smokeless tobacco: Never  "Used   Substance Use Topics   • Alcohol use: No     Alcohol/week: 0.0 oz     Frequency: Never     Drinks per session: Patient refused     Binge frequency: Never   • Drug use: No        Family History:   family history includes Arthritis in her maternal grandmother; Cancer in her maternal aunt, maternal grandfather, maternal grandmother, maternal uncle, and other family members; Genetic Disorder in her father, maternal grandfather, and maternal uncle; Heart Disease in her father, maternal aunt, maternal grandmother, maternal uncle, paternal grandmother, and another family member; Hyperlipidemia in her father and maternal grandmother; Hypertension in her maternal grandmother and paternal grandmother; Other in her maternal grandfather and mother; Stroke in her maternal grandmother and paternal grandmother.      PHYSICAL EXAM:   OBJECTIVE:  Vital signs: /68 (BP Location: Left arm, Patient Position: Sitting, BP Cuff Size: Adult)   Pulse 98   Ht 1.702 m (5' 7\")   Wt 86.2 kg (190 lb)   SpO2 98%   BMI 29.76 kg/m²   GENERAL: Well-developed, well-nourished in no apparent distress.   EYE:  No ocular asymmetry, PERRLA  HENT: Pink, moist mucous membranes.    NECK: No thyromegaly.   CARDIOVASCULAR:  No murmurs  LUNGS: Clear breath sounds  ABDOMEN: Soft, nontender   EXTREMITIES: No clubbing, cyanosis, or edema.   NEUROLOGICAL: No gross focal motor abnormalities   LYMPH: No cervical adenopathy seen  SKIN: No rashes, lesions.     Labs:  Lab Results   Component Value Date/Time    HBA1C 5.0 06/17/2020 02:13 PM        Lab Results   Component Value Date/Time    WBC 7.1 09/08/2020 02:41 PM    RBC 4.80 09/08/2020 02:41 PM    HEMOGLOBIN 14.7 09/08/2020 02:41 PM    MCV 90.6 09/08/2020 02:41 PM    MCH 30.6 09/08/2020 02:41 PM    MCHC 33.8 09/08/2020 02:41 PM    RDW 38.3 09/08/2020 02:41 PM    MPV 11.1 09/08/2020 02:41 PM       Lab Results   Component Value Date/Time    SODIUM 136 09/08/2020 02:41 PM    POTASSIUM 3.8 09/08/2020 " 02:41 PM    CHLORIDE 101 09/08/2020 02:41 PM    CO2 22 09/08/2020 02:41 PM    ANION 13.0 09/08/2020 02:41 PM    GLUCOSE 89 09/08/2020 02:41 PM    BUN 12 09/08/2020 02:41 PM    CREATININE 0.79 09/08/2020 02:41 PM    CALCIUM 9.7 09/08/2020 02:41 PM    ASTSGOT 15 09/08/2020 02:41 PM    ALTSGPT 18 09/08/2020 02:41 PM    TBILIRUBIN 0.3 09/08/2020 02:41 PM    ALBUMIN 4.7 09/08/2020 02:41 PM    TOTPROTEIN 7.7 09/08/2020 02:41 PM    GLOBULIN 3.0 09/08/2020 02:41 PM    AGRATIO 1.6 09/08/2020 02:41 PM       Lab Results   Component Value Date/Time    CHOLSTRLTOT 168 08/15/2019 1151    TRIGLYCERIDE 79 08/15/2019 1151    HDL 39 (A) 08/15/2019 1151     (H) 08/15/2019 1151       Lab Results   Component Value Date/Time    MALBCRT see below 06/17/2020 02:13 PM    MICROALBUR <1.2 06/17/2020 02:13 PM        Lab Results   Component Value Date/Time    TSHULTRASEN 1.920 08/15/2019 1151     No results found for: FREEDIR  Lab Results   Component Value Date/Time    FREET3 3.89 08/15/2019 1151     No results found for: THYSTIMIG        ASSESSMENT/PLAN:     1. Controlled type 2 diabetes mellitus without complication, with long-term current use of insulin (HCC)  Well-controlled,  Unclear if she is a good candidate for a pump especially when her C-peptide levels are detectable and she does not really have type 1 diabetes but has type 2 diabetes.     I spoke with the diabetes nurse after the patient's visit was concluded and she expressed to me some reservations about the patient and possible history of malingering    It is unclear to me why she is on basal bolus therapy even though her C-peptide levels are detectable.  I am getting an updated C-peptide and lipid panel and TSH and vitamin D today and I will update her I will see her again in 4 months with repeat labs      2. Vitamin D deficiency  Controlled continue current medications we will repeat calcium 25-hydroxy vitamin levels in 3 months    3. Long-term insulin use (HCC)  Patient  is on long-term insulin therapy for diabetes      Return in about 4 months (around 4/14/2021).      This patient during there office visit today was started on a new medication.  Side effects of the new medication were discussed with the patient today in the office.     Thank you kindly for allowing me to participate in the diabetes care plan for this patient.    Derick Kelley MD, [facility], [facility]  12/14/20    CC:   Jayme Pastrana M.D.

## 2020-12-17 DIAGNOSIS — Z23 NEED FOR VACCINATION: ICD-10-CM

## 2020-12-19 ENCOUNTER — APPOINTMENT (OUTPATIENT)
Dept: FAMILY PLANNING/WOMEN'S HEALTH CLINIC | Facility: IMMUNIZATION CENTER | Age: 20
End: 2020-12-19
Attending: FAMILY MEDICINE
Payer: COMMERCIAL

## 2020-12-19 ENCOUNTER — IMMUNIZATION (OUTPATIENT)
Dept: FAMILY PLANNING/WOMEN'S HEALTH CLINIC | Facility: IMMUNIZATION CENTER | Age: 20
End: 2020-12-19

## 2020-12-19 DIAGNOSIS — Z23 ENCOUNTER FOR VACCINATION: Primary | ICD-10-CM

## 2020-12-19 DIAGNOSIS — Z23 NEED FOR VACCINATION: ICD-10-CM

## 2020-12-19 PROCEDURE — 91300 PFIZER SARS-COV-2 VACCINE: CPT

## 2020-12-19 PROCEDURE — 0001A PFIZER SARS-COV-2 VACCINE: CPT

## 2021-01-06 DIAGNOSIS — Z79.4 CONTROLLED TYPE 2 DIABETES MELLITUS WITHOUT COMPLICATION, WITH LONG-TERM CURRENT USE OF INSULIN (HCC): ICD-10-CM

## 2021-01-06 DIAGNOSIS — E11.9 CONTROLLED TYPE 2 DIABETES MELLITUS WITHOUT COMPLICATION, WITH LONG-TERM CURRENT USE OF INSULIN (HCC): ICD-10-CM

## 2021-01-06 RX ORDER — PROCHLORPERAZINE 25 MG/1
1 SUPPOSITORY RECTAL CONTINUOUS
Qty: 1 EACH | Refills: 1 | Status: SHIPPED | OUTPATIENT
Start: 2021-01-06 | End: 2022-03-21

## 2021-01-06 RX ORDER — PROCHLORPERAZINE 25 MG/1
1 SUPPOSITORY RECTAL
Qty: 2 EACH | Refills: 2 | Status: SHIPPED | OUTPATIENT
Start: 2021-01-06 | End: 2022-03-21

## 2021-01-06 RX ORDER — PROCHLORPERAZINE 25 MG/1
1 SUPPOSITORY RECTAL
Qty: 9 EACH | Refills: 3 | Status: SHIPPED | OUTPATIENT
Start: 2021-01-06 | End: 2022-03-21

## 2021-01-06 RX ORDER — INSULIN GLARGINE 100 [IU]/ML
30 INJECTION, SOLUTION SUBCUTANEOUS EVERY EVENING
Qty: 15 ML | Refills: 11 | Status: SHIPPED | OUTPATIENT
Start: 2021-01-06 | End: 2022-03-08

## 2021-01-12 ENCOUNTER — IMMUNIZATION (OUTPATIENT)
Dept: FAMILY PLANNING/WOMEN'S HEALTH CLINIC | Facility: IMMUNIZATION CENTER | Age: 21
End: 2021-01-12
Attending: FAMILY MEDICINE
Payer: COMMERCIAL

## 2021-01-12 ENCOUNTER — HOSPITAL ENCOUNTER (OUTPATIENT)
Dept: LAB | Facility: MEDICAL CENTER | Age: 21
End: 2021-01-12
Attending: INTERNAL MEDICINE
Payer: COMMERCIAL

## 2021-01-12 DIAGNOSIS — E11.9 CONTROLLED TYPE 2 DIABETES MELLITUS WITHOUT COMPLICATION, WITH LONG-TERM CURRENT USE OF INSULIN (HCC): ICD-10-CM

## 2021-01-12 DIAGNOSIS — Z23 ENCOUNTER FOR VACCINATION: Primary | ICD-10-CM

## 2021-01-12 DIAGNOSIS — E55.9 VITAMIN D DEFICIENCY: ICD-10-CM

## 2021-01-12 DIAGNOSIS — Z79.4 CONTROLLED TYPE 2 DIABETES MELLITUS WITHOUT COMPLICATION, WITH LONG-TERM CURRENT USE OF INSULIN (HCC): ICD-10-CM

## 2021-01-12 DIAGNOSIS — Z79.4 LONG-TERM INSULIN USE (HCC): ICD-10-CM

## 2021-01-12 LAB
25(OH)D3 SERPL-MCNC: 22 NG/ML (ref 30–100)
ALBUMIN SERPL BCP-MCNC: 4.7 G/DL (ref 3.2–4.9)
ALBUMIN/GLOB SERPL: 1.6 G/DL
ALP SERPL-CCNC: 87 U/L (ref 30–99)
ALT SERPL-CCNC: 22 U/L (ref 2–50)
ANION GAP SERPL CALC-SCNC: 8 MMOL/L (ref 7–16)
AST SERPL-CCNC: 19 U/L (ref 12–45)
BILIRUB SERPL-MCNC: 0.5 MG/DL (ref 0.1–1.5)
BUN SERPL-MCNC: 15 MG/DL (ref 8–22)
CALCIUM SERPL-MCNC: 9.4 MG/DL (ref 8.5–10.5)
CHLORIDE SERPL-SCNC: 102 MMOL/L (ref 96–112)
CHOLEST SERPL-MCNC: 166 MG/DL (ref 100–199)
CO2 SERPL-SCNC: 23 MMOL/L (ref 20–33)
CREAT SERPL-MCNC: 0.68 MG/DL (ref 0.5–1.4)
GLOBULIN SER CALC-MCNC: 2.9 G/DL (ref 1.9–3.5)
GLUCOSE SERPL-MCNC: 91 MG/DL (ref 65–99)
HDLC SERPL-MCNC: 46 MG/DL
LDLC SERPL CALC-MCNC: 103 MG/DL
POTASSIUM SERPL-SCNC: 3.7 MMOL/L (ref 3.6–5.5)
PROT SERPL-MCNC: 7.6 G/DL (ref 6–8.2)
SODIUM SERPL-SCNC: 133 MMOL/L (ref 135–145)
T4 FREE SERPL-MCNC: 1.23 NG/DL (ref 0.93–1.7)
TRIGL SERPL-MCNC: 86 MG/DL (ref 0–149)
TSH SERPL DL<=0.005 MIU/L-ACNC: 3.09 UIU/ML (ref 0.38–5.33)

## 2021-01-12 PROCEDURE — 86341 ISLET CELL ANTIBODY: CPT

## 2021-01-12 PROCEDURE — 83036 HEMOGLOBIN GLYCOSYLATED A1C: CPT

## 2021-01-12 PROCEDURE — 80061 LIPID PANEL: CPT

## 2021-01-12 PROCEDURE — 36415 COLL VENOUS BLD VENIPUNCTURE: CPT

## 2021-01-12 PROCEDURE — 84439 ASSAY OF FREE THYROXINE: CPT

## 2021-01-12 PROCEDURE — 0002A PFIZER SARS-COV-2 VACCINE: CPT | Performed by: FAMILY MEDICINE

## 2021-01-12 PROCEDURE — 91300 PFIZER SARS-COV-2 VACCINE: CPT | Performed by: FAMILY MEDICINE

## 2021-01-12 PROCEDURE — 84681 ASSAY OF C-PEPTIDE: CPT

## 2021-01-12 PROCEDURE — 82306 VITAMIN D 25 HYDROXY: CPT

## 2021-01-12 PROCEDURE — 80053 COMPREHEN METABOLIC PANEL: CPT

## 2021-01-12 PROCEDURE — 84443 ASSAY THYROID STIM HORMONE: CPT

## 2021-01-13 ENCOUNTER — HOSPITAL ENCOUNTER (OUTPATIENT)
Dept: LAB | Facility: MEDICAL CENTER | Age: 21
End: 2021-01-13
Attending: INTERNAL MEDICINE
Payer: COMMERCIAL

## 2021-01-13 LAB
ALBUMIN SERPL BCP-MCNC: 4.7 G/DL (ref 3.2–4.9)
ALBUMIN/GLOB SERPL: 1.6 G/DL
ALP SERPL-CCNC: 86 U/L (ref 30–99)
ALT SERPL-CCNC: 24 U/L (ref 2–50)
ANION GAP SERPL CALC-SCNC: 10 MMOL/L (ref 7–16)
APPEARANCE UR: CLEAR
AST SERPL-CCNC: 21 U/L (ref 12–45)
BASOPHILS # BLD AUTO: 0.6 % (ref 0–1.8)
BASOPHILS # BLD: 0.06 K/UL (ref 0–0.12)
BILIRUB SERPL-MCNC: 0.4 MG/DL (ref 0.1–1.5)
BILIRUB UR QL STRIP.AUTO: NEGATIVE
BUN SERPL-MCNC: 14 MG/DL (ref 8–22)
C3 SERPL-MCNC: 138.9 MG/DL (ref 87–200)
C4 SERPL-MCNC: 18.1 MG/DL (ref 19–52)
CALCIUM SERPL-MCNC: 9.6 MG/DL (ref 8.5–10.5)
CHLORIDE SERPL-SCNC: 103 MMOL/L (ref 96–112)
CO2 SERPL-SCNC: 24 MMOL/L (ref 20–33)
COLOR UR: YELLOW
CREAT SERPL-MCNC: 0.64 MG/DL (ref 0.5–1.4)
CREAT UR-MCNC: 33.26 MG/DL
CREAT UR-MCNC: 34.11 MG/DL
EOSINOPHIL # BLD AUTO: 0.15 K/UL (ref 0–0.51)
EOSINOPHIL NFR BLD: 1.6 % (ref 0–6.9)
ERYTHROCYTE [DISTWIDTH] IN BLOOD BY AUTOMATED COUNT: 39.9 FL (ref 35.9–50)
EST. AVERAGE GLUCOSE BLD GHB EST-MCNC: 100 MG/DL
GLOBULIN SER CALC-MCNC: 3 G/DL (ref 1.9–3.5)
GLUCOSE SERPL-MCNC: 89 MG/DL (ref 65–99)
GLUCOSE UR STRIP.AUTO-MCNC: NEGATIVE MG/DL
HBA1C MFR BLD: 5.1 % (ref 0–5.6)
HCT VFR BLD AUTO: 42.7 % (ref 37–47)
HGB BLD-MCNC: 14.2 G/DL (ref 12–16)
IMM GRANULOCYTES # BLD AUTO: 0.03 K/UL (ref 0–0.11)
IMM GRANULOCYTES NFR BLD AUTO: 0.3 % (ref 0–0.9)
KETONES UR STRIP.AUTO-MCNC: NEGATIVE MG/DL
LEUKOCYTE ESTERASE UR QL STRIP.AUTO: NEGATIVE
LYMPHOCYTES # BLD AUTO: 3.05 K/UL (ref 1–4.8)
LYMPHOCYTES NFR BLD: 32.7 % (ref 22–41)
MCH RBC QN AUTO: 30.7 PG (ref 27–33)
MCHC RBC AUTO-ENTMCNC: 33.3 G/DL (ref 33.6–35)
MCV RBC AUTO: 92.4 FL (ref 81.4–97.8)
MICRO URNS: NORMAL
MICROALBUMIN UR-MCNC: <1.2 MG/DL
MICROALBUMIN/CREAT UR: NORMAL MG/G (ref 0–30)
MONOCYTES # BLD AUTO: 0.78 K/UL (ref 0–0.85)
MONOCYTES NFR BLD AUTO: 8.4 % (ref 0–13.4)
NEUTROPHILS # BLD AUTO: 5.25 K/UL (ref 2–7.15)
NEUTROPHILS NFR BLD: 56.4 % (ref 44–72)
NITRITE UR QL STRIP.AUTO: NEGATIVE
NRBC # BLD AUTO: 0 K/UL
NRBC BLD-RTO: 0 /100 WBC
PH UR STRIP.AUTO: 6 [PH] (ref 5–8)
PLATELET # BLD AUTO: 249 K/UL (ref 164–446)
PMV BLD AUTO: 10.9 FL (ref 9–12.9)
POTASSIUM SERPL-SCNC: 4.1 MMOL/L (ref 3.6–5.5)
PROT SERPL-MCNC: 7.7 G/DL (ref 6–8.2)
PROT UR QL STRIP: NEGATIVE MG/DL
PROT UR-MCNC: <4 MG/DL (ref 0–15)
PROT/CREAT UR: NORMAL MG/G (ref 10–107)
RBC # BLD AUTO: 4.62 M/UL (ref 4.2–5.4)
RBC UR QL AUTO: NEGATIVE
SODIUM SERPL-SCNC: 137 MMOL/L (ref 135–145)
SP GR UR STRIP.AUTO: 1.01
UROBILINOGEN UR STRIP.AUTO-MCNC: 0.2 MG/DL
WBC # BLD AUTO: 9.3 K/UL (ref 4.8–10.8)

## 2021-01-13 PROCEDURE — 81003 URINALYSIS AUTO W/O SCOPE: CPT

## 2021-01-13 PROCEDURE — 85025 COMPLETE CBC W/AUTO DIFF WBC: CPT

## 2021-01-13 PROCEDURE — 82043 UR ALBUMIN QUANTITATIVE: CPT

## 2021-01-13 PROCEDURE — 80053 COMPREHEN METABOLIC PANEL: CPT

## 2021-01-13 PROCEDURE — 86225 DNA ANTIBODY NATIVE: CPT

## 2021-01-13 PROCEDURE — 82570 ASSAY OF URINE CREATININE: CPT

## 2021-01-13 PROCEDURE — 84156 ASSAY OF PROTEIN URINE: CPT

## 2021-01-13 PROCEDURE — 36415 COLL VENOUS BLD VENIPUNCTURE: CPT

## 2021-01-13 PROCEDURE — 87086 URINE CULTURE/COLONY COUNT: CPT

## 2021-01-13 PROCEDURE — 86160 COMPLEMENT ANTIGEN: CPT | Mod: 91

## 2021-01-14 LAB
C PEPTIDE SERPL-MCNC: 1.3 NG/ML (ref 0.8–3.5)
DSDNA AB TITR SER CLIF: NORMAL {TITER}

## 2021-01-15 ENCOUNTER — OFFICE VISIT (OUTPATIENT)
Dept: MEDICAL GROUP | Facility: PHYSICIAN GROUP | Age: 21
End: 2021-01-15
Payer: COMMERCIAL

## 2021-01-15 ENCOUNTER — TELEPHONE (OUTPATIENT)
Dept: ENDOCRINOLOGY | Facility: MEDICAL CENTER | Age: 21
End: 2021-01-15

## 2021-01-15 VITALS
BODY MASS INDEX: 31.27 KG/M2 | HEIGHT: 67 IN | SYSTOLIC BLOOD PRESSURE: 110 MMHG | OXYGEN SATURATION: 96 % | TEMPERATURE: 99.2 F | WEIGHT: 199.2 LBS | HEART RATE: 79 BPM | DIASTOLIC BLOOD PRESSURE: 90 MMHG | RESPIRATION RATE: 12 BRPM

## 2021-01-15 DIAGNOSIS — E53.8 VITAMIN B12 DEFICIENCY: ICD-10-CM

## 2021-01-15 DIAGNOSIS — G89.29 OTHER CHRONIC PAIN: ICD-10-CM

## 2021-01-15 DIAGNOSIS — M35.3 POLYMYALGIA (HCC): ICD-10-CM

## 2021-01-15 DIAGNOSIS — E11.9 CONTROLLED TYPE 2 DIABETES MELLITUS WITHOUT COMPLICATION, WITH LONG-TERM CURRENT USE OF INSULIN (HCC): ICD-10-CM

## 2021-01-15 DIAGNOSIS — E55.9 VITAMIN D DEFICIENCY: ICD-10-CM

## 2021-01-15 DIAGNOSIS — Z79.4 CONTROLLED TYPE 2 DIABETES MELLITUS WITHOUT COMPLICATION, WITH LONG-TERM CURRENT USE OF INSULIN (HCC): ICD-10-CM

## 2021-01-15 DIAGNOSIS — R11.0 NAUSEA: ICD-10-CM

## 2021-01-15 LAB
BACTERIA UR CULT: NORMAL
SIGNIFICANT IND 70042: NORMAL
SITE SITE: NORMAL
SOURCE SOURCE: NORMAL

## 2021-01-15 PROCEDURE — 99214 OFFICE O/P EST MOD 30 MIN: CPT | Performed by: NURSE PRACTITIONER

## 2021-01-15 RX ORDER — DULOXETIN HYDROCHLORIDE 30 MG/1
30 CAPSULE, DELAYED RELEASE ORAL 2 TIMES DAILY
Qty: 60 CAP | Refills: 1 | Status: SHIPPED | OUTPATIENT
Start: 2021-01-15 | End: 2021-08-10 | Stop reason: SDUPTHER

## 2021-01-15 RX ORDER — METOCLOPRAMIDE 5 MG/1
5 TABLET ORAL EVERY 12 HOURS
Qty: 30 TAB | Refills: 0 | Status: SHIPPED | OUTPATIENT
Start: 2021-01-15 | End: 2021-02-28

## 2021-01-15 RX ORDER — KETOROLAC TROMETHAMINE 10 MG/1
10 TABLET, FILM COATED ORAL EVERY 12 HOURS
Qty: 28 TAB | Refills: 0 | Status: SHIPPED | OUTPATIENT
Start: 2021-01-15 | End: 2022-03-21

## 2021-01-15 ASSESSMENT — PATIENT HEALTH QUESTIONNAIRE - PHQ9: CLINICAL INTERPRETATION OF PHQ2 SCORE: 0

## 2021-01-15 ASSESSMENT — FIBROSIS 4 INDEX: FIB4 SCORE: 0.36

## 2021-01-15 NOTE — PROGRESS NOTES
Chief Complaint   Patient presents with   • Establish Care       HISTORY OF PRESENT ILLNESS: Patient is a 20 y.o. female, established patient who presents today to discuss medical problems as listed below:    Health Maintenance:  COMPLETED     No problem-specific Assessment & Plan notes found for this encounter.      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[facility]) 08/22/2019   • History of cholecystectomy 08/22/2019   • Celiac disease 08/22/2019   • Uncontrolled type 2 diabetes mellitus with hyperglycemia ([facility]) 08/12/2019   • POTS (postural orthostatic tachycardia syndrome) 12/17/2018   • Hyperglycemia 12/14/2018   • Other irritable bowel syndrome 08/10/2018   • Salt craving 02/02/2018   • Hypoglycemia 02/02/2018   • Chronic tension-type headache, not intractable 11/09/2016   • Mittelschmerz 09/19/2016   • Tourette syndrome 09/19/2016   • OCD (obsessive compulsive disorder) 09/19/2016   • Factor V Leiden ([facility])         Allergies: Cefdinir and Erythromycin    Current Outpatient Medications   Medication Sig Dispense Refill   • ketorolac (TORADOL) 10 MG Tab Take 1 Tab by mouth every 12 hours. 28 Tab 0   • metoclopramide (REGLAN) 5 MG tablet Take 1 Tab by mouth every 12 hours. 30 Tab 0   • insulin glargine (LANTUS SOLOSTAR) 100 UNIT/ML Solution Pen-injector injection Inject 30 Units under the skin every evening. 15 mL 11   • Continuous Blood Gluc  (DEXCOM G6 ) Device 1 Applicator continuous. 1 Each 1   • Continuous Blood Gluc Sensor (DEXCOM G6 SENSOR) Misc 1 Applicator every 10 days. 9 Each 3   • Continuous Blood Gluc Transmit (DEXCOM G6 TRANSMITTER) Misc 1 Applicator every 3 months. 2 Each 2   • vitamin D, Ergocalciferol, (DRISDOL) 1.25 MG (12716 UT) Cap  "capsule Take 1 Cap by mouth every 7 days. 12 Cap 0   • tizanidine (ZANAFLEX) 6 MG capsule Take 8 mg by mouth 3 times a day. Taking 8mg     • cyanocobalamin (VITAMIN B-12) 1000 MCG/ML Solution Inject 1 mL intramuscularly every 14 days. 12 mL 0   • hydroxychloroquine (PLAQUENIL) 200 MG Tab Take 400 mg by mouth every day.     • amitriptyline (ELAVIL) 50 MG Tab Take 1 Tab by mouth every bedtime. 90 Tab 3   • ondansetron (ZOFRAN ODT) 8 MG TABLET DISPERSIBLE Take 1 Tab by mouth every 8 hours as needed for Nausea. 30 Tab 3   • Blood Glucose Test Strips Test strips order: Test strips for meter. Sig: use tid and prn ssx high or low sugar #100 RF x 3 400 Strip 3   • insulin lispro, Human, (HUMALOG KWIKPEN) 100 UNIT/ML injection PEN Inject 2-10 Units as instructed 3 times a day before meals. 15 mL 6   • FREESTYLE LITE strip      • Continuous Blood Gluc Sensor (DEXCOM G6 SENSOR) Misc by Does not apply route.     • Insulin Pen Needle 32 G x 4 mm 1 Each by Does not apply route 3 times a day. 100 Each 11   • Glucagon, rDNA, 1 MG Kit Use one pen prn severe hypoglycemia 1 Kit 1   • Lancets Lancets order: Lancets for glucometer. Sig: use tid and prn ssx high or low sugar. #100 RF x 3 100 Each 3   • Syringe/Needle, Disp, (SYRINGE 3CC/26QZ4-9/2\") 22G X 1-1/2\" 3 ML Misc For use with Intra-muscular or subcutaneous B 12  injection every 2 weeks. 12 Each 1   • glucose monitoring kit (FREESTYLE) monitoring kit Use to measure blood glucose regularly. 1 Kit 0     No current facility-administered medications for this visit.        Social History     Tobacco Use   • Smoking status: Never Smoker   • Smokeless tobacco: Never Used   Substance Use Topics   • Alcohol use: No     Alcohol/week: 0.0 oz     Frequency: Never     Drinks per session: Patient refused     Binge frequency: Never   • Drug use: No     Social History     Social History Narrative   • Not on file       Family History   Problem Relation Age of Onset   • Other Maternal Grandfather  "        Parkinson's   • Genetic Disorder Maternal Grandfather         Parkinsons, and crouzon syndrome   • Cancer Maternal Grandfather         Skin Cancer   • Heart Disease Other    • Cancer Other         bile duct cancer   • Other Mother         sphincter of salud, pancreatic insufficiency   • Cancer Other         great uncle brain   • Arthritis Maternal Grandmother    • Cancer Maternal Grandmother         Skin Cancer   • Heart Disease Maternal Grandmother    • Hypertension Maternal Grandmother    • Hyperlipidemia Maternal Grandmother    • Stroke Maternal Grandmother    • Genetic Disorder Father         Factor V Liden and Tourettes   • Heart Disease Father    • Hyperlipidemia Father    • Genetic Disorder Maternal Uncle         Crouzon Syndrome   • Cancer Maternal Uncle         Bile Duct, and Skin cancer   • Cancer Maternal Aunt         Multiple Aunts and Uncles passed from cancer   • Heart Disease Maternal Aunt         Open heart surgery occuring   • Heart Disease Paternal Grandmother    • Hypertension Paternal Grandmother    • Stroke Paternal Grandmother    • Heart Disease Maternal Uncle         multiple uncles.       Allergies, past medical history, past surgical history, family history, social history reviewed and updated.    Review of Systems:     - Constitutional: Negative for fever, chills, unexpected weight change, and fatigue/generalized weakness.     - Respiratory: Negative for cough, sputum production, chest congestion, dyspnea, wheezing, and crackles.      - Cardiovascular: Negative for chest pain, palpitations, orthopnea, and bilateral lower extremity edema.     - Musculoskeletal: Generalized body aches.   - Psychiatric/Behavioral: Negative for depression, suicidal/homicidal ideation and memory loss.      All other systems reviewed and are negative    Exam:    /90 (BP Location: Right arm, Patient Position: Sitting, BP Cuff Size: Adult)   Pulse 79   Temp 37.3 °C (99.2 °F) (Temporal)   Resp 12   Ht  "1.702 m (5' 7\")   Wt 90.4 kg (199 lb 3.2 oz)   SpO2 96%   BMI 31.20 kg/m²  Body mass index is 31.2 kg/m².    Physical Exam:  Constitutional: Well-developed and well-nourished. Not diaphoretic. Cardiovascular: Regular rate and rhythm, S1 and S2 without murmur, rubs, or gallops.    Chest: Effort normal. Clear to auscultation throughout. No adventitious sounds.   Musculoskeletal: All major joints AROM full in all directions without pain.  Neurological: Alert and oriented x 3.  Psychiatric:  Behavior, mood, and affect are appropriate.  MA/nursing note and vitals reviewed.    Patient was seen for 25 minutes face to face of which > 50% of appointment time was spent on counseling and coordination of care regarding the above.     Assessment/Plan:  1. Other chronic pain  Uncontrolled, stable.  Trial of Toradol and Cymbalta.  Also discussed importance of nonpharmacological stress control such as meditation, deep breathing and stretching.  Also discussed the importance of healthy nutrition, avoiding excessive sugars.  Discussed anti-inflammatory supplements such as billy, bone broth, turmeric and flaxseed.  - ketorolac (TORADOL) 10 MG Tab; Take 1 Tab by mouth every 12 hours.  Dispense: 28 Tab; Refill: 0  - REFERRAL TO PHYSIATRY (PMR)  - DULoxetine (CYMBALTA) 30 MG Cap DR Particles; Take 1 Cap by mouth 2 times a day.  Dispense: 60 Cap; Refill: 1    2. Nausea  - metoclopramide (REGLAN) 5 MG tablet; Take 1 Tab by mouth every 12 hours.  Dispense: 30 Tab; Refill: 0    3. Polymyalgia (HCC)  As discussed in 1  - ketorolac (TORADOL) 10 MG Tab; Take 1 Tab by mouth every 12 hours.  Dispense: 28 Tab; Refill: 0  - REFERRAL TO PHYSIATRY (PMR)  - DULoxetine (CYMBALTA) 30 MG Cap DR Particles; Take 1 Cap by mouth 2 times a day.  Dispense: 60 Cap; Refill: 1  Discussed with patient possible alternative diagnoses, pt is to take all medications as prescribed. If symptoms persist FU w/PCP, if symptoms worsen go to emergency room. If " experiencing any side effects from prescribed medications reports to the office immediately or go to emergency room.  Reviewed indication, dosage, usage and potential adverse effects of prescribed medications. Reviewed risks and benefits of treatment plan. Patient verbalizes understanding of all instruction and verbally agrees to plan.    No follow-ups on file.

## 2021-01-16 NOTE — TELEPHONE ENCOUNTER
Pt called stating she received a call from you  Regarding her insulin pump. She is under the impression we are not sending it to [facility], but [facility]. She stated it cant go to [facility]. Pt. Would like to talk about this process with you. She has a couple of follow up questions as well on a lab you ordered a couple months ago.

## 2021-01-16 NOTE — ASSESSMENT & PLAN NOTE
New problem.  Patient is here for intermittent chronic muscular and joint pains.  She describes her pain is generalized.  Associated symptom is nausea without vomiting.  She is an established patient with Dr. Pastrana.  She was unable to see her primary care and thus she is here for pain control.  Patient has multiple comorbidities including type 1 diabetes which is well controlled with recent A1c of 5.1 and lupus for which she is seeing rheumatology, Dr. Charles.  She does not have a diagnosis of fibromyalgia.  She is currently on hydroxychloroquine Zanaflex and Elavil.  She does not feel like these medications are helpful.  She has been taking Toradol in the past and would like a refill on this today.  Her recent CMP is WNL.  She is interested in other alternatives.  She denies fever, CP, dyspnea, dizziness, headaches or peripheral edema, no GI symptoms.  Patient is utilizing nonpharmacological pain control such as stretching, deep breathing and healthy dietary interventions.

## 2021-01-17 LAB — GAD65 AB SER IA-ACNC: <5 IU/ML (ref 0–5)

## 2021-02-26 ENCOUNTER — OFFICE VISIT (OUTPATIENT)
Dept: MEDICAL GROUP | Facility: PHYSICIAN GROUP | Age: 21
End: 2021-02-26
Payer: COMMERCIAL

## 2021-02-26 ENCOUNTER — APPOINTMENT (OUTPATIENT)
Dept: RADIOLOGY | Facility: MEDICAL CENTER | Age: 21
End: 2021-02-26
Attending: EMERGENCY MEDICINE
Payer: COMMERCIAL

## 2021-02-26 ENCOUNTER — HOSPITAL ENCOUNTER (EMERGENCY)
Facility: MEDICAL CENTER | Age: 21
End: 2021-02-26
Attending: EMERGENCY MEDICINE
Payer: COMMERCIAL

## 2021-02-26 VITALS
SYSTOLIC BLOOD PRESSURE: 130 MMHG | DIASTOLIC BLOOD PRESSURE: 60 MMHG | HEIGHT: 66 IN | BODY MASS INDEX: 33.11 KG/M2 | OXYGEN SATURATION: 96 % | HEART RATE: 117 BPM | RESPIRATION RATE: 12 BRPM | TEMPERATURE: 99.2 F | WEIGHT: 206 LBS

## 2021-02-26 VITALS
SYSTOLIC BLOOD PRESSURE: 102 MMHG | RESPIRATION RATE: 18 BRPM | HEART RATE: 94 BPM | BODY MASS INDEX: 32.77 KG/M2 | WEIGHT: 203.93 LBS | DIASTOLIC BLOOD PRESSURE: 69 MMHG | TEMPERATURE: 98 F | OXYGEN SATURATION: 95 % | HEIGHT: 66 IN

## 2021-02-26 DIAGNOSIS — R10.9 FLANK PAIN: ICD-10-CM

## 2021-02-26 DIAGNOSIS — M54.6 ACUTE MIDLINE THORACIC BACK PAIN: ICD-10-CM

## 2021-02-26 DIAGNOSIS — R11.0 NAUSEA WITHOUT VOMITING: ICD-10-CM

## 2021-02-26 DIAGNOSIS — R00.0 TACHYCARDIA: ICD-10-CM

## 2021-02-26 DIAGNOSIS — Z91.89 AT RISK FOR DEHYDRATION: ICD-10-CM

## 2021-02-26 DIAGNOSIS — R10.11 RIGHT UPPER QUADRANT PAIN: ICD-10-CM

## 2021-02-26 LAB
ALBUMIN SERPL BCP-MCNC: 4.3 G/DL (ref 3.2–4.9)
ALBUMIN/GLOB SERPL: 1.3 G/DL
ALP SERPL-CCNC: 103 U/L (ref 30–99)
ALT SERPL-CCNC: 33 U/L (ref 2–50)
AMYLASE SERPL-CCNC: 36 U/L (ref 20–103)
ANION GAP SERPL CALC-SCNC: 10 MMOL/L (ref 7–16)
APPEARANCE UR: NORMAL
AST SERPL-CCNC: 21 U/L (ref 12–45)
BASOPHILS # BLD AUTO: 0.5 % (ref 0–1.8)
BASOPHILS # BLD: 0.05 K/UL (ref 0–0.12)
BILIRUB SERPL-MCNC: 0.3 MG/DL (ref 0.1–1.5)
BILIRUB UR STRIP-MCNC: NEGATIVE MG/DL
BUN SERPL-MCNC: 12 MG/DL (ref 8–22)
CALCIUM SERPL-MCNC: 9.7 MG/DL (ref 8.5–10.5)
CHLORIDE SERPL-SCNC: 103 MMOL/L (ref 96–112)
CO2 SERPL-SCNC: 24 MMOL/L (ref 20–33)
COLOR UR AUTO: NORMAL
CREAT SERPL-MCNC: 0.67 MG/DL (ref 0.5–1.4)
D DIMER PPP IA.FEU-MCNC: 0.44 UG/ML (FEU) (ref 0–0.5)
EOSINOPHIL # BLD AUTO: 0.07 K/UL (ref 0–0.51)
EOSINOPHIL NFR BLD: 0.7 % (ref 0–6.9)
ERYTHROCYTE [DISTWIDTH] IN BLOOD BY AUTOMATED COUNT: 39.1 FL (ref 35.9–50)
ERYTHROCYTE [SEDIMENTATION RATE] IN BLOOD BY WESTERGREN METHOD: 7 MM/HOUR (ref 0–20)
GLOBULIN SER CALC-MCNC: 3.4 G/DL (ref 1.9–3.5)
GLUCOSE BLD-MCNC: 92 MG/DL (ref 65–99)
GLUCOSE SERPL-MCNC: 95 MG/DL (ref 65–99)
GLUCOSE UR STRIP.AUTO-MCNC: NEGATIVE MG/DL
HCG SERPL QL: NEGATIVE
HCT VFR BLD AUTO: 43.9 % (ref 37–47)
HGB BLD-MCNC: 14.9 G/DL (ref 12–16)
IMM GRANULOCYTES # BLD AUTO: 0.05 K/UL (ref 0–0.11)
IMM GRANULOCYTES NFR BLD AUTO: 0.5 % (ref 0–0.9)
KETONES UR STRIP.AUTO-MCNC: NEGATIVE MG/DL
LEUKOCYTE ESTERASE UR QL STRIP.AUTO: NEGATIVE
LIPASE SERPL-CCNC: 30 U/L (ref 11–82)
LYMPHOCYTES # BLD AUTO: 2.42 K/UL (ref 1–4.8)
LYMPHOCYTES NFR BLD: 23.8 % (ref 22–41)
MCH RBC QN AUTO: 30.7 PG (ref 27–33)
MCHC RBC AUTO-ENTMCNC: 33.9 G/DL (ref 33.6–35)
MCV RBC AUTO: 90.5 FL (ref 81.4–97.8)
MONOCYTES # BLD AUTO: 0.84 K/UL (ref 0–0.85)
MONOCYTES NFR BLD AUTO: 8.3 % (ref 0–13.4)
NEUTROPHILS # BLD AUTO: 6.72 K/UL (ref 2–7.15)
NEUTROPHILS NFR BLD: 66.2 % (ref 44–72)
NITRITE UR QL STRIP.AUTO: NEGATIVE
NRBC # BLD AUTO: 0 K/UL
NRBC BLD-RTO: 0 /100 WBC
PH UR STRIP.AUTO: 5.5 [PH] (ref 5–8)
PLATELET # BLD AUTO: 267 K/UL (ref 164–446)
PMV BLD AUTO: 10 FL (ref 9–12.9)
POTASSIUM SERPL-SCNC: 4.2 MMOL/L (ref 3.6–5.5)
PROT SERPL-MCNC: 7.7 G/DL (ref 6–8.2)
PROT UR QL STRIP: NEGATIVE MG/DL
RBC # BLD AUTO: 4.85 M/UL (ref 4.2–5.4)
RBC UR QL AUTO: NEGATIVE
SODIUM SERPL-SCNC: 137 MMOL/L (ref 135–145)
SP GR UR STRIP.AUTO: >=1.03
UROBILINOGEN UR STRIP-MCNC: 0.2 MG/DL
WBC # BLD AUTO: 10.2 K/UL (ref 4.8–10.8)

## 2021-02-26 PROCEDURE — 81002 URINALYSIS NONAUTO W/O SCOPE: CPT | Performed by: NURSE PRACTITIONER

## 2021-02-26 PROCEDURE — 99214 OFFICE O/P EST MOD 30 MIN: CPT | Performed by: NURSE PRACTITIONER

## 2021-02-26 PROCEDURE — 74177 CT ABD & PELVIS W/CONTRAST: CPT

## 2021-02-26 PROCEDURE — 700105 HCHG RX REV CODE 258: Performed by: EMERGENCY MEDICINE

## 2021-02-26 PROCEDURE — 700117 HCHG RX CONTRAST REV CODE 255: Performed by: EMERGENCY MEDICINE

## 2021-02-26 PROCEDURE — 82150 ASSAY OF AMYLASE: CPT

## 2021-02-26 PROCEDURE — 83690 ASSAY OF LIPASE: CPT

## 2021-02-26 PROCEDURE — 96375 TX/PRO/DX INJ NEW DRUG ADDON: CPT

## 2021-02-26 PROCEDURE — 700111 HCHG RX REV CODE 636 W/ 250 OVERRIDE (IP): Performed by: EMERGENCY MEDICINE

## 2021-02-26 PROCEDURE — 85652 RBC SED RATE AUTOMATED: CPT

## 2021-02-26 PROCEDURE — 96374 THER/PROPH/DIAG INJ IV PUSH: CPT

## 2021-02-26 PROCEDURE — 700101 HCHG RX REV CODE 250: Performed by: EMERGENCY MEDICINE

## 2021-02-26 PROCEDURE — 85025 COMPLETE CBC W/AUTO DIFF WBC: CPT

## 2021-02-26 PROCEDURE — 80053 COMPREHEN METABOLIC PANEL: CPT

## 2021-02-26 PROCEDURE — 84703 CHORIONIC GONADOTROPIN ASSAY: CPT

## 2021-02-26 PROCEDURE — 99284 EMERGENCY DEPT VISIT MOD MDM: CPT

## 2021-02-26 PROCEDURE — 85379 FIBRIN DEGRADATION QUANT: CPT

## 2021-02-26 PROCEDURE — 82962 GLUCOSE BLOOD TEST: CPT

## 2021-02-26 RX ORDER — OXYCODONE HYDROCHLORIDE AND ACETAMINOPHEN 5; 325 MG/1; MG/1
1 TABLET ORAL EVERY 4 HOURS PRN
Qty: 20 TABLET | Refills: 0 | Status: SHIPPED | OUTPATIENT
Start: 2021-02-26 | End: 2021-03-01

## 2021-02-26 RX ORDER — PROMETHAZINE HYDROCHLORIDE 25 MG/1
25 TABLET ORAL EVERY 6 HOURS PRN
Qty: 15 TABLET | Refills: 0 | Status: SHIPPED | OUTPATIENT
Start: 2021-02-26 | End: 2022-03-21

## 2021-02-26 RX ORDER — ONDANSETRON 8 MG/1
8 TABLET, ORALLY DISINTEGRATING ORAL EVERY 8 HOURS PRN
Qty: 30 TABLET | Refills: 1 | Status: SHIPPED | OUTPATIENT
Start: 2021-02-26 | End: 2022-02-02 | Stop reason: SDUPTHER

## 2021-02-26 RX ORDER — MORPHINE SULFATE 4 MG/ML
4 INJECTION, SOLUTION INTRAMUSCULAR; INTRAVENOUS ONCE
Status: COMPLETED | OUTPATIENT
Start: 2021-02-26 | End: 2021-02-26

## 2021-02-26 RX ORDER — LIDOCAINE 50 MG/G
1 PATCH TOPICAL EVERY 24 HOURS
Qty: 10 PATCH | Refills: 0 | Status: SHIPPED | OUTPATIENT
Start: 2021-02-26 | End: 2022-03-08

## 2021-02-26 RX ORDER — LIDOCAINE 50 MG/G
1 PATCH TOPICAL ONCE
Status: DISCONTINUED | OUTPATIENT
Start: 2021-02-26 | End: 2021-02-27 | Stop reason: HOSPADM

## 2021-02-26 RX ORDER — SODIUM CHLORIDE 9 MG/ML
1000 INJECTION, SOLUTION INTRAVENOUS ONCE
Status: COMPLETED | OUTPATIENT
Start: 2021-02-26 | End: 2021-02-26

## 2021-02-26 RX ORDER — ONDANSETRON 2 MG/ML
4 INJECTION INTRAMUSCULAR; INTRAVENOUS ONCE
Status: COMPLETED | OUTPATIENT
Start: 2021-02-26 | End: 2021-02-26

## 2021-02-26 RX ADMIN — ONDANSETRON 4 MG: 2 INJECTION INTRAMUSCULAR; INTRAVENOUS at 17:13

## 2021-02-26 RX ADMIN — SODIUM CHLORIDE 1000 ML: 9 INJECTION, SOLUTION INTRAVENOUS at 20:37

## 2021-02-26 RX ADMIN — MORPHINE SULFATE 4 MG: 4 INJECTION INTRAVENOUS at 17:13

## 2021-02-26 RX ADMIN — LIDOCAINE 1 PATCH: 50 PATCH TOPICAL at 21:30

## 2021-02-26 RX ADMIN — IOHEXOL 100 ML: 350 INJECTION, SOLUTION INTRAVENOUS at 19:20

## 2021-02-26 ASSESSMENT — FIBROSIS 4 INDEX
FIB4 SCORE: 0.36
FIB4 SCORE: 0.36

## 2021-02-26 NOTE — LETTER
February 26, 2021       Patient: Malena Bennett   YOB: 2000   Date of Visit: 2/26/2021         To Whom It May Concern:    Patient was evaluated in the clinic today.  I recommend Ms. Malena Bennett remain out of work until she is symptom free.    If you have any questions or concerns, please don't hesitate to call [phone]          Sincerely,          DWAYNE Wilkerson.  Electronically Signed

## 2021-02-26 NOTE — ASSESSMENT & PLAN NOTE
New problem.  Acute onset of pain in mid thoracic region started yesterday morning. Stabbing pain 8/10, waxes and wanes, worse after food intake. Associated symptoms nausea w/out vomiting. Denies fever, CP, SOB, coughing, no urinary symptoms, no constipation or diarrhea, no epigastric tenderness. Hx cholecystectomy. She is feeling worse. She is unable to tolerate food or fluid intake. Pt is well controlled type 1 diabetic, sugars have been stable.  She denies any falls or recent trauma.  No previous history of kidney stones.  New medication was chloroquine started 2.5 mos ago by rheumatology.  Vital signs today are /60, pulse 117, respirations of 12, afebrile.

## 2021-02-26 NOTE — PROGRESS NOTES
Chief Complaint   Patient presents with   • Back Pain     Thorasic back pain and rib pain, takes breath away.        HISTORY OF PRESENT ILLNESS: Patient is a 20 y.o. female, established patient who presents today to discuss medical problems as listed below:    Acute midline thoracic back pain  New problem.  Acute onset of pain in mid thoracic region started yesterday morning. Stabbing pain 8/10, waxes and wanes, worse after food intake. Associated symptoms nausea w/out vomiting. Denies fever, CP, SOB, coughing, no urinary symptoms, no constipation or diarrhea, no epigastric tenderness. Hx cholecystectomy. She is feeling worse. She is unable to tolerate food or fluid intake. Pt is well controlled type 1 diabetic, sugars have been stable.  She denies any falls or recent trauma.  No previous history of kidney stones.  New medication was chloroquine started 2.5 mos ago by rheumatology.  Vital signs today are /60, pulse 117, respirations of 12, afebrile.      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   • History of cholecystectomy 08/22/2019   • Celiac disease 08/22/2019   • Uncontrolled type 2 diabetes mellitus with hyperglycemia ([facility]) 08/12/2019   • POTS (postural orthostatic tachycardia syndrome) 12/17/2018   • Hyperglycemia 12/14/2018   • Other irritable bowel syndrome 08/10/2018   • Salt craving 02/02/2018   • Hypoglycemia 02/02/2018   • Chronic tension-type headache, not intractable 11/09/2016   • Mittelschmerz 09/19/2016   • Tourette syndrome 09/19/2016   • OCD (obsessive compulsive disorder) 09/19/2016   • Factor V Leiden ([facility])         Allergies: Cefdinir and  Erythromycin    Current Outpatient Medications   Medication Sig Dispense Refill   • methotrexate 2.5 MG Tab      • ondansetron (ZOFRAN ODT) 8 MG TABLET DISPERSIBLE Take 1 tablet by mouth every 8 hours as needed for Nausea. 30 tablet 1   • ketorolac (TORADOL) 10 MG Tab Take 1 Tab by mouth every 12 hours. 28 Tab 0   • metoclopramide (REGLAN) 5 MG tablet Take 1 Tab by mouth every 12 hours. 30 Tab 0   • DULoxetine (CYMBALTA) 30 MG Cap DR Particles Take 1 Cap by mouth 2 times a day. 60 Cap 1   • insulin glargine (LANTUS SOLOSTAR) 100 UNIT/ML Solution Pen-injector injection Inject 30 Units under the skin every evening. 15 mL 11   • Continuous Blood Gluc  (DEXCOM G6 ) Device 1 Applicator continuous. 1 Each 1   • Continuous Blood Gluc Sensor (DEXCOM G6 SENSOR) Misc 1 Applicator every 10 days. 9 Each 3   • Continuous Blood Gluc Transmit (DEXCOM G6 TRANSMITTER) Misc 1 Applicator every 3 months. 2 Each 2   • vitamin D, Ergocalciferol, (DRISDOL) 1.25 MG (61354 UT) Cap capsule Take 1 Cap by mouth every 7 days. 12 Cap 0   • tizanidine (ZANAFLEX) 6 MG capsule Take 8 mg by mouth 3 times a day. Taking 8mg     • cyanocobalamin (VITAMIN B-12) 1000 MCG/ML Solution Inject 1 mL intramuscularly every 14 days. 12 mL 0   • hydroxychloroquine (PLAQUENIL) 200 MG Tab Take 400 mg by mouth every day.     • Blood Glucose Test Strips Test strips order: Test strips for meter. Sig: use tid and prn ssx high or low sugar #100 RF x 3 400 Strip 3   • insulin lispro, Human, (HUMALOG KWIKPEN) 100 UNIT/ML injection PEN Inject 2-10 Units as instructed 3 times a day before meals. 15 mL 6   • FREESTYLE LITE strip      • Insulin Pen Needle 32 G x 4 mm 1 Each by Does not apply route 3 times a day. 100 Each 11   • Glucagon, rDNA, 1 MG Kit Use one pen prn severe hypoglycemia 1 Kit 1   • Lancets Lancets order: Lancets for glucometer. Sig: use tid and prn ssx high or low sugar. #100 RF x 3 100 Each 3   • Syringe/Needle, Disp, (SYRINGE  "3CC/38OM2-7/2\") 22G X 1-1/2\" 3 ML Misc For use with Intra-muscular or subcutaneous B 12  injection every 2 weeks. 12 Each 1   • glucose monitoring kit (FREESTYLE) monitoring kit Use to measure blood glucose regularly. 1 Kit 0   • Continuous Blood Gluc Sensor (DEXCOM G6 SENSOR) Misc by Does not apply route.       No current facility-administered medications for this visit.       Social History     Tobacco Use   • Smoking status: Never Smoker   • Smokeless tobacco: Never Used   Substance Use Topics   • Alcohol use: No     Alcohol/week: 0.0 oz   • Drug use: No     Social History     Social History Narrative   • Not on file       Family History   Problem Relation Age of Onset   • Other Maternal Grandfather         Parkinson's   • Genetic Disorder Maternal Grandfather         Parkinsons, and crouzon syndrome   • Cancer Maternal Grandfather         Skin Cancer   • Heart Disease Other    • Cancer Other         bile duct cancer   • Other Mother         sphincter of salud, pancreatic insufficiency   • Cancer Other         great uncle brain   • Arthritis Maternal Grandmother    • Cancer Maternal Grandmother         Skin Cancer   • Heart Disease Maternal Grandmother    • Hypertension Maternal Grandmother    • Hyperlipidemia Maternal Grandmother    • Stroke Maternal Grandmother    • Genetic Disorder Father         Factor V Liden and Tourettes   • Heart Disease Father    • Hyperlipidemia Father    • Genetic Disorder Maternal Uncle         Crouzon Syndrome   • Cancer Maternal Uncle         Bile Duct, and Skin cancer   • Cancer Maternal Aunt         Multiple Aunts and Uncles passed from cancer   • Heart Disease Maternal Aunt         Open heart surgery occuring   • Heart Disease Paternal Grandmother    • Hypertension Paternal Grandmother    • Stroke Paternal Grandmother    • Heart Disease Maternal Uncle         multiple uncles.       Allergies, past medical history, past surgical history, family history, social history reviewed and " "updated.    Review of Systems:     - Constitutional: Negative for fever, chills, unexpected weight change, and fatigue/generalized weakness.     - Respiratory: Negative for cough, sputum production, chest congestion, dyspnea, wheezing, and crackles.      - Cardiovascular: Negative for chest pain, palpitations, orthopnea, and bilateral lower extremity edema.     - Gastrointestinal: Nausea without vomiting, back thoracic pain radiating into right upper abdominal quadrant.  Negative for heartburn, vomiting, abdominal pain, hematochezia, melena, diarrhea, constipation, and greasy/foul-smelling stools.     - Genitourinary: Negative for dysuria, polyuria, hematuria, pyuria, urinary urgency, and urinary incontinence.    - Musculoskeletal: Midthoracic back pain radiating to RUQ.     - Psychiatric/Behavioral: Negative for depression, suicidal/homicidal ideation and memory loss.      All other systems reviewed and are negative    Exam:    /60   Pulse (!) 117   Temp 37.3 °C (99.2 °F) (Temporal)   Resp 12   Ht 1.676 m (5' 6\")   Wt 93.4 kg (206 lb)   SpO2 96%   BMI 33.25 kg/m²  Body mass index is 33.25 kg/m².    Physical Exam:  Constitutional: Well-developed and well-nourished. Not diaphoretic. No distress.   Cardiovascular: Regular rate and rhythm, S1 and S2 without murmur, rubs, or gallops.    Chest: CVA tenderness in the right side.  Effort normal. Clear to auscultation throughout. No adventitious sounds.   Abdomen: Mild tenderness in RUQ.  No epigastric tenderness.  Soft, and without distention. Active bowel sounds in all four quadrants. No rebound, guarding, masses or HSM.  Musculoskeletal: All major joints AROM full in all directions without pain.  Neurological: Alert and oriented x 3.   Psychiatric:  Behavior, mood, and affect are appropriate.  MA/nursing note and vitals reviewed.    LABS: 1/13/1  results reviewed and discussed with the patient, questions answered.    My total time spent caring for the " patient on the day of the encounter was 25 minutes.   This does not include time spent on separately billable procedures/tests.     Assessment/Plan:  1. Acute midline thoracic back pain  Uncontrolled.  Given acute presentation and associated symptoms of nausea inability to tolerate fluids and foods patient is at high risk for dehydration.  She advised to go to ED, she declines at this time.  ER precautions explained to patient, if feels worse in the next 24 hours go to ED.  DDx includes but not limited to nephrolithiasis, MS radiculopathy, shingles (less likely, no rash, tingling, unilateral?),  peptic ulcer, pancreatitis (less likely). Will obtain abd CT, if not diagnostic will obtain dx thoracic spine.  - AMYLASE; Future  - ASPARTATE AMINO-DANIEL; Future  - CBC WITH DIFFERENTIAL; Future  - Comp Metabolic Panel; Future  - CRP HIGH SENSITIVE (CARDIAC); Future  - LIPASE; Future  - Lipid Profile; Future  - POCT Urinalysis    2. Nausea without vomiting  As discussed in 1  - ondansetron (ZOFRAN ODT) 8 MG TABLET DISPERSIBLE; Take 1 tablet by mouth every 8 hours as needed for Nausea.  Dispense: 30 tablet; Refill: 1  - POCT Urinalysis    3. At risk for dehydration  ER precautions given.  As discussed in 1    4. Tachycardia  ER precautions given.  As discussed in 1    5. Right upper quadrant pain  - CT-ABDOMEN W/O; Future  - POCT Urinalysis       Discussed with patient possible alternative diagnoses, pt is to take all medications as prescribed. If symptoms persist FU w/PCP, if symptoms worsen go to emergency room. If experiencing any side effects from prescribed medications reports to the office immediately or go to emergency room.  Reviewed indication, dosage, usage and potential adverse effects of prescribed medications. Reviewed risks and benefits of treatment plan. Patient verbalizes understanding of all instruction and verbally agrees to plan.    No follow-ups on file. annual

## 2021-02-27 NOTE — ED TRIAGE NOTES
"Malena Bennett  20 y.o.  Chief Complaint   Patient presents with   • Sent by MD     pt sent by primary MD for r/t pancreatitis; pt c/o mid back pain radiating into upper abd since yesterday; pt states it \"takes her breath away\" and is made worse with eating; pt has hx of cholecystectomy   • N/V     pt c/o unable to keep fluids or food down; \"feels dehydrated\"   • Headache   pt is type 1 diabetic, states blood glucose levels are well controlled  "

## 2021-02-27 NOTE — ED PROVIDER NOTES
"ED Provider Note    Scribed for Gael Weir M.D. by Tan French. 2/26/2021, 4:29 PM.    Primary care provider: NADER Wilkerson  Means of arrival: Walk-In  History obtained from: Patient  History limited by: None    CHIEF COMPLAINT  Chief Complaint   Patient presents with    Sent by MD     pt sent by primary MD for r/t pancreatitis; pt c/o mid back pain radiating into upper abd since yesterday; pt states it \"takes her breath away\" and is made worse with eating; pt has hx of cholecystectomy    N/V     pt c/o unable to keep fluids or food down; \"feels dehydrated\"    Headache       HPI  Malena Bennett is a 20 y.o. female who presents to the Emergency Department for evaluation of vomiting, onset yesterday. Patient notes associated back that radiates into her upper right abdomen and diarrhea. Patient states that she is unable to eat anything and that her pain is 7 out of 10 in severity adding that her pain \"takes my breath away\". Denies fever, chills, shortness of breath, pain with breathing, chest pain. Patient notes that eating is exacerbating her pain. Notes medical history of cholecystectomy and appendectomy. Notes medical history that includes Type 1 diabetes, Lupus and factor 5 Leiden mutation. Notes controlled glucose levels. Patient was seen for her symptoms and was sent to the ED for further evaluation. Notes familial history of pulmonary embolism. States that she is allergic to Cefdinir and Erythromycin. Patient states that she is currently taking Cymbalta.     REVIEW OF SYSTEMS  As above otherwise all other systems are negative    PAST MEDICAL HISTORY   has a past medical history of Asthma, Clotting disorder (HCC), Factor 5 Leiden mutation, heterozygous (HCC), OCD (obsessive compulsive disorder), and Tourette disease.    SURGICAL HISTORY   has a past surgical history that includes tavo by laparoscopy (1/30/2017); appendectomy laparoscopic (7/1/2017); and appendectomy.    SOCIAL " "HISTORY  Social History     Tobacco Use    Smoking status: Never Smoker    Smokeless tobacco: Never Used   Substance Use Topics    Alcohol use: No     Alcohol/week: 0.0 oz    Drug use: No      Social History     Substance and Sexual Activity   Drug Use No       FAMILY HISTORY  Family History   Problem Relation Age of Onset    Other Maternal Grandfather         Parkinson's    Genetic Disorder Maternal Grandfather         Parkinsons, and crouzon syndrome    Cancer Maternal Grandfather         Skin Cancer    Heart Disease Other     Cancer Other         bile duct cancer    Other Mother         sphincter of salud, pancreatic insufficiency    Cancer Other         great uncle brain    Arthritis Maternal Grandmother     Cancer Maternal Grandmother         Skin Cancer    Heart Disease Maternal Grandmother     Hypertension Maternal Grandmother     Hyperlipidemia Maternal Grandmother     Stroke Maternal Grandmother     Genetic Disorder Father         Factor V Liden and Tourettes    Heart Disease Father     Hyperlipidemia Father     Genetic Disorder Maternal Uncle         Crouzon Syndrome    Cancer Maternal Uncle         Bile Duct, and Skin cancer    Cancer Maternal Aunt         Multiple Aunts and Uncles passed from cancer    Heart Disease Maternal Aunt         Open heart surgery occuring    Heart Disease Paternal Grandmother     Hypertension Paternal Grandmother     Stroke Paternal Grandmother     Heart Disease Maternal Uncle         multiple uncles.       CURRENT MEDICATIONS  Home Medications    **Home medications have not yet been reviewed for this encounter**         ALLERGIES  Allergies   Allergen Reactions    Cefdinir Rash     rash    Erythromycin Vomiting and Nausea       PHYSICAL EXAM  VITAL SIGNS: /80   Pulse (!) 120   Temp 36.7 °C (98 °F) (Temporal)   Resp 18   Ht 1.676 m (5' 6\")   Wt 92.5 kg (203 lb 14.8 oz)   SpO2 98%   BMI 32.91 kg/m²     Constitutional: Well developed, Well nourished, No acute distress, " Non-toxic appearance.   HENT: Mucous membranes dry, Normocephalic, Atraumatic, Bilateral external ears normal,  No oral exudates, Nose normal.   Eyes:conjunctiva is normal, there are no signs of exudate.   Neck: Supple, no meningeal signs.  Lymphatic: No lymphadenopathy noted.   Cardiovascular: Tachycardic rate and normal rhythm without murmurs gallops or rubs.   Thorax & Lungs: No respiratory distress. Breathing comfortably. Lungs are clear to auscultation bilaterally, there are no wheezes no rales. Chest wall is tender as described below  Abdomen: Tenderness in epigastric region and RUQ, right chest wall in the rib area as well no rebound, Negative homins sign, Soft,  nondistended. Bowel sounds are present.   Skin: Warm, Dry, No erythema,   Back: No tenderness, No CVA tenderness.  Musculoskeletal: Good range of motion in all major joints. No tenderness to palpation or major deformities noted. Intact distal pulses, no clubbing, no cyanosis, no edema,   Neurologic: Alert & oriented x 3, Moving all extremities. No gross abnormalities.    Psychiatric: Affect normal, Judgment normal, Mood normal.     LABS  Results for orders placed or performed during the hospital encounter of 02/26/21   CBC WITH DIFFERENTIAL   Result Value Ref Range    WBC 10.2 4.8 - 10.8 K/uL    RBC 4.85 4.20 - 5.40 M/uL    Hemoglobin 14.9 12.0 - 16.0 g/dL    Hematocrit 43.9 37.0 - 47.0 %    MCV 90.5 81.4 - 97.8 fL    MCH 30.7 27.0 - 33.0 pg    MCHC 33.9 33.6 - 35.0 g/dL    RDW 39.1 35.9 - 50.0 fL    Platelet Count 267 164 - 446 K/uL    MPV 10.0 9.0 - 12.9 fL    Neutrophils-Polys 66.20 44.00 - 72.00 %    Lymphocytes 23.80 22.00 - 41.00 %    Monocytes 8.30 0.00 - 13.40 %    Eosinophils 0.70 0.00 - 6.90 %    Basophils 0.50 0.00 - 1.80 %    Immature Granulocytes 0.50 0.00 - 0.90 %    Nucleated RBC 0.00 /100 WBC    Neutrophils (Absolute) 6.72 2.00 - 7.15 K/uL    Lymphs (Absolute) 2.42 1.00 - 4.80 K/uL    Monos (Absolute) 0.84 0.00 - 0.85 K/uL    Eos  (Absolute) 0.07 0.00 - 0.51 K/uL    Baso (Absolute) 0.05 0.00 - 0.12 K/uL    Immature Granulocytes (abs) 0.05 0.00 - 0.11 K/uL    NRBC (Absolute) 0.00 K/uL   COMP METABOLIC PANEL   Result Value Ref Range    Sodium 137 135 - 145 mmol/L    Potassium 4.2 3.6 - 5.5 mmol/L    Chloride 103 96 - 112 mmol/L    Co2 24 20 - 33 mmol/L    Anion Gap 10.0 7.0 - 16.0    Glucose 95 65 - 99 mg/dL    Bun 12 8 - 22 mg/dL    Creatinine 0.67 0.50 - 1.40 mg/dL    Calcium 9.7 8.5 - 10.5 mg/dL    AST(SGOT) 21 12 - 45 U/L    ALT(SGPT) 33 2 - 50 U/L    Alkaline Phosphatase 103 (H) 30 - 99 U/L    Total Bilirubin 0.3 0.1 - 1.5 mg/dL    Albumin 4.3 3.2 - 4.9 g/dL    Total Protein 7.7 6.0 - 8.2 g/dL    Globulin 3.4 1.9 - 3.5 g/dL    A-G Ratio 1.3 g/dL   LIPASE   Result Value Ref Range    Lipase 30 11 - 82 U/L   HCG QUAL SERUM   Result Value Ref Range    Beta-Hcg Qualitative Serum Negative Negative   D-DIMER   Result Value Ref Range    D-Dimer Screen 0.44 0.00 - 0.50 ug/mL (FEU)   Sed Rate   Result Value Ref Range    Sed Rate Westergren 7 0 - 20 mm/hour   AMYLASE   Result Value Ref Range    Amylase 36 20 - 103 U/L   ESTIMATED GFR   Result Value Ref Range    GFR If African American >60 >60 mL/min/1.73 m 2    GFR If Non African American >60 >60 mL/min/1.73 m 2   ACCU-CHEK GLUCOSE   Result Value Ref Range    Glucose - Accu-Ck 92 65 - 99 mg/dL     All labs reviewed by me.    RADIOLOGY  CT-ABDOMEN-PELVIS WITH   Final Result      Negative contrast-enhanced CT of the abdomen and pelvis.      Status post cholecystectomy, appendectomy and IUD appears well positioned        The radiologist's interpretation of all radiological studies have been reviewed by me.    COURSE & MEDICAL DECISION MAKING  Pertinent Labs & Imaging studies reviewed. (See chart for details)    4:29 PM - Patient seen and examined at bedside. Discussed that I would like to evaluate with blood testing. Discussed the CTs I plan on ordering given the results of the laboratory testing.  Patient understanding and consenting to this. Patient given the opportunity to ask questions at this time. She would like to have her blood sugar evaluated and may require insulin. Patient will be treated with morphine 4mg/ml injection 4mg and Zofran injection 4mg. Ordered UA culture if indicated, HCG qual, Lipase, CMP, CBC w/, sed rate, Amylase and D-dimer to evaluate her symptoms. The differential diagnoses include but are not limited to: PE vs. Pancreatitis vs. UTI vs. Lupus flare.     6:47 PM - Ordered CT abdoment pelvis w/    6:51 PM - Ordered Accucheck glucose.     7:28 PM - Discussed the patient's returned labs. Discussed that her d-dimer and WBC is normal. Does not appear to be a PE at this time. Patient informed of my plan to scan her abdomen using CT. At this time the patient states that the side of her abdomen is hurting her in addition she would like some ice chips.     7:57 PM - Ordered NS infusion 1000ml    8:38 PM - Patient was reevaluated at bedside. Discussed lab and radiology results with the patient and informed them of the findings. Discussed that we are waiting on urine at this time. Patient states that she had one completed at her PCP. The patient is feeling improved but states that she is still feeling pain. Discussed that we can try lidocaine for treatment of her pain as well as narcotic pain medication to be taken sparingly and as needed. Discussed proper use of each medication. Patient instructed to follow up with her rheumatologist and primary care physician. Patient given the opportunity to ask questions at this time.     9:05 PM - Ordered Lidoderm 5% patch 1 patch. Patient to be discharged following medical intervention.     HYDRATION: Based on the patient's presentation of dehydration,  the patient was given IV fluids. IV Hydration was used because oral hydration is unable to be done due to the patient's symptoms. Upon recheck following hydration, the patient was improved.    Decision  Making:  Patient presents emerge department for evaluation.  Clinically the patient has tenderness within the right flank right abdominal region.  Based on her history is very possible she might have pancreatitis intra-abdominal infection.  CT scan laboratory studies were drawn.  They all appear normal.  At this point could very well just be musculoskeletal.  I will start the patient with a lidocaine patch and pain medications.  I recommended for the patient to follow the primary care physician for further outpatient treatment and care.    I reviewed prescription monitoring program for patient's narcotic use before prescribing a scheduled drug.The patient will not drink alcohol nor drive with prescribed medications.    I reviewed prescription monitoring program for patient's narcotic use before prescribing a scheduled drug.The patient will not drink alcohol nor drive with prescribed medications      In prescribing controlled substances to this patient, I certify that I have obtained and reviewed the medical history this patient I have also made a good olivia effort to obtain applicable records from other providers who have treated the patient and records did not demonstrate any increased risk of substance abuse that would prevent me from prescribing controlled substances.     I have conducted a physical exam and documented it. I have reviewed Ms. Bennett’s prescription history as maintained by the [place] Prescription Monitoring Program.     I have assessed the patient’s risk for abuse, dependency, and addiction using the validated Opioid Risk Tool available at https://www.mdcalc.com/gdryde-iskq-dvvj-ort-narcotic-abuse.     Given the above, I believe the benefits of controlled substance therapy outweigh the risks. The reasons for prescribing controlled substances include in my professional opinion, controlled substances are a reasonable choice for this patient. Accordingly, I have discussed the risk and benefits,  treatment plan, and alternative therapies with the patient. The patient has been consented for the medication and understands the risks.    The patient will return for new or worsening symptoms and is stable at the time of discharge.    The patient is referred to a primary physician for blood pressure management, diabetic screening, and for all other preventative health concerns.    DISPOSITION:  Patient will be discharged home in stable condition.    FOLLOW UP:  No follow-up provider specified.    OUTPATIENT MEDICATIONS:  New Prescriptions    LIDOCAINE (LIDODERM) 5 % PATCH    Place 1 Patch on the skin every 24 hours.    OXYCODONE-ACETAMINOPHEN (PERCOCET) 5-325 MG TAB    Take 1 tablet by mouth every four hours as needed for up to 3 days.    PROMETHAZINE (PHENERGAN) 25 MG TAB    Take 1 tablet by mouth every 6 hours as needed for Nausea/Vomiting.          FINAL IMPRESSION  1. Flank pain          Tan DILLON (Kathy), am scribing for, and in the presence of, Gael Weir M.D..    Electronically signed by: Tan French (Kathy), 2/26/2021    Gael DILLON M.D. personally performed the services described in this documentation, as scribed by Tan French in my presence, and it is both accurate and complete. C    The note accurately reflects work and decisions made by me.  Gael Weir M.D.  2/26/2021  10:51 PM

## 2021-02-27 NOTE — ED NOTES
Rounded on pt, pt is alert and orientated, speaking in full sentences, responds to commands and answers appropriately.  Resp are even and unlabored.  No behavioral indicators of pain. Patient updated on wait status, answered questions, addressed needs, ensured call light in reach.

## 2021-02-28 ENCOUNTER — OFFICE VISIT (OUTPATIENT)
Dept: URGENT CARE | Facility: PHYSICIAN GROUP | Age: 21
End: 2021-02-28
Payer: COMMERCIAL

## 2021-02-28 VITALS
SYSTOLIC BLOOD PRESSURE: 116 MMHG | TEMPERATURE: 97.7 F | RESPIRATION RATE: 16 BRPM | BODY MASS INDEX: 32.33 KG/M2 | OXYGEN SATURATION: 94 % | WEIGHT: 206 LBS | HEART RATE: 95 BPM | HEIGHT: 67 IN | DIASTOLIC BLOOD PRESSURE: 78 MMHG

## 2021-02-28 DIAGNOSIS — R07.81 RIB PAIN ON RIGHT SIDE: ICD-10-CM

## 2021-02-28 PROCEDURE — 99213 OFFICE O/P EST LOW 20 MIN: CPT | Performed by: PHYSICIAN ASSISTANT

## 2021-02-28 ASSESSMENT — FIBROSIS 4 INDEX: FIB4 SCORE: 0.29

## 2021-02-28 NOTE — PROGRESS NOTES
Chief Complaint   Patient presents with   • Pain     brused rib, discomfort feeling (R) side. pain since monday.        HISTORY OF PRESENT ILLNESS: Patient is a 20 y.o. female who presents today for the following:    Patient comes in for evaluation of pain on the ribs on the right side of her trunk, starting in the thoracic region, wrapping around to the anterior ribs, under the right breast.  She started having pain 6 days ago.  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  She has worsening pain with deep inspiration, coughing, hiccups, and pressure in the area.  She was seen in the emergency department 2 days ago and had a negative D-dimer.    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   • History of cholecystectomy 08/22/2019   • Celiac disease 08/22/2019   • Uncontrolled type 2 diabetes mellitus with hyperglycemia ([facility]) 08/12/2019   • POTS (postural orthostatic tachycardia syndrome) 12/17/2018   • Hyperglycemia 12/14/2018   • Other irritable bowel syndrome 08/10/2018   • Salt craving 02/02/2018   • Hypoglycemia 02/02/2018   • Chronic tension-type headache, not intractable 11/09/2016   • Mittelschmerz 09/19/2016   • Tourette syndrome 09/19/2016   • OCD (obsessive compulsive disorder) 09/19/2016   • Factor V Leiden ([facility])        Allergies:Cefdinir and Erythromycin    Current Outpatient Medications Ordered in Epic   Medication Sig Dispense Refill   • methotrexate 2.5 MG Tab      • ondansetron (ZOFRAN ODT) 8 MG TABLET DISPERSIBLE Take 1 tablet by mouth every 8 hours  as needed for Nausea. 30 tablet 1   • oxyCODONE-acetaminophen (PERCOCET) 5-325 MG Tab Take 1 tablet by mouth every four hours as needed for up to 3 days. 20 tablet 0   • lidocaine (LIDODERM) 5 % Patch Place 1 Patch on the skin every 24 hours. 10 Patch 0   • promethazine (PHENERGAN) 25 MG Tab Take 1 tablet by mouth every 6 hours as needed for Nausea/Vomiting. 15 tablet 0   • ketorolac (TORADOL) 10 MG Tab Take 1 Tab by mouth every 12 hours. 28 Tab 0   • DULoxetine (CYMBALTA) 30 MG Cap DR Particles Take 1 Cap by mouth 2 times a day. 60 Cap 1   • insulin glargine (LANTUS SOLOSTAR) 100 UNIT/ML Solution Pen-injector injection Inject 30 Units under the skin every evening. 15 mL 11   • Continuous Blood Gluc  (DEXCOM G6 ) Device 1 Applicator continuous. 1 Each 1   • Continuous Blood Gluc Sensor (DEXCOM G6 SENSOR) Misc 1 Applicator every 10 days. 9 Each 3   • Continuous Blood Gluc Transmit (DEXCOM G6 TRANSMITTER) Misc 1 Applicator every 3 months. 2 Each 2   • vitamin D, Ergocalciferol, (DRISDOL) 1.25 MG (56510 UT) Cap capsule Take 1 Cap by mouth every 7 days. 12 Cap 0   • tizanidine (ZANAFLEX) 6 MG capsule Take 8 mg by mouth 3 times a day. Taking 8mg     • cyanocobalamin (VITAMIN B-12) 1000 MCG/ML Solution Inject 1 mL intramuscularly every 14 days. 12 mL 0   • hydroxychloroquine (PLAQUENIL) 200 MG Tab Take 400 mg by mouth every day.     • Blood Glucose Test Strips Test strips order: Test strips for meter. Sig: use tid and prn ssx high or low sugar #100 RF x 3 400 Strip 3   • insulin lispro, Human, (HUMALOG KWIKPEN) 100 UNIT/ML injection PEN Inject 2-10 Units as instructed 3 times a day before meals. 15 mL 6   • FREESTYLE LITE strip      • Insulin Pen Needle 32 G x 4 mm 1 Each by Does not apply route 3 times a day. 100 Each 11   • Glucagon, rDNA, 1 MG Kit Use one pen prn severe hypoglycemia 1 Kit 1   • Lancets Lancets order: Lancets for glucometer. Sig: use tid and prn ssx high or low sugar. #100 RF x 3 100  "Each 3   • Syringe/Needle, Disp, (SYRINGE 3CC/86UB0-0/2\") 22G X 1-1/2\" 3 ML Misc For use with Intra-muscular or subcutaneous B 12  injection every 2 weeks. 12 Each 1   • glucose monitoring kit (FREESTYLE) monitoring kit Use to measure blood glucose regularly. 1 Kit 0     No current Epic-ordered facility-administered medications on file.       Past Medical History:   Diagnosis Date   • Asthma     resolved   • Clotting disorder (HCC)    • Factor 5 Leiden mutation, heterozygous (HCC)    • OCD (obsessive compulsive disorder)    • Tourette disease        Social History     Tobacco Use   • Smoking status: Never Smoker   • Smokeless tobacco: Never Used   Substance Use Topics   • Alcohol use: No     Alcohol/week: 0.0 oz   • Drug use: No       Family Status   Relation Name Status   • MGFa Nik Pete (Not Specified)   • OTHER  (Not Specified)   • OTHER  (Not Specified)   • Mo  (Not Specified)   • OTHER  (Not Specified)   • MGMo Emilie (Not Specified)   • Christiano Velazquez (Not Specified)   • Elsie Pete (Not Specified)   • Irene Pickering (Not Specified)   • PGMo Yoly Bennett (Not Specified)   • Elsie Patel (Not Specified)     Family History   Problem Relation Age of Onset   • Other Maternal Grandfather         Parkinson's   • Genetic Disorder Maternal Grandfather         Parkinsons, and crouzon syndrome   • Cancer Maternal Grandfather         Skin Cancer   • Heart Disease Other    • Cancer Other         bile duct cancer   • Other Mother         sphincter of salud, pancreatic insufficiency   • Cancer Other         great uncle brain   • Arthritis Maternal Grandmother    • Cancer Maternal Grandmother         Skin Cancer   • Heart Disease Maternal Grandmother    • Hypertension Maternal Grandmother    • Hyperlipidemia Maternal Grandmother    • Stroke Maternal Grandmother    • Genetic Disorder Father         Factor V Liden and Tourettes   • Heart Disease Father    • Hyperlipidemia Father    • Genetic Disorder Maternal " "Uncle         Crouzon Syndrome   • Cancer Maternal Uncle         Bile Duct, and Skin cancer   • Cancer Maternal Aunt         Multiple Aunts and Uncles passed from cancer   • Heart Disease Maternal Aunt         Open heart surgery occuring   • Heart Disease Paternal Grandmother    • Hypertension Paternal Grandmother    • Stroke Paternal Grandmother    • Heart Disease Maternal Uncle         multiple uncles.       Review of Systems:    Constitutional ROS: No unexpected change in weight, No weakness, No fatigue  Eye ROS: No recent significant change in vision, No eye pain, redness, discharge  Ear ROS: No drainage, No tinnitus or vertigo, No recent change in hearing  Mouth/Throat ROS: No teeth or gum problems, No bleeding gums, No tongue complaints  Neck ROS: No swollen glands, No significant pain in neck  Pulmonary ROS: No chronic cough, sputum, or hemoptysis, No dyspnea on exertion, No wheezing  Cardiovascular ROS: No diaphoresis, No edema, No palpitations  Musculoskeletal/Extremities ROS: Positive for rib pain  Hematologic/Lymphatic ROS: No chills, No night sweats, No weight loss  Skin/Integumentary ROS: No edema, No evidence of rash, No itching      Exam:  /78   Pulse 95   Temp 36.5 °C (97.7 °F) (Temporal)   Resp 16   Ht 1.702 m (5' 7\")   Wt 93.4 kg (206 lb)   SpO2 94%   General: Well developed, well nourished. No distress.    HEENT: Head is grossly normal.  Pulmonary: Unlabored respiratory effort.   Neurologic: Grossly nonfocal. No facial asymmetry noted.  Musculoskeletal: Tenderness noted on the ribs on the right side around the right breast extending to the mid thoracic region.  No soft tissue swelling, ecchymosis, or rashes noted in the area of pain.  Skin: Warm, dry, good turgor. No rashes in visible areas.   Psych: Normal mood. Alert and oriented to person, place and time.    Assessment/Plan:  Patient had a negative D-dimer 2 days ago, at which time she was having the same symptoms.  Discussed this " is likely musculoskeletal and recommend ketorolac as directed, which she already has a prescription for.  Consider using prednisone but patient is a type I diabetic.  Follow up for worsening or persistent symptoms.  1. Rib pain on right side

## 2021-03-03 ENCOUNTER — TELEMEDICINE (OUTPATIENT)
Dept: MEDICAL GROUP | Facility: PHYSICIAN GROUP | Age: 21
End: 2021-03-03
Payer: COMMERCIAL

## 2021-03-03 ENCOUNTER — HOSPITAL ENCOUNTER (OUTPATIENT)
Dept: RADIOLOGY | Facility: MEDICAL CENTER | Age: 21
End: 2021-03-03
Attending: NURSE PRACTITIONER
Payer: COMMERCIAL

## 2021-03-03 VITALS
OXYGEN SATURATION: 94 % | HEIGHT: 67 IN | HEART RATE: 128 BPM | SYSTOLIC BLOOD PRESSURE: 94 MMHG | DIASTOLIC BLOOD PRESSURE: 50 MMHG | WEIGHT: 190 LBS | BODY MASS INDEX: 29.82 KG/M2

## 2021-03-03 DIAGNOSIS — Z09 HOSPITAL DISCHARGE FOLLOW-UP: ICD-10-CM

## 2021-03-03 DIAGNOSIS — Z87.09 HISTORY OF ASTHMA: ICD-10-CM

## 2021-03-03 DIAGNOSIS — R06.02 SOB (SHORTNESS OF BREATH): ICD-10-CM

## 2021-03-03 DIAGNOSIS — M79.10 MUSCLE TENDERNESS: ICD-10-CM

## 2021-03-03 PROCEDURE — 71046 X-RAY EXAM CHEST 2 VIEWS: CPT

## 2021-03-03 PROCEDURE — 99214 OFFICE O/P EST MOD 30 MIN: CPT | Mod: 95,CR | Performed by: NURSE PRACTITIONER

## 2021-03-03 RX ORDER — ALBUTEROL SULFATE 90 UG/1
2 AEROSOL, METERED RESPIRATORY (INHALATION) EVERY 6 HOURS PRN
Qty: 8.5 G | Refills: 1 | Status: SHIPPED | OUTPATIENT
Start: 2021-03-03 | End: 2022-03-08

## 2021-03-03 RX ORDER — HYDROCODONE BITARTRATE AND ACETAMINOPHEN 5; 325 MG/1; MG/1
1 TABLET ORAL EVERY 4 HOURS PRN
COMMUNITY
End: 2021-08-10

## 2021-03-03 ASSESSMENT — FIBROSIS 4 INDEX: FIB4 SCORE: 0.29

## 2021-03-04 ENCOUNTER — TELEPHONE (OUTPATIENT)
Dept: MEDICAL GROUP | Facility: PHYSICIAN GROUP | Age: 21
End: 2021-03-04

## 2021-03-04 NOTE — TELEPHONE ENCOUNTER
Phone Number Called: [phone]    Call outcome: Left detailed message for patient. Informed to call back with any additional questions.    Message: Called to inform patient that her chest xray was normal      ----- Message from NADER Wilkerson sent at 3/4/2021 10:42 AM PST -----  Please let patient know her CXR is normal
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
## 2021-03-23 ENCOUNTER — APPOINTMENT (OUTPATIENT)
Dept: ENDOCRINOLOGY | Facility: MEDICAL CENTER | Age: 21
End: 2021-03-23
Attending: INTERNAL MEDICINE
Payer: COMMERCIAL

## 2021-04-02 ENCOUNTER — OFFICE VISIT (OUTPATIENT)
Dept: MEDICAL GROUP | Facility: PHYSICIAN GROUP | Age: 21
End: 2021-04-02
Payer: COMMERCIAL

## 2021-04-02 VITALS
TEMPERATURE: 98.1 F | RESPIRATION RATE: 20 BRPM | HEIGHT: 67 IN | BODY MASS INDEX: 32.43 KG/M2 | OXYGEN SATURATION: 95 % | HEART RATE: 78 BPM | WEIGHT: 206.6 LBS | SYSTOLIC BLOOD PRESSURE: 100 MMHG | DIASTOLIC BLOOD PRESSURE: 60 MMHG

## 2021-04-02 DIAGNOSIS — E10.9 CONTROLLED DIABETES MELLITUS TYPE 1 WITHOUT COMPLICATIONS (HCC): ICD-10-CM

## 2021-04-02 PROCEDURE — 99395 PREV VISIT EST AGE 18-39: CPT | Performed by: NURSE PRACTITIONER

## 2021-04-02 ASSESSMENT — FIBROSIS 4 INDEX: FIB4 SCORE: 0.29

## 2021-04-02 NOTE — ASSESSMENT & PLAN NOTE
Chronic problem.  Initially diagnosed with a fine at 14.  This has been well controlled but the latest A1c is 5.1 from January 2021.  She has good control, she does admit to intermittent hypoglycemic episode approximately 4 times per week.  She is being followed by endocrinology, Dr. Lora.  Patient was on continuous glucose monitor, Dexcom and insulin pump from [facility].  Last ophthalmology exam in July 2020, normal, per patient.  Denies neuropathy.

## 2021-04-03 NOTE — PROGRESS NOTES
Chief Complaint   Patient presents with   • Establish Care       HISTORY OF PRESENT ILLNESS: Patient is a 20 y.o. female, established patient who presents today to discuss medical problems as listed below:    Health Maintenance:  COMPLETED     Controlled type 1 diabetes mellitus (HCC)  Chronic problem.  Initially diagnosed with a fine at 14.  This has been well controlled but the latest A1c is 5.1 from January 2021.  She has good control, she does admit to intermittent hypoglycemic episode approximately 4 times per week.  She is being followed by endocrinology, Dr. Lora.  Patient was on continuous glucose monitor, Dexcom and insulin pump from [facility].  Last ophthalmology exam in July 2020, normal, per patient.  Denies neuropathy.      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   • History of cholecystectomy 08/22/2019   • Celiac disease 08/22/2019   • Controlled type 1 diabetes mellitus (HCC) 08/12/2019   • POTS (postural orthostatic tachycardia syndrome) 12/17/2018   • Hyperglycemia 12/14/2018   • Other irritable bowel syndrome 08/10/2018   • Salt craving 02/02/2018   • Hypoglycemia 02/02/2018   • Chronic tension-type headache, not intractable 11/09/2016   • Mittelschmerz 09/19/2016   • Tourette syndrome 09/19/2016   • OCD (obsessive compulsive disorder) 09/19/2016   • Factor V Leiden ([facility])         Allergies: Cefdinir and Erythromycin    Current Outpatient Medications   Medication Sig Dispense Refill   •  HYDROcodone-acetaminophen (NORCO) 5-325 MG Tab per tablet Take 1 tablet by mouth every four hours as needed.     • albuterol 108 (90 Base) MCG/ACT Aero Soln inhalation aerosol Inhale 2 Puffs every 6 hours as needed for Shortness of Breath. 8.5 g 1   • methotrexate 2.5 MG Tab      • ondansetron (ZOFRAN ODT) 8 MG TABLET DISPERSIBLE Take 1 tablet by mouth every 8 hours as needed for Nausea. 30 tablet 1   • lidocaine (LIDODERM) 5 % Patch Place 1 Patch on the skin every 24 hours. 10 Patch 0   • promethazine (PHENERGAN) 25 MG Tab Take 1 tablet by mouth every 6 hours as needed for Nausea/Vomiting. 15 tablet 0   • ketorolac (TORADOL) 10 MG Tab Take 1 Tab by mouth every 12 hours. 28 Tab 0   • DULoxetine (CYMBALTA) 30 MG Cap DR Particles Take 1 Cap by mouth 2 times a day. 60 Cap 1   • insulin glargine (LANTUS SOLOSTAR) 100 UNIT/ML Solution Pen-injector injection Inject 30 Units under the skin every evening. 15 mL 11   • Continuous Blood Gluc  (DEXCOM G6 ) Device 1 Applicator continuous. 1 Each 1   • Continuous Blood Gluc Sensor (DEXCOM G6 SENSOR) Misc 1 Applicator every 10 days. 9 Each 3   • Continuous Blood Gluc Transmit (DEXCOM G6 TRANSMITTER) Misc 1 Applicator every 3 months. 2 Each 2   • vitamin D, Ergocalciferol, (DRISDOL) 1.25 MG (15761 UT) Cap capsule Take 1 Cap by mouth every 7 days. 12 Cap 0   • tizanidine (ZANAFLEX) 6 MG capsule Take 8 mg by mouth 3 times a day. Taking 8mg     • cyanocobalamin (VITAMIN B-12) 1000 MCG/ML Solution Inject 1 mL intramuscularly every 14 days. 12 mL 0   • hydroxychloroquine (PLAQUENIL) 200 MG Tab Take 400 mg by mouth every day.     • Blood Glucose Test Strips Test strips order: Test strips for meter. Sig: use tid and prn ssx high or low sugar #100 RF x 3 400 Strip 3   • insulin lispro, Human, (HUMALOG KWIKPEN) 100 UNIT/ML injection PEN Inject 2-10 Units as instructed 3 times a day before meals. 15 mL 6   • FREESTYLE LITE strip      • Insulin Pen Needle 32 G x 4 mm 1 Each  "by Does not apply route 3 times a day. 100 Each 11   • Glucagon, rDNA, 1 MG Kit Use one pen prn severe hypoglycemia 1 Kit 1   • Lancets Lancets order: Lancets for glucometer. Sig: use tid and prn ssx high or low sugar. #100 RF x 3 100 Each 3   • Syringe/Needle, Disp, (SYRINGE 3CC/69EL2-7/2\") 22G X 1-1/2\" 3 ML Misc For use with Intra-muscular or subcutaneous B 12  injection every 2 weeks. 12 Each 1   • glucose monitoring kit (FREESTYLE) monitoring kit Use to measure blood glucose regularly. 1 Kit 0     No current facility-administered medications for this visit.       Social History     Tobacco Use   • Smoking status: Never Smoker   • Smokeless tobacco: Never Used   Substance Use Topics   • Alcohol use: No     Alcohol/week: 0.0 oz   • Drug use: No     Social History     Social History Narrative   • Not on file       Family History   Problem Relation Age of Onset   • Other Maternal Grandfather         Parkinson's   • Genetic Disorder Maternal Grandfather         Parkinsons, and crouzon syndrome   • Cancer Maternal Grandfather         Skin Cancer   • Heart Disease Other    • Cancer Other         bile duct cancer   • Other Mother         sphincter of salud, pancreatic insufficiency   • Cancer Mother 53        cervical ca   • Cancer Other         great uncle brain   • Arthritis Maternal Grandmother    • Cancer Maternal Grandmother         Skin Cancer   • Heart Disease Maternal Grandmother    • Hypertension Maternal Grandmother    • Hyperlipidemia Maternal Grandmother    • Stroke Maternal Grandmother    • Genetic Disorder Father         Factor V Liden and Tourettes   • Heart Disease Father    • Hyperlipidemia Father    • Genetic Disorder Maternal Uncle         Crouzon Syndrome   • Cancer Maternal Uncle         Bile Duct, and Skin cancer   • Cancer Maternal Aunt         Multiple Aunts and Uncles passed from cancer   • Heart Disease Maternal Aunt         Open heart surgery occuring   • Heart Disease Paternal Grandmother    • " "Hypertension Paternal Grandmother    • Stroke Paternal Grandmother    • Heart Disease Maternal Uncle         multiple uncles.       Allergies, past medical history, past surgical history, family history, social history reviewed and updated.    Review of Systems:     - Constitutional: Negative for fever, chills, unexpected weight change, and fatigue/generalized weakness.     - HEENT: Negative for headaches, vision changes, hearing changes, ear pain, ear discharge, rhinorrhea, sinus congestion, sore throat, and neck pain.      - Respiratory: Negative for cough, sputum production, chest congestion, dyspnea, wheezing, and crackles.      - Cardiovascular: Negative for chest pain, palpitations, orthopnea, and bilateral lower extremity edema.     - Gastrointestinal: Negative for heartburn, nausea, vomiting, abdominal pain, hematochezia, melena, diarrhea, constipation, and greasy/foul-smelling stools.     - Genitourinary: Negative for dysuria, polyuria, hematuria, pyuria, urinary urgency, and urinary incontinence.    - Musculoskeletal: Negative for myalgias, back pain, and joint pain.     - Skin: Negative for rash, itching, cyanotic skin color change.     - Neurological: Negative for dizziness, tingling, tremors, focal sensory deficit, focal weakness and headaches.     - Endo/Heme/Allergies: Does not bruise/bleed easily.     - Psychiatric/Behavioral: Negative for depression, suicidal/homicidal ideation and memory loss.      All other systems reviewed and are negative    Exam:    /60   Pulse 78   Temp 36.7 °C (98.1 °F) (Temporal)   Resp 20   Ht 1.702 m (5' 7\")   Wt 93.7 kg (206 lb 9.6 oz)   SpO2 95%   BMI 32.36 kg/m²  Body mass index is 32.36 kg/m².    Physical Exam:  Constitutional: Well-developed and well-nourished. Not diaphoretic. No distress.   Skin: Skin is warm and dry. No rash noted.  Head: Atraumatic without lesions.  Eyes: Conjunctivae and extraocular motions are normal. Pupils are equal, round, and " reactive to light. No scleral icterus.   Ears:  External ears unremarkable. Tympanic membranes clear and intact.  Nose: Nares patent. Septum midline. Turbinates without erythema nor edema. No discharge.   Mouth/Throat: Dentition is normal. Tongue normal. Oropharynx is clear and moist. Posterior pharynx without erythema or exudates.  Neck: Supple, trachea midline. Normal range of motion. No thyromegaly present. No lymphadenopathy--cervical or supraclavicular.  Cardiovascular: Regular rate and rhythm, S1 and S2 without murmur, rubs, or gallops.    Chest: Effort normal. Clear to auscultation throughout. No adventitious sounds. No CVA tenderness.  Abdomen: Soft, non tender, and without distention. Active bowel sounds in all four quadrants. No rebound, guarding, masses or HSM.  : Negative for dysuria, polyuria, hematuria, pyuria, urinary urgency, and urinary incontinence.  Extremities: No cyanosis, clubbing, erythema, nor edema. Distal pulses intact and symmetric.   Musculoskeletal: All major joints AROM full in all directions without pain.  Neurological: Alert and oriented x 3. DTRs 2+/3 and symmetric. No cranial nerve deficit. 5/5 myotomes. Sensation intact. Negative Rhomberg.  Psychiatric:  Behavior, mood, and affect are appropriate.  MA/nursing note and vitals reviewed.    LABS: 2/26/21  results reviewed and discussed with the patient, questions answered.    Assessment/Plan:  1. Controlled diabetes mellitus type 1 without complications (HCC)  Well-controlled on current regimen.  Followed by endocrinology.  - HEMOGLOBIN A1C; Future  - TSH; Future  - FREE THYROXINE; Future  - VITAMIN D,25 HYDROXY; Future  - VITAMIN B12; Future       Discussed with patient possible alternative diagnoses, pt is to take all medications as prescribed. If symptoms persist FU w/PCP, if symptoms worsen go to emergency room. If experiencing any side effects from prescribed medications reports to the office immediately or go to emergency  room.  Reviewed indication, dosage, usage and potential adverse effects of prescribed medications. Reviewed risks and benefits of treatment plan. Patient verbalizes understanding of all instruction and verbally agrees to plan.    No follow-ups on file. annually

## 2021-04-22 ENCOUNTER — HOSPITAL ENCOUNTER (OUTPATIENT)
Dept: LAB | Facility: MEDICAL CENTER | Age: 21
End: 2021-04-22
Attending: INTERNAL MEDICINE
Payer: COMMERCIAL

## 2021-04-22 ENCOUNTER — HOSPITAL ENCOUNTER (OUTPATIENT)
Dept: LAB | Facility: MEDICAL CENTER | Age: 21
End: 2021-04-22
Attending: NURSE PRACTITIONER
Payer: COMMERCIAL

## 2021-04-22 DIAGNOSIS — E10.9 CONTROLLED DIABETES MELLITUS TYPE 1 WITHOUT COMPLICATIONS (HCC): ICD-10-CM

## 2021-04-22 LAB
ALBUMIN SERPL BCP-MCNC: 4.5 G/DL (ref 3.2–4.9)
ALT SERPL-CCNC: 55 U/L (ref 2–50)
APPEARANCE UR: CLEAR
AST SERPL-CCNC: 37 U/L (ref 12–45)
BASOPHILS # BLD AUTO: 0.8 % (ref 0–1.8)
BASOPHILS # BLD: 0.06 K/UL (ref 0–0.12)
BILIRUB UR QL STRIP.AUTO: NEGATIVE
COLOR UR: YELLOW
CREAT SERPL-MCNC: 0.8 MG/DL (ref 0.5–1.4)
EOSINOPHIL # BLD AUTO: 0.11 K/UL (ref 0–0.51)
EOSINOPHIL NFR BLD: 1.4 % (ref 0–6.9)
ERYTHROCYTE [DISTWIDTH] IN BLOOD BY AUTOMATED COUNT: 39.7 FL (ref 35.9–50)
EST. AVERAGE GLUCOSE BLD GHB EST-MCNC: 103 MG/DL
GLUCOSE UR STRIP.AUTO-MCNC: NEGATIVE MG/DL
HBA1C MFR BLD: 5.2 % (ref 4–5.6)
HCT VFR BLD AUTO: 40.9 % (ref 37–47)
HGB BLD-MCNC: 14 G/DL (ref 12–16)
IMM GRANULOCYTES # BLD AUTO: 0.01 K/UL (ref 0–0.11)
IMM GRANULOCYTES NFR BLD AUTO: 0.1 % (ref 0–0.9)
KETONES UR STRIP.AUTO-MCNC: NEGATIVE MG/DL
LEUKOCYTE ESTERASE UR QL STRIP.AUTO: NEGATIVE
LYMPHOCYTES # BLD AUTO: 2.78 K/UL (ref 1–4.8)
LYMPHOCYTES NFR BLD: 35.8 % (ref 22–41)
MCH RBC QN AUTO: 31.3 PG (ref 27–33)
MCHC RBC AUTO-ENTMCNC: 34.2 G/DL (ref 33.6–35)
MCV RBC AUTO: 91.5 FL (ref 81.4–97.8)
MICRO URNS: NORMAL
MONOCYTES # BLD AUTO: 0.74 K/UL (ref 0–0.85)
MONOCYTES NFR BLD AUTO: 9.5 % (ref 0–13.4)
NEUTROPHILS # BLD AUTO: 4.07 K/UL (ref 2–7.15)
NEUTROPHILS NFR BLD: 52.4 % (ref 44–72)
NITRITE UR QL STRIP.AUTO: NEGATIVE
NRBC # BLD AUTO: 0 K/UL
NRBC BLD-RTO: 0 /100 WBC
PH UR STRIP.AUTO: 5.5 [PH] (ref 5–8)
PLATELET # BLD AUTO: 240 K/UL (ref 164–446)
PMV BLD AUTO: 10.6 FL (ref 9–12.9)
PROT UR QL STRIP: NEGATIVE MG/DL
RBC # BLD AUTO: 4.47 M/UL (ref 4.2–5.4)
RBC UR QL AUTO: NEGATIVE
SP GR UR STRIP.AUTO: >=1.03
UROBILINOGEN UR STRIP.AUTO-MCNC: 0.2 MG/DL
WBC # BLD AUTO: 7.8 K/UL (ref 4.8–10.8)

## 2021-04-22 PROCEDURE — 84439 ASSAY OF FREE THYROXINE: CPT

## 2021-04-22 PROCEDURE — 82565 ASSAY OF CREATININE: CPT

## 2021-04-22 PROCEDURE — 82570 ASSAY OF URINE CREATININE: CPT

## 2021-04-22 PROCEDURE — 85025 COMPLETE CBC W/AUTO DIFF WBC: CPT

## 2021-04-22 PROCEDURE — 36415 COLL VENOUS BLD VENIPUNCTURE: CPT

## 2021-04-22 PROCEDURE — 82607 VITAMIN B-12: CPT

## 2021-04-22 PROCEDURE — 84156 ASSAY OF PROTEIN URINE: CPT

## 2021-04-22 PROCEDURE — 86225 DNA ANTIBODY NATIVE: CPT

## 2021-04-22 PROCEDURE — 81003 URINALYSIS AUTO W/O SCOPE: CPT

## 2021-04-22 PROCEDURE — 84460 ALANINE AMINO (ALT) (SGPT): CPT

## 2021-04-22 PROCEDURE — 84450 TRANSFERASE (AST) (SGOT): CPT

## 2021-04-22 PROCEDURE — 86160 COMPLEMENT ANTIGEN: CPT

## 2021-04-22 PROCEDURE — 82306 VITAMIN D 25 HYDROXY: CPT

## 2021-04-22 PROCEDURE — 82040 ASSAY OF SERUM ALBUMIN: CPT

## 2021-04-22 PROCEDURE — 83036 HEMOGLOBIN GLYCOSYLATED A1C: CPT

## 2021-04-22 PROCEDURE — 84443 ASSAY THYROID STIM HORMONE: CPT

## 2021-04-23 ENCOUNTER — TELEPHONE (OUTPATIENT)
Dept: MEDICAL GROUP | Facility: PHYSICIAN GROUP | Age: 21
End: 2021-04-23

## 2021-04-23 LAB
C3 SERPL-MCNC: 142 MG/DL (ref 87–200)
C4 SERPL-MCNC: 20 MG/DL (ref 19–52)
CREAT UR-MCNC: 277.94 MG/DL
PROT UR-MCNC: 12 MG/DL (ref 0–15)
PROT/CREAT UR: 43 MG/G (ref 10–107)
T4 FREE SERPL-MCNC: 1.16 NG/DL (ref 0.93–1.7)
TSH SERPL DL<=0.005 MIU/L-ACNC: 3.14 UIU/ML (ref 0.38–5.33)
VIT B12 SERPL-MCNC: 547 PG/ML (ref 211–911)

## 2021-04-23 NOTE — TELEPHONE ENCOUNTER
Phone Number Called: [phone] (home)       Call outcome: Left detailed message for patient. Informed to call back with any additional questions.    Message: Called to inform patient that her labs look good

## 2021-04-24 LAB
25(OH)D3 SERPL-MCNC: 22 NG/ML (ref 30–80)
DSDNA AB TITR SER CLIF: NORMAL {TITER}

## 2021-06-07 ENCOUNTER — HOSPITAL ENCOUNTER (OUTPATIENT)
Dept: LAB | Facility: MEDICAL CENTER | Age: 21
End: 2021-06-07
Attending: INTERNAL MEDICINE
Payer: COMMERCIAL

## 2021-06-07 LAB
ALBUMIN SERPL BCP-MCNC: 4.4 G/DL (ref 3.2–4.9)
ALP SERPL-CCNC: 109 U/L (ref 30–99)
ALT SERPL-CCNC: 55 U/L (ref 2–50)
AST SERPL-CCNC: 33 U/L (ref 12–45)
BILIRUB CONJ SERPL-MCNC: <0.2 MG/DL (ref 0.1–0.5)
BILIRUB INDIRECT SERPL-MCNC: ABNORMAL MG/DL (ref 0–1)
BILIRUB SERPL-MCNC: 0.4 MG/DL (ref 0.1–1.5)
CREAT UR-MCNC: 113.35 MG/DL
EST. AVERAGE GLUCOSE BLD GHB EST-MCNC: 100 MG/DL
GLUCOSE SERPL-MCNC: 61 MG/DL (ref 65–99)
HBA1C MFR BLD: 5.1 % (ref 4–5.6)
MICROALBUMIN UR-MCNC: <1.2 MG/DL
MICROALBUMIN/CREAT UR: NORMAL MG/G (ref 0–30)
PROT SERPL-MCNC: 7.7 G/DL (ref 6–8.2)
T3 SERPL-MCNC: 151 NG/DL (ref 60–181)
T3FREE SERPL-MCNC: 3.73 PG/ML (ref 2–4.4)
T4 FREE SERPL-MCNC: 1.17 NG/DL (ref 0.93–1.7)
TSH SERPL DL<=0.005 MIU/L-ACNC: 2.17 UIU/ML (ref 0.38–5.33)
VIT B12 SERPL-MCNC: 582 PG/ML (ref 211–911)

## 2021-06-07 PROCEDURE — 82043 UR ALBUMIN QUANTITATIVE: CPT

## 2021-06-07 PROCEDURE — 82607 VITAMIN B-12: CPT

## 2021-06-07 PROCEDURE — 84439 ASSAY OF FREE THYROXINE: CPT

## 2021-06-07 PROCEDURE — 84481 FREE ASSAY (FT-3): CPT

## 2021-06-07 PROCEDURE — 82570 ASSAY OF URINE CREATININE: CPT

## 2021-06-07 PROCEDURE — 82306 VITAMIN D 25 HYDROXY: CPT

## 2021-06-07 PROCEDURE — 82947 ASSAY GLUCOSE BLOOD QUANT: CPT

## 2021-06-07 PROCEDURE — 84480 ASSAY TRIIODOTHYRONINE (T3): CPT

## 2021-06-07 PROCEDURE — 36415 COLL VENOUS BLD VENIPUNCTURE: CPT

## 2021-06-07 PROCEDURE — 84443 ASSAY THYROID STIM HORMONE: CPT

## 2021-06-07 PROCEDURE — 83036 HEMOGLOBIN GLYCOSYLATED A1C: CPT

## 2021-06-07 PROCEDURE — 80076 HEPATIC FUNCTION PANEL: CPT

## 2021-06-07 PROCEDURE — 86376 MICROSOMAL ANTIBODY EACH: CPT

## 2021-06-07 PROCEDURE — 84681 ASSAY OF C-PEPTIDE: CPT

## 2021-06-09 LAB
25(OH)D3 SERPL-MCNC: 18 NG/ML (ref 30–80)
C PEPTIDE SERPL-MCNC: 0.4 NG/ML (ref 0.8–3.5)
THYROPEROXIDASE AB SERPL-ACNC: 0.9 IU/ML (ref 0–9)

## 2021-06-29 ENCOUNTER — TELEPHONE (OUTPATIENT)
Dept: ENDOCRINOLOGY | Facility: MEDICAL CENTER | Age: 21
End: 2021-06-29

## 2021-06-29 NOTE — TELEPHONE ENCOUNTER
1. Caller Name: Sachin Stephenson called to verify pt Diagnosis and it was verified they will be sending out a DWO for us to fill out and fax back to them for supplies.

## 2021-08-10 ENCOUNTER — OFFICE VISIT (OUTPATIENT)
Dept: MEDICAL GROUP | Facility: PHYSICIAN GROUP | Age: 21
End: 2021-08-10
Payer: COMMERCIAL

## 2021-08-10 VITALS
WEIGHT: 223 LBS | HEIGHT: 67 IN | OXYGEN SATURATION: 97 % | TEMPERATURE: 98.3 F | BODY MASS INDEX: 35 KG/M2 | HEART RATE: 85 BPM | DIASTOLIC BLOOD PRESSURE: 70 MMHG | SYSTOLIC BLOOD PRESSURE: 100 MMHG | RESPIRATION RATE: 16 BRPM

## 2021-08-10 DIAGNOSIS — Z79.4 CONTROLLED TYPE 2 DIABETES MELLITUS WITHOUT COMPLICATION, WITH LONG-TERM CURRENT USE OF INSULIN (HCC): ICD-10-CM

## 2021-08-10 DIAGNOSIS — M35.9 CONNECTIVE TISSUE DISORDER (HCC): ICD-10-CM

## 2021-08-10 DIAGNOSIS — M35.3 POLYMYALGIA (HCC): ICD-10-CM

## 2021-08-10 DIAGNOSIS — Z23 NEED FOR VACCINATION: ICD-10-CM

## 2021-08-10 DIAGNOSIS — G89.29 OTHER CHRONIC PAIN: ICD-10-CM

## 2021-08-10 DIAGNOSIS — M32.9 LUPUS (HCC): ICD-10-CM

## 2021-08-10 DIAGNOSIS — E10.9 CONTROLLED DIABETES MELLITUS TYPE 1 WITHOUT COMPLICATIONS (HCC): ICD-10-CM

## 2021-08-10 DIAGNOSIS — E11.9 CONTROLLED TYPE 2 DIABETES MELLITUS WITHOUT COMPLICATION, WITH LONG-TERM CURRENT USE OF INSULIN (HCC): ICD-10-CM

## 2021-08-10 PROCEDURE — 99213 OFFICE O/P EST LOW 20 MIN: CPT | Mod: 25 | Performed by: NURSE PRACTITIONER

## 2021-08-10 PROCEDURE — 90471 IMMUNIZATION ADMIN: CPT | Performed by: NURSE PRACTITIONER

## 2021-08-10 PROCEDURE — 90621 MENB-FHBP VACC 2/3 DOSE IM: CPT | Performed by: NURSE PRACTITIONER

## 2021-08-10 RX ORDER — HYDROXYCHLOROQUINE SULFATE 200 MG/1
400 TABLET, FILM COATED ORAL DAILY
Qty: 90 TABLET | Refills: 0 | Status: ON HOLD | OUTPATIENT
Start: 2021-08-10 | End: 2022-03-28 | Stop reason: SDUPTHER

## 2021-08-10 RX ORDER — DULOXETIN HYDROCHLORIDE 30 MG/1
30 CAPSULE, DELAYED RELEASE ORAL 2 TIMES DAILY
Qty: 180 CAPSULE | Refills: 3 | Status: SHIPPED | OUTPATIENT
Start: 2021-08-10 | End: 2022-03-08

## 2021-08-10 ASSESSMENT — FIBROSIS 4 INDEX: FIB4 SCORE: 0.39

## 2021-08-10 NOTE — PROGRESS NOTES
Chief Complaint   Patient presents with   • Referral Needed     rhuematology, physciatry        HISTORY OF PRESENT ILLNESS: Patient is a 20 y.o. female, established patient who presents today to discuss medical problems as listed below:    Health Maintenance:  COMPLETED     Controlled type 2 diabetes mellitus without complication, with long-term current use of insulin (HCC)  Chronic problem.  Well-controlled with recent A1c 5.1 from 6/7/2021.  Patient is on a pump, followed by endocrinology, Dr. Lora.    Lupus (HCC)  Chronic problem.  Previously followed by rheumatology, .  On Plaquenil 400 mg daily.  Needing refills today.  Patient is requesting referral to rheumatology for second opinion.  Recent CMP WNL from 4/2021.    Polymyalgia ([facility])  Chronic problem.  Previously followed by physiatry, Dr. Cristobal Jewell, was not covered by insurance for a while.  Now patient has different insurance and would like a referral to physiatry.  Patient takes Cymbalta 30 mg twice daily, requesting refills today.      Patient Active Problem List    Diagnosis Date Noted   • Lupus (HCC) 08/10/2021   • Polymyalgia ([facility]) 08/10/2021   • SOB (shortness of breath) 03/03/2021   • History of asthma 03/03/2021   • Muscle tenderness 03/03/2021   • Hospital discharge follow-up 03/03/2021   • Acute midline thoracic back pain 02/26/2021   • Other chronic pain 01/15/2021   • Controlled type 2 diabetes mellitus without complication, with long-term current use of insulin ([facility]) 12/14/2020   • Vitamin D deficiency 12/14/2020   • Elevated antinuclear antibody (SHANI) level 03/03/2020   • Long term current use of insulin ([facility]) 12/17/2019   • Type 1 diabetes mellitus ([facility]) 12/17/2019   • 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   • History of cholecystectomy 08/22/2019   • Celiac disease 08/22/2019   • Controlled type 1 diabetes mellitus ([facility]) 08/12/2019   • POTS (postural orthostatic tachycardia syndrome) 12/17/2018   • Hyperglycemia  12/14/2018   • Other irritable bowel syndrome 08/10/2018   • Salt craving 02/02/2018   • Hypoglycemia 02/02/2018   • Chronic tension-type headache, not intractable 11/09/2016   • Mittelschmerz 09/19/2016   • Tourette syndrome 09/19/2016   • OCD (obsessive compulsive disorder) 09/19/2016   • Factor V Leiden (HCC)         Allergies: Cefdinir and Erythromycin    Current Outpatient Medications   Medication Sig Dispense Refill   • hydroxychloroquine (PLAQUENIL) 200 MG Tab Take 2 Tablets by mouth every day. 90 tablet 0   • DULoxetine (CYMBALTA) 30 MG Cap DR Particles Take 1 capsule by mouth 2 times a day. 180 capsule 3   • albuterol 108 (90 Base) MCG/ACT Aero Soln inhalation aerosol Inhale 2 Puffs every 6 hours as needed for Shortness of Breath. 8.5 g 1   • methotrexate 2.5 MG Tab      • ondansetron (ZOFRAN ODT) 8 MG TABLET DISPERSIBLE Take 1 tablet by mouth every 8 hours as needed for Nausea. 30 tablet 1   • lidocaine (LIDODERM) 5 % Patch Place 1 Patch on the skin every 24 hours. 10 Patch 0   • promethazine (PHENERGAN) 25 MG Tab Take 1 tablet by mouth every 6 hours as needed for Nausea/Vomiting. 15 tablet 0   • ketorolac (TORADOL) 10 MG Tab Take 1 Tab by mouth every 12 hours. 28 Tab 0   • insulin glargine (LANTUS SOLOSTAR) 100 UNIT/ML Solution Pen-injector injection Inject 30 Units under the skin every evening. 15 mL 11   • Continuous Blood Gluc  (DEXCOM G6 ) Device 1 Applicator continuous. 1 Each 1   • Continuous Blood Gluc Sensor (DEXCOM G6 SENSOR) Misc 1 Applicator every 10 days. 9 Each 3   • Continuous Blood Gluc Transmit (DEXCOM G6 TRANSMITTER) Misc 1 Applicator every 3 months. 2 Each 2   • vitamin D, Ergocalciferol, (DRISDOL) 1.25 MG (18455 UT) Cap capsule Take 1 Cap by mouth every 7 days. 12 Cap 0   • tizanidine (ZANAFLEX) 6 MG capsule Take 8 mg by mouth 3 times a day. Taking 8mg     • cyanocobalamin (VITAMIN B-12) 1000 MCG/ML Solution Inject 1 mL intramuscularly every 14 days. 12 mL 0   • Blood  "Glucose Test Strips Test strips order: Test strips for meter. Sig: use tid and prn ssx high or low sugar #100 RF x 3 400 Strip 3   • insulin lispro, Human, (HUMALOG KWIKPEN) 100 UNIT/ML injection PEN Inject 2-10 Units as instructed 3 times a day before meals. 15 mL 6   • FREESTYLE LITE strip      • Insulin Pen Needle 32 G x 4 mm 1 Each by Does not apply route 3 times a day. 100 Each 11   • Glucagon, rDNA, 1 MG Kit Use one pen prn severe hypoglycemia 1 Kit 1   • Lancets Lancets order: Lancets for glucometer. Sig: use tid and prn ssx high or low sugar. #100 RF x 3 100 Each 3   • Syringe/Needle, Disp, (SYRINGE 3CC/93LB0-5/2\") 22G X 1-1/2\" 3 ML Misc For use with Intra-muscular or subcutaneous B 12  injection every 2 weeks. 12 Each 1   • glucose monitoring kit (FREESTYLE) monitoring kit Use to measure blood glucose regularly. 1 Kit 0   • HUMALOG 100 UNIT/ML        No current facility-administered medications for this visit.       Social History     Tobacco Use   • Smoking status: Never Smoker   • Smokeless tobacco: Never Used   Vaping Use   • Vaping Use: Never used   Substance Use Topics   • Alcohol use: No     Alcohol/week: 0.0 oz   • Drug use: No     Social History     Social History Narrative   • Not on file       Family History   Problem Relation Age of Onset   • Other Maternal Grandfather         Parkinson's   • Genetic Disorder Maternal Grandfather         Parkinsons, and crouzon syndrome   • Cancer Maternal Grandfather         Skin Cancer   • Heart Disease Other    • Cancer Other         bile duct cancer   • Other Mother         sphincter of salud, pancreatic insufficiency   • Cancer Mother 53        cervical ca   • Cancer Other         great uncle brain   • Arthritis Maternal Grandmother    • Cancer Maternal Grandmother         Skin Cancer   • Heart Disease Maternal Grandmother    • Hypertension Maternal Grandmother    • Hyperlipidemia Maternal Grandmother    • Stroke Maternal Grandmother    • Genetic Disorder " "Father         Factor V Liden and Tourettes   • Heart Disease Father    • Hyperlipidemia Father    • Genetic Disorder Maternal Uncle         Crouzon Syndrome   • Cancer Maternal Uncle         Bile Duct, and Skin cancer   • Cancer Maternal Aunt         Multiple Aunts and Uncles passed from cancer   • Heart Disease Maternal Aunt         Open heart surgery occuring   • Heart Disease Paternal Grandmother    • Hypertension Paternal Grandmother    • Stroke Paternal Grandmother    • Heart Disease Maternal Uncle         multiple uncles.       Allergies, past medical history, past surgical history, family history, social history reviewed and updated.    Review of Systems:     - Constitutional: Negative for fever, chills, unexpected weight change, and fatigue/generalized weakness.     - Respiratory: Negative for cough, sputum production, chest congestion, dyspnea, wheezing, and crackles.      - Cardiovascular: Negative for chest pain, palpitations, orthopnea, and bilateral lower extremity edema.     - Psychiatric/Behavioral: Negative for depression, suicidal/homicidal ideation and memory loss.      All other systems reviewed and are negative    Exam:    /70   Pulse 85   Temp 36.8 °C (98.3 °F) (Temporal)   Resp 16   Ht 1.702 m (5' 7\")   Wt 101 kg (223 lb)   SpO2 97%   BMI 34.93 kg/m²  Body mass index is 34.93 kg/m².    Physical Exam:  Constitutional: Well-developed and well-nourished. Not diaphoretic. No distress.   Cardiovascular: Regular rate and rhythm, S1 and S2 without murmur, rubs, or gallops.    Chest: Effort normal. Clear to auscultation throughout. No adventitious sounds.   Neurological: Alert and oriented x 3.   Psychiatric:  Behavior, mood, and affect are appropriate.  MA/nursing note and vitals reviewed.    LABS:   results reviewed and discussed with the patient, questions answered.    My total time spent caring for the patient on the day of the encounter was 15 minutes.   This does not include time " spent on separately billable procedures/tests.     Assessment/Plan:  1. Controlled diabetes mellitus type 1 without complications (HCC)  Well-controlled.  Followed by endocrinology  - Diabetic Monofilament LE Exam    2. Lupus (HCC)  Well-controlled.  Continue current regimen.  Refills for Plaquenil given to avoid interruption in symptom control until patient is established with rheumatology.  - REFERRAL TO RHEUMATOLOGY  - hydroxychloroquine (PLAQUENIL) 200 MG Tab; Take 2 Tablets by mouth every day.  Dispense: 90 tablet; Refill: 0  - REFERRAL TO OUTPATIENT INTERVENTIONAL PAIN CLINIC    3. Connective tissue disorder (HCC)  As discussed in 2  - REFERRAL TO RHEUMATOLOGY  - hydroxychloroquine (PLAQUENIL) 200 MG Tab; Take 2 Tablets by mouth every day.  Dispense: 90 tablet; Refill: 0  - REFERRAL TO OUTPATIENT INTERVENTIONAL PAIN CLINIC    4. Other chronic pain  Stable on current regimen.  Refills given.  Referral placed.  - REFERRAL TO OUTPATIENT INTERVENTIONAL PAIN CLINIC  - DULoxetine (CYMBALTA) 30 MG Cap DR Particles; Take 1 capsule by mouth 2 times a day.  Dispense: 180 capsule; Refill: 3    5. Polymyalgia (HCC)  As discussed in 4  - DULoxetine (CYMBALTA) 30 MG Cap DR Particles; Take 1 capsule by mouth 2 times a day.  Dispense: 180 capsule; Refill: 3    6. Need for vaccination  - Meningococcal Vaccine Serogroup B 2-3 Dose (TRUMENBA)       Discussed with patient possible alternative diagnoses, pt is to take all medications as prescribed. If symptoms persist FU w/PCP, if symptoms worsen go to emergency room. If experiencing any side effects from prescribed medications reports to the office immediately or go to emergency room.  Reviewed indication, dosage, usage and potential adverse effects of prescribed medications. Reviewed risks and benefits of treatment plan. Patient verbalizes understanding of all instruction and verbally agrees to plan.    No follow-ups on file. annual

## 2021-08-10 NOTE — ASSESSMENT & PLAN NOTE
Chronic problem.  Previously followed by rheumatology, .  On Plaquenil 400 mg daily.  Needing refills today.  Patient is requesting referral to rheumatology for second opinion.  Recent CMP WNL from 4/2021.

## 2021-08-10 NOTE — ASSESSMENT & PLAN NOTE
Chronic problem.  Well-controlled with recent A1c 5.1 from 6/7/2021.  Patient is on a pump, followed by endocrinology, Dr. Lora.

## 2021-08-10 NOTE — ASSESSMENT & PLAN NOTE
Chronic problem.  Previously followed by physiatry, Dr. Cristobal Jewell, was not covered by insurance for a while.  Now patient has different insurance and would like a referral to physiatry.  Patient takes Cymbalta 30 mg twice daily, requesting refills today.

## 2021-09-27 NOTE — MR AVS SNAPSHOT
Malena Bennett   2017 1:30 PM   Non-Provider Visit   MRN: [id number]    Department:  [facility]   Dept Phone:  [phone]    Description:  Female : 2000   Provider:  [facility]           Reason for Visit     Immunizations HPV      Allergies as of 2017     Allergen Noted Reactions    Erythromycin 2017   Vomiting, Nausea      Vital Signs     Last Menstrual Period Smoking Status                2017 (Approximate) Never Smoker           Basic Information     Date Of Birth Sex Race Ethnicity Preferred Language    2000 Female White Non- English      Problem List              ICD-10-CM Priority Class Noted - Resolved    Pelvic pain R10.2   2016 - Present    Factor V Leiden (CMS-HCC) (Chronic) D68.51   Unknown - Present    Mittelschmerz N94.0   2016 - Present    Tourette syndrome F95.2   2016 - Present    OCD (obsessive compulsive disorder) F42.9   2016 - Present    Right upper quadrant pain R10.11   2016 - Present    Chronic tension-type headache, not intractable G44.229   2016 - Present    Acute gastritis without hemorrhage K29.00   2016 - Present    Biliary dyskinesia K82.8   2017 - Present    Overweight, pediatric, BMI (body mass index) 95-99% for age E66.3, Z68.54   2017 - Present    Appendicitis K37   2017 - Present      Health Maintenance        Date Due Completion Dates    IMM HEP A VACCINE (2 of 2 - Standard Series) 10/23/2010 2010    IMM VARICELLA (CHICKENPOX) VACCINE (1 of 2 - 2 Dose Adolescent Series) 2012 ---    IMM HPV VACCINE (2 of 3 - Female 3 Dose Series) 2017    IMM INFLUENZA (1) 2017 ---    IMM DTaP/Tdap/Td Vaccine (7 - Td) 2022, 2005, 2001, 2000, 2000, 2000            Current Immunizations     Dtap Vaccine 2005, 2001, 2000, 2000, 2000    HPV 9-VALENT VACCINE (GARDASIL 9) 2017    Blood pressure rechecked after visit    153/65  Mesha Nieves CMA Rheumatology  9/27/2021 9:23 AM                                RAPID3 (0-30) Cumulative Score  3.7          RAPID3 Weighted Score (divide #4 by 3 and that is the weighted score)  1.2            Hepatitis A Vaccine, Ped/Adol 4/23/2010    Hepatitis B Vaccine Non-Recombivax (Ped/Adol) 1/9/2001, 5/2/2000, 2/29/2000    IPV 6/13/2005, 8/9/2001, 5/2/2000, 2/29/2000    MMR Vaccine 6/13/2005, 8/9/2001    Meningococcal Conjugate Vaccine MCV4 (Menactra) 5/19/2017, 8/12/2014    Tdap Vaccine 8/23/2012      Below and/or attached are the medications your provider expects you to take. Review all of your home medications and newly ordered medications with your provider and/or pharmacist. Follow medication instructions as directed by your provider and/or pharmacist. Please keep your medication list with you and share with your provider. Update the information when medications are discontinued, doses are changed, or new medications (including over-the-counter products) are added; and carry medication information at all times in the event of emergency situations     Allergies:  ERYTHROMYCIN - Vomiting,Nausea               Medications  Valid as of: July 28, 2017 -  2:07 PM    Generic Name Brand Name Tablet Size Instructions for use    Ibuprofen (Tab) MOTRIN 200 MG Take 400 mg by mouth every 6 hours as needed.        Ondansetron (TABLET DISPERSIBLE) ZOFRAN ODT 4 MG Take 4 mg by mouth every 8 hours as needed for Nausea/Vomiting.        Ondansetron HCl (Tab) ZOFRAN 4 MG Take 1 Tab by mouth every four hours as needed for Nausea/Vomiting.        OxyCODONE HCl (Solution) ROXICODONE 5 MG/5ML Take 5 mL by mouth every four hours as needed.        .                 Medicines prescribed today were sent to:     [facility] # [place], [place] - [address] [facility] [address]    Phone: [phone] Fax: [phone]    Open 24 Hours?: No      Medication refill instructions:       If your prescription bottle indicates you have medication refills left, it is not necessary to call your provider’s office. Please contact your pharmacy and they will refill your medication.    If your prescription bottle indicates  you do not have any refills left, you may request refills at any time through one of the following ways: The online [email or web address] system (except Urgent Care), by calling your provider’s office, or by asking your pharmacy to contact your provider’s office with a refill request. Medication refills are processed only during regular business hours and may not be available until the next business day. Your provider may request additional information or to have a follow-up visit with you prior to refilling your medication.   *Please Note: Medication refills are assigned a new Rx number when refilled electronically. Your pharmacy may indicate that no refills were authorized even though a new prescription for the same medication is available at the pharmacy. Please request the medicine by name with the pharmacy before contacting your provider for a refill.           [email or web address] Access Code: Activation code not generated  Current [email or web address] Status: Active

## 2022-01-20 ENCOUNTER — HOSPITAL ENCOUNTER (OUTPATIENT)
Facility: MEDICAL CENTER | Age: 22
End: 2022-01-20
Attending: NURSE PRACTITIONER
Payer: COMMERCIAL

## 2022-01-20 ENCOUNTER — EMPLOYEE HEALTH (OUTPATIENT)
Dept: OCCUPATIONAL MEDICINE | Facility: CLINIC | Age: 22
End: 2022-01-20

## 2022-01-20 ENCOUNTER — EH NON-PROVIDER (OUTPATIENT)
Dept: OCCUPATIONAL MEDICINE | Facility: CLINIC | Age: 22
End: 2022-01-20

## 2022-01-20 DIAGNOSIS — Z02.1 PRE-EMPLOYMENT DRUG SCREENING: ICD-10-CM

## 2022-01-20 DIAGNOSIS — Z02.1 PRE-EMPLOYMENT HEALTH SCREENING EXAMINATION: Primary | ICD-10-CM

## 2022-01-20 DIAGNOSIS — Z02.1 PRE-EMPLOYMENT HEALTH SCREENING EXAMINATION: ICD-10-CM

## 2022-01-20 PROCEDURE — 8915 PR COMPREHENSIVE PHYSICAL: Performed by: NURSE PRACTITIONER

## 2022-01-20 PROCEDURE — 94375 RESPIRATORY FLOW VOLUME LOOP: CPT | Performed by: NURSE PRACTITIONER

## 2022-01-20 PROCEDURE — 86480 TB TEST CELL IMMUN MEASURE: CPT | Performed by: NURSE PRACTITIONER

## 2022-01-20 PROCEDURE — 80305 DRUG TEST PRSMV DIR OPT OBS: CPT | Performed by: NURSE PRACTITIONER

## 2022-02-02 DIAGNOSIS — R11.0 NAUSEA WITHOUT VOMITING: ICD-10-CM

## 2022-02-02 RX ORDER — ONDANSETRON 8 MG/1
8 TABLET, ORALLY DISINTEGRATING ORAL EVERY 8 HOURS PRN
Qty: 30 TABLET | Refills: 1 | Status: ON HOLD | OUTPATIENT
Start: 2022-02-02 | End: 2022-03-28 | Stop reason: SDUPTHER

## 2022-02-03 DIAGNOSIS — E55.9 VITAMIN D DEFICIENCY: ICD-10-CM

## 2022-02-03 RX ORDER — ERGOCALCIFEROL 1.25 MG/1
50000 CAPSULE ORAL
Qty: 12 CAPSULE | Refills: 0 | Status: SHIPPED | OUTPATIENT
Start: 2022-02-03 | End: 2022-03-21

## 2022-02-03 NOTE — TELEPHONE ENCOUNTER
Received request via: Pharmacy    Was the patient seen in the last year in this department? Yes    Does the patient have an active prescription (recently filled or refills available) for medication(s) requested? No     Last office visit: 08/10/2021

## 2022-03-08 ENCOUNTER — HOSPITAL ENCOUNTER (OUTPATIENT)
Dept: LAB | Facility: MEDICAL CENTER | Age: 22
End: 2022-03-08
Attending: INTERNAL MEDICINE

## 2022-03-08 ENCOUNTER — HOSPITAL ENCOUNTER (OUTPATIENT)
Dept: LAB | Facility: MEDICAL CENTER | Age: 22
End: 2022-03-08
Attending: NURSE PRACTITIONER

## 2022-03-08 ENCOUNTER — OFFICE VISIT (OUTPATIENT)
Dept: MEDICAL GROUP | Facility: PHYSICIAN GROUP | Age: 22
End: 2022-03-08

## 2022-03-08 VITALS
DIASTOLIC BLOOD PRESSURE: 86 MMHG | RESPIRATION RATE: 16 BRPM | OXYGEN SATURATION: 98 % | SYSTOLIC BLOOD PRESSURE: 112 MMHG | TEMPERATURE: 97.8 F | HEIGHT: 67 IN | BODY MASS INDEX: 28.98 KG/M2 | HEART RATE: 91 BPM

## 2022-03-08 DIAGNOSIS — G89.29 OTHER CHRONIC PAIN: ICD-10-CM

## 2022-03-08 DIAGNOSIS — M35.3 POLYMYALGIA (HCC): ICD-10-CM

## 2022-03-08 DIAGNOSIS — R68.89 FLU-LIKE SYMPTOMS: ICD-10-CM

## 2022-03-08 DIAGNOSIS — Z00.00 ANNUAL PHYSICAL EXAM: ICD-10-CM

## 2022-03-08 DIAGNOSIS — M79.2 NEUROPATHIC PAIN: ICD-10-CM

## 2022-03-08 DIAGNOSIS — R05.9 COUGH: ICD-10-CM

## 2022-03-08 DIAGNOSIS — G89.4 CHRONIC PAIN SYNDROME: ICD-10-CM

## 2022-03-08 DIAGNOSIS — Z23 NEED FOR VACCINATION: ICD-10-CM

## 2022-03-08 LAB
25(OH)D3 SERPL-MCNC: 14 NG/ML (ref 30–100)
ALBUMIN SERPL BCP-MCNC: 4.9 G/DL (ref 3.2–4.9)
ALBUMIN/GLOB SERPL: 1.5 G/DL
ALP SERPL-CCNC: 129 U/L (ref 30–99)
ALT SERPL-CCNC: 84 U/L (ref 2–50)
ALT SERPL-CCNC: 84 U/L (ref 2–50)
ANION GAP SERPL CALC-SCNC: 14 MMOL/L (ref 7–16)
APPEARANCE UR: ABNORMAL
AST SERPL-CCNC: 61 U/L (ref 12–45)
AST SERPL-CCNC: 63 U/L (ref 12–45)
BACTERIA #/AREA URNS HPF: NEGATIVE /HPF
BILIRUB SERPL-MCNC: 0.3 MG/DL (ref 0.1–1.5)
BILIRUB UR QL STRIP.AUTO: NEGATIVE
BUN SERPL-MCNC: 12 MG/DL (ref 8–22)
C3 SERPL-MCNC: 183.2 MG/DL (ref 87–200)
C3 SERPL-MCNC: 187.1 MG/DL (ref 87–200)
C4 SERPL-MCNC: 30.6 MG/DL (ref 19–52)
C4 SERPL-MCNC: 31 MG/DL (ref 19–52)
CALCIUM SERPL-MCNC: 9.6 MG/DL (ref 8.5–10.5)
CHLORIDE SERPL-SCNC: 101 MMOL/L (ref 96–112)
CO2 SERPL-SCNC: 24 MMOL/L (ref 20–33)
COLOR UR: YELLOW
CREAT SERPL-MCNC: 0.67 MG/DL (ref 0.5–1.4)
CREAT SERPL-MCNC: 0.68 MG/DL (ref 0.5–1.4)
CREAT UR-MCNC: 61.7 MG/DL
EPI CELLS #/AREA URNS HPF: ABNORMAL /HPF
FERRITIN SERPL-MCNC: 108 NG/ML (ref 10–291)
FOLATE SERPL-MCNC: 5.9 NG/ML
GLOBULIN SER CALC-MCNC: 3.2 G/DL (ref 1.9–3.5)
GLUCOSE SERPL-MCNC: 88 MG/DL (ref 65–99)
GLUCOSE UR STRIP.AUTO-MCNC: NEGATIVE MG/DL
HYALINE CASTS #/AREA URNS LPF: ABNORMAL /LPF
IRON SATN MFR SERPL: 24 % (ref 15–55)
IRON SERPL-MCNC: 87 UG/DL (ref 40–170)
KETONES UR STRIP.AUTO-MCNC: NEGATIVE MG/DL
LEUKOCYTE ESTERASE UR QL STRIP.AUTO: ABNORMAL
MICRO URNS: ABNORMAL
NITRITE UR QL STRIP.AUTO: NEGATIVE
PH UR STRIP.AUTO: 7 [PH] (ref 5–8)
POTASSIUM SERPL-SCNC: 4.2 MMOL/L (ref 3.6–5.5)
PROT SERPL-MCNC: 8.1 G/DL (ref 6–8.2)
PROT UR QL STRIP: NEGATIVE MG/DL
PROT UR-MCNC: 5 MG/DL (ref 0–15)
PROT/CREAT UR: 81 MG/G (ref 10–107)
RBC # URNS HPF: ABNORMAL /HPF
RBC UR QL AUTO: NEGATIVE
SODIUM SERPL-SCNC: 139 MMOL/L (ref 135–145)
SP GR UR STRIP.AUTO: 1.01
T4 FREE SERPL-MCNC: 1.23 NG/DL (ref 0.93–1.7)
TIBC SERPL-MCNC: 367 UG/DL (ref 250–450)
TSH SERPL DL<=0.005 MIU/L-ACNC: 1.27 UIU/ML (ref 0.38–5.33)
UIBC SERPL-MCNC: 280 UG/DL (ref 110–370)
UROBILINOGEN UR STRIP.AUTO-MCNC: 0.2 MG/DL
VIT B12 SERPL-MCNC: 473 PG/ML (ref 211–911)
WBC #/AREA URNS HPF: ABNORMAL /HPF

## 2022-03-08 PROCEDURE — 36415 COLL VENOUS BLD VENIPUNCTURE: CPT

## 2022-03-08 PROCEDURE — 90686 IIV4 VACC NO PRSV 0.5 ML IM: CPT | Performed by: NURSE PRACTITIONER

## 2022-03-08 PROCEDURE — 86160 COMPLEMENT ANTIGEN: CPT | Mod: 91

## 2022-03-08 PROCEDURE — 83550 IRON BINDING TEST: CPT

## 2022-03-08 PROCEDURE — 86160 COMPLEMENT ANTIGEN: CPT

## 2022-03-08 PROCEDURE — 82565 ASSAY OF CREATININE: CPT

## 2022-03-08 PROCEDURE — 84443 ASSAY THYROID STIM HORMONE: CPT

## 2022-03-08 PROCEDURE — 84460 ALANINE AMINO (ALT) (SGPT): CPT

## 2022-03-08 PROCEDURE — 84439 ASSAY OF FREE THYROXINE: CPT

## 2022-03-08 PROCEDURE — 90471 IMMUNIZATION ADMIN: CPT | Performed by: NURSE PRACTITIONER

## 2022-03-08 PROCEDURE — 86225 DNA ANTIBODY NATIVE: CPT

## 2022-03-08 PROCEDURE — 80053 COMPREHEN METABOLIC PANEL: CPT

## 2022-03-08 PROCEDURE — 82607 VITAMIN B-12: CPT

## 2022-03-08 PROCEDURE — 84450 TRANSFERASE (AST) (SGOT): CPT

## 2022-03-08 PROCEDURE — 82570 ASSAY OF URINE CREATININE: CPT

## 2022-03-08 PROCEDURE — 83036 HEMOGLOBIN GLYCOSYLATED A1C: CPT

## 2022-03-08 PROCEDURE — 82746 ASSAY OF FOLIC ACID SERUM: CPT

## 2022-03-08 PROCEDURE — 84156 ASSAY OF PROTEIN URINE: CPT

## 2022-03-08 PROCEDURE — 82728 ASSAY OF FERRITIN: CPT

## 2022-03-08 PROCEDURE — 82306 VITAMIN D 25 HYDROXY: CPT

## 2022-03-08 PROCEDURE — 83540 ASSAY OF IRON: CPT

## 2022-03-08 PROCEDURE — 81001 URINALYSIS AUTO W/SCOPE: CPT

## 2022-03-08 PROCEDURE — 99214 OFFICE O/P EST MOD 30 MIN: CPT | Mod: 25 | Performed by: NURSE PRACTITIONER

## 2022-03-08 RX ORDER — FLUTICASONE PROPIONATE 50 MCG
1 SPRAY, SUSPENSION (ML) NASAL
Qty: 16 G | Refills: 1 | Status: SHIPPED | OUTPATIENT
Start: 2022-03-08 | End: 2022-03-21

## 2022-03-08 RX ORDER — GUAIFENESIN 600 MG/1
600 TABLET, EXTENDED RELEASE ORAL EVERY 12 HOURS
Qty: 60 TABLET | Refills: 1 | Status: SHIPPED | OUTPATIENT
Start: 2022-03-08 | End: 2022-03-18

## 2022-03-08 RX ORDER — BENZONATATE 100 MG/1
100 CAPSULE ORAL 3 TIMES DAILY PRN
Qty: 60 CAPSULE | Refills: 0 | Status: SHIPPED | OUTPATIENT
Start: 2022-03-08 | End: 2022-03-21

## 2022-03-08 RX ORDER — GABAPENTIN 100 MG/1
100 CAPSULE ORAL 4 TIMES DAILY
Qty: 120 CAPSULE | Refills: 0 | Status: SHIPPED | OUTPATIENT
Start: 2022-03-08 | End: 2022-03-30

## 2022-03-08 RX ORDER — DULOXETIN HYDROCHLORIDE 60 MG/1
60 CAPSULE, DELAYED RELEASE ORAL DAILY
Qty: 90 CAPSULE | Refills: 3 | Status: SHIPPED | OUTPATIENT
Start: 2022-03-08 | End: 2022-04-25

## 2022-03-08 ASSESSMENT — PATIENT HEALTH QUESTIONNAIRE - PHQ9: CLINICAL INTERPRETATION OF PHQ2 SCORE: 0

## 2022-03-08 NOTE — LETTER
March 8, 2022       Patient: Malena Bennett   YOB: 2000   Date of Visit: 3/8/2022         To Whom It May Concern:    My patient, Malena Bennett was seen and examined in the office today. I recommend she stays home for the next week.  She is okay to return to work without restrictions if symptom-free for at least 24 hours.     If you have any questions or concerns, please don't hesitate to call [phone]          Sincerely,          DWAYNE Wilkerson.  Electronically Signed

## 2022-03-08 NOTE — ASSESSMENT & PLAN NOTE
Chronic problem.  Previously followed by Dr.Nels Zelaya physiatry.  Has not been seen by a new physiatrist yet.  Needing a new referral for physiatry and refill on Cymbalta.  Currently on 60 mg daily.

## 2022-03-08 NOTE — PROGRESS NOTES
Chief Complaint   Patient presents with   • Requesting Labs   • Referral Needed     Rheumatology         HISTORY OF PRESENT ILLNESS: Patient is a 21 y.o. female, established patient who presents today to discuss medical problems as listed below:    Health Maintenance:  COMPLETED     Flu-like symptoms  New problem.  Flulike symptoms including productive cough, chest congestion x 1 wk, no improvement. Denies CP or dyspnea, no wheezing, no sinus congestion, ear pressure headache, no fevers, no GI symptoms.  Patient is a nurse working in peds, exposures to respiratory symptoms.  Tested for Covid x2 and negative, the most recent 2 days ago.  Tried OTC cough drops, did not find that effective.    Polymyalgia (HCC)  Chronic problem.  Previously followed by Dr.Nels Zelaya physiatry.  Has not been seen by a new physiatrist yet.  Needing a new referral for physiatry and refill on Cymbalta.  Currently on 60 mg daily.    Other chronic pain  Chronic generalized pains, well controlled type I, followed by rheumatology Dr. Charles in previously by physiatry.  On Cymbalta 60 mg daily.  Follows healthy lifestyle in terms of diet and physical activity.      Patient Active Problem List    Diagnosis Date Noted   • Cough 03/08/2022   • Flu-like symptoms 03/08/2022   • Lupus (HCC) 08/10/2021   • Polymyalgia (HCC) 08/10/2021   • SOB (shortness of breath) 03/03/2021   • History of asthma 03/03/2021   • Muscle tenderness 03/03/2021   • Hospital discharge follow-up 03/03/2021   • Acute midline thoracic back pain 02/26/2021   • Other chronic pain 01/15/2021   • Controlled type 2 diabetes mellitus without complication, with long-term current use of insulin ([facility]) 12/14/2020   • Vitamin D deficiency 12/14/2020   • Elevated antinuclear antibody (SHANI) level 03/03/2020   • Long term current use of insulin ([facility]) 12/17/2019   • Type 1 diabetes mellitus ([facility]) 12/17/2019   • 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   • History of cholecystectomy  08/22/2019   • Celiac disease 08/22/2019   • Controlled type 1 diabetes mellitus (HCC) 08/12/2019   • POTS (postural orthostatic tachycardia syndrome) 12/17/2018   • Hyperglycemia 12/14/2018   • Other irritable bowel syndrome 08/10/2018   • Salt craving 02/02/2018   • Hypoglycemia 02/02/2018   • Chronic tension-type headache, not intractable 11/09/2016   • [place] 09/19/2016   • Tourette syndrome 09/19/2016   • OCD (obsessive compulsive disorder) 09/19/2016   • Factor V Leiden ([facility])         Allergies: Cefdinir and Erythromycin    Current Outpatient Medications   Medication Sig Dispense Refill   • benzonatate (TESSALON) 100 MG Cap Take 1 Capsule by mouth 3 times a day as needed for Cough. 60 Capsule 0   • guaiFENesin ER (MUCINEX) 600 MG TABLET SR 12 HR Take 1 Tablet by mouth every 12 hours for 10 days. 60 Tablet 1   • fluticasone (FLONASE) 50 MCG/ACT nasal spray Administer 1 Spray into affected nostril(S) at bedtime. 16 g 1   • gabapentin (NEURONTIN) 100 MG Cap Take 1 Capsule by mouth 4 times a day. 120 Capsule 0   • DULoxetine (CYMBALTA) 60 MG Cap DR Particles delayed-release capsule Take 1 Capsule by mouth every day. 90 Capsule 3   • vitamin D2, Ergocalciferol, (DRISDOL) 1.25 MG (12157 UT) Cap capsule Take 1 Capsule by mouth every 7 days. 12 Capsule 0   • ondansetron (ZOFRAN ODT) 8 MG TABLET DISPERSIBLE Take 1 Tablet by mouth every 8 hours as needed for Nausea. 30 Tablet 1   • HUMALOG 100 UNIT/ML      • hydroxychloroquine (PLAQUENIL) 200 MG Tab Take 2 Tablets by mouth every day. 90 tablet 0   • promethazine (PHENERGAN) 25 MG Tab Take 1 tablet by mouth every 6 hours as needed for Nausea/Vomiting. 15 tablet 0   • ketorolac (TORADOL) 10 MG Tab Take 1 Tab by mouth every 12 hours. 28 Tab 0   • Continuous Blood Gluc  (DEXCOM G6 ) Device 1 Applicator continuous. 1 Each 1   • Continuous Blood Gluc Sensor (DEXCOM G6 SENSOR) Misc 1 Applicator every 10 days. 9 Each 3   • Continuous Blood Gluc Transmit  "(DEXCOM G6 TRANSMITTER) Misc 1 Applicator every 3 months. 2 Each 2   • tizanidine (ZANAFLEX) 6 MG capsule Take 8 mg by mouth 3 times a day. Taking 8mg     • cyanocobalamin (VITAMIN B-12) 1000 MCG/ML Solution Inject 1 mL intramuscularly every 14 days. 12 mL 0   • Blood Glucose Test Strips Test strips order: Test strips for meter. Sig: use tid and prn ssx high or low sugar #100 RF x 3 400 Strip 3   • FREESTYLE LITE strip      • Glucagon, rDNA, 1 MG Kit Use one pen prn severe hypoglycemia 1 Kit 1   • Lancets Lancets order: Lancets for glucometer. Sig: use tid and prn ssx high or low sugar. #100 RF x 3 100 Each 3   • Syringe/Needle, Disp, (SYRINGE 3CC/15KT2-2/2\") 22G X 1-1/2\" 3 ML Misc For use with Intra-muscular or subcutaneous B 12  injection every 2 weeks. (Patient taking differently: For use with Intra-muscular or subcutaneous B 12  injection every 2 weeks.) 12 Each 1   • glucose monitoring kit (FREESTYLE) monitoring kit Use to measure blood glucose regularly. 1 Kit 0   • methotrexate 2.5 MG Tab  (Patient not taking: Reported on 3/8/2022)       No current facility-administered medications for this visit.       Social History     Tobacco Use   • Smoking status: Never Smoker   • Smokeless tobacco: Never Used   Vaping Use   • Vaping Use: Never used   Substance Use Topics   • Alcohol use: No     Alcohol/week: 0.0 oz   • Drug use: No     Social History     Social History Narrative   • Not on file       Family History   Problem Relation Age of Onset   • Other Maternal Grandfather         Parkinson's   • Genetic Disorder Maternal Grandfather         Parkinsons, and crouzon syndrome   • Cancer Maternal Grandfather         Skin Cancer   • Heart Disease Other    • Cancer Other         bile duct cancer   • Other Mother         sphincter of salud, pancreatic insufficiency   • Cancer Mother 53        cervical ca   • Cancer Other         great uncle brain   • Arthritis Maternal Grandmother    • Cancer Maternal Grandmother         " "Skin Cancer   • Heart Disease Maternal Grandmother    • Hypertension Maternal Grandmother    • Hyperlipidemia Maternal Grandmother    • Stroke Maternal Grandmother    • Genetic Disorder Father         Factor V Liden and Tourettes   • Heart Disease Father    • Hyperlipidemia Father    • Genetic Disorder Maternal Uncle         Crouzon Syndrome   • Cancer Maternal Uncle         Bile Duct, and Skin cancer   • Cancer Maternal Aunt         Multiple Aunts and Uncles passed from cancer   • Heart Disease Maternal Aunt         Open heart surgery occuring   • Heart Disease Paternal Grandmother    • Hypertension Paternal Grandmother    • Stroke Paternal Grandmother    • Heart Disease Maternal Uncle         multiple uncles.       Allergies, past medical history, past surgical history, family history, social history reviewed and updated.    Review of Systems:     - Constitutional: Negative for fever, chills, unexpected weight change, and fatigue/generalized weakness.     - HEENT: sinus congestion. Negative for headaches, vision changes, hearing changes, ear pain, ear discharge, rhinorrhea, sore throat, and neck pain.      - Respiratory: cough, chest congestion. Negative for sputum production, dyspnea, wheezing, and crackles.      - Cardiovascular: Negative for chest pain, palpitations, orthopnea, and bilateral lower extremity edema.     - Musculoskeletal: Generalized polymyalgia    - Psychiatric/Behavioral: Negative for depression, suicidal/homicidal ideation and memory loss.      All other systems reviewed and are negative    Exam:    /86   Pulse 91   Temp 36.6 °C (97.8 °F) (Temporal)   Resp 16   Ht 1.702 m (5' 7\")   SpO2 98%   BMI 28.98 kg/m²  Body mass index is 28.98 kg/m².    Physical Exam:  Constitutional: Well-developed and well-nourished. Not diaphoretic. No distress.   Cardiovascular: Regular rate and rhythm, S1 and S2 without murmur, rubs, or gallops.    Chest: Effort normal. Clear to auscultation throughout. " No adventitious sounds.   Neurological: Alert and oriented x 3.   Psychiatric:  Behavior, mood, and affect are appropriate.  MA/nursing note and vitals reviewed.     Assessment/Plan:  1. Need for vaccination  - INFLUENZA VACCINE QUAD INJ (PF)    2. Cough  3. Flu-like symptoms  Uncontrolled, stable.  Discussed supportive care, warm fluids and rest, supplements such as zinc, vitamin C, steam baths or showers, steam inhalations essential oils such as peppermint, Vicks vapor rubs.  Advised to avoid work during treatment.  Will treat with Tessalon Perles, Mucinex and Flonase.  Cough can last anywhere from 2 to 6 weeks.  If no improvement come back for evaluation.  Avoid dairy as it is mucus producing.  - benzonatate (TESSALON) 100 MG Cap; Take 1 Capsule by mouth 3 times a day as needed for Cough.  Dispense: 60 Capsule; Refill: 0  - guaiFENesin ER (MUCINEX) 600 MG TABLET SR 12 HR; Take 1 Tablet by mouth every 12 hours for 10 days.  Dispense: 60 Tablet; Refill: 1  - fluticasone (FLONASE) 50 MCG/ACT nasal spray; Administer 1 Spray into affected nostril(S) at bedtime.  Dispense: 16 g; Refill: 1    4. Polymyalgia (HCC)  - Referral to Rheumatology  - Referral to Physiatry (PMR)    5. Chronic pain syndrome  - Referral to Physiatry (PMR)  - DULoxetine (CYMBALTA) 60 MG Cap DR Particles delayed-release capsule; Take 1 Capsule by mouth every day.  Dispense: 90 Capsule; Refill: 3    6. Neuropathic pain  Uncontrolled, stable.  Trial of Neurontin.  - gabapentin (NEURONTIN) 100 MG Cap; Take 1 Capsule by mouth 4 times a day.  Dispense: 120 Capsule; Refill: 0    7. Other chronic pain  - DULoxetine (CYMBALTA) 60 MG Cap DR Particles delayed-release capsule; Take 1 Capsule by mouth every day.  Dispense: 90 Capsule; Refill: 3    8. Annual physical exam  - HEMOGLOBIN A1C; Future  - CBC WITHOUT DIFFERENTIAL; Future  - Comp Metabolic Panel; Future  - Lipid Profile; Future  - VITAMIN D,25 HYDROXY; Future  - TSH; Future  - FREE THYROXINE;  Future  - VITAMIN B12; Future  - FERRITIN; Future  - IRON/TOTAL IRON BIND; Future  - FOLATE; Future  - COMPLEMENT C3+C4 SERUM; Future       Discussed with patient possible alternative diagnoses, pt is to take all medications as prescribed. If symptoms persist FU w/PCP, if symptoms worsen go to emergency room. If experiencing any side effects from prescribed medications reports to the office immediately or go to emergency room.  Reviewed indication, dosage, usage and potential adverse effects of prescribed medications. Reviewed risks and benefits of treatment plan. Patient verbalizes understanding of all instruction and verbally agrees to plan.    No follow-ups on file. 1 mos

## 2022-03-08 NOTE — ASSESSMENT & PLAN NOTE
New problem.  Flulike symptoms including productive cough, chest congestion x 1 wk, no improvement. Denies CP or dyspnea, no wheezing, no sinus congestion, ear pressure headache, no fevers, no GI symptoms.  Patient is a nurse working in peds, exposures to respiratory symptoms.  Tested for Covid x2 and negative, the most recent 2 days ago.  Tried OTC cough drops, did not find that effective.

## 2022-03-08 NOTE — ASSESSMENT & PLAN NOTE
Chronic generalized pains, well controlled type I, followed by rheumatology Dr. Charles in previously by physiatry.  On Cymbalta 60 mg daily.  Follows healthy lifestyle in terms of diet and physical activity.

## 2022-03-09 ENCOUNTER — EH NON-PROVIDER (OUTPATIENT)
Dept: OCCUPATIONAL MEDICINE | Facility: CLINIC | Age: 22
End: 2022-03-09

## 2022-03-09 LAB
EST. AVERAGE GLUCOSE BLD GHB EST-MCNC: 97 MG/DL
HBA1C MFR BLD: 5 % (ref 4–5.6)

## 2022-03-10 LAB — DSDNA AB TITR SER CLIF: 5 IU (ref 0–24)

## 2022-03-21 ENCOUNTER — OFFICE VISIT (OUTPATIENT)
Dept: URGENT CARE | Facility: PHYSICIAN GROUP | Age: 22
End: 2022-03-21

## 2022-03-21 ENCOUNTER — HOSPITAL ENCOUNTER (INPATIENT)
Facility: MEDICAL CENTER | Age: 22
LOS: 5 days | DRG: 368 | End: 2022-03-28
Attending: EMERGENCY MEDICINE | Admitting: INTERNAL MEDICINE

## 2022-03-21 VITALS
WEIGHT: 190 LBS | BODY MASS INDEX: 30.53 KG/M2 | SYSTOLIC BLOOD PRESSURE: 112 MMHG | TEMPERATURE: 97.2 F | HEIGHT: 66 IN | DIASTOLIC BLOOD PRESSURE: 70 MMHG | OXYGEN SATURATION: 97 % | HEART RATE: 85 BPM | RESPIRATION RATE: 16 BRPM

## 2022-03-21 DIAGNOSIS — R11.0 NAUSEA: ICD-10-CM

## 2022-03-21 DIAGNOSIS — E87.6 HYPOKALEMIA: ICD-10-CM

## 2022-03-21 DIAGNOSIS — K92.0 SYMPTOM OF BLOOD IN VOMIT: ICD-10-CM

## 2022-03-21 DIAGNOSIS — E10.9 TYPE 1 DIABETES MELLITUS ON INSULIN THERAPY (HCC): ICD-10-CM

## 2022-03-21 DIAGNOSIS — R11.2 NON-INTRACTABLE VOMITING WITH NAUSEA, UNSPECIFIED VOMITING TYPE: ICD-10-CM

## 2022-03-21 DIAGNOSIS — K92.2 UPPER GI BLEED: ICD-10-CM

## 2022-03-21 DIAGNOSIS — R11.0 NAUSEA WITHOUT VOMITING: ICD-10-CM

## 2022-03-21 DIAGNOSIS — E16.2 HYPOGLYCEMIA: ICD-10-CM

## 2022-03-21 DIAGNOSIS — M35.9 CONNECTIVE TISSUE DISORDER (HCC): ICD-10-CM

## 2022-03-21 DIAGNOSIS — R10.10 UPPER ABDOMINAL PAIN: ICD-10-CM

## 2022-03-21 DIAGNOSIS — M32.9 LUPUS (HCC): ICD-10-CM

## 2022-03-21 LAB
ABO + RH BLD: NORMAL
ABO GROUP BLD: NORMAL
ALBUMIN SERPL BCP-MCNC: 4.4 G/DL (ref 3.2–4.9)
ALBUMIN/GLOB SERPL: 1.2 G/DL
ALP SERPL-CCNC: 126 U/L (ref 30–99)
ALT SERPL-CCNC: 42 U/L (ref 2–50)
ANION GAP SERPL CALC-SCNC: 10 MMOL/L (ref 7–16)
ANION GAP SERPL CALC-SCNC: 13 MMOL/L (ref 7–16)
APTT PPP: 26.1 SEC (ref 24.7–36)
AST SERPL-CCNC: 29 U/L (ref 12–45)
BASOPHILS # BLD AUTO: 0.5 % (ref 0–1.8)
BASOPHILS # BLD: 0.03 K/UL (ref 0–0.12)
BILIRUB SERPL-MCNC: 0.3 MG/DL (ref 0.1–1.5)
BLD GP AB SCN SERPL QL: NORMAL
BUN SERPL-MCNC: 10 MG/DL (ref 8–22)
BUN SERPL-MCNC: 15 MG/DL (ref 8–22)
CALCIUM SERPL-MCNC: 8.5 MG/DL (ref 8.4–10.2)
CALCIUM SERPL-MCNC: 9.4 MG/DL (ref 8.4–10.2)
CHLORIDE SERPL-SCNC: 102 MMOL/L (ref 96–112)
CHLORIDE SERPL-SCNC: 102 MMOL/L (ref 96–112)
CO2 SERPL-SCNC: 23 MMOL/L (ref 20–33)
CO2 SERPL-SCNC: 24 MMOL/L (ref 20–33)
CREAT SERPL-MCNC: 0.58 MG/DL (ref 0.5–1.4)
CREAT SERPL-MCNC: 0.81 MG/DL (ref 0.5–1.4)
EOSINOPHIL # BLD AUTO: 0.08 K/UL (ref 0–0.51)
EOSINOPHIL NFR BLD: 1.2 % (ref 0–6.9)
ERYTHROCYTE [DISTWIDTH] IN BLOOD BY AUTOMATED COUNT: 38.6 FL (ref 35.9–50)
FLUAV RNA SPEC QL NAA+PROBE: NEGATIVE
FLUBV RNA SPEC QL NAA+PROBE: NEGATIVE
GFR SERPLBLD CREATININE-BSD FMLA CKD-EPI: 105 ML/MIN/1.73 M 2
GFR SERPLBLD CREATININE-BSD FMLA CKD-EPI: 131 ML/MIN/1.73 M 2
GLOBULIN SER CALC-MCNC: 3.7 G/DL (ref 1.9–3.5)
GLUCOSE BLD STRIP.AUTO-MCNC: 100 MG/DL (ref 65–99)
GLUCOSE BLD STRIP.AUTO-MCNC: 101 MG/DL (ref 65–99)
GLUCOSE BLD STRIP.AUTO-MCNC: 56 MG/DL (ref 65–99)
GLUCOSE BLD STRIP.AUTO-MCNC: 68 MG/DL (ref 65–99)
GLUCOSE BLD STRIP.AUTO-MCNC: 86 MG/DL (ref 65–99)
GLUCOSE BLD STRIP.AUTO-MCNC: 90 MG/DL (ref 65–99)
GLUCOSE SERPL-MCNC: 73 MG/DL (ref 65–99)
GLUCOSE SERPL-MCNC: 97 MG/DL (ref 65–99)
HCT VFR BLD AUTO: 46.4 % (ref 37–47)
HGB BLD-MCNC: 13.3 G/DL (ref 12–16)
HGB BLD-MCNC: 15.7 G/DL (ref 12–16)
IMM GRANULOCYTES # BLD AUTO: 0.02 K/UL (ref 0–0.11)
IMM GRANULOCYTES NFR BLD AUTO: 0.3 % (ref 0–0.9)
INR PPP: 1.16 (ref 0.87–1.13)
LIPASE SERPL-CCNC: 26 U/L (ref 7–58)
LYMPHOCYTES # BLD AUTO: 2.12 K/UL (ref 1–4.8)
LYMPHOCYTES NFR BLD: 32.2 % (ref 22–41)
MCH RBC QN AUTO: 30.7 PG (ref 27–33)
MCHC RBC AUTO-ENTMCNC: 33.8 G/DL (ref 33.6–35)
MCV RBC AUTO: 90.6 FL (ref 81.4–97.8)
MONOCYTES # BLD AUTO: 0.88 K/UL (ref 0–0.85)
MONOCYTES NFR BLD AUTO: 13.4 % (ref 0–13.4)
NEUTROPHILS # BLD AUTO: 3.45 K/UL (ref 2–7.15)
NEUTROPHILS NFR BLD: 52.4 % (ref 44–72)
NRBC # BLD AUTO: 0 K/UL
NRBC BLD-RTO: 0 /100 WBC
PLATELET # BLD AUTO: 262 K/UL (ref 164–446)
PMV BLD AUTO: 9.8 FL (ref 9–12.9)
POTASSIUM SERPL-SCNC: 3 MMOL/L (ref 3.6–5.5)
POTASSIUM SERPL-SCNC: 3.3 MMOL/L (ref 3.6–5.5)
PROT SERPL-MCNC: 8.1 G/DL (ref 6–8.2)
PROTHROMBIN TIME: 13.9 SEC (ref 12–14.6)
RBC # BLD AUTO: 5.12 M/UL (ref 4.2–5.4)
RH BLD: NORMAL
RSV RNA SPEC QL NAA+PROBE: NEGATIVE
SARS-COV-2 RNA RESP QL NAA+PROBE: NOTDETECTED
SODIUM SERPL-SCNC: 136 MMOL/L (ref 135–145)
SODIUM SERPL-SCNC: 138 MMOL/L (ref 135–145)
SPECIMEN SOURCE: NORMAL
WBC # BLD AUTO: 6.6 K/UL (ref 4.8–10.8)

## 2022-03-21 PROCEDURE — 96375 TX/PRO/DX INJ NEW DRUG ADDON: CPT

## 2022-03-21 PROCEDURE — 96374 THER/PROPH/DIAG INJ IV PUSH: CPT

## 2022-03-21 PROCEDURE — 83690 ASSAY OF LIPASE: CPT

## 2022-03-21 PROCEDURE — 96366 THER/PROPH/DIAG IV INF ADDON: CPT

## 2022-03-21 PROCEDURE — 82962 GLUCOSE BLOOD TEST: CPT

## 2022-03-21 PROCEDURE — 80053 COMPREHEN METABOLIC PANEL: CPT

## 2022-03-21 PROCEDURE — 85730 THROMBOPLASTIN TIME PARTIAL: CPT

## 2022-03-21 PROCEDURE — 86901 BLOOD TYPING SEROLOGIC RH(D): CPT

## 2022-03-21 PROCEDURE — 99285 EMERGENCY DEPT VISIT HI MDM: CPT

## 2022-03-21 PROCEDURE — G0378 HOSPITAL OBSERVATION PER HR: HCPCS

## 2022-03-21 PROCEDURE — 700111 HCHG RX REV CODE 636 W/ 250 OVERRIDE (IP): Performed by: EMERGENCY MEDICINE

## 2022-03-21 PROCEDURE — 99214 OFFICE O/P EST MOD 30 MIN: CPT | Performed by: PHYSICIAN ASSISTANT

## 2022-03-21 PROCEDURE — 700111 HCHG RX REV CODE 636 W/ 250 OVERRIDE (IP): Performed by: HOSPITALIST

## 2022-03-21 PROCEDURE — 36415 COLL VENOUS BLD VENIPUNCTURE: CPT

## 2022-03-21 PROCEDURE — 700101 HCHG RX REV CODE 250: Performed by: HOSPITALIST

## 2022-03-21 PROCEDURE — 700105 HCHG RX REV CODE 258: Performed by: EMERGENCY MEDICINE

## 2022-03-21 PROCEDURE — 86900 BLOOD TYPING SEROLOGIC ABO: CPT

## 2022-03-21 PROCEDURE — 86850 RBC ANTIBODY SCREEN: CPT

## 2022-03-21 PROCEDURE — 0241U HCHG SARS-COV-2 COVID-19 NFCT DS RESP RNA 4 TRGT MIC: CPT

## 2022-03-21 PROCEDURE — 700105 HCHG RX REV CODE 258: Performed by: HOSPITALIST

## 2022-03-21 PROCEDURE — 85610 PROTHROMBIN TIME: CPT

## 2022-03-21 PROCEDURE — 80048 BASIC METABOLIC PNL TOTAL CA: CPT

## 2022-03-21 PROCEDURE — C9113 INJ PANTOPRAZOLE SODIUM, VIA: HCPCS | Performed by: EMERGENCY MEDICINE

## 2022-03-21 PROCEDURE — 85025 COMPLETE CBC W/AUTO DIFF WBC: CPT

## 2022-03-21 PROCEDURE — 96365 THER/PROPH/DIAG IV INF INIT: CPT

## 2022-03-21 PROCEDURE — 85018 HEMOGLOBIN: CPT

## 2022-03-21 PROCEDURE — 99220 PR INITIAL OBSERVATION CARE,LEVL III: CPT | Performed by: HOSPITALIST

## 2022-03-21 RX ORDER — DEXTROSE MONOHYDRATE 25 G/50ML
25 INJECTION, SOLUTION INTRAVENOUS
Status: DISCONTINUED | OUTPATIENT
Start: 2022-03-21 | End: 2022-03-21

## 2022-03-21 RX ORDER — ACETAMINOPHEN 325 MG/1
650 TABLET ORAL EVERY 6 HOURS PRN
Status: DISCONTINUED | OUTPATIENT
Start: 2022-03-21 | End: 2022-03-28 | Stop reason: HOSPADM

## 2022-03-21 RX ORDER — OXYCODONE HYDROCHLORIDE 5 MG/1
5 TABLET ORAL
Status: DISCONTINUED | OUTPATIENT
Start: 2022-03-21 | End: 2022-03-28 | Stop reason: HOSPADM

## 2022-03-21 RX ORDER — POTASSIUM CHLORIDE 7.45 MG/ML
10 INJECTION INTRAVENOUS ONCE
Status: COMPLETED | OUTPATIENT
Start: 2022-03-21 | End: 2022-03-21

## 2022-03-21 RX ORDER — PANTOPRAZOLE SODIUM 40 MG/10ML
40 INJECTION, POWDER, LYOPHILIZED, FOR SOLUTION INTRAVENOUS 2 TIMES DAILY
Status: DISCONTINUED | OUTPATIENT
Start: 2022-03-22 | End: 2022-03-22

## 2022-03-21 RX ORDER — HYDROMORPHONE HYDROCHLORIDE 1 MG/ML
0.25 INJECTION, SOLUTION INTRAMUSCULAR; INTRAVENOUS; SUBCUTANEOUS
Status: DISCONTINUED | OUTPATIENT
Start: 2022-03-21 | End: 2022-03-28 | Stop reason: HOSPADM

## 2022-03-21 RX ORDER — KETOROLAC TROMETHAMINE 10 MG/1
10 TABLET, FILM COATED ORAL 2 TIMES DAILY PRN
Status: ON HOLD | COMMUNITY
End: 2022-03-26

## 2022-03-21 RX ORDER — ONDANSETRON 2 MG/ML
4 INJECTION INTRAMUSCULAR; INTRAVENOUS EVERY 4 HOURS PRN
Status: DISCONTINUED | OUTPATIENT
Start: 2022-03-21 | End: 2022-03-28 | Stop reason: HOSPADM

## 2022-03-21 RX ORDER — OXYCODONE HYDROCHLORIDE 5 MG/1
2.5 TABLET ORAL
Status: DISCONTINUED | OUTPATIENT
Start: 2022-03-21 | End: 2022-03-28 | Stop reason: HOSPADM

## 2022-03-21 RX ORDER — ONDANSETRON 4 MG/1
4 TABLET, ORALLY DISINTEGRATING ORAL EVERY 4 HOURS PRN
Status: DISCONTINUED | OUTPATIENT
Start: 2022-03-21 | End: 2022-03-28 | Stop reason: HOSPADM

## 2022-03-21 RX ORDER — GABAPENTIN 100 MG/1
100 CAPSULE ORAL 4 TIMES DAILY
Status: DISCONTINUED | OUTPATIENT
Start: 2022-03-21 | End: 2022-03-24

## 2022-03-21 RX ORDER — TIZANIDINE 4 MG/1
8 TABLET ORAL EVERY 8 HOURS PRN
Status: DISCONTINUED | OUTPATIENT
Start: 2022-03-21 | End: 2022-03-28 | Stop reason: HOSPADM

## 2022-03-21 RX ORDER — ONDANSETRON 2 MG/ML
4 INJECTION INTRAMUSCULAR; INTRAVENOUS ONCE
Status: COMPLETED | OUTPATIENT
Start: 2022-03-21 | End: 2022-03-21

## 2022-03-21 RX ORDER — AMOXICILLIN 250 MG
2 CAPSULE ORAL 2 TIMES DAILY
Status: DISCONTINUED | OUTPATIENT
Start: 2022-03-21 | End: 2022-03-28 | Stop reason: HOSPADM

## 2022-03-21 RX ORDER — POTASSIUM CHLORIDE 7.45 MG/ML
10 INJECTION INTRAVENOUS
Status: COMPLETED | OUTPATIENT
Start: 2022-03-21 | End: 2022-03-21

## 2022-03-21 RX ORDER — PROCHLORPERAZINE MALEATE 5 MG/1
5 TABLET ORAL EVERY 6 HOURS PRN
Qty: 15 TABLET | Refills: 0 | Status: ON HOLD | OUTPATIENT
Start: 2022-03-21 | End: 2022-04-13

## 2022-03-21 RX ORDER — BISACODYL 10 MG
10 SUPPOSITORY, RECTAL RECTAL
Status: DISCONTINUED | OUTPATIENT
Start: 2022-03-21 | End: 2022-03-28 | Stop reason: HOSPADM

## 2022-03-21 RX ORDER — PROCHLORPERAZINE MALEATE 10 MG
5 TABLET ORAL EVERY 6 HOURS PRN
Status: DISCONTINUED | OUTPATIENT
Start: 2022-03-21 | End: 2022-03-21

## 2022-03-21 RX ORDER — ERGOCALCIFEROL 1.25 MG/1
50000 CAPSULE ORAL
COMMUNITY

## 2022-03-21 RX ORDER — DEXTROSE MONOHYDRATE, SODIUM CHLORIDE, AND POTASSIUM CHLORIDE 50; 1.49; 4.5 G/1000ML; G/1000ML; G/1000ML
INJECTION, SOLUTION INTRAVENOUS CONTINUOUS
Status: DISCONTINUED | OUTPATIENT
Start: 2022-03-21 | End: 2022-03-28 | Stop reason: HOSPADM

## 2022-03-21 RX ORDER — PROCHLORPERAZINE EDISYLATE 5 MG/ML
5-10 INJECTION INTRAMUSCULAR; INTRAVENOUS EVERY 4 HOURS PRN
Status: DISCONTINUED | OUTPATIENT
Start: 2022-03-21 | End: 2022-03-25

## 2022-03-21 RX ORDER — HYDROXYCHLOROQUINE SULFATE 200 MG/1
400 TABLET, FILM COATED ORAL DAILY
Status: DISCONTINUED | OUTPATIENT
Start: 2022-03-22 | End: 2022-03-28 | Stop reason: HOSPADM

## 2022-03-21 RX ORDER — PROMETHAZINE HYDROCHLORIDE 25 MG/1
12.5-25 TABLET ORAL EVERY 4 HOURS PRN
Status: DISCONTINUED | OUTPATIENT
Start: 2022-03-21 | End: 2022-03-25

## 2022-03-21 RX ORDER — DEXTROSE AND SODIUM CHLORIDE 5; .45 G/100ML; G/100ML
INJECTION, SOLUTION INTRAVENOUS ONCE
Status: COMPLETED | OUTPATIENT
Start: 2022-03-21 | End: 2022-03-22

## 2022-03-21 RX ORDER — INSULIN GLARGINE 100 [IU]/ML
10 INJECTION, SOLUTION SUBCUTANEOUS DAILY
COMMUNITY
End: 2022-03-31

## 2022-03-21 RX ORDER — TIZANIDINE 4 MG/1
8 TABLET ORAL EVERY EVENING
Status: ON HOLD | COMMUNITY
End: 2022-04-13

## 2022-03-21 RX ORDER — DULOXETIN HYDROCHLORIDE 30 MG/1
60 CAPSULE, DELAYED RELEASE ORAL DAILY
Status: DISCONTINUED | OUTPATIENT
Start: 2022-03-22 | End: 2022-03-28 | Stop reason: HOSPADM

## 2022-03-21 RX ORDER — POLYETHYLENE GLYCOL 3350 17 G/17G
1 POWDER, FOR SOLUTION ORAL
Status: DISCONTINUED | OUTPATIENT
Start: 2022-03-21 | End: 2022-03-28 | Stop reason: HOSPADM

## 2022-03-21 RX ORDER — PROMETHAZINE HYDROCHLORIDE 25 MG/1
12.5-25 SUPPOSITORY RECTAL EVERY 4 HOURS PRN
Status: DISCONTINUED | OUTPATIENT
Start: 2022-03-21 | End: 2022-03-25

## 2022-03-21 RX ORDER — METHOTREXATE 25 MG/ML
10 INJECTION, SOLUTION INTRA-ARTERIAL; INTRAMUSCULAR; INTRAVENOUS
COMMUNITY
Start: 2022-03-08 | End: 2022-03-30

## 2022-03-21 RX ADMIN — POTASSIUM CHLORIDE 10 MEQ: 7.46 INJECTION, SOLUTION INTRAVENOUS at 21:02

## 2022-03-21 RX ADMIN — ONDANSETRON 4 MG: 4 TABLET, ORALLY DISINTEGRATING ORAL at 21:08

## 2022-03-21 RX ADMIN — DEXTROSE MONOHYDRATE 25 G: 100 INJECTION, SOLUTION INTRAVENOUS at 22:57

## 2022-03-21 RX ADMIN — POTASSIUM CHLORIDE 10 MEQ: 7.46 INJECTION, SOLUTION INTRAVENOUS at 17:13

## 2022-03-21 RX ADMIN — ONDANSETRON 4 MG: 2 INJECTION INTRAMUSCULAR; INTRAVENOUS at 15:33

## 2022-03-21 RX ADMIN — PANTOPRAZOLE SODIUM 80 MG: 40 INJECTION, POWDER, FOR SOLUTION INTRAVENOUS at 16:41

## 2022-03-21 RX ADMIN — POTASSIUM CHLORIDE 10 MEQ: 7.46 INJECTION, SOLUTION INTRAVENOUS at 18:17

## 2022-03-21 RX ADMIN — DEXTROSE AND SODIUM CHLORIDE: 5; 450 INJECTION, SOLUTION INTRAVENOUS at 15:42

## 2022-03-21 RX ADMIN — DEXTROSE MONOHYDRATE, SODIUM CHLORIDE, AND POTASSIUM CHLORIDE: 50; 4.5; 1.49 INJECTION, SOLUTION INTRAVENOUS at 22:15

## 2022-03-21 ASSESSMENT — ENCOUNTER SYMPTOMS
BLOOD IN STOOL: 0
HEADACHES: 1
FOCAL WEAKNESS: 0
BACK PAIN: 0
ROS GI COMMENTS: HEMATEMESIS
CHILLS: 0
VOMITING: 1
MYALGIAS: 0
DIZZINESS: 1
VOMITING: 1
NAUSEA: 1
NECK PAIN: 0
ABDOMINAL PAIN: 1
MYALGIAS: 0
CHILLS: 0
FEVER: 0
EYE DISCHARGE: 0
BRUISES/BLEEDS EASILY: 0
CONSTIPATION: 0
SHORTNESS OF BREATH: 0
DIARRHEA: 1
FEVER: 0
NERVOUS/ANXIOUS: 0
HEADACHES: 0
FLANK PAIN: 0
CONSTIPATION: 0
EYE REDNESS: 0
COUGH: 0
DIZZINESS: 1
DIARRHEA: 1
BLOOD IN STOOL: 0
STRIDOR: 0
SHORTNESS OF BREATH: 0
NAUSEA: 1
WEIGHT LOSS: 0
ABDOMINAL PAIN: 1

## 2022-03-21 ASSESSMENT — PAIN DESCRIPTION - PAIN TYPE
TYPE: ACUTE PAIN
TYPE: ACUTE PAIN

## 2022-03-21 ASSESSMENT — FIBROSIS 4 INDEX
FIB4 SCORE: 0.61
FIB4 SCORE: 0.61

## 2022-03-21 NOTE — PROGRESS NOTES
Subjective:   Malena Bennett is a 21 y.o. female who presents for Emesis (4 days ago. Has blood in vomit. Nausea. )      HPI  21 y.o. female with history of type 1 insulin-dependent diabetes, lupus, and factor V Leiden deficiency presents to urgent care with new problem to provider of nausea, vomiting, diarrhea, and generalized abdominal discomfort onset about 5 days ago. She denies history of similar.  No history of upper or lower GI bleed.  No blood in stools.  She reports vomit is occasionally dark in color.  And she states she feels as if she is throwing up clots. Mild lightheadedness with standing.  No syncope.  No chronic NSAID or heavy alcohol use.  Patient reports her blood sugar has been recently low most measured at 56 this morning, she had 2 honey packets with some improvement.  History of cholecystectomy and appendectomy.  Patient does not have regular menstrual cycles as she is on Mirena IUD.  She denies urinary symptoms.  Denies other associated aggravating or alleviating factors.     Review of Systems   Constitutional: Positive for malaise/fatigue. Negative for chills, fever and weight loss.   Respiratory: Negative for shortness of breath.    Cardiovascular: Negative for chest pain.   Gastrointestinal: Positive for abdominal pain, diarrhea, nausea and vomiting. Negative for blood in stool, constipation and melena.   Genitourinary: Negative for dysuria, frequency and urgency.   Musculoskeletal: Negative for back pain, myalgias and neck pain.   Neurological: Positive for dizziness. Negative for headaches.       Patient Active Problem List   Diagnosis   • Factor V Leiden (HCC)   • [place]   • Tourette syndrome   • OCD (obsessive compulsive disorder)   • Chronic tension-type headache, not intractable   • Salt craving   • Hypoglycemia   • Other irritable bowel syndrome   • Hyperglycemia   • POTS (postural orthostatic tachycardia syndrome)   • Controlled type 1 diabetes mellitus (HCC)    • Factor VIII inhibitor disorder (HCC)   • History of cholecystectomy   • Celiac disease   • Elevated antinuclear antibody (SHANI) level   • Controlled type 2 diabetes mellitus without complication, with long-term current use of insulin (HCC)   • Vitamin D deficiency   • Other chronic pain   • Acute midline thoracic back pain   • SOB (shortness of breath)   • History of asthma   • Muscle tenderness   • Hospital discharge follow-up   • Long term current use of insulin (HCC)   • Type 1 diabetes mellitus (HCC)   • Lupus (HCC)   • Polymyalgia (HCC)   • Cough   • Flu-like symptoms     Past Surgical History:   Procedure Laterality Date   • APPENDECTOMY  2018   • APPENDECTOMY LAPAROSCOPIC  7/1/2017    Procedure: APPENDECTOMY LAPAROSCOPIC;  Surgeon: Derick Arthur M.D.;  Location: SURGERY [facility];  Service:    • ELIGIO BY LAPAROSCOPY  1/30/2017    Procedure: ELIGIO BY LAPAROSCOPY;  Surgeon: Fina Perkins M.D.;  Location: SURGERY SAME DAY [facility] ORS;  Service:      Social History     Tobacco Use   • Smoking status: Never Smoker   • Smokeless tobacco: Never Used   Vaping Use   • Vaping Use: Never used   Substance Use Topics   • Alcohol use: No     Alcohol/week: 0.0 oz   • Drug use: No      Family History   Problem Relation Age of Onset   • Other Maternal Grandfather         Parkinson's   • Genetic Disorder Maternal Grandfather         Parkinsons, and crouzon syndrome   • Cancer Maternal Grandfather         Skin Cancer   • Heart Disease Other    • Cancer Other         bile duct cancer   • Other Mother         sphincter of salud, pancreatic insufficiency   • Cancer Mother 53        cervical ca   • Cancer Other         great uncle brain   • Arthritis Maternal Grandmother    • Cancer Maternal Grandmother         Skin Cancer   • Heart Disease Maternal Grandmother    • Hypertension Maternal Grandmother    • Hyperlipidemia Maternal Grandmother    • Stroke Maternal Grandmother    • Genetic Disorder Father         Factor V  "Liden and Tourettes   • Heart Disease Father    • Hyperlipidemia Father    • Genetic Disorder Maternal Uncle         Crouzon Syndrome   • Cancer Maternal Uncle         Bile Duct, and Skin cancer   • Cancer Maternal Aunt         Multiple Aunts and Uncles passed from cancer   • Heart Disease Maternal Aunt         Open heart surgery occuring   • Heart Disease Paternal Grandmother    • Hypertension Paternal Grandmother    • Stroke Paternal Grandmother    • Heart Disease Maternal Uncle         multiple uncles.      (Allergies, Medications, & Tobacco/Substance Use were reconciled by the Medical Assistant and reviewed by myself. The family history is prepopulated)     Objective:     /70   Pulse 85   Temp 36.2 °C (97.2 °F) (Temporal)   Resp 16   Ht 1.676 m (5' 6\")   Wt 86.2 kg (190 lb)   SpO2 97%   BMI 30.67 kg/m²     Physical Exam  Vitals reviewed.   Constitutional:       General: She is not in acute distress.     Appearance: Normal appearance. She is not ill-appearing or diaphoretic.   HENT:      Head: Normocephalic and atraumatic.      Nose: Nose normal.      Mouth/Throat:      Mouth: Mucous membranes are moist.      Pharynx: Oropharyngeal exudate present.   Eyes:      Conjunctiva/sclera: Conjunctivae normal.   Cardiovascular:      Rate and Rhythm: Normal rate and regular rhythm.      Heart sounds: Normal heart sounds. No murmur heard.    No friction rub. No gallop.   Pulmonary:      Effort: Pulmonary effort is normal. No respiratory distress.      Breath sounds: Normal breath sounds. No wheezing, rhonchi or rales.   Abdominal:      General: Abdomen is flat. Bowel sounds are normal. There is no distension.      Palpations: Abdomen is soft. There is no mass.      Tenderness: There is generalized abdominal tenderness and tenderness in the epigastric area. There is no right CVA tenderness, left CVA tenderness or guarding.   Musculoskeletal:         General: Normal range of motion.      Cervical back: Normal " range of motion and neck supple.   Lymphadenopathy:      Cervical: Cervical adenopathy present.   Skin:     General: Skin is warm and dry.      Findings: No rash.   Neurological:      General: No focal deficit present.      Mental Status: She is alert and oriented to person, place, and time.   Psychiatric:         Mood and Affect: Mood normal.         Behavior: Behavior normal.         Thought Content: Thought content normal.         Judgment: Judgment normal.         Assessment/Plan:     1. Symptom of blood in vomit  CBC WITH DIFFERENTIAL    Comp Metabolic Panel   2. Type 1 diabetes mellitus on insulin therapy (HCC)     3. Upper abdominal pain  Comp Metabolic Panel    AMYLASE    LIPASE   4. Nausea  prochlorperazine (COMPAZINE) 5 MG Tab     CBC, CMP, amylase, and lipase ordered for further evaluation of mild generalized abdominal pain with nausea and vomiting.  Differential diagnosis include viral gastroenteritis, pancreatitis, DKA, upper/lower GI bleed, gastritis, peptic ulcerative disease.  I will follow-up pending results, however patient was given strict ED precautions for persistent symptoms of vomiting, specifically hematemesis, or development of fevers or worsening abdominal pain.  Recommend patient drink plenty of fluids including electrolyte replacement such as Pedialyte.  Continue to monitor blood sugars.    Differential diagnosis, natural history, supportive care, and indications for immediate follow-up discussed.    Advised the patient to follow-up with the primary care physician for recheck, reevaluation, and consideration of further management.  Patient verbalized understanding of treatment plan and has no further questions regarding care.     I personally reviewed prior external notes and test results pertinent to today's visit. My total time spent caring for the patient on the day of the encounter that included review of prior records, obtaining history, examination, discussion of plan and return  precautions was greater than 35 minutes.     Please note that this dictation was created using voice recognition software. I have made a reasonable attempt to correct obvious errors, but I expect that there are errors of grammar and possibly content that I did not discover before finalizing the note.    This note was electronically signed by Dea Zapata PA-C

## 2022-03-21 NOTE — ED PROVIDER NOTES
"ED Provider Note    Scribed for Dinorah Donaldson M.D. by Alisia Crocker. 3/21/2022  3:12 PM    Primary care provider: [facility] Wilkerson  Means of arrival: Walk in   History obtained from: Patient   History limited by: None     CHIEF COMPLAINT  Chief Complaint   Patient presents with    N/V     X 5 d \" blood and clots \"  Emesis x 8 past 24 hrs Occurs after eating    Abdominal Pain     Sharp Epigastric pain Non radiatio=ng Hx DM No fever    Low Blood Sugar    Headache       HPI  Malena Bennett is a 21 y.o. female who presents to the Emergency Department for evaluation of increased nausea and vomiting with intermittent episodes of hematemesis onset 6 days ago. Patient reports she has had 8 episodes of emesis in the past 24 hours which often occurs after eating and adds that she seems to be throwing up \"blood clots.\" She was seen at Urgent Care earlier today and was prompted to present to the ED. Patent reports that she does have established care with [facility]. She presents associated symptoms of generalized headache, and hypoglycemia. Denies melena, hematochezia, or epistaxis. No alleviating factors noted at this time. Patient is vaccinated for COVID x3. Patient denies being on any blood thinners. Patient has additional history of Lupus and Diabetes. She additionally has an IUD in place. Surgical history includes appendectomy and cholecstyecomy.       REVIEW OF SYSTEMS  HEENT:  No ear pain, congestion, or sore throat. No epistaxis   EYES: no discharge, redness, or vision changes  CARDIAC: no chest pain, no palpitations    PULMONARY: no dyspnea, cough, or congestion   GI: nausea, emesis, hematemesis. No melena, hematochezia  : no dysuria, back pain, or hematuria   Neuro: no weakness, numbness, aphasia. Headache   Musculoskeletal: no swelling, deformity, pain, or joint swelling  Endocrine: no fevers, sweating, or weight loss. Hypoglycemia   SKIN: no rash, erythema, or contusions     See " history of present illness. All other systems are negative. C.    PAST MEDICAL HISTORY   has a past medical history of Asthma, Clotting disorder (HCC), Diabetes (HCC), Factor 5 Leiden mutation, heterozygous (HCC), Lupus (HCC), OCD (obsessive compulsive disorder), and Tourette disease.    SURGICAL HISTORY   has a past surgical history that includes appendectomy laparoscopic (7/1/2017); tavo by laparoscopy (1/30/2017); and appendectomy (2018).    SOCIAL HISTORY  Social History     Tobacco Use    Smoking status: Never Smoker    Smokeless tobacco: Never Used   Vaping Use    Vaping Use: Never used   Substance Use Topics    Alcohol use: No     Alcohol/week: 0.0 oz    Drug use: No      Social History     Substance and Sexual Activity   Drug Use No       FAMILY HISTORY  Family History   Problem Relation Age of Onset    Other Maternal Grandfather         Parkinson's    Genetic Disorder Maternal Grandfather         Parkinsons, and crouzon syndrome    Cancer Maternal Grandfather         Skin Cancer    Heart Disease Other     Cancer Other         bile duct cancer    Other Mother         sphincter of salud, pancreatic insufficiency    Cancer Mother 53        cervical ca    Cancer Other         great uncle brain    Arthritis Maternal Grandmother     Cancer Maternal Grandmother         Skin Cancer    Heart Disease Maternal Grandmother     Hypertension Maternal Grandmother     Hyperlipidemia Maternal Grandmother     Stroke Maternal Grandmother     Genetic Disorder Father         Factor V Liden and Tourettes    Heart Disease Father     Hyperlipidemia Father     Genetic Disorder Maternal Uncle         Crouzon Syndrome    Cancer Maternal Uncle         Bile Duct, and Skin cancer    Cancer Maternal Aunt         Multiple Aunts and Uncles passed from cancer    Heart Disease Maternal Aunt         Open heart surgery occuring    Heart Disease Paternal Grandmother     Hypertension Paternal Grandmother     Stroke Paternal Grandmother     Heart  "Disease Maternal Uncle         multiple uncles.       CURRENT MEDICATIONS  Current Outpatient Medications   Medication Instructions    Blood Glucose Test Strips Test strips order: Test strips for meter. Sig: use tid and prn ssx high or low sugar #100 RF x 3    Continuous Blood Gluc  (DEXCOM G6 ) Device 1 Applicator, Does not apply, CONTINUOUS    Continuous Blood Gluc Sensor (DEXCOM G6 SENSOR) Misc 1 Applicator, Does not apply, EVERY 10 DAYS    Continuous Blood Gluc Transmit (DEXCOM G6 TRANSMITTER) Misc 1 Applicator, Does not apply, EVERY 3 MONTHS    cyanocobalamin (VITAMIN B-12) 1000 MCG/ML Solution Inject 1 mL intramuscularly every 14 days.    DULoxetine (CYMBALTA) 60 mg, Oral, DAILY    FREESTYLE LITE strip No dose, route, or frequency recorded.    gabapentin (NEURONTIN) 100 mg, Oral, 4 TIMES DAILY    Glucagon, rDNA, 1 MG Kit Use one pen prn severe hypoglycemia    glucose monitoring kit (FREESTYLE) monitoring kit Use to measure blood glucose regularly.    HUMALOG 100 UNIT/ML No dose, route, or frequency recorded.    hydroxychloroquine (PLAQUENIL) 400 mg, Oral, DAILY    ketorolac (TORADOL) 10 mg, Oral, EVERY 12 HOURS    Lancets Lancets order: Lancets for glucometer. Sig: use tid and prn ssx high or low sugar. #100 RF x 3    methotrexate 2.5 MG Tab No dose, route, or frequency recorded.    Methotrexate Sodium 50 MG/2ML Solution inject 0.4 mls subcutaneously once a week    ondansetron (ZOFRAN ODT) 8 mg, Oral, EVERY 8 HOURS PRN    prochlorperazine (COMPAZINE) 5 mg, Oral, EVERY 6 HOURS PRN    promethazine (PHENERGAN) 25 mg, Oral, EVERY 6 HOURS PRN    Syringe/Needle, Disp, (SYRINGE 3CC/31EX6-0/2\") 22G X 1-1/2\" 3 ML Misc For use with Intra-muscular or subcutaneous B 12  injection every 2 weeks.    tizanidine (ZANAFLEX) 8 mg, Oral, 3 TIMES DAILY, Taking 8mg     vitamin D2 (Ergocalciferol) (DRISDOL) 50,000 Units, Oral, EVERY 7 DAYS     ALLERGIES  Allergies   Allergen Reactions    Cefdinir Rash     rash    " "Erythromycin Vomiting and Nausea       PHYSICAL EXAM  VITAL SIGNS: /85   Pulse 100   Temp 36.9 °C (98.5 °F) (Temporal)   Resp 16   Ht 1.702 m (5' 7\")   Wt 94.5 kg (208 lb 5.4 oz)   SpO2 94%   Breastfeeding No   BMI 32.63 kg/m²     Constitutional: Well developed, Well nourished, No acute distress, Pale appearing   HEENT: Normocephalic, Atraumatic,  external ears normal, pharynx pink,  Mucous  Membranes moist, No rhinorrhea or mucosal edema  Eyes: PERRL, EOMI, Conjunctiva normal, No discharge.   Neck: Normal range of motion, No tenderness, Supple, No stridor.   Lymphatic: No lymphadenopathy    Cardiovascular: Mildly tachycardic Rate and Rhythm, No murmurs,  rubs, or gallops.   Thorax & Lungs: Lungs clear to auscultation bilaterally, No respiratory distress, No wheezes, rhales or rhonchi, No chest wall tenderness.   Abdomen: Bowel sounds normal, Soft, non tender, non distended,  No pulsatile masses., no rebound guarding or peritoneal signs.   Skin: Pale appearing, Warm, Dry, No erythema, No rash,   Back:  No CVA tenderness,  No spinal tenderness, bony crepitance, step offs, or instability.   Neurologic: Alert & oriented clear speech no focal deficits  Extremities: Equal, intact distal pulses, No cyanosis, clubbing or edema,  No tenderness.   Musculoskeletal: Good range of motion in all major joints. No tenderness to palpation or major deformities noted.     DIAGNOSTIC STUDIES / PROCEDURES    LABS  Results for orders placed or performed during the hospital encounter of 03/21/22   CBC WITH DIFFERENTIAL   Result Value Ref Range    WBC 6.6 4.8 - 10.8 K/uL    RBC 5.12 4.20 - 5.40 M/uL    Hemoglobin 15.7 12.0 - 16.0 g/dL    Hematocrit 46.4 37.0 - 47.0 %    MCV 90.6 81.4 - 97.8 fL    MCH 30.7 27.0 - 33.0 pg    MCHC 33.8 33.6 - 35.0 g/dL    RDW 38.6 35.9 - 50.0 fL    Platelet Count 262 164 - 446 K/uL    MPV 9.8 9.0 - 12.9 fL    Neutrophils-Polys 52.40 44.00 - 72.00 %    Lymphocytes 32.20 22.00 - 41.00 %    " Monocytes 13.40 0.00 - 13.40 %    Eosinophils 1.20 0.00 - 6.90 %    Basophils 0.50 0.00 - 1.80 %    Immature Granulocytes 0.30 0.00 - 0.90 %    Nucleated RBC 0.00 /100 WBC    Neutrophils (Absolute) 3.45 2.00 - 7.15 K/uL    Lymphs (Absolute) 2.12 1.00 - 4.80 K/uL    Monos (Absolute) 0.88 (H) 0.00 - 0.85 K/uL    Eos (Absolute) 0.08 0.00 - 0.51 K/uL    Baso (Absolute) 0.03 0.00 - 0.12 K/uL    Immature Granulocytes (abs) 0.02 0.00 - 0.11 K/uL    NRBC (Absolute) 0.00 K/uL   COMP METABOLIC PANEL   Result Value Ref Range    Sodium 138 135 - 145 mmol/L    Potassium 3.0 (L) 3.6 - 5.5 mmol/L    Chloride 102 96 - 112 mmol/L    Co2 23 20 - 33 mmol/L    Anion Gap 13.0 7.0 - 16.0    Glucose 73 65 - 99 mg/dL    Bun 15 8 - 22 mg/dL    Creatinine 0.81 0.50 - 1.40 mg/dL    Calcium 9.4 8.4 - 10.2 mg/dL    AST(SGOT) 29 12 - 45 U/L    ALT(SGPT) 42 2 - 50 U/L    Alkaline Phosphatase 126 (H) 30 - 99 U/L    Total Bilirubin 0.3 0.1 - 1.5 mg/dL    Albumin 4.4 3.2 - 4.9 g/dL    Total Protein 8.1 6.0 - 8.2 g/dL    Globulin 3.7 (H) 1.9 - 3.5 g/dL    A-G Ratio 1.2 g/dL   PTT   Result Value Ref Range    APTT 26.1 24.7 - 36.0 sec   PT/INR   Result Value Ref Range    PT 13.9 12.0 - 14.6 sec    INR 1.16 (H) 0.87 - 1.13   COD - Adult (Type and Screen)   Result Value Ref Range    ABO Grouping Only A     Rh Grouping Only NEG     Antibody Screen-Cod NEG    LIPASE   Result Value Ref Range    Lipase 26 7 - 58 U/L   ABO Rh Confirm   Result Value Ref Range    ABO Rh Confirm A NEG    ESTIMATED GFR   Result Value Ref Range    GFR (CKD-EPI) 105 >60 mL/min/1.73 m 2   COV-2, FLU A/B, AND RSV BY PCR (2-4 HOURS CEPHEID): Collect NP swab in VTM    Specimen: Nasopharyngeal; Respirate   Result Value Ref Range    Influenza virus A RNA Negative Negative    Influenza virus B, PCR Negative Negative    RSV, PCR Negative Negative    SARS-CoV-2 by PCR NotDetected     SARS-CoV-2 Source Nasal Swab    POCT glucose device results   Result Value Ref Range    POC Glucose, Blood  68 65 - 99 mg/dL   POCT glucose device results   Result Value Ref Range    POC Glucose, Blood 100 (H) 65 - 99 mg/dL   POCT glucose device results   Result Value Ref Range    POC Glucose, Blood 101 (H) 65 - 99 mg/dL      All labs reviewed by me.    COURSE & MEDICAL DECISION MAKING  Nursing notes, VS, PMSFHx reviewed in chart.    3:12 PM Patient evaluated at bedside. Ordered PT/INR, COD, Lipase, PTT, CBC with diff., CMP, and Blood glucose  for further evaluation. Patient will be treated with D5 infusion and Zofran 4 mg. Differential diagnoses include but not limited to: Upper GI bleed, ulcer, esophageal varices, gastritis, dehydration, or hypoglycemia. Discussed with patient about administering basic labs and imaging for further evaluation. Informed patient about medical consult to GI to establish next step in plan of care.  Informed patient about medication administration for pain control and. Patient verbalizes understanding and agreement to this plan of care.     4:14 PM Patient was reevaluated at bedside. Patient will be treated with KCL 10 meq. Discussed labs and radiology results with the patient and informed them that she will be admitted to the hospital secondary to abnormal lab results. Informed patient that I will consul with GI consultants as patient's potassium is low. Patient verbalizes understanding and agreement to this plan of care.    4:26 PM I discussed the patient's case and the above findings with Dr. Vergara (Hospitalist) who agreed to hospitalize the patient. Patient's care was transferred at this time.      4:29 PM - I discussed the patient's case and the above findings with Dr. Multani (GI) who will consult with patient at bedside     HYDRATION: Based on the patient's presentation of Other Hypoglycemia  the patient was given IV fluids. IV Hydration was used because oral hydration was not adequate alone. Upon recheck following hydration, the patient was improved.     DISPOSITION:  Patient  will be hospitalized by Dr. Vergara in guarded condition.    FINAL IMPRESSION  1. Upper GI bleed    2. Hypokalemia    3. Hypoglycemia          I, Alisia Crocker (Scribe), am scribing for, and in the presence of, Dinorah Donaldson M.D..    Electronically signed by: Alisia Crocker (Scribe), 3/21/2022    Dinorah DILLON M.D. personally performed the services described in this documentation, as scribed by Alisia Crocker in my presence, and it is both accurate and complete.    C    The note accurately reflects work and decisions made by me.  Dinorah Donaldson M.D.  3/21/2022  9:45 PM

## 2022-03-21 NOTE — ASSESSMENT & PLAN NOTE
With hypoglycemia   Patient will use insulin pump  D5 0.45NS - not tolerating PO  Accu-Checks, hypoglycemia protocol

## 2022-03-21 NOTE — ED TRIAGE NOTES
Pt states she has type 1 DM and glucose running low due to not being able to randy food    FSBS 57 at 1400 Pt took honey packet and turned her insulin pump off

## 2022-03-21 NOTE — ED NOTES
Med Rec PARTIALLY complete per pt  Allergies Reviewed    Pt's INSULIN pump needs to be verified if admitted       3/21/22 1626 Confirmed insulin pump settings.   Patient removed insulin pump today at 1400 due to hypoglycemia. Has not taken any other insulin today.

## 2022-03-21 NOTE — ASSESSMENT & PLAN NOTE
Likely secondary to Hemoptysis  Hb stable  hemodymically stable  EGD 03/22:  normal duodenum, erythema in antrum and body, esophagitis

## 2022-03-21 NOTE — CONSULTS
Gastroenterology Initial Consult Note               Author:  Sharda Multani D.O. Date & Time Created: 3/21/2022 4:59 PM       Patient ID:  Name:             Malena Bennett  YOB: 2000  Age:                 21 y.o.  female  MRN:               [id number]      Referring Provider:  Dinorah Donaldson MD      Medical Decision Making, by Problem:  Active Hospital Problems    Diagnosis    • GI bleed [K92.2]    • Hypokalemia [E87.6]    • Type 1 diabetes mellitus (HCC) [E10.9]    • Hypoglycemia [E16.2]            Assessment/Recommendations:  The patient is a very pleasant 21-year-old woman with a history of GERD, erosive gastritis, IBS-C, possible celiac sprue, secondary adrenal insufficiency, SLE, factor V Leiden deficiency, anxiety, OCD, Tourette's syndrome, asthma (resolved), diabetes, and migraine headaches who presented for evaluation of hematemesis.    Hematemesis: She has 5 days of vomiting dark clot and red blood.  She has associated orthostatic symptoms but is hemodynamically stable.  She also reports new onset abdominal pain.  Her hemoglobin is stable.  Her history is not consistent with Tisha-Henderson tear.  Suspect erosive gastritis, duodenitis, PUD, esophagitis and less likely angiodysplasia/AVMs, Dieulafoy's lesion, malignancy, small bowel bleeding, and right-sided colon bleeding.  Plan for diagnostic EGD with therapeutic intent.  The patient is a suitable candidate for endoscopy and sedation.  The risk/benefits/alternatives of EGD with possible intervention to include perforation, bleeding, infection, aspiration, missed lesions, need for repeat procedure, need for surgery, and need for additional intervention were discussed with the patient who agrees to these risk and would like to proceed.  -Clear liquid diet  -N.p.o. after midnight  -Diagnostic EGD with possible intervention tomorrow  -2 large-bore IVs  -Continue supportive care and IVF  -Trend hemoglobin  -Start Protonix 40 mg IV  twice daily  -Transfuse for goal hemoglobin > 7  -Transfuse for goal platelet count > 50  -Transfuse for goal INR <2.5  -Urine pregnancy test and UA (ordered)  -Covid-19 test (ordered)    Hypokalemia: Likely secondary to multiple bouts of emesis.  Could also be related to her underlying secondary adrenal insufficiency.  Will defer additional management to the primary team.  -Correct electrolytes (defer to the primary team)    Elevated LAEs: The patient has mild intermittent LAE elevation in an ALT predominant hepatocellular pattern since at least 2021.  This was thought to be secondary to methotrexate use and the methotrexate was discontinued at the beginning of 3/2022.  Her LAE's are downtrending since discontinuation of methotrexate.    -Consider inpatient versus outpatient serologic evaluation        Sharda Multani D.O.      Thank you for inviting me to participate in the care of this patient. Please do not hesitate to call [facility] with additional questions/concerns or changes in the patient's clinical status at [phone].    ________________________________________________________________________________________________________________________________________________________      Presenting Chief Complaint:  Vomiting blood clots      History of Present Illness:    This is a very pleasant 21 y.o. female with the past medical history as listed below.  Approximately 6-7 days ago, started to feel ill with dizziness, lightheadedness, and headache.  About 5 days ago, she developed nausea with hematemesis.  She notes vomiting dark clots on her first episode of emesis.  She is continued to have 4-5 episodes of hematemesis daily which increased in volume today.  She also has persistent dizziness and orthostatic symptoms.  She denies prior similar symptoms.  She uses ibuprofen once every 3 weeks.    [facility] 2018 at which point she reported melena and was advised to complete an  EGD.  However, this was not completed.  She also reported intermittent nausea/vomiting since 2018 (on review of her medical record).      Review of Systems:  Review of Systems   Constitutional: Positive for malaise/fatigue. Negative for chills and fever.   Respiratory: Negative for cough and shortness of breath.    Cardiovascular: Negative for chest pain and leg swelling.   Gastrointestinal: Positive for abdominal pain, diarrhea, nausea and vomiting. Negative for blood in stool, constipation and melena.        Hematemesis   Genitourinary: Negative for dysuria and urgency.   Skin: Negative for rash.   Neurological: Positive for dizziness and headaches. Negative for focal weakness.             Past Medical History:  Past Medical History:   Diagnosis Date   • Asthma     resolved   • Clotting disorder (HCC)    • Diabetes (HCC)    • Factor 5 Leiden mutation, heterozygous (HCC)    • Lupus (HCC)    • OCD (obsessive compulsive disorder)    • Tourette disease    -Anxiety  -Biliary dyskinesia (status post cholecystectomy)  -GERD  -Migraine headaches  -Possible celiac sprue (stool antigen screen positive but TTG IgG/IgA and gliadin IgG/IgA were negative)  -IBS-C  -Secondary adrenal insufficiency      Active Hospital Problems    Diagnosis    • GI bleed [K92.2]    • Hypokalemia [E87.6]    • Type 1 diabetes mellitus (HCC) [E10.9]    • Hypoglycemia [E16.2]          Past Surgical History:  Past Surgical History:   Procedure Laterality Date   • APPENDECTOMY  2018   • APPENDECTOMY LAPAROSCOPIC  7/1/2017    Procedure: APPENDECTOMY LAPAROSCOPIC;  Surgeon: Derick Arthur M.D.;  Location: SURGERY [facility];  Service:    • ELIGIO BY LAPAROSCOPY  1/30/2017    Procedure: ELIGIO BY LAPAROSCOPY;  Surgeon: Fina Perkins M.D.;  Location: SURGERY SAME DAY [facility];  Service:          Prior Endoscopic Procedures:  EGD 10/2017: Erythematous to duodenopathy (peptic duodenitis on biopsy), erythematous gastropathy (erosive gastritis, no  HP on biopsy) (reflux esophagitis with, edematous distal esophageal mucosa fibrinopurulent debris)    Colonoscopy 12/2017: Normal terminal ileum (normal terminal ileal mucosa on biopsy), normal colon (normal colonic mucosa on biopsy)      Hospital Medications:  Current Facility-Administered Medications   Medication Dose Frequency Provider Last Rate Last Admin   • potassium chloride (KCL) ivpb 10 mEq  10 mEq Q HOUR Dinorah Donaldson M.D.       • [START ON 3/22/2022] DULoxetine (CYMBALTA) capsule 60 mg  60 mg DAILY Eulogio Miller M.D.       • gabapentin (NEURONTIN) capsule 100 mg  100 mg 4X/DAY Asedaryn Miller M.D.       • [START ON 3/22/2022] hydroxychloroquine (Plaquenil) tablet 400 mg  400 mg DAILY Eulogio Miller M.D.       • tizanidine (ZANAFLEX) tablet 8 mg  8 mg Q8HRS PRN Eulogio Miller M.D.       • acetaminophen (Tylenol) tablet 650 mg  650 mg Q6HRS PRN Eulogio Miller M.D.       • senna-docusate (PERICOLACE or SENOKOT S) 8.6-50 MG per tablet 2 Tablet  2 Tablet BID Eulogio Miller M.D.        And   • polyethylene glycol/lytes (MIRALAX) PACKET 1 Packet  1 Packet QDAY PRN Eulogio Miller M.D.        And   • magnesium hydroxide (MILK OF MAGNESIA) suspension 30 mL  30 mL QDAY PRN Eulogio Miller M.D.        And   • bisacodyl (DULCOLAX) suppository 10 mg  10 mg QDAY PRN Eulogio Miller M.D.       • dextrose 5 % and 0.45 % NaCl with KCl 20 mEq   Continuous Asem VERN Miller M.D.       • Pharmacy Consult Request ...Pain Management Review 1 Each  1 Each PHARMACY TO DOSE Eulogio Miller M.D.       • oxyCODONE immediate-release (ROXICODONE) tablet 2.5 mg  2.5 mg Q3HRS PRN Eulogio Miller M.D.        Or   • oxyCODONE immediate-release (ROXICODONE) tablet 5 mg  5 mg Q3HRS PRN Eulogio Miller M.D.        Or   • HYDROmorphone (Dilaudid) injection 0.25 mg  0.25 mg Q3HRS PRN Eulogio Miller M.D.       • ondansetron (ZOFRAN) syringe/vial injection 4 mg  4 mg Q4HRS PRN Eulogio Miller M.D.       • ondansetron  (ZOFRAN ODT) dispertab 4 mg  4 mg Q4HRS PRN Eulogio Miller M.D.       • promethazine (PHENERGAN) tablet 12.5-25 mg  12.5-25 mg Q4HRS PRN Eulogio Miller M.D.       • promethazine (PHENERGAN) suppository 12.5-25 mg  12.5-25 mg Q4HRS PRN Eulogio Miller M.D.       • prochlorperazine (COMPAZINE) injection 5-10 mg  5-10 mg Q4HRS PRN Eulogio Miller M.D.       • [START ON 3/22/2022] pantoprazole (Protonix) injection 40 mg  40 mg BID Eulogio Miller M.D.       • insulin regular (HumuLIN R,NovoLIN R) injection  1-6 Units 4X/DAY ACHS Eulogio Miller M.D.        And   • dextrose 50% (D50W) injection 25 g  25 g Q15 MIN PRN Eulogio Miller M.D.       Last reviewed on 3/21/2022  4:27 PM by Joan Hooks, PharmD        Current Outpatient Medications:  (Not in a hospital admission)        Medication Allergies:  Allergies   Allergen Reactions   • Cefdinir Rash     rash   • Erythromycin Vomiting and Nausea         Family Medical History:  Family History   Problem Relation Age of Onset   • Other Maternal Grandfather         Parkinson's   • Genetic Disorder Maternal Grandfather         Parkinsons, and crouzon syndrome   • Cancer Maternal Grandfather         Skin Cancer   • Heart Disease Other    • Cancer Other         bile duct cancer   • Other Mother         sphincter of salud, pancreatic insufficiency   • Cancer Mother 53        cervical ca   • Cancer Other         great uncle brain   • Arthritis Maternal Grandmother    • Cancer Maternal Grandmother         Skin Cancer   • Heart Disease Maternal Grandmother    • Hypertension Maternal Grandmother    • Hyperlipidemia Maternal Grandmother    • Stroke Maternal Grandmother    • Genetic Disorder Father         Factor V Liden and Tourettes   • Heart Disease Father    • Hyperlipidemia Father    • Genetic Disorder Maternal Uncle         Crouzon Syndrome   • Cancer Maternal Uncle         Bile Duct, and Skin cancer   • Cancer Maternal Aunt         Multiple Aunts and Uncles passed  from cancer   • Heart Disease Maternal Aunt         Open heart surgery occuring   • Heart Disease Paternal Grandmother    • Hypertension Paternal Grandmother    • Stroke Paternal Grandmother    • Heart Disease Maternal Uncle         multiple uncles.         Social History:  Social History     Socioeconomic History   • Marital status: Single     Spouse name: Not on file   • Number of children: Not on file   • Years of education: Not on file   • Highest education level: Bachelor's degree (e.g., BA, AB, BS)   Occupational History   • Not on file   Tobacco Use   • Smoking status: Never Smoker   • Smokeless tobacco: Never Used   Vaping Use   • Vaping Use: Never used   Substance and Sexual Activity   • Alcohol use: No     Alcohol/week: 0.0 oz   • Drug use: No   • Sexual activity: Yes     Partners: Male     Birth control/protection: I.U.D.   Other Topics Concern   • Behavioral problems Not Asked   • Interpersonal relationships Not Asked   • Sad or not enjoying activities Not Asked   • Suicidal thoughts Not Asked   • Poor school performance Not Asked   • Reading difficulties Not Asked   • Speech difficulties Not Asked   • Writing difficulties Not Asked   • Inadequate sleep Not Asked   • Excessive TV viewing Not Asked   • Excessive video game use Not Asked   • Inadequate exercise Not Asked   • Sports related Not Asked   • Poor diet Not Asked   • Family concerns for drug/alcohol abuse Not Asked   • Poor oral hygiene Not Asked   • Bike safety Not Asked   • Family concerns vehicle safety Not Asked   Social History Narrative   • Not on file     Social Determinants of Health     Financial Resource Strain: Not on file   Food Insecurity: Not on file   Transportation Needs: Not on file   Physical Activity: Not on file   Stress: Not on file   Social Connections: Not on file   Intimate Partner Violence: Not on file   Housing Stability: Not on file         Vital signs:  Weight/BMI: Body mass index is 32.63 kg/m².  /75   Pulse 83  "  Temp 36.9 °C (98.5 °F) (Temporal)   Resp 18   Ht 1.702 m (5' 7\")   Wt 94.5 kg (208 lb 5.4 oz)   SpO2 97%   Vitals:    03/21/22 1434 03/21/22 1650   BP: 127/85 105/75   Pulse: 100 83   Resp: 16 18   Temp: 36.9 °C (98.5 °F)    TempSrc: Temporal    SpO2: 94% 97%   Weight: 94.5 kg (208 lb 5.4 oz)    Height: 1.702 m (5' 7\")      Oxygen Therapy:  Pulse Oximetry: 97 %, O2 Delivery Device: None - Room Air  No intake or output data in the 24 hours ending 03/21/22 1659      Physical Exam:  Physical Exam  Vitals and nursing note reviewed.   Constitutional:       General: She is not in acute distress.     Appearance: Normal appearance. She is not ill-appearing.   HENT:      Head: Normocephalic and atraumatic.      Nose: Nose normal.      Mouth/Throat:      Mouth: Mucous membranes are moist.      Pharynx: Oropharynx is clear.   Eyes:      General: No scleral icterus.     Extraocular Movements: Extraocular movements intact.      Conjunctiva/sclera: Conjunctivae normal.   Cardiovascular:      Rate and Rhythm: Normal rate and regular rhythm.      Heart sounds: Normal heart sounds. No murmur heard.    No gallop.   Pulmonary:      Effort: Pulmonary effort is normal. No respiratory distress.      Breath sounds: Normal breath sounds. No wheezing, rhonchi or rales.   Abdominal:      General: Abdomen is flat. Bowel sounds are normal. There is no distension.      Palpations: Abdomen is soft. There is no mass.      Tenderness: There is abdominal tenderness in the right upper quadrant and epigastric area. There is no guarding or rebound.   Musculoskeletal:         General: Normal range of motion.      Cervical back: Normal range of motion.      Right lower leg: No edema.      Left lower leg: No edema.   Skin:     General: Skin is warm and dry.      Coloration: Skin is not jaundiced.   Neurological:      General: No focal deficit present.      Mental Status: She is alert and oriented to person, place, and time.   Psychiatric:         " Mood and Affect: Mood normal.         Behavior: Behavior normal.         Judgment: Judgment normal.       MP: 3      Labs:  Recent Labs     03/21/22  1510   SODIUM 138   POTASSIUM 3.0*   CHLORIDE 102   CO2 23   BUN 15   CREATININE 0.81   CALCIUM 9.4     Recent Labs     03/21/22  1510   ALTSGPT 42   ASTSGOT 29   ALKPHOSPHAT 126*   TBILIRUBIN 0.3   LIPASE 26   GLUCOSE 73     Recent Labs     03/21/22  1510   WBC 6.6   NEUTSPOLYS 52.40   LYMPHOCYTES 32.20   MONOCYTES 13.40   EOSINOPHILS 1.20   BASOPHILS 0.50   ASTSGOT 29   ALTSGPT 42   ALKPHOSPHAT 126*   TBILIRUBIN 0.3     Recent Labs     03/21/22  1510   RBC 5.12   HEMOGLOBIN 15.7   HEMATOCRIT 46.4   PLATELETCT 262   PROTHROMBTM 13.9   APTT 26.1   INR 1.16*     Recent Results (from the past 24 hour(s))   POCT glucose device results    Collection Time: 03/21/22  2:48 PM   Result Value Ref Range    POC Glucose, Blood 68 65 - 99 mg/dL   CBC WITH DIFFERENTIAL    Collection Time: 03/21/22  3:10 PM   Result Value Ref Range    WBC 6.6 4.8 - 10.8 K/uL    RBC 5.12 4.20 - 5.40 M/uL    Hemoglobin 15.7 12.0 - 16.0 g/dL    Hematocrit 46.4 37.0 - 47.0 %    MCV 90.6 81.4 - 97.8 fL    MCH 30.7 27.0 - 33.0 pg    MCHC 33.8 33.6 - 35.0 g/dL    RDW 38.6 35.9 - 50.0 fL    Platelet Count 262 164 - 446 K/uL    MPV 9.8 9.0 - 12.9 fL    Neutrophils-Polys 52.40 44.00 - 72.00 %    Lymphocytes 32.20 22.00 - 41.00 %    Monocytes 13.40 0.00 - 13.40 %    Eosinophils 1.20 0.00 - 6.90 %    Basophils 0.50 0.00 - 1.80 %    Immature Granulocytes 0.30 0.00 - 0.90 %    Nucleated RBC 0.00 /100 WBC    Neutrophils (Absolute) 3.45 2.00 - 7.15 K/uL    Lymphs (Absolute) 2.12 1.00 - 4.80 K/uL    Monos (Absolute) 0.88 (H) 0.00 - 0.85 K/uL    Eos (Absolute) 0.08 0.00 - 0.51 K/uL    Baso (Absolute) 0.03 0.00 - 0.12 K/uL    Immature Granulocytes (abs) 0.02 0.00 - 0.11 K/uL    NRBC (Absolute) 0.00 K/uL   COMP METABOLIC PANEL    Collection Time: 03/21/22  3:10 PM   Result Value Ref Range    Sodium 138 135 - 145 mmol/L     Potassium 3.0 (L) 3.6 - 5.5 mmol/L    Chloride 102 96 - 112 mmol/L    Co2 23 20 - 33 mmol/L    Anion Gap 13.0 7.0 - 16.0    Glucose 73 65 - 99 mg/dL    Bun 15 8 - 22 mg/dL    Creatinine 0.81 0.50 - 1.40 mg/dL    Calcium 9.4 8.4 - 10.2 mg/dL    AST(SGOT) 29 12 - 45 U/L    ALT(SGPT) 42 2 - 50 U/L    Alkaline Phosphatase 126 (H) 30 - 99 U/L    Total Bilirubin 0.3 0.1 - 1.5 mg/dL    Albumin 4.4 3.2 - 4.9 g/dL    Total Protein 8.1 6.0 - 8.2 g/dL    Globulin 3.7 (H) 1.9 - 3.5 g/dL    A-G Ratio 1.2 g/dL   PTT    Collection Time: 03/21/22  3:10 PM   Result Value Ref Range    APTT 26.1 24.7 - 36.0 sec   PT/INR    Collection Time: 03/21/22  3:10 PM   Result Value Ref Range    PT 13.9 12.0 - 14.6 sec    INR 1.16 (H) 0.87 - 1.13   COD - Adult (Type and Screen)    Collection Time: 03/21/22  3:10 PM   Result Value Ref Range    ABO Grouping Only A     Rh Grouping Only NEG     Antibody Screen-Cod NEG    LIPASE    Collection Time: 03/21/22  3:10 PM   Result Value Ref Range    Lipase 26 7 - 58 U/L   ESTIMATED GFR    Collection Time: 03/21/22  3:10 PM   Result Value Ref Range    GFR (CKD-EPI) 105 >60 mL/min/1.73 m 2   ABO Rh Confirm    Collection Time: 03/21/22  3:40 PM   Result Value Ref Range    ABO Rh Confirm A NEG          Radiology Review:  No orders to display         MDM (Data Review):   -Records reviewed and summarized in current documentation  -I personally reviewed and interpreted the laboratory results  -I personally reviewed the radiology images        Core Quality Measures   Reviewed items:  Labs and Medications reviewed

## 2022-03-21 NOTE — LETTER
2022    To Whom It May Concern:         This is confirmation that Malena Bennett, VIPIN 2000 was a patient at [facility].  She was treated for medical illness from 3/21/2022 until 3/28/2022.  She may return to work after 2022         If you have any questions please do not hesitate to call me at the phone number listed below.    Sincerely,          Chandni Barnes M.D.  [phone]

## 2022-03-21 NOTE — ASSESSMENT & PLAN NOTE
Hold home insulin plan.  Start D5, sliding scale insulin for now  Accu-Cheks, hypoglycemia protocol

## 2022-03-22 ENCOUNTER — ANESTHESIA (OUTPATIENT)
Dept: SURGERY | Facility: MEDICAL CENTER | Age: 22
DRG: 368 | End: 2022-03-22

## 2022-03-22 ENCOUNTER — APPOINTMENT (OUTPATIENT)
Dept: RADIOLOGY | Facility: MEDICAL CENTER | Age: 22
DRG: 368 | End: 2022-03-22
Attending: INTERNAL MEDICINE

## 2022-03-22 ENCOUNTER — ANESTHESIA EVENT (OUTPATIENT)
Dept: SURGERY | Facility: MEDICAL CENTER | Age: 22
DRG: 368 | End: 2022-03-22

## 2022-03-22 PROBLEM — K92.0 HEMATEMESIS: Status: ACTIVE | Noted: 2022-03-21

## 2022-03-22 PROBLEM — R04.2 HEMOPTYSIS: Status: ACTIVE | Noted: 2022-03-22

## 2022-03-22 LAB
ALBUMIN SERPL BCP-MCNC: 4 G/DL (ref 3.2–4.9)
ALBUMIN/GLOB SERPL: 1.3 G/DL
ALP SERPL-CCNC: 107 U/L (ref 30–99)
ALT SERPL-CCNC: 33 U/L (ref 2–50)
ANION GAP SERPL CALC-SCNC: 10 MMOL/L (ref 7–16)
APPEARANCE UR: CLEAR
AST SERPL-CCNC: 23 U/L (ref 12–45)
BACTERIA #/AREA URNS HPF: ABNORMAL /HPF
BASOPHILS # BLD AUTO: 0.6 % (ref 0–1.8)
BASOPHILS # BLD: 0.03 K/UL (ref 0–0.12)
BILIRUB SERPL-MCNC: 0.4 MG/DL (ref 0.1–1.5)
BILIRUB UR QL STRIP.AUTO: NEGATIVE
BUN SERPL-MCNC: 8 MG/DL (ref 8–22)
CALCIUM SERPL-MCNC: 8.8 MG/DL (ref 8.4–10.2)
CHLORIDE SERPL-SCNC: 104 MMOL/L (ref 96–112)
CO2 SERPL-SCNC: 23 MMOL/L (ref 20–33)
COLOR UR: YELLOW
CREAT SERPL-MCNC: 0.6 MG/DL (ref 0.5–1.4)
EOSINOPHIL # BLD AUTO: 0.09 K/UL (ref 0–0.51)
EOSINOPHIL NFR BLD: 1.9 % (ref 0–6.9)
EPI CELLS #/AREA URNS HPF: ABNORMAL /HPF
ERYTHROCYTE [DISTWIDTH] IN BLOOD BY AUTOMATED COUNT: 38.6 FL (ref 35.9–50)
GFR SERPLBLD CREATININE-BSD FMLA CKD-EPI: 130 ML/MIN/1.73 M 2
GLOBULIN SER CALC-MCNC: 3 G/DL (ref 1.9–3.5)
GLUCOSE BLD STRIP.AUTO-MCNC: 105 MG/DL (ref 65–99)
GLUCOSE BLD STRIP.AUTO-MCNC: 138 MG/DL (ref 65–99)
GLUCOSE BLD STRIP.AUTO-MCNC: 62 MG/DL (ref 65–99)
GLUCOSE BLD STRIP.AUTO-MCNC: 73 MG/DL (ref 65–99)
GLUCOSE BLD STRIP.AUTO-MCNC: 88 MG/DL (ref 65–99)
GLUCOSE BLD STRIP.AUTO-MCNC: 88 MG/DL (ref 65–99)
GLUCOSE BLD STRIP.AUTO-MCNC: 89 MG/DL (ref 65–99)
GLUCOSE BLD STRIP.AUTO-MCNC: 90 MG/DL (ref 65–99)
GLUCOSE BLD STRIP.AUTO-MCNC: 91 MG/DL (ref 65–99)
GLUCOSE BLD STRIP.AUTO-MCNC: 92 MG/DL (ref 65–99)
GLUCOSE BLD STRIP.AUTO-MCNC: 94 MG/DL (ref 65–99)
GLUCOSE SERPL-MCNC: 102 MG/DL (ref 65–99)
GLUCOSE UR STRIP.AUTO-MCNC: NEGATIVE MG/DL
HCG UR QL: NEGATIVE
HCT VFR BLD AUTO: 41.4 % (ref 37–47)
HGB BLD-MCNC: 13.3 G/DL (ref 12–16)
HGB BLD-MCNC: 14.1 G/DL (ref 12–16)
HGB BLD-MCNC: 14.2 G/DL (ref 12–16)
HYALINE CASTS #/AREA URNS LPF: ABNORMAL /LPF
IMM GRANULOCYTES # BLD AUTO: 0.02 K/UL (ref 0–0.11)
IMM GRANULOCYTES NFR BLD AUTO: 0.4 % (ref 0–0.9)
IRON SATN MFR SERPL: 37 % (ref 15–55)
IRON SERPL-MCNC: 104 UG/DL (ref 40–170)
KETONES UR STRIP.AUTO-MCNC: 15 MG/DL
LEUKOCYTE ESTERASE UR QL STRIP.AUTO: NEGATIVE
LYMPHOCYTES # BLD AUTO: 2.11 K/UL (ref 1–4.8)
LYMPHOCYTES NFR BLD: 43.5 % (ref 22–41)
MAGNESIUM SERPL-MCNC: 2 MG/DL (ref 1.5–2.5)
MCH RBC QN AUTO: 30.9 PG (ref 27–33)
MCHC RBC AUTO-ENTMCNC: 34.3 G/DL (ref 33.6–35)
MCV RBC AUTO: 90.2 FL (ref 81.4–97.8)
MICRO URNS: ABNORMAL
MONOCYTES # BLD AUTO: 0.6 K/UL (ref 0–0.85)
MONOCYTES NFR BLD AUTO: 12.4 % (ref 0–13.4)
NEUTROPHILS # BLD AUTO: 2 K/UL (ref 2–7.15)
NEUTROPHILS NFR BLD: 41.2 % (ref 44–72)
NITRITE UR QL STRIP.AUTO: NEGATIVE
NRBC # BLD AUTO: 0 K/UL
NRBC BLD-RTO: 0 /100 WBC
PATHOLOGY CONSULT NOTE: NORMAL
PH UR STRIP.AUTO: 5 [PH] (ref 5–8)
PLATELET # BLD AUTO: 230 K/UL (ref 164–446)
PMV BLD AUTO: 9.6 FL (ref 9–12.9)
POTASSIUM SERPL-SCNC: 3.7 MMOL/L (ref 3.6–5.5)
PROT SERPL-MCNC: 7 G/DL (ref 6–8.2)
PROT UR QL STRIP: NEGATIVE MG/DL
RBC # BLD AUTO: 4.59 M/UL (ref 4.2–5.4)
RBC # URNS HPF: ABNORMAL /HPF
RBC UR QL AUTO: ABNORMAL
SODIUM SERPL-SCNC: 137 MMOL/L (ref 135–145)
SP GR UR STRIP.AUTO: 1.02
TIBC SERPL-MCNC: 281 UG/DL (ref 250–450)
TSH SERPL DL<=0.005 MIU/L-ACNC: 1.79 UIU/ML (ref 0.38–5.33)
UIBC SERPL-MCNC: 177 UG/DL (ref 110–370)
WBC # BLD AUTO: 4.9 K/UL (ref 4.8–10.8)
WBC #/AREA URNS HPF: ABNORMAL /HPF

## 2022-03-22 PROCEDURE — 82390 ASSAY OF CERULOPLASMIN: CPT

## 2022-03-22 PROCEDURE — 76700 US EXAM ABDOM COMPLETE: CPT

## 2022-03-22 PROCEDURE — 700102 HCHG RX REV CODE 250 W/ 637 OVERRIDE(OP): Performed by: HOSPITALIST

## 2022-03-22 PROCEDURE — 160002 HCHG RECOVERY MINUTES (STAT): Performed by: INTERNAL MEDICINE

## 2022-03-22 PROCEDURE — 700101 HCHG RX REV CODE 250: Performed by: HOSPITALIST

## 2022-03-22 PROCEDURE — 84443 ASSAY THYROID STIM HORMONE: CPT

## 2022-03-22 PROCEDURE — 0DB68ZX EXCISION OF STOMACH, VIA NATURAL OR ARTIFICIAL OPENING ENDOSCOPIC, DIAGNOSTIC: ICD-10-PCS | Performed by: INTERNAL MEDICINE

## 2022-03-22 PROCEDURE — 82784 ASSAY IGA/IGD/IGG/IGM EACH: CPT

## 2022-03-22 PROCEDURE — 160035 HCHG PACU - 1ST 60 MINS PHASE I: Performed by: INTERNAL MEDICINE

## 2022-03-22 PROCEDURE — 96375 TX/PRO/DX INJ NEW DRUG ADDON: CPT

## 2022-03-22 PROCEDURE — 160202 HCHG ENDO MINUTES - 1ST 30 MINS LEVEL 3: Performed by: INTERNAL MEDICINE

## 2022-03-22 PROCEDURE — 86709 HEPATITIS A IGM ANTIBODY: CPT

## 2022-03-22 PROCEDURE — 99226 PR SUBSEQUENT OBSERVATION CARE,LEVEL III: CPT | Performed by: INTERNAL MEDICINE

## 2022-03-22 PROCEDURE — 700105 HCHG RX REV CODE 258: Performed by: ANESTHESIOLOGY

## 2022-03-22 PROCEDURE — 85025 COMPLETE CBC W/AUTO DIFF WBC: CPT

## 2022-03-22 PROCEDURE — 86704 HEP B CORE ANTIBODY TOTAL: CPT

## 2022-03-22 PROCEDURE — 86015 ACTIN ANTIBODY EACH: CPT

## 2022-03-22 PROCEDURE — 82787 IGG 1 2 3 OR 4 EACH: CPT | Mod: 91

## 2022-03-22 PROCEDURE — 83540 ASSAY OF IRON: CPT

## 2022-03-22 PROCEDURE — 700111 HCHG RX REV CODE 636 W/ 250 OVERRIDE (IP): Performed by: HOSPITALIST

## 2022-03-22 PROCEDURE — 88305 TISSUE EXAM BY PATHOLOGIST: CPT

## 2022-03-22 PROCEDURE — 86235 NUCLEAR ANTIGEN ANTIBODY: CPT | Mod: 91

## 2022-03-22 PROCEDURE — 88312 SPECIAL STAINS GROUP 1: CPT

## 2022-03-22 PROCEDURE — 82962 GLUCOSE BLOOD TEST: CPT

## 2022-03-22 PROCEDURE — 71045 X-RAY EXAM CHEST 1 VIEW: CPT

## 2022-03-22 PROCEDURE — 86708 HEPATITIS A ANTIBODY: CPT

## 2022-03-22 PROCEDURE — 81001 URINALYSIS AUTO W/SCOPE: CPT

## 2022-03-22 PROCEDURE — 87340 HEPATITIS B SURFACE AG IA: CPT

## 2022-03-22 PROCEDURE — 700101 HCHG RX REV CODE 250: Performed by: ANESTHESIOLOGY

## 2022-03-22 PROCEDURE — 86039 ANTINUCLEAR ANTIBODIES (ANA): CPT

## 2022-03-22 PROCEDURE — A9270 NON-COVERED ITEM OR SERVICE: HCPCS | Performed by: HOSPITALIST

## 2022-03-22 PROCEDURE — 86225 DNA ANTIBODY NATIVE: CPT

## 2022-03-22 PROCEDURE — 82728 ASSAY OF FERRITIN: CPT

## 2022-03-22 PROCEDURE — 96376 TX/PRO/DX INJ SAME DRUG ADON: CPT

## 2022-03-22 PROCEDURE — 86364 TISS TRNSGLTMNASE EA IG CLAS: CPT

## 2022-03-22 PROCEDURE — 99244 OFF/OP CNSLTJ NEW/EST MOD 40: CPT | Performed by: INTERNAL MEDICINE

## 2022-03-22 PROCEDURE — 85018 HEMOGLOBIN: CPT

## 2022-03-22 PROCEDURE — C9113 INJ PANTOPRAZOLE SODIUM, VIA: HCPCS | Performed by: HOSPITALIST

## 2022-03-22 PROCEDURE — 81025 URINE PREGNANCY TEST: CPT

## 2022-03-22 PROCEDURE — 160048 HCHG OR STATISTICAL LEVEL 1-5: Performed by: INTERNAL MEDICINE

## 2022-03-22 PROCEDURE — 80053 COMPREHEN METABOLIC PANEL: CPT

## 2022-03-22 PROCEDURE — 86706 HEP B SURFACE ANTIBODY: CPT

## 2022-03-22 PROCEDURE — 86038 ANTINUCLEAR ANTIBODIES: CPT

## 2022-03-22 PROCEDURE — 83735 ASSAY OF MAGNESIUM: CPT

## 2022-03-22 PROCEDURE — 83550 IRON BINDING TEST: CPT

## 2022-03-22 PROCEDURE — 36415 COLL VENOUS BLD VENIPUNCTURE: CPT

## 2022-03-22 PROCEDURE — 700111 HCHG RX REV CODE 636 W/ 250 OVERRIDE (IP): Performed by: ANESTHESIOLOGY

## 2022-03-22 PROCEDURE — G0378 HOSPITAL OBSERVATION PER HR: HCPCS

## 2022-03-22 PROCEDURE — 160009 HCHG ANES TIME/MIN: Performed by: INTERNAL MEDICINE

## 2022-03-22 PROCEDURE — 86381 MITOCHONDRIAL ANTIBODY EACH: CPT

## 2022-03-22 RX ORDER — ONDANSETRON 2 MG/ML
4 INJECTION INTRAMUSCULAR; INTRAVENOUS
Status: DISCONTINUED | OUTPATIENT
Start: 2022-03-22 | End: 2022-03-22 | Stop reason: HOSPADM

## 2022-03-22 RX ORDER — HALOPERIDOL 5 MG/ML
1 INJECTION INTRAMUSCULAR
Status: DISCONTINUED | OUTPATIENT
Start: 2022-03-22 | End: 2022-03-22 | Stop reason: HOSPADM

## 2022-03-22 RX ORDER — MIDAZOLAM HYDROCHLORIDE 1 MG/ML
INJECTION INTRAMUSCULAR; INTRAVENOUS PRN
Status: DISCONTINUED | OUTPATIENT
Start: 2022-03-22 | End: 2022-03-22 | Stop reason: SURG

## 2022-03-22 RX ORDER — SODIUM CHLORIDE, SODIUM LACTATE, POTASSIUM CHLORIDE, CALCIUM CHLORIDE 600; 310; 30; 20 MG/100ML; MG/100ML; MG/100ML; MG/100ML
INJECTION, SOLUTION INTRAVENOUS
Status: DISCONTINUED | OUTPATIENT
Start: 2022-03-22 | End: 2022-03-22 | Stop reason: SURG

## 2022-03-22 RX ORDER — LIDOCAINE HYDROCHLORIDE 40 MG/ML
SOLUTION TOPICAL PRN
Status: DISCONTINUED | OUTPATIENT
Start: 2022-03-22 | End: 2022-03-22 | Stop reason: SURG

## 2022-03-22 RX ORDER — ONDANSETRON 2 MG/ML
INJECTION INTRAMUSCULAR; INTRAVENOUS PRN
Status: DISCONTINUED | OUTPATIENT
Start: 2022-03-22 | End: 2022-03-22 | Stop reason: SURG

## 2022-03-22 RX ORDER — METOCLOPRAMIDE HYDROCHLORIDE 5 MG/ML
INJECTION INTRAMUSCULAR; INTRAVENOUS PRN
Status: DISCONTINUED | OUTPATIENT
Start: 2022-03-22 | End: 2022-03-22 | Stop reason: SURG

## 2022-03-22 RX ORDER — DIPHENHYDRAMINE HYDROCHLORIDE 50 MG/ML
12.5 INJECTION INTRAMUSCULAR; INTRAVENOUS
Status: DISCONTINUED | OUTPATIENT
Start: 2022-03-22 | End: 2022-03-22 | Stop reason: HOSPADM

## 2022-03-22 RX ORDER — ROCURONIUM BROMIDE 10 MG/ML
INJECTION, SOLUTION INTRAVENOUS PRN
Status: DISCONTINUED | OUTPATIENT
Start: 2022-03-22 | End: 2022-03-22 | Stop reason: SURG

## 2022-03-22 RX ORDER — LIDOCAINE HYDROCHLORIDE 20 MG/ML
INJECTION, SOLUTION EPIDURAL; INFILTRATION; INTRACAUDAL; PERINEURAL PRN
Status: DISCONTINUED | OUTPATIENT
Start: 2022-03-22 | End: 2022-03-22 | Stop reason: SURG

## 2022-03-22 RX ORDER — MIDAZOLAM HYDROCHLORIDE 1 MG/ML
1 INJECTION INTRAMUSCULAR; INTRAVENOUS
Status: DISCONTINUED | OUTPATIENT
Start: 2022-03-22 | End: 2022-03-22 | Stop reason: HOSPADM

## 2022-03-22 RX ORDER — SODIUM CHLORIDE, SODIUM LACTATE, POTASSIUM CHLORIDE, CALCIUM CHLORIDE 600; 310; 30; 20 MG/100ML; MG/100ML; MG/100ML; MG/100ML
INJECTION, SOLUTION INTRAVENOUS CONTINUOUS
Status: DISCONTINUED | OUTPATIENT
Start: 2022-03-22 | End: 2022-03-22 | Stop reason: HOSPADM

## 2022-03-22 RX ADMIN — PROPOFOL 200 MG: 10 INJECTION, EMULSION INTRAVENOUS at 08:17

## 2022-03-22 RX ADMIN — LIDOCAINE HYDROCHLORIDE 3 ML: 40 SOLUTION TOPICAL at 08:18

## 2022-03-22 RX ADMIN — PANTOPRAZOLE SODIUM 40 MG: 40 INJECTION, POWDER, FOR SOLUTION INTRAVENOUS at 05:39

## 2022-03-22 RX ADMIN — PROCHLORPERAZINE EDISYLATE 5 MG: 5 INJECTION INTRAMUSCULAR; INTRAVENOUS at 11:22

## 2022-03-22 RX ADMIN — OXYCODONE HYDROCHLORIDE 5 MG: 5 TABLET ORAL at 17:17

## 2022-03-22 RX ADMIN — MIDAZOLAM HYDROCHLORIDE 2 MG: 1 INJECTION, SOLUTION INTRAMUSCULAR; INTRAVENOUS at 08:14

## 2022-03-22 RX ADMIN — LIDOCAINE HYDROCHLORIDE 2 ML: 20 INJECTION, SOLUTION EPIDURAL; INFILTRATION; INTRACAUDAL; PERINEURAL at 08:16

## 2022-03-22 RX ADMIN — FENTANYL CITRATE 100 MCG: 50 INJECTION, SOLUTION INTRAMUSCULAR; INTRAVENOUS at 08:14

## 2022-03-22 RX ADMIN — SODIUM CHLORIDE, POTASSIUM CHLORIDE, SODIUM LACTATE AND CALCIUM CHLORIDE: 600; 310; 30; 20 INJECTION, SOLUTION INTRAVENOUS at 08:13

## 2022-03-22 RX ADMIN — SUGAMMADEX 200 MG: 100 INJECTION, SOLUTION INTRAVENOUS at 08:30

## 2022-03-22 RX ADMIN — ONDANSETRON 4 MG: 2 INJECTION INTRAMUSCULAR; INTRAVENOUS at 08:21

## 2022-03-22 RX ADMIN — METOCLOPRAMIDE 10 MG: 5 INJECTION, SOLUTION INTRAMUSCULAR; INTRAVENOUS at 08:21

## 2022-03-22 RX ADMIN — ONDANSETRON 4 MG: 2 INJECTION INTRAMUSCULAR; INTRAVENOUS at 04:31

## 2022-03-22 RX ADMIN — OXYCODONE HYDROCHLORIDE 5 MG: 5 TABLET ORAL at 21:25

## 2022-03-22 RX ADMIN — DEXTROSE MONOHYDRATE, SODIUM CHLORIDE, AND POTASSIUM CHLORIDE: 50; 4.5; 1.49 INJECTION, SOLUTION INTRAVENOUS at 15:40

## 2022-03-22 RX ADMIN — ROCURONIUM BROMIDE 30 MG: 10 INJECTION, SOLUTION INTRAVENOUS at 08:18

## 2022-03-22 ASSESSMENT — ENCOUNTER SYMPTOMS
DOUBLE VISION: 0
SEIZURES: 0
DEPRESSION: 0
LOSS OF CONSCIOUSNESS: 0
FALLS: 0
VOMITING: 0
WEIGHT LOSS: 0
HEMOPTYSIS: 1
HEARTBURN: 0
BLOOD IN STOOL: 0
SINUS PAIN: 0
COUGH: 0
HALLUCINATIONS: 0
VOMITING: 1
SPUTUM PRODUCTION: 0
BLURRED VISION: 0
HEADACHES: 1
FEVER: 0
DIZZINESS: 0
NAUSEA: 0
ABDOMINAL PAIN: 0
SORE THROAT: 0
SHORTNESS OF BREATH: 0
BACK PAIN: 0
NAUSEA: 1
PALPITATIONS: 0
COUGH: 1
CHILLS: 0

## 2022-03-22 ASSESSMENT — LIFESTYLE VARIABLES
EVER FELT BAD OR GUILTY ABOUT YOUR DRINKING: NO
CONSUMPTION TOTAL: NEGATIVE
HAVE PEOPLE ANNOYED YOU BY CRITICIZING YOUR DRINKING: NO
ALCOHOL_USE: NO
TOTAL SCORE: 0
AVERAGE NUMBER OF DAYS PER WEEK YOU HAVE A DRINK CONTAINING ALCOHOL: 0
TOTAL SCORE: 0
TOTAL SCORE: 0
HAVE YOU EVER FELT YOU SHOULD CUT DOWN ON YOUR DRINKING: NO
SUBSTANCE_ABUSE: 0
ON A TYPICAL DAY WHEN YOU DRINK ALCOHOL HOW MANY DRINKS DO YOU HAVE: 0
EVER HAD A DRINK FIRST THING IN THE MORNING TO STEADY YOUR NERVES TO GET RID OF A HANGOVER: NO
HOW MANY TIMES IN THE PAST YEAR HAVE YOU HAD 5 OR MORE DRINKS IN A DAY: 0

## 2022-03-22 ASSESSMENT — COGNITIVE AND FUNCTIONAL STATUS - GENERAL
MOBILITY SCORE: 24
SUGGESTED CMS G CODE MODIFIER DAILY ACTIVITY: CH
DAILY ACTIVITIY SCORE: 24
SUGGESTED CMS G CODE MODIFIER MOBILITY: CH

## 2022-03-22 ASSESSMENT — PAIN DESCRIPTION - PAIN TYPE
TYPE: ACUTE PAIN

## 2022-03-22 ASSESSMENT — PATIENT HEALTH QUESTIONNAIRE - PHQ9
1. LITTLE INTEREST OR PLEASURE IN DOING THINGS: NOT AT ALL
SUM OF ALL RESPONSES TO PHQ9 QUESTIONS 1 AND 2: 0
2. FEELING DOWN, DEPRESSED, IRRITABLE, OR HOPELESS: NOT AT ALL

## 2022-03-22 ASSESSMENT — FIBROSIS 4 INDEX: FIB4 SCORE: 0.38

## 2022-03-22 NOTE — H&P
"Hospital Medicine History & Physical Note    Date of Service  3/21/2022    Primary Care Physician  DWAYNE Wilkerson.    Consultants  [facility] consultants, Dr Multani      Code Status  Full Code    Chief Complaint  Chief Complaint   Patient presents with   • N/V     X 5 d \" blood and clots \"  Emesis x 8 past 24 hrs Occurs after eating   • Abdominal Pain     Sharp Epigastric pain Non radiatio=ng Hx DM No fever   • Low Blood Sugar   • Headache     History of Presenting Illness  Malena Bennett is a 21 y.o. female with past medical history of diabetes who presented 3/21/2022 with nausea vomiting abdominal pain and hematemesis.  Patient reports that she has not been feeling well over the past 6 days, but much worse over the past 24 hours.  She reports that she had multiple episodes of vomiting that had blood in it up to 8 times.  She also reports having dizziness and lightheadedness and reported noticing that her sugar has been running low.  She uses an insulin pump.  She denies noticing any changes to her stool.    I discussed the plan of care with emergency department physician, and the patient    Review of Systems  Review of Systems   Constitutional: Negative for chills and fever.   Eyes: Negative for discharge and redness.   Respiratory: Negative for cough, shortness of breath and stridor.    Cardiovascular: Negative for chest pain and leg swelling.   Gastrointestinal: Positive for abdominal pain and vomiting.   Genitourinary: Negative for flank pain.   Musculoskeletal: Negative for myalgias.   Skin: Negative.    Neurological: Negative for focal weakness.   Endo/Heme/Allergies: Does not bruise/bleed easily.   Psychiatric/Behavioral: The patient is not nervous/anxious.      Past Medical History   has a past medical history of Asthma, Clotting disorder (HCC), Diabetes (HCC), Factor 5 Leiden mutation, heterozygous (HCC), Lupus (HCC), OCD (obsessive compulsive disorder), and Tourette disease.    Surgical " History   has a past surgical history that includes appendectomy laparoscopic (7/1/2017); tavo by laparoscopy (1/30/2017); and appendectomy (2018).     Family History  family history includes Arthritis in her maternal grandmother; Cancer in her maternal aunt, maternal grandfather, maternal grandmother, maternal uncle, and other family members; Cancer (age of onset: 53) in her mother; Genetic Disorder in her father, maternal grandfather, and maternal uncle; Heart Disease in her father, maternal aunt, maternal grandmother, maternal uncle, paternal grandmother, and another family member; Hyperlipidemia in her father and maternal grandmother; Hypertension in her maternal grandmother and paternal grandmother; Other in her maternal grandfather and mother; Stroke in her maternal grandmother and paternal grandmother.     Social History   reports that she has never smoked. She has never used smokeless tobacco. She reports that she does not drink alcohol and does not use drugs.    Allergies  Allergies   Allergen Reactions   • Cefdinir Rash     rash   • Erythromycin Vomiting and Nausea     Medications  Prior to Admission Medications   Prescriptions Last Dose Informant Patient Reported? Taking?   DULoxetine (CYMBALTA) 60 MG Cap DR Particles delayed-release capsule 6 days Patient No No   Sig: Take 1 Capsule by mouth every day.   Methotrexate Sodium 50 MG/2ML Solution > 3 WEEKS Patient Yes No   Sig: 10 mg every 7 days. 0.4 ml = 10 mg, SUBCUTANEOSLY   cyanocobalamin (VITAMIN B-12) 1000 MCG/ML Solution 10 days Patient No No   Sig: Inject 1 mL intramuscularly every 14 days.   gabapentin (NEURONTIN) 100 MG Cap 6 days Patient No No   Sig: Take 1 Capsule by mouth 4 times a day.   hydroxychloroquine (PLAQUENIL) 200 MG Tab 6 days Patient No No   Sig: Take 2 Tablets by mouth every day.   insulin glargine (LANTUS SOLOSTAR) 100 UNIT/ML Solution Pen-injector injection   Yes Yes   Sig: Inject 10 Units under the skin every day. When not able  to use insulin pump   insulin infusion pump Device 3/21/2022 at 1400  Yes Yes   Sig: Inject 0.7 Units/hr under the skin continuous. Patient's own SQ insulin pump    Type of Insulin: Humalog  Last change of tubing: took out pump today 3/21/22 due to hypoglycemia    Dosing:  Basal rate:   0.7 units/hr - adjusts basal rate according to Control IQ technology and CGM  Bolus ratio:   1 unit : 12 g carbohydrate at breakfast, lunch, dinner, snacks  Correction ratio:   1 units for every 50 over 150 mg/dL    Disconnect pump if patient becomes hypoglycemic and altered.   ketorolac (TORADOL) 10 MG Tab > 3 WEEKS Patient Yes Yes   Sig: Take 10 mg by mouth 2 times a day as needed. Indications: Pain   ondansetron (ZOFRAN ODT) 8 MG TABLET DISPERSIBLE 3/20/2022 at PM Patient No No   Sig: Take 1 Tablet by mouth every 8 hours as needed for Nausea.   prochlorperazine (COMPAZINE) 5 MG Tab 3/20/2022 at AM Patient No No   Sig: Take 1 Tablet by mouth every 6 hours as needed for Nausea/Vomiting.   tizanidine (ZANAFLEX) 4 MG Tab 6 days Patient Yes Yes   Sig: Take 8 mg by mouth every evening.   vitamin D2, Ergocalciferol, (DRISDOL) 1.25 MG (23385 UT) Cap capsule 6 days Patient Yes Yes   Sig: Take 50,000 Units by mouth every 72 hours. EVERY 3 DAYS      Facility-Administered Medications: None     Physical Exam  Temp:  [36.2 °C (97.2 °F)-36.9 °C (98.5 °F)] 36.5 °C (97.7 °F)  Pulse:  [] 83  Resp:  [16-18] 18  BP: (105-127)/(70-85) 109/76  SpO2:  [94 %-97 %] 96 %  Blood Pressure: 105/75   Temperature: 36.9 °C (98.5 °F)   Pulse: 83   Respiration: 18   Pulse Oximetry: 97 %     Physical Exam  Constitutional:       General: She is not in acute distress.  HENT:      Head: Normocephalic and atraumatic.      Right Ear: External ear normal.      Left Ear: External ear normal.      Nose: No congestion or rhinorrhea.      Mouth/Throat:      Mouth: Mucous membranes are moist.      Pharynx: No oropharyngeal exudate or posterior oropharyngeal erythema.    Eyes:      General: No scleral icterus.        Right eye: No discharge.         Left eye: No discharge.      Conjunctiva/sclera: Conjunctivae normal.      Pupils: Pupils are equal, round, and reactive to light.   Cardiovascular:      Rate and Rhythm: Tachycardia present.      Heart sounds:     No friction rub. No gallop.   Pulmonary:      Effort: Pulmonary effort is normal.   Abdominal:      General: Abdomen is flat. There is no distension.      Tenderness: There is no guarding.   Musculoskeletal:         General: No swelling.      Cervical back: Neck supple. No rigidity. No muscular tenderness.      Right lower leg: No edema.      Left lower leg: No edema.   Skin:     General: Skin is dry.      Capillary Refill: Capillary refill takes 2 to 3 seconds.      Coloration: Skin is pale. Skin is not jaundiced.      Findings: No bruising or erythema.   Neurological:      Mental Status: She is alert and oriented to person, place, and time.   Psychiatric:         Mood and Affect: Mood normal.         Judgment: Judgment normal.       Laboratory:  Recent Labs     03/21/22  1510   WBC 6.6   RBC 5.12   HEMOGLOBIN 15.7   HEMATOCRIT 46.4   MCV 90.6   MCH 30.7   MCHC 33.8   RDW 38.6   PLATELETCT 262   MPV 9.8     Recent Labs     03/21/22  1510   SODIUM 138   POTASSIUM 3.0*   CHLORIDE 102   CO2 23   GLUCOSE 73   BUN 15   CREATININE 0.81   CALCIUM 9.4     Recent Labs     03/21/22  1510   ALTSGPT 42   ASTSGOT 29   ALKPHOSPHAT 126*   TBILIRUBIN 0.3   LIPASE 26   GLUCOSE 73     Recent Labs     03/21/22  1510   APTT 26.1   INR 1.16*     No results for input(s): NTPROBNP in the last 72 hours.      No results for input(s): TROPONINT in the last 72 hours.    Imaging:  No orders to display     Assessment/Plan:  I anticipate this patient is appropriate for observation status at this time.    * GI bleed- (present on admission)  Assessment & Plan  Insert to wide pore peripheral IV accesses    Intravenous fluids   H&H every 8 hours  Transfuse  for Hb <7  IV PPI   GI consulted [[facility]] by emergency department physician    Hypoglycemia- (present on admission)  Assessment & Plan  Hold home insulin plan.  Start D5, sliding scale insulin for now  Accu-Cheks, hypoglycemia protocol    Hypokalemia- (present on admission)  Assessment & Plan  Replacing, checking magnesium    Continue to monitor replace as needed     Type 1 diabetes mellitus (HCC)- (present on admission)  Assessment & Plan  With hypoglycemia   Last glycated hemoglobin was 5%  We will hold home insulin  Start D5, sliding scale insulin for now  Accu-Checks, hypoglycemia protocol     VTE prophylaxis: SCDs/TEDs

## 2022-03-22 NOTE — ASSESSMENT & PLAN NOTE
Patient reports coughing up blood and clots over last several days  EGD negative - rare eosinophils identified  Bronch negative  ENT scope of sinuses negative  CTA head/neck/aorta unremarkable  antiphospholipid/lupus anticoagulant pending  CTA chest ordered

## 2022-03-22 NOTE — CONSULTS
Pulmonary Consultation    Date of consult: 3/22/2022    Referring Physician  Alexander Carmona D.O.    Reason for Consultation  Hemoptysis     History of Presenting Illness  21 y.o. female who presented 3/21/2022 with hematemesis for approximately 5 days.  She has a history of GERD, erosive gastritis, celiac disease, type 1 diabetes, SLE, factor V Leiden deficiency, anxiety, OCD and migraines.  She states she was in her normal state of health until about 3 weeks ago when she had some upper respiratory viral illness.  She developed a cough which has not improved.  She states approximately 6 days ago she was in the shower and had an intense coughing which resulted in her coughing up several blood clots.  Over the past 6 days she has had multiple episodes which start with coughing and and with her gagging and vomiting at which time she produces a large amount of blood with clots.  Is blood is mixed with gastric contents and was initially very concerning for GI bleed.  Today she was taken for EGD which was unremarkable except for erythema in the body of her stomach and some esophagitis.  Immediately upon returning from the EGD she had another episode of hematemesis.  Pulmonary was consulted for the possibility that this is originating in the lungs.  She is currently on room air saturating 98%, speaking in full sentences, with no respiratory distress or respiratory complaints aside from her ongoing cough.    Patient states she had a history of pretty severe asthma as a kid requiring frequent nebulizer treatments however since she was 10 she has had virtually no problems with her asthma whatsoever.  She denies any chronic pulmonary symptoms including shortness of breath or cough.  She is a never smoker.  She does not have any known toxic inhalant exposure.    Code Status  Full Code    Review of Systems  Review of Systems   Constitutional: Negative for chills, fever, malaise/fatigue and weight loss.   HENT: Negative for  congestion, sinus pain and sore throat.    Respiratory: Positive for cough and hemoptysis. Negative for sputum production and shortness of breath.    Cardiovascular: Negative for chest pain, palpitations and leg swelling.   Gastrointestinal: Positive for nausea and vomiting. Negative for abdominal pain and heartburn.   Musculoskeletal: Negative for joint pain.   Skin: Negative for rash.   Neurological: Positive for headaches. Negative for dizziness.   Psychiatric/Behavioral: Negative for depression.       Past Medical History   has a past medical history of Asthma, Clotting disorder (HCC), Diabetes (HCC), Factor 5 Leiden mutation, heterozygous (HCC), Lupus (HCC), OCD (obsessive compulsive disorder), and Tourette disease.    Surgical History   has a past surgical history that includes appendectomy laparoscopic (7/1/2017); tavo by laparoscopy (1/30/2017); appendectomy (2018); and pr upper gi endoscopy,diagnosis (N/A, 3/22/2022).    Family History  family history includes Arthritis in her maternal grandmother; Cancer in her maternal aunt, maternal grandfather, maternal grandmother, maternal uncle, and other family members; Cancer (age of onset: 53) in her mother; Genetic Disorder in her father, maternal grandfather, and maternal uncle; Heart Disease in her father, maternal aunt, maternal grandmother, maternal uncle, paternal grandmother, and another family member; Hyperlipidemia in her father and maternal grandmother; Hypertension in her maternal grandmother and paternal grandmother; Other in her maternal grandfather and mother; Stroke in her maternal grandmother and paternal grandmother.    Social History   reports that she has never smoked. She has never used smokeless tobacco. She reports that she does not drink alcohol and does not use drugs.    Medications  Home Medications     Reviewed by Mariann Hill R.N. (Registered Nurse) on 03/22/22 at 0759  Med List Status: Complete   Medication Last Dose Status    acetaminophen (Tylenol) tablet 650 mg  Active   bisacodyl (DULCOLAX) suppository 10 mg  Active   cyanocobalamin (VITAMIN B-12) 1000 MCG/ML Solution 10 days Active   dextrose 10 % BOLUS 25 g  Active   dextrose 5 % and 0.45 % NaCl with KCl 20 mEq  Active   DULoxetine (CYMBALTA) 60 MG Cap DR Particles delayed-release capsule 6 days Active   DULoxetine (CYMBALTA) capsule 60 mg  Active   gabapentin (NEURONTIN) 100 MG Cap 6 days Active   gabapentin (NEURONTIN) capsule 100 mg  Active   HYDROmorphone (Dilaudid) injection 0.25 mg  Active   hydroxychloroquine (PLAQUENIL) 200 MG Tab 6 days Active   hydroxychloroquine (Plaquenil) tablet 400 mg  Active   insulin glargine (LANTUS SOLOSTAR) 100 UNIT/ML Solution Pen-injector injection  Active   insulin infusion pump Device 3/21/2022 Active   insulin regular (HumuLIN R,NovoLIN R) injection  Active   ketorolac (TORADOL) 10 MG Tab > 3 WEEKS Active   magnesium hydroxide (MILK OF MAGNESIA) suspension 30 mL  Active   Methotrexate Sodium 50 MG/2ML Solution > 3 WEEKS Active   ondansetron (ZOFRAN ODT) 8 MG TABLET DISPERSIBLE 3/20/2022 Active   ondansetron (ZOFRAN ODT) dispertab 4 mg  Active   ondansetron (ZOFRAN) syringe/vial injection 4 mg  Active   oxyCODONE immediate-release (ROXICODONE) tablet 2.5 mg  Active   oxyCODONE immediate-release (ROXICODONE) tablet 5 mg  Active   pantoprazole (Protonix) injection 40 mg  Active   Pharmacy Consult Request ...Pain Management Review 1 Each  Active   polyethylene glycol/lytes (MIRALAX) PACKET 1 Packet  Active   prochlorperazine (COMPAZINE) 5 MG Tab 3/20/2022 Active   prochlorperazine (COMPAZINE) injection 5-10 mg  Active   promethazine (PHENERGAN) suppository 12.5-25 mg  Active   promethazine (PHENERGAN) tablet 12.5-25 mg  Active   senna-docusate (PERICOLACE or SENOKOT S) 8.6-50 MG per tablet 2 Tablet  Active   tizanidine (ZANAFLEX) 4 MG Tab 6 days Active   tizanidine (ZANAFLEX) tablet 8 mg  Active   vitamin D2, Ergocalciferol, (DRISDOL) 1.25 MG  (15771 UT) Cap capsule 6 days Active              Current Facility-Administered Medications   Medication Dose Route Frequency Provider Last Rate Last Admin   • DULoxetine (CYMBALTA) capsule 60 mg  60 mg Oral DAILY Eulogio Miller M.D.       • gabapentin (NEURONTIN) capsule 100 mg  100 mg Oral 4X/DAY Asedaryn Miller M.D.       • hydroxychloroquine (Plaquenil) tablet 400 mg  400 mg Oral DAILY Asedaryn Miller M.D.       • tizanidine (ZANAFLEX) tablet 8 mg  8 mg Oral Q8HRS PRN Asem VERN Miller M.D.       • acetaminophen (Tylenol) tablet 650 mg  650 mg Oral Q6HRS PRN Asem VERN Miller M.D.       • senna-docusate (PERICOLACE or SENOKOT S) 8.6-50 MG per tablet 2 Tablet  2 Tablet Oral BID Asedaryn Miller M.D.        And   • polyethylene glycol/lytes (MIRALAX) PACKET 1 Packet  1 Packet Oral QDAY PRN Asem VERN Miller M.D.        And   • magnesium hydroxide (MILK OF MAGNESIA) suspension 30 mL  30 mL Oral QDAY PRN Asem VERN Miller M.D.        And   • bisacodyl (DULCOLAX) suppository 10 mg  10 mg Rectal QDAY PRN Asem VERN Miller M.D.       • dextrose 5 % and 0.45 % NaCl with KCl 20 mEq   Intravenous Continuous Asem VERN Miller M.D. 75 mL/hr at 03/22/22 1540 New Bag at 03/22/22 1540   • Pharmacy Consult Request ...Pain Management Review 1 Each  1 Each Other PHARMACY TO DOSE Asem VERN Miller M.D.       • oxyCODONE immediate-release (ROXICODONE) tablet 2.5 mg  2.5 mg Oral Q3HRS PRN Eulogio Miller M.D.        Or   • oxyCODONE immediate-release (ROXICODONE) tablet 5 mg  5 mg Oral Q3HRS PRN Asem VERN Miller M.D.        Or   • HYDROmorphone (Dilaudid) injection 0.25 mg  0.25 mg Intravenous Q3HRS PRN Asedaryn Miller M.D.       • ondansetron (ZOFRAN) syringe/vial injection 4 mg  4 mg Intravenous Q4HRS PRN Eulogio Miller M.D.   4 mg at 03/22/22 0431   • ondansetron (ZOFRAN ODT) dispertab 4 mg  4 mg Oral Q4HRS PRN Eulogio Miller M.D.   4 mg at 03/21/22 1449   • promethazine (PHENERGAN) tablet 12.5-25 mg  12.5-25 mg Oral Q4HRS PRN  Eulogio Miller M.D.       • promethazine (PHENERGAN) suppository 12.5-25 mg  12.5-25 mg Rectal Q4HRS PRN Eulogio Miller M.D.       • prochlorperazine (COMPAZINE) injection 5-10 mg  5-10 mg Intravenous Q4HRS PRN Eulogio Miller M.D.   5 mg at 03/22/22 1122   • insulin regular (HumuLIN R,NovoLIN R) injection  1-6 Units Subcutaneous 4X/DAY ACHS Eulogio Miller M.D.        And   • dextrose 10 % BOLUS 25 g  25 g Intravenous Q15 MIN PRN Eulogio Miller M.D.   25 g at 03/21/22 4582       Allergies  Allergies   Allergen Reactions   • Cefdinir Rash     rash   • Erythromycin Vomiting and Nausea       Vital Signs last 24 hours  Temp:  [36.4 °C (97.5 °F)-36.6 °C (97.9 °F)] 36.6 °C (97.8 °F)  Pulse:  [78-85] 84  Resp:  [12-18] 17  BP: ()/(59-80) 93/63  SpO2:  [93 %-98 %] 98 %    Physical Exam  Physical Exam  Constitutional:       Appearance: Normal appearance. She is not ill-appearing, toxic-appearing or diaphoretic.   HENT:      Head: Normocephalic and atraumatic.      Mouth/Throat:      Mouth: Mucous membranes are dry.   Eyes:      Extraocular Movements: Extraocular movements intact.   Cardiovascular:      Rate and Rhythm: Normal rate and regular rhythm.      Pulses: Normal pulses.      Heart sounds: Normal heart sounds.   Pulmonary:      Effort: Pulmonary effort is normal. No respiratory distress.      Breath sounds: No stridor. No wheezing or rales.      Comments: Mild rhonchi in LLL  Chest:      Chest wall: No tenderness.   Abdominal:      General: Abdomen is flat.      Palpations: Abdomen is soft.   Musculoskeletal:         General: No swelling, tenderness or deformity.   Skin:     General: Skin is warm and dry.   Neurological:      General: No focal deficit present.      Mental Status: She is alert and oriented to person, place, and time.         Fluids    Intake/Output Summary (Last 24 hours) at 3/22/2022 1632  Last data filed at 3/22/2022 0838  Gross per 24 hour   Intake 600 ml   Output --   Net 600 ml        Laboratory  Recent Results (from the past 48 hour(s))   POCT glucose device results    Collection Time: 03/21/22  2:48 PM   Result Value Ref Range    POC Glucose, Blood 68 65 - 99 mg/dL   CBC WITH DIFFERENTIAL    Collection Time: 03/21/22  3:10 PM   Result Value Ref Range    WBC 6.6 4.8 - 10.8 K/uL    RBC 5.12 4.20 - 5.40 M/uL    Hemoglobin 15.7 12.0 - 16.0 g/dL    Hematocrit 46.4 37.0 - 47.0 %    MCV 90.6 81.4 - 97.8 fL    MCH 30.7 27.0 - 33.0 pg    MCHC 33.8 33.6 - 35.0 g/dL    RDW 38.6 35.9 - 50.0 fL    Platelet Count 262 164 - 446 K/uL    MPV 9.8 9.0 - 12.9 fL    Neutrophils-Polys 52.40 44.00 - 72.00 %    Lymphocytes 32.20 22.00 - 41.00 %    Monocytes 13.40 0.00 - 13.40 %    Eosinophils 1.20 0.00 - 6.90 %    Basophils 0.50 0.00 - 1.80 %    Immature Granulocytes 0.30 0.00 - 0.90 %    Nucleated RBC 0.00 /100 WBC    Neutrophils (Absolute) 3.45 2.00 - 7.15 K/uL    Lymphs (Absolute) 2.12 1.00 - 4.80 K/uL    Monos (Absolute) 0.88 (H) 0.00 - 0.85 K/uL    Eos (Absolute) 0.08 0.00 - 0.51 K/uL    Baso (Absolute) 0.03 0.00 - 0.12 K/uL    Immature Granulocytes (abs) 0.02 0.00 - 0.11 K/uL    NRBC (Absolute) 0.00 K/uL   COMP METABOLIC PANEL    Collection Time: 03/21/22  3:10 PM   Result Value Ref Range    Sodium 138 135 - 145 mmol/L    Potassium 3.0 (L) 3.6 - 5.5 mmol/L    Chloride 102 96 - 112 mmol/L    Co2 23 20 - 33 mmol/L    Anion Gap 13.0 7.0 - 16.0    Glucose 73 65 - 99 mg/dL    Bun 15 8 - 22 mg/dL    Creatinine 0.81 0.50 - 1.40 mg/dL    Calcium 9.4 8.4 - 10.2 mg/dL    AST(SGOT) 29 12 - 45 U/L    ALT(SGPT) 42 2 - 50 U/L    Alkaline Phosphatase 126 (H) 30 - 99 U/L    Total Bilirubin 0.3 0.1 - 1.5 mg/dL    Albumin 4.4 3.2 - 4.9 g/dL    Total Protein 8.1 6.0 - 8.2 g/dL    Globulin 3.7 (H) 1.9 - 3.5 g/dL    A-G Ratio 1.2 g/dL   PTT    Collection Time: 03/21/22  3:10 PM   Result Value Ref Range    APTT 26.1 24.7 - 36.0 sec   PT/INR    Collection Time: 03/21/22  3:10 PM   Result Value Ref Range    PT 13.9 12.0 - 14.6  sec    INR 1.16 (H) 0.87 - 1.13   COD - Adult (Type and Screen)    Collection Time: 03/21/22  3:10 PM   Result Value Ref Range    ABO Grouping Only A     Rh Grouping Only NEG     Antibody Screen-Cod NEG    LIPASE    Collection Time: 03/21/22  3:10 PM   Result Value Ref Range    Lipase 26 7 - 58 U/L   ESTIMATED GFR    Collection Time: 03/21/22  3:10 PM   Result Value Ref Range    GFR (CKD-EPI) 105 >60 mL/min/1.73 m 2   ABO Rh Confirm    Collection Time: 03/21/22  3:40 PM   Result Value Ref Range    ABO Rh Confirm A NEG    COV-2, FLU A/B, AND RSV BY PCR (2-4 HOURS CEPHEID): Collect NP swab in VTM    Collection Time: 03/21/22  5:35 PM    Specimen: Nasopharyngeal; Respirate   Result Value Ref Range    Influenza virus A RNA Negative Negative    Influenza virus B, PCR Negative Negative    RSV, PCR Negative Negative    SARS-CoV-2 by PCR NotDetected     SARS-CoV-2 Source Nasal Swab    POCT glucose device results    Collection Time: 03/21/22  5:46 PM   Result Value Ref Range    POC Glucose, Blood 100 (H) 65 - 99 mg/dL   POCT glucose device results    Collection Time: 03/21/22  7:54 PM   Result Value Ref Range    POC Glucose, Blood 101 (H) 65 - 99 mg/dL   POCT glucose device results    Collection Time: 03/21/22  9:54 PM   Result Value Ref Range    POC Glucose, Blood 56 (L) 65 - 99 mg/dL   POCT glucose device results    Collection Time: 03/21/22 10:17 PM   Result Value Ref Range    POC Glucose, Blood 86 65 - 99 mg/dL   POCT glucose device results    Collection Time: 03/21/22 10:43 PM   Result Value Ref Range    POC Glucose, Blood 90 65 - 99 mg/dL   Basic Metabolic Panel    Collection Time: 03/21/22 11:07 PM   Result Value Ref Range    Sodium 136 135 - 145 mmol/L    Potassium 3.3 (L) 3.6 - 5.5 mmol/L    Chloride 102 96 - 112 mmol/L    Co2 24 20 - 33 mmol/L    Glucose 97 65 - 99 mg/dL    Bun 10 8 - 22 mg/dL    Creatinine 0.58 0.50 - 1.40 mg/dL    Calcium 8.5 8.4 - 10.2 mg/dL    Anion Gap 10.0 7.0 - 16.0   HGB    Collection Time:  03/21/22 11:07 PM   Result Value Ref Range    Hemoglobin 13.3 12.0 - 16.0 g/dL   ESTIMATED GFR    Collection Time: 03/21/22 11:07 PM   Result Value Ref Range    GFR (CKD-EPI) 131 >60 mL/min/1.73 m 2   POCT glucose device results    Collection Time: 03/21/22 11:49 PM   Result Value Ref Range    POC Glucose, Blood 138 (H) 65 - 99 mg/dL   POCT glucose device results    Collection Time: 03/22/22  1:28 AM   Result Value Ref Range    POC Glucose, Blood 92 65 - 99 mg/dL   CBC with Differential    Collection Time: 03/22/22  4:48 AM   Result Value Ref Range    WBC 4.9 4.8 - 10.8 K/uL    RBC 4.59 4.20 - 5.40 M/uL    Hemoglobin 14.2 12.0 - 16.0 g/dL    Hematocrit 41.4 37.0 - 47.0 %    MCV 90.2 81.4 - 97.8 fL    MCH 30.9 27.0 - 33.0 pg    MCHC 34.3 33.6 - 35.0 g/dL    RDW 38.6 35.9 - 50.0 fL    Platelet Count 230 164 - 446 K/uL    MPV 9.6 9.0 - 12.9 fL    Neutrophils-Polys 41.20 (L) 44.00 - 72.00 %    Lymphocytes 43.50 (H) 22.00 - 41.00 %    Monocytes 12.40 0.00 - 13.40 %    Eosinophils 1.90 0.00 - 6.90 %    Basophils 0.60 0.00 - 1.80 %    Immature Granulocytes 0.40 0.00 - 0.90 %    Nucleated RBC 0.00 /100 WBC    Neutrophils (Absolute) 2.00 2.00 - 7.15 K/uL    Lymphs (Absolute) 2.11 1.00 - 4.80 K/uL    Monos (Absolute) 0.60 0.00 - 0.85 K/uL    Eos (Absolute) 0.09 0.00 - 0.51 K/uL    Baso (Absolute) 0.03 0.00 - 0.12 K/uL    Immature Granulocytes (abs) 0.02 0.00 - 0.11 K/uL    NRBC (Absolute) 0.00 K/uL   Comp Metabolic Panel (CMP)    Collection Time: 03/22/22  4:48 AM   Result Value Ref Range    Sodium 137 135 - 145 mmol/L    Potassium 3.7 3.6 - 5.5 mmol/L    Chloride 104 96 - 112 mmol/L    Co2 23 20 - 33 mmol/L    Anion Gap 10.0 7.0 - 16.0    Glucose 102 (H) 65 - 99 mg/dL    Bun 8 8 - 22 mg/dL    Creatinine 0.60 0.50 - 1.40 mg/dL    Calcium 8.8 8.4 - 10.2 mg/dL    AST(SGOT) 23 12 - 45 U/L    ALT(SGPT) 33 2 - 50 U/L    Alkaline Phosphatase 107 (H) 30 - 99 U/L    Total Bilirubin 0.4 0.1 - 1.5 mg/dL    Albumin 4.0 3.2 - 4.9 g/dL     Total Protein 7.0 6.0 - 8.2 g/dL    Globulin 3.0 1.9 - 3.5 g/dL    A-G Ratio 1.3 g/dL   Magnesium    Collection Time: 03/22/22  4:48 AM   Result Value Ref Range    Magnesium 2.0 1.5 - 2.5 mg/dL   ESTIMATED GFR    Collection Time: 03/22/22  4:48 AM   Result Value Ref Range    GFR (CKD-EPI) 130 >60 mL/min/1.73 m 2   HCG QUALITATIVE UR    Collection Time: 03/22/22  5:20 AM   Result Value Ref Range    Beta-Hcg Urine Negative Negative   URINALYSIS    Collection Time: 03/22/22  5:20 AM    Specimen: Urine, Clean Catch   Result Value Ref Range    Color Yellow     Character Clear     Specific Gravity 1.020 <1.035    Ph 5.0 5.0 - 8.0    Glucose Negative Negative mg/dL    Ketones 15 (A) Negative mg/dL    Protein Negative Negative mg/dL    Bilirubin Negative Negative    Nitrite Negative Negative    Leukocyte Esterase Negative Negative    Occult Blood Trace (A) Negative    Micro Urine Req Microscopic    URINE MICROSCOPIC (W/UA)    Collection Time: 03/22/22  5:20 AM   Result Value Ref Range    WBC 0-2 /hpf    RBC 0-2 /hpf    Bacteria Few (A) None /hpf    Epithelial Cells Moderate (A) Few /hpf    Hyaline Cast 0-2 /lpf   POCT glucose device results    Collection Time: 03/22/22  5:38 AM   Result Value Ref Range    POC Glucose, Blood 88 65 - 99 mg/dL   POCT glucose device results    Collection Time: 03/22/22  7:30 AM   Result Value Ref Range    POC Glucose, Blood 90 65 - 99 mg/dL   Histology Request    Collection Time: 03/22/22  9:00 AM   Result Value Ref Range    Pathology Request Sent to Histo    POCT glucose device results    Collection Time: 03/22/22 11:04 AM   Result Value Ref Range    POC Glucose, Blood 91 65 - 99 mg/dL   HGB (Hemoglobin) for 48 hours    Collection Time: 03/22/22  2:00 PM   Result Value Ref Range    Hemoglobin 13.3 12.0 - 16.0 g/dL   TSH WITH REFLEX TO FT4    Collection Time: 03/22/22  2:00 PM   Result Value Ref Range    TSH 1.790 0.380 - 5.330 uIU/mL       Imaging  US-ABDOMEN COMPLETE SURVEY   Final Result       1.  Echogenic liver consistent with hepatic steatosis      2.  No evidence for cirrhosis or portal hypertension      3.  Prior cholecystectomy. No biliary dilation      DX-CHEST-PORTABLE (1 VIEW)    (Results Pending)       Assessment/Plan  * Hemoptysis  Assessment & Plan  Patient with large volume bloody emesis.  It is unlikely that this large volume of blood is originating in her respiratory tract while she remains so stable from a respiratory standpoint.  She is currently on room air saturating very well at 98 with no respiratory symptoms no feelings of shortness of breath.  She does report a history of mouth sores related to her lupus and states the last time she noticed them was about 2 weeks ago when this all began.  She states she currently does not have any mouth sores and cannot feel them or see them at this time.  Given the benign nature of the findings of her EGD we still have an unknown source of bleeding.  Plan  -I recommend obtaining serologies to assess for current lupus flare  -If bleeding recurs today or tomorrow I would recommend obtaining a CTA with care taken to obtain images from the nasal sinuses through the stomach to assess for any arterial bleeding.  -If bleeding recurs and CTA is unrevealing I will plan to bronch for airway surveillance on Thursday.  -CXR ordered to assess for consolidation  -Pulmonary to follow      Discussed patient condition and risk of morbidity and/or mortality with Hospitalist and RN.      Denice Lucas MD RD  Pulmonary and Critical Care    Available on Voalte

## 2022-03-22 NOTE — PROGRESS NOTES
4 Eyes Skin Assessment Completed by JORDI Cai and Selena RN.    Head WDL  Ears WDL  Nose WDL  Mouth WDL  Neck WDL  Breast/Chest WDL  Shoulder Blades WDL  Spine WDL  (R) Arm/Elbow/Hand Bruising  (L) Arm/Elbow/Hand WDL  Abdomen WDL  Groin WDL  Scrotum/Coccyx/Buttocks WDL  (R) Leg WDL  (L) Leg WDL  (R) Heel/Foot/Toe WDL  (L) Heel/Foot/Toe WDL          Devices In Places Blood Pressure Cuff and Pulse Ox      Interventions In Place N/A    Possible Skin Injury No    Pictures Uploaded Into Epic N/A  Wound Consult Placed N/A  RN Wound Prevention Protocol Ordered No

## 2022-03-22 NOTE — PROGRESS NOTES
Report received from night shift RN. Assume care. Pt. AAOx4 pt is bed,  Assessment completed. VSS. Denies pain, good CMS and pulses to gal LE, denies numbness and tingling. Pt was update for the care for the day. White board updated, All question answered. Pt has call light within reach,  bed is in the lowest position. Pt has no other needs at this time.     US to be done today earlier this afternoon

## 2022-03-22 NOTE — ED NOTES
Admit orders obtained and pending a bed assignment    FSBS 100 continue the initial IV infusion with the potassium IVPB

## 2022-03-22 NOTE — OR NURSING
Pt allergies and NPO status verified, home meds reconcilled. Belongings secured. Pt verbalizes understanding of the pain scale, expected course of stay, and plan of care.  Procedure verified. FSBS 90.

## 2022-03-22 NOTE — ANESTHESIA PREPROCEDURE EVALUATION
Case: [id number] Date/Time: 03/22/22 0815    Procedure: GASTROSCOPY    Location:  ENDOSCOPIC ULTRASOUND ROOM / SURGERY [facility]    Surgeons: Sharda Multani D.O.      Upper GI bleed    Relevant Problems   NEURO   (positive) Chronic tension-type headache, not intractable   (positive) History of asthma      ENDO   (positive) Controlled type 1 diabetes mellitus (HCC)   (positive) Controlled type 2 diabetes mellitus without complication, with long-term current use of insulin (HCC)   (positive) Type 1 diabetes mellitus (HCC)      Other   (positive) GI bleed   (positive) Lupus (HCC)       Physical Exam    Airway   Mallampati: II  TM distance: >3 FB  Neck ROM: full       Cardiovascular - normal exam  Rhythm: regular  Rate: normal  (-) murmur     Dental - normal exam           Pulmonary - normal exam  Breath sounds clear to auscultation     Abdominal    Neurological - normal exam                 Anesthesia Plan    ASA 2- EMERGENT       Plan - general       Airway plan will be ETT          Induction: intravenous    Postoperative Plan: Postoperative administration of opioids is intended.    Pertinent diagnostic labs and testing reviewed    Informed Consent:    Anesthetic plan and risks discussed with patient.    Use of blood products discussed with: patient whom consented to blood products.

## 2022-03-22 NOTE — PROGRESS NOTES
Hypoglycemia Intervention    Hypoglycemia protocol intervention:  Blood glucose 56 at 2155  Intervention: 4 oz of fruit juice at 2200.  Repeat blood glucose 86 at 2220.  Intervention: 4 oz of fruit juice   Repeat blood glucose 90 2245  Intervention: 25 gm Dextrose in 250mL at 2300  Repeat blood glucose 138 at 2345      Dr. Galvan notified of the above 2208

## 2022-03-22 NOTE — OP REPORT
EGD Report    Date:  3/22/2022    Surgeon: Sharda Multani D.O., [facility]    Indications: Hematemesis    Procedure: Diagnostic EGD with biopsy    Procedure duration: 9 minutes      Procedure in detail, Findings and Endoscopic Diagnosis:      -Prior to the procedure, a History and Physical was performed, and patient medications and allergies were reviewed. The patient’s tolerance of previous anesthesia was also reviewed. The risks and benefits of the procedure and the sedation options and risks were discussed with the patient. All questions were answered, and informed consent was obtained. The patient was deemed in satisfactory condition to undergo the procedure.    -Prior Anticoagulants: the patient has taken no previous anticoagulant or antiplatelet agents.    -ASA Grade Assessment: see anesthesia note     Anesthesia/medications:  see anesthesia note     -The patient was placed in the left lateral decubitus position. The scope was passed under direct vision. Continuous oxygen was provided via nasal cannula and intravenous sedation was administered in divided doses throughout the procedure. The patient’s blood pressure, pulse, and oxygen saturation were monitored continuously throughout the procedure.    -The adult gastroscope was gently advanced under direct visualization over the tongue, down the esophagus, through the stomach and into the 2nd portion of the duodenum. The color, texture, mucosa and anatomy of esophagus, stomach and duodenum were carefully examined with the scope. The scope was then withdrawn from the patient and the procedure terminated. Further details are in the finding section, based on anatomical location.  Retroflexion was performed in the stomach.    -The patient tolerated the procedure well and there were no immediate complications. The patient was then transferred to the recovery room in stable condition.    EBL: Minimal    Complications:  No immediate complications      Findings:  Duodenum:  The duodenal mucosa was normal in appearance throughout the entire examined duodenum.  No intraluminal blood or clot was identified.    Stomach: There was patchy mucosal erythema in the gastric antrum and body.  Multiple biopsies were obtained from the gastric antrum and body using a cold biopsy forceps.  The tissue was submitted to pathology.  No intraluminal blood or clot was identified.    Esophagus: The DH was located 39 cm from the incisors.  The EGF was located 38 cm from the incisors.  The SCJ was located 38 cm from the incisors.  There was LA class A/B esophagitis in the distal esophagus with friability.  No clear ulcerations or bleeding was identified.  There was edema versus small esophageal varices without high risk stigmata in the distal esophagus.      Summary of Findings:  -Normal duodenum  -Erythematous gastropathy; biopsied  -LACA/LACB esophagitis with friability  -Distal esophageal edema (could represent small esophageal varices)        Recommendations:  -A letter will be sent regarding to the biopsy result in about 2-4 weeks  -Return to the hospital floor  -Resume current medications  -Clear liquid diet as tolerated  -2 large-bore IVs  -Continue supportive care and IVF  -Continue antiemetics  -Trend hemoglobin  -Change Protonix 40 mg IV  to prilosec 40mg po BID taken 30 minutes prior to a meal  -Start sucralfate 1 g solution 4 times daily x14 days  -Transfuse for goal hemoglobin > 7  -Transfuse for goal platelet count > 50  -Transfuse for goal INR <2.5  -Abdominal US with Dopplers  -Complete serologic evaluation for liver disease (ordered)        Sharda Multani DO, FACP      Thank you for inviting me to participate in the care of this patient. Please do not hesitate to call GI consultants with additional questions/concerns or changes in the patient's clinical status at [phone].

## 2022-03-22 NOTE — ASSESSMENT & PLAN NOTE
On plaquenil  Discussed case with patients rheumatologist, do not suspect patients symptoms related to lupus

## 2022-03-22 NOTE — OR NURSING
0835: To PACU post EGD. OPA dc'd on arrival. Pt tearful, as pt said she would be pre-operatively.     0847: Pt no longer tearful.  Dr. Multani at bedside.     0910: Meets criteria for room.

## 2022-03-22 NOTE — ANESTHESIA TIME REPORT
Anesthesia Start and Stop Event Times     Date Time Event    3/22/2022 0805 Ready for Procedure     0813 Anesthesia Start     0839 Anesthesia Stop        Responsible Staff  03/22/22    Name Role Begin End    Josh Howard M.D. Anesth 0813 0839        Preop Diagnosis (Free Text):  Pre-op Diagnosis     GI bleed        Preop Diagnosis (Codes):    Premium Reason  Non-Premium    Comments:

## 2022-03-22 NOTE — PROGRESS NOTES
[facility] Medicine Daily Progress Note    Date of Service  3/22/2022    Chief Complaint  Malena Bennett is a 21 y.o. female admitted 3/21/2022 with hemoptysis.     Hospital Course  21-year-old woman with a history of GERD, erosive gastritis, IBS-C, possible celiac sprue, secondary adrenal insufficiency, SLE, factor V Leiden deficiency, anxiety, OCD, Tourette's syndrome, type 1 diabetes, and migraine headaches who presented for evaluation of hematemesis and hemoptysis.     Interval Problem Update  Patient was seen and examined at bedside.  No acute events overnight. Patient is resting comfortably in bed and in no acute distress.     EGD normal duodenum, erythema in antrum and body, esophagitis  Pulm consulted for hemoptysis     Consultants/Specialty  GI and pulmonary    Code Status  Full Code    Disposition  Patient is not medically cleared for discharge.   Anticipate discharge to to home with close outpatient follow-up.  I have placed the appropriate orders for post-discharge needs.    Review of Systems  Review of Systems   Constitutional: Negative for chills and fever.   HENT: Negative for congestion and sore throat.    Eyes: Negative for blurred vision and double vision.   Respiratory: Positive for hemoptysis. Negative for cough and shortness of breath.    Cardiovascular: Negative for chest pain and palpitations.   Gastrointestinal: Negative for abdominal pain, blood in stool, melena, nausea and vomiting.   Genitourinary: Negative for dysuria and frequency.   Musculoskeletal: Negative for back pain and falls.   Skin: Negative for rash.   Neurological: Negative for seizures and loss of consciousness.   Psychiatric/Behavioral: Negative for hallucinations and substance abuse.        Physical Exam  Temp:  [36.4 °C (97.5 °F)-36.9 °C (98.5 °F)] 36.6 °C (97.8 °F)  Pulse:  [] 84  Resp:  [12-18] 17  BP: ()/(59-85) 93/63  SpO2:  [93 %-98 %] 98 %    Physical Exam  Constitutional:       General: She  is not in acute distress.     Appearance: She is normal weight. She is not toxic-appearing.   HENT:      Head: Normocephalic.      Right Ear: External ear normal.      Left Ear: External ear normal.      Nose: Nose normal. No congestion.      Mouth/Throat:      Mouth: Mucous membranes are moist.      Pharynx: No oropharyngeal exudate.   Eyes:      General: No scleral icterus.     Extraocular Movements: Extraocular movements intact.      Pupils: Pupils are equal, round, and reactive to light.   Cardiovascular:      Rate and Rhythm: Normal rate and regular rhythm.      Heart sounds: No murmur heard.  Pulmonary:      Breath sounds: No wheezing.   Abdominal:      General: Bowel sounds are normal.      Palpations: Abdomen is soft.      Tenderness: There is no abdominal tenderness. There is no guarding or rebound.   Musculoskeletal:         General: No swelling or deformity.   Skin:     Capillary Refill: Capillary refill takes less than 2 seconds.      Coloration: Skin is not jaundiced.      Findings: No bruising.   Neurological:      General: No focal deficit present.      Mental Status: She is alert. Mental status is at baseline.      Cranial Nerves: No cranial nerve deficit.      Sensory: No sensory deficit.      Comments: A&O x4   Psychiatric:         Mood and Affect: Mood normal.         Behavior: Behavior normal.         Thought Content: Thought content normal.         Judgment: Judgment normal.         Fluids    Intake/Output Summary (Last 24 hours) at 3/22/2022 1223  Last data filed at 3/22/2022 0838  Gross per 24 hour   Intake 600 ml   Output --   Net 600 ml       Laboratory  Recent Labs     03/21/22  1510 03/21/22  2307 03/22/22  0448   WBC 6.6  --  4.9   RBC 5.12  --  4.59   HEMOGLOBIN 15.7 13.3 14.2   HEMATOCRIT 46.4  --  41.4   MCV 90.6  --  90.2   MCH 30.7  --  30.9   MCHC 33.8  --  34.3   RDW 38.6  --  38.6   PLATELETCT 262  --  230   MPV 9.8  --  9.6     Recent Labs     03/21/22  1510 03/21/22 2307  03/22/22  0448   SODIUM 138 136 137   POTASSIUM 3.0* 3.3* 3.7   CHLORIDE 102 102 104   CO2 23 24 23   GLUCOSE 73 97 102*   BUN 15 10 8   CREATININE 0.81 0.58 0.60   CALCIUM 9.4 8.5 8.8     Recent Labs     03/21/22  1510   APTT 26.1   INR 1.16*               Imaging  US-ABDOMEN COMPLETE SURVEY    (Results Pending)        Assessment/Plan  * Hemoptysis  Assessment & Plan  Patient reports coughing up blood and clots over last several days  Patient states that coughing lead to vomiting where she again noted blood  GI was consulted but EGD is unremarkable  Pulmonology has been consulted    Hypokalemia- (present on admission)  Assessment & Plan  Replacing, checking magnesium    Continue to monitor replace as needed     Hematemesis- (present on admission)  Assessment & Plan  Likely secondary to Hemoptysis  Hb stable  hemodymically stable  EGD 03/22:  normal duodenum, erythema in antrum and body, esophagitis    Lupus (HCC)- (present on admission)  Assessment & Plan  On plaquenil    Type 1 diabetes mellitus (HCC)- (present on admission)  Assessment & Plan  With hypoglycemia   Patient will use insulin pump  Start D5, sliding scale insulin for now  Accu-Checks, hypoglycemia protocol     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 In review of yesterday's note (3/21/2022), there are no changes except as documented above.

## 2022-03-22 NOTE — ASSESSMENT & PLAN NOTE
Patient with large volume bloody emesis.  It is unlikely that this large volume of blood is originating in her respiratory tract while she remains so stable from a respiratory standpoint.  She is currently on room air saturating very well at 98 with no respiratory symptoms no feelings of shortness of breath.  She does report a history of mouth sores related to her lupus and states the last time she noticed them was about 2 weeks ago when this all began.    Plan  -ESR and CRP are negative  -Bleeding has continued today with no source identified, GI has signed off, epistaxis is a serious consideration here.  -CTA of neck and chest unrevealing for source of bleeding  -No bleeding originating from the lung on bronchoscopy.  -Recommend evaluation by ENT for nose bleed.   -Pulmonary will sign off

## 2022-03-22 NOTE — ANESTHESIA PROCEDURE NOTES
Airway    Date/Time: 3/22/2022 8:18 AM  Performed by: Josh Howard M.D.  Authorized by: Josh Howard M.D.     Location:  OR  Urgency:  Elective  Difficult Airway: No    Indications for Airway Management:  Anesthesia      Spontaneous Ventilation: absent    Sedation Level:  Deep  Preoxygenated: Yes    Patient Position:  Sniffing  Mask Difficulty Assessment:  2 - vent by mask + OA or adjuvant +/- NMBA  Final Airway Type:  Endotracheal airway  Final Endotracheal Airway:  ETT  Cuffed: Yes    Technique Used for Successful ETT Placement:  Direct laryngoscopy    Insertion Site:  Oral  Blade Type:  Enedina  Laryngoscope Blade/Videolaryngoscope Blade Size:  3  ETT Size (mm):  6.5  Measured from:  Teeth  ETT to Teeth (cm):  21  Placement Verified by: auscultation and capnometry    Cormack-Lehane Classification:  Grade IIa - partial view of glottis  Number of Attempts at Approach:  1   Atraumatic DLx1

## 2022-03-22 NOTE — CARE PLAN
The patient is Watcher - Medium risk of patient condition declining or worsening    Shift Goals  Clinical Goals: Maintain blood glucose in parameters  >80 and <150  Patient Goals: Nausea free, rest    Progress made toward(s) clinical / shift goals:      Patient is not progressing towards the following goals:    Patient c/o nausea and HA. Hypoglycemic protocol continued. Patient NPO, FSBS monitored.       Problem: Nutrition  Goal: Patient's nutritional and fluid intake will be adequate or improve  Outcome: Not Progressing     Problem: Gastrointestinal Irritability  Goal: Nausea and vomiting will be absent or improve  Outcome: Not Progressing

## 2022-03-23 ENCOUNTER — APPOINTMENT (OUTPATIENT)
Dept: RADIOLOGY | Facility: MEDICAL CENTER | Age: 22
DRG: 368 | End: 2022-03-23
Attending: INTERNAL MEDICINE

## 2022-03-23 LAB
ALBUMIN SERPL BCP-MCNC: 4 G/DL (ref 3.2–4.9)
BASOPHILS # BLD AUTO: 0.8 % (ref 0–1.8)
BASOPHILS # BLD: 0.05 K/UL (ref 0–0.12)
BUN SERPL-MCNC: 4 MG/DL (ref 8–22)
CALCIUM SERPL-MCNC: 9.3 MG/DL (ref 8.4–10.2)
CHLORIDE SERPL-SCNC: 104 MMOL/L (ref 96–112)
CO2 SERPL-SCNC: 26 MMOL/L (ref 20–33)
CREAT SERPL-MCNC: 0.66 MG/DL (ref 0.5–1.4)
CRP SERPL HS-MCNC: <0.3 MG/DL (ref 0–0.75)
EOSINOPHIL # BLD AUTO: 0.15 K/UL (ref 0–0.51)
EOSINOPHIL NFR BLD: 2.3 % (ref 0–6.9)
ERYTHROCYTE [DISTWIDTH] IN BLOOD BY AUTOMATED COUNT: 39.6 FL (ref 35.9–50)
ERYTHROCYTE [SEDIMENTATION RATE] IN BLOOD BY WESTERGREN METHOD: 16 MM/HOUR (ref 0–25)
GFR SERPLBLD CREATININE-BSD FMLA CKD-EPI: 127 ML/MIN/1.73 M 2
GLUCOSE BLD STRIP.AUTO-MCNC: 101 MG/DL (ref 65–99)
GLUCOSE BLD STRIP.AUTO-MCNC: 103 MG/DL (ref 65–99)
GLUCOSE BLD STRIP.AUTO-MCNC: 114 MG/DL (ref 65–99)
GLUCOSE BLD STRIP.AUTO-MCNC: 60 MG/DL (ref 65–99)
GLUCOSE BLD STRIP.AUTO-MCNC: 79 MG/DL (ref 65–99)
GLUCOSE BLD STRIP.AUTO-MCNC: 82 MG/DL (ref 65–99)
GLUCOSE BLD STRIP.AUTO-MCNC: 83 MG/DL (ref 65–99)
GLUCOSE BLD STRIP.AUTO-MCNC: 93 MG/DL (ref 65–99)
GLUCOSE SERPL-MCNC: 99 MG/DL (ref 65–99)
HCT VFR BLD AUTO: 42.7 % (ref 37–47)
HGB BLD-MCNC: 13.8 G/DL (ref 12–16)
HGB BLD-MCNC: 14.1 G/DL (ref 12–16)
IMM GRANULOCYTES # BLD AUTO: 0.01 K/UL (ref 0–0.11)
IMM GRANULOCYTES NFR BLD AUTO: 0.2 % (ref 0–0.9)
LYMPHOCYTES # BLD AUTO: 2.67 K/UL (ref 1–4.8)
LYMPHOCYTES NFR BLD: 41.6 % (ref 22–41)
MAGNESIUM SERPL-MCNC: 1.9 MG/DL (ref 1.5–2.5)
MCH RBC QN AUTO: 30.1 PG (ref 27–33)
MCHC RBC AUTO-ENTMCNC: 33 G/DL (ref 33.6–35)
MCV RBC AUTO: 91 FL (ref 81.4–97.8)
MONOCYTES # BLD AUTO: 0.69 K/UL (ref 0–0.85)
MONOCYTES NFR BLD AUTO: 10.7 % (ref 0–13.4)
NEUTROPHILS # BLD AUTO: 2.85 K/UL (ref 2–7.15)
NEUTROPHILS NFR BLD: 44.4 % (ref 44–72)
NRBC # BLD AUTO: 0 K/UL
NRBC BLD-RTO: 0 /100 WBC
PHOSPHATE SERPL-MCNC: 3.6 MG/DL (ref 2.5–4.5)
PLATELET # BLD AUTO: 263 K/UL (ref 164–446)
PMV BLD AUTO: 10 FL (ref 9–12.9)
POTASSIUM SERPL-SCNC: 3.8 MMOL/L (ref 3.6–5.5)
RBC # BLD AUTO: 4.69 M/UL (ref 4.2–5.4)
SODIUM SERPL-SCNC: 139 MMOL/L (ref 135–145)
WBC # BLD AUTO: 6.4 K/UL (ref 4.8–10.8)

## 2022-03-23 PROCEDURE — 70498 CT ANGIOGRAPHY NECK: CPT

## 2022-03-23 PROCEDURE — 83735 ASSAY OF MAGNESIUM: CPT

## 2022-03-23 PROCEDURE — 86140 C-REACTIVE PROTEIN: CPT

## 2022-03-23 PROCEDURE — 82962 GLUCOSE BLOOD TEST: CPT | Mod: 91

## 2022-03-23 PROCEDURE — A9270 NON-COVERED ITEM OR SERVICE: HCPCS | Performed by: HOSPITALIST

## 2022-03-23 PROCEDURE — 86225 DNA ANTIBODY NATIVE: CPT

## 2022-03-23 PROCEDURE — 70496 CT ANGIOGRAPHY HEAD: CPT

## 2022-03-23 PROCEDURE — 85652 RBC SED RATE AUTOMATED: CPT

## 2022-03-23 PROCEDURE — 99233 SBSQ HOSP IP/OBS HIGH 50: CPT | Performed by: INTERNAL MEDICINE

## 2022-03-23 PROCEDURE — 80069 RENAL FUNCTION PANEL: CPT

## 2022-03-23 PROCEDURE — 770006 HCHG ROOM/CARE - MED/SURG/GYN SEMI*

## 2022-03-23 PROCEDURE — 85025 COMPLETE CBC W/AUTO DIFF WBC: CPT

## 2022-03-23 PROCEDURE — 700101 HCHG RX REV CODE 250: Performed by: HOSPITALIST

## 2022-03-23 PROCEDURE — 36415 COLL VENOUS BLD VENIPUNCTURE: CPT

## 2022-03-23 PROCEDURE — 74175 CTA ABDOMEN W/CONTRAST: CPT

## 2022-03-23 PROCEDURE — 700111 HCHG RX REV CODE 636 W/ 250 OVERRIDE (IP): Performed by: HOSPITALIST

## 2022-03-23 PROCEDURE — 99232 SBSQ HOSP IP/OBS MODERATE 35: CPT | Performed by: INTERNAL MEDICINE

## 2022-03-23 PROCEDURE — 700102 HCHG RX REV CODE 250 W/ 637 OVERRIDE(OP): Performed by: HOSPITALIST

## 2022-03-23 PROCEDURE — 85018 HEMOGLOBIN: CPT

## 2022-03-23 PROCEDURE — 700117 HCHG RX CONTRAST REV CODE 255: Performed by: INTERNAL MEDICINE

## 2022-03-23 RX ADMIN — ONDANSETRON 4 MG: 4 TABLET, ORALLY DISINTEGRATING ORAL at 17:37

## 2022-03-23 RX ADMIN — IOHEXOL 40 ML: 350 INJECTION, SOLUTION INTRAVENOUS at 12:43

## 2022-03-23 RX ADMIN — DEXTROSE MONOHYDRATE, SODIUM CHLORIDE, AND POTASSIUM CHLORIDE: 50; 4.5; 1.49 INJECTION, SOLUTION INTRAVENOUS at 05:09

## 2022-03-23 RX ADMIN — IOHEXOL 100 ML: 350 INJECTION, SOLUTION INTRAVENOUS at 12:44

## 2022-03-23 RX ADMIN — ONDANSETRON 4 MG: 4 TABLET, ORALLY DISINTEGRATING ORAL at 03:33

## 2022-03-23 RX ADMIN — ACETAMINOPHEN 650 MG: 325 TABLET, FILM COATED ORAL at 14:46

## 2022-03-23 RX ADMIN — ONDANSETRON 4 MG: 4 TABLET, ORALLY DISINTEGRATING ORAL at 10:01

## 2022-03-23 RX ADMIN — DULOXETINE HYDROCHLORIDE 60 MG: 30 CAPSULE, DELAYED RELEASE ORAL at 05:07

## 2022-03-23 RX ADMIN — HYDROXYCHLOROQUINE SULFATE 400 MG: 200 TABLET, FILM COATED ORAL at 05:07

## 2022-03-23 RX ADMIN — DEXTROSE MONOHYDRATE, SODIUM CHLORIDE, AND POTASSIUM CHLORIDE: 50; 4.5; 1.49 INJECTION, SOLUTION INTRAVENOUS at 20:23

## 2022-03-23 ASSESSMENT — ENCOUNTER SYMPTOMS
DIARRHEA: 1
HALLUCINATIONS: 0
FOCAL WEAKNESS: 0
HEADACHES: 1
FALLS: 0
SHORTNESS OF BREATH: 0
PALPITATIONS: 0
HEMOPTYSIS: 1
ABDOMINAL PAIN: 0
SORE THROAT: 0
WEIGHT LOSS: 0
BACK PAIN: 0
ABDOMINAL PAIN: 1
DIZZINESS: 1
CONSTIPATION: 0
VOMITING: 0
COUGH: 0
NAUSEA: 1
SINUS PAIN: 0
SEIZURES: 0
BLOOD IN STOOL: 0
VOMITING: 1
DOUBLE VISION: 0
BLURRED VISION: 0
CHILLS: 0
LOSS OF CONSCIOUSNESS: 0
FEVER: 0
ROS GI COMMENTS: HEMATEMESIS

## 2022-03-23 ASSESSMENT — PAIN DESCRIPTION - PAIN TYPE: TYPE: ACUTE PAIN

## 2022-03-23 ASSESSMENT — LIFESTYLE VARIABLES: SUBSTANCE_ABUSE: 0

## 2022-03-23 NOTE — PROGRESS NOTES
Gastroenterology Initial Consult Note               Author:  Danica Hitchcock APRESTRELLA Date & Time Created: 3/23/2022 8:38 AM       Patient ID:  Name:             Malena Bennett  YOB: 2000  Age:                 21 y.o.  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    Hematemesis: She has 5 days of vomiting dark clot and red blood.  She has associated orthostatic symptoms but is hemodynamically stable.  She also reports new onset abdominal pain.  Her hemoglobin is stable.  Her history is not consistent with Tisha-Henderson tear.  Suspect erosive gastritis, duodenitis, PUD, esophagitis and less likely angiodysplasia/AVMs, Dieulafoy's lesion, malignancy, small bowel bleeding, and right-sided colon bleeding.  Plan for diagnostic EGD with therapeutic intent.  The patient is a suitable candidate for endoscopy and sedation.  The risk/benefits/alternatives of EGD with possible intervention to include perforation, bleeding, infection, aspiration, missed lesions, need for repeat procedure, need for surgery, and need for additional intervention were discussed with the patient who agrees to these risk and would like to proceed.  -ADAT  -Start Prilosec 40 mg twice a day  -Start Sucralfate 1 g suspension QID x 14 days    Hypokalemia: Likely  secondary to multiple bouts of emesis.  Could also be related to her underlying secondary adrenal insufficiency.  Will defer additional management to the primary team.  -Resolved    Elevated LAEs: The patient has mild intermittent LAE elevation in an ALT predominant hepatocellular pattern since at least 2021.  This was thought to be secondary to methotrexate use and the methotrexate was discontinued at the beginning of 3/2022.  Her LAE's are downtrending since discontinuation of methotrexate.  Abdominal US revealed hepatic steatosis, no cirrhosis or portal hypertension. No biliary dilatation  --Complete serologic evaluation for liver disease      GI WILL SIGN OFF. PLEASE CALL IF ANY QUESTIONS OR CONCERNS      DWAYNE Gonzales.      Thank you for inviting me to participate in the care of this patient. Please do not hesitate to call GI consultants with additional questions/concerns or changes in the patient's clinical status at [phone].    GI attending attestation:  Sharda Multani DO, FACHERBER    I saw and evaluated the patient.  I agree with the assessment and recommendations above as edited by me in addition to below..    CTA abdomen was negative for SMA or celiac artery abnormalities.  She continues to have nausea with vomiting and p.o. intolerance.  However, she is now reporting hemoptysis rather than hematemesis.  Her nausea could be medication side effect or related to acute infectious gastroenteritis.  Gastric biopsies demonstrated mild chronic gastritis with were eosinophils not consistent with eosinophilic gastritis.  No evidence of H. pylori on biopsies.  With trial PPI and sucralfate.  If persistent nausea/vomiting, could consider gastric emptying study (4-hour study as an outpatient is preferred over a 2-hour study as an inpatient) or UGI series with small bowel follow-through.  -Start Prilosec 40 mg twice daily  -Start sucralfate 1 g suspension 4 times daily x14 days  -Could consider UGI  series with small bowel follow-through  -Could consider GES    Thank you for inviting me to participate in the care of this patient.  GI to sign off at this time.  If the patient develops changes in clinical course/decompensation or you have any additional questions or concerns, please don't hesitate to reengage GI consultants.  The patient should follow up with [facility] in 4-6 weeks after discharge.  The patient will be called to schedule an appointment time.  If the patient does not receive a call from [facility] scheduling or they have additional questions, recommend the patient call the clinic at [phone] to schedule an appointment.        ________________________________________________________________________________________________________________________________________________________      Presenting Chief Complaint:  Vomiting blood clots      History of Present Illness:    This is a very pleasant 21 y.o. female with the past medical history as listed below.  Approximately 6-7 days ago, started to feel ill with dizziness, lightheadedness, and headache.  About 5 days ago, she developed nausea with hematemesis.  She notes vomiting dark clots on her first episode of emesis.  She is continued to have 4-5 episodes of hematemesis daily which increased in volume today.  She also has persistent dizziness and orthostatic symptoms.  She denies prior similar symptoms.  She uses ibuprofen once every 3 weeks.    She was last seen at [facility] in 2018 at which point she reported melena and was advised to complete an EGD.  However, this was not completed.  She also reported intermittent nausea/vomiting since 2018 (on review of her medical record).    INTERVAL HISTORY:    3/23/22: Stable. No acute overnight events. Continues to have hemoptysis however slowly improving. Pulmonology on board. Tolerating small amount of fluids, she would like to advance her diet. Last emesis was 3:30  AM-no bloody emesis. Upper abdominal discomfort. Abdominal US revealed no cirrhosis. Hepatic steatosis. LFT's downtrending. Hgb:14.1.    EGD 3/22/22:   -Normal duodenum  -Erythematous gastropathy; biopsied  -LACA/LACB esophagitis with friability  -Distal esophageal edema (could represent small esophageal varices)    Review of Systems:  Review of Systems   Constitutional: Positive for malaise/fatigue. Negative for chills and fever.   Respiratory: Negative for cough and shortness of breath.    Cardiovascular: Negative for chest pain and leg swelling.   Gastrointestinal: Positive for abdominal pain, diarrhea, nausea and vomiting. Negative for blood in stool, constipation and melena.        Hematemesis   Genitourinary: Negative for dysuria and urgency.   Skin: Negative for rash.   Neurological: Positive for dizziness and headaches. Negative for focal weakness.             Past Medical History:  Past Medical History:   Diagnosis Date   • Asthma     resolved   • Clotting disorder (HCC)    • Diabetes (HCC)    • Factor 5 Leiden mutation, heterozygous (HCC)    • Lupus (HCC)    • OCD (obsessive compulsive disorder)    • Tourette disease    -Anxiety  -Biliary dyskinesia (status post cholecystectomy)  -GERD  -Migraine headaches  -Possible celiac sprue (stool antigen screen positive but TTG IgG/IgA and gliadin IgG/IgA were negative)  -IBS-C  -Secondary adrenal insufficiency      Active Hospital Problems    Diagnosis    • Hemoptysis [R04.2]    • Hematemesis [K92.0]    • Hypokalemia [E87.6]    • Lupus (HCC) [M32.9]    • Type 1 diabetes mellitus (HCC) [E10.9]    • 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;  Service:    • ELIGIO BY LAPAROSCOPY  1/30/2017    Procedure: ELIGIO BY LAPAROSCOPY;  Surgeon: Fina Perkins M.D.;  Location: SURGERY SAME DAY [facility];  Service:          Prior Endoscopic Procedures:  EGD 10/2017: Erythematous to duodenopathy (peptic duodenitis on biopsy), erythematous gastropathy (erosive gastritis, no HP on biopsy) (reflux esophagitis with, edematous distal esophageal mucosa fibrinopurulent debris)    Colonoscopy 12/2017: Normal terminal ileum (normal terminal ileal mucosa on biopsy), normal colon (normal colonic mucosa on biopsy)      Hospital Medications:  Current Facility-Administered Medications   Medication Dose Frequency Provider Last Rate Last Admin   • DULoxetine (CYMBALTA) capsule 60 mg  60 mg DAILY Asem VERN Miller M.D.   60 mg at 03/23/22 0507   • gabapentin (NEURONTIN) capsule 100 mg  100 mg 4X/DAY Asem VERN Miller M.D.       • hydroxychloroquine (Plaquenil) tablet 400 mg  400 mg DAILY Asem VERN Miller M.D.   400 mg at 03/23/22 0507   • tizanidine (ZANAFLEX) tablet 8 mg  8 mg Q8HRS PRN Eulogio Miller M.D.       • acetaminophen (Tylenol) tablet 650 mg  650 mg Q6HRS PRN Gusm VERN Miller M.D.       • senna-docusate (PERICOLACE or SENOKOT S) 8.6-50 MG per tablet 2 Tablet  2 Tablet BID Eulogio Miller M.D.        And   • polyethylene glycol/lytes (MIRALAX) PACKET 1 Packet  1 Packet QDAY PRN Asedaryn Miller M.D.        And   • magnesium hydroxide (MILK OF MAGNESIA) suspension 30 mL  30 mL QDAY PRN Eulogio Miller M.D.        And   • bisacodyl (DULCOLAX) suppository 10 mg  10 mg QDAY PRN Asedaryn Miller M.D.       • dextrose 5 % and 0.45 % NaCl with KCl 20 mEq   Continuous Asem VERN Miller M.D. 75 mL/hr at 03/23/22 0509 New Bag at 03/23/22 0509   • Pharmacy Consult Request ...Pain Management Review 1 Each  1 Each PHARMACY TO DOSE Eulogio Miller M.D.       • oxyCODONE  immediate-release (ROXICODONE) tablet 2.5 mg  2.5 mg Q3HRS PRN Asem A MIRTHA Miller        Or   • oxyCODONE immediate-release (ROXICODONE) tablet 5 mg  5 mg Q3HRS PRN Asem A MIRTHA Miller   5 mg at 03/22/22 2125    Or   • HYDROmorphone (Dilaudid) injection 0.25 mg  0.25 mg Q3HRS PRN Asem A MIRTHA Miller       • ondansetron (ZOFRAN) syringe/vial injection 4 mg  4 mg Q4HRS PRN Asem A MIRTHA Miller   4 mg at 03/22/22 0431   • ondansetron (ZOFRAN ODT) dispertab 4 mg  4 mg Q4HRS PRN Asem A MIRTHA Miller   4 mg at 03/23/22 0333   • promethazine (PHENERGAN) tablet 12.5-25 mg  12.5-25 mg Q4HRS PRN Asem VENR Miller M.D.       • promethazine (PHENERGAN) suppository 12.5-25 mg  12.5-25 mg Q4HRS PRN Asem A MIRTHA Miller       • prochlorperazine (COMPAZINE) injection 5-10 mg  5-10 mg Q4HRS PRN Asem VERN Miller M.D.   5 mg at 03/22/22 1122   • insulin regular (HumuLIN R,NovoLIN R) injection  1-6 Units 4X/DAY ACHS Asedaryn Miller M.D.        And   • dextrose 10 % BOLUS 25 g  25 g Q15 MIN PRN Asem VERN Miller M.D.   25 g at 03/21/22 2377   Last reviewed on 3/22/2022  7:59 AM by Mariann Hill R.N.        Current Outpatient Medications:  Medications Prior to Admission   Medication Sig Dispense Refill Last Dose   • insulin infusion pump Device Inject 0.7 Units/hr under the skin continuous. Patient's own SQ insulin pump    Type of Insulin: Humalog  Last change of tubing: took out pump today 3/21/22 due to hypoglycemia    Dosing:  Basal rate:   0.7 units/hr - adjusts basal rate according to Control IQ technology and CGM  Bolus ratio:   1 unit : 12 g carbohydrate at breakfast, lunch, dinner, snacks  Correction ratio:   1 units for every 50 over 150 mg/dL    Disconnect pump if patient becomes hypoglycemic and altered.   3/21/2022 at 1400   • ketorolac (TORADOL) 10 MG Tab Take 10 mg by mouth 2 times a day as needed. Indications: Pain   > 3 WEEKS   • tizanidine (ZANAFLEX) 4 MG Tab Take 8 mg by mouth every evening.   6 days    • vitamin D2, Ergocalciferol, (DRISDOL) 1.25 MG (07215 UT) Cap capsule Take 50,000 Units by mouth every 72 hours. EVERY 3 DAYS   6 days   • insulin glargine (LANTUS SOLOSTAR) 100 UNIT/ML Solution Pen-injector injection Inject 10 Units under the skin every day. When not able to use insulin pump      • prochlorperazine (COMPAZINE) 5 MG Tab Take 1 Tablet by mouth every 6 hours as needed for Nausea/Vomiting. 15 Tablet 0 3/20/2022 at AM   • Methotrexate Sodium 50 MG/2ML Solution 10 mg every 7 days. 0.4 ml = 10 mg, SUBCUTANEOSLY   > 3 WEEKS   • gabapentin (NEURONTIN) 100 MG Cap Take 1 Capsule by mouth 4 times a day. 120 Capsule 0 6 days   • DULoxetine (CYMBALTA) 60 MG Cap DR Particles delayed-release capsule Take 1 Capsule by mouth every day. 90 Capsule 3 6 days   • ondansetron (ZOFRAN ODT) 8 MG TABLET DISPERSIBLE Take 1 Tablet by mouth every 8 hours as needed for Nausea. 30 Tablet 1 3/20/2022 at PM   • hydroxychloroquine (PLAQUENIL) 200 MG Tab Take 2 Tablets by mouth every day. 90 tablet 0 6 days   • cyanocobalamin (VITAMIN B-12) 1000 MCG/ML Solution Inject 1 mL intramuscularly every 14 days. 12 mL 0 10 days         Medication Allergies:  Allergies   Allergen Reactions   • Cefdinir Rash     rash   • Erythromycin Vomiting and Nausea         Family Medical History:  Family History   Problem Relation Age of Onset   • Other Maternal Grandfather         Parkinson's   • Genetic Disorder Maternal Grandfather         Parkinsons, and crouzon syndrome   • Cancer Maternal Grandfather         Skin Cancer   • Heart Disease Other    • Cancer Other         bile duct cancer   • Other Mother         sphincter of salud, pancreatic insufficiency   • Cancer Mother 53        cervical ca   • Cancer Other         great uncle brain   • Arthritis Maternal Grandmother    • Cancer Maternal Grandmother         Skin Cancer   • Heart Disease Maternal Grandmother    • Hypertension Maternal Grandmother    • Hyperlipidemia Maternal Grandmother    •  Stroke Maternal Grandmother    • Genetic Disorder Father         Factor V Liden and Tourettes   • Heart Disease Father    • Hyperlipidemia Father    • Genetic Disorder Maternal Uncle         Crouzon Syndrome   • Cancer Maternal Uncle         Bile Duct, and Skin cancer   • Cancer Maternal Aunt         Multiple Aunts and Uncles passed from cancer   • Heart Disease Maternal Aunt         Open heart surgery occuring   • Heart Disease Paternal Grandmother    • Hypertension Paternal Grandmother    • Stroke Paternal Grandmother    • Heart Disease Maternal Uncle         multiple uncles.         Social History:  Social History     Socioeconomic History   • Marital status: Single     Spouse name: Not on file   • Number of children: Not on file   • Years of education: Not on file   • Highest education level: Bachelor's degree (e.g., BA, AB, BS)   Occupational History   • Not on file   Tobacco Use   • Smoking status: Never Smoker   • Smokeless tobacco: Never Used   Vaping Use   • Vaping Use: Never used   Substance and Sexual Activity   • Alcohol use: No     Alcohol/week: 0.0 oz   • Drug use: No   • Sexual activity: Yes     Partners: Male     Birth control/protection: I.U.D.   Other Topics Concern   • Behavioral problems Not Asked   • Interpersonal relationships Not Asked   • Sad or not enjoying activities Not Asked   • Suicidal thoughts Not Asked   • Poor school performance Not Asked   • Reading difficulties Not Asked   • Speech difficulties Not Asked   • Writing difficulties Not Asked   • Inadequate sleep Not Asked   • Excessive TV viewing Not Asked   • Excessive video game use Not Asked   • Inadequate exercise Not Asked   • Sports related Not Asked   • Poor diet Not Asked   • Family concerns for drug/alcohol abuse Not Asked   • Poor oral hygiene Not Asked   • Bike safety Not Asked   • Family concerns vehicle safety Not Asked   Social History Narrative   • Not on file     Social Determinants of Health     Financial Resource  "Strain: Low Risk    • Difficulty of Paying Living Expenses: Not very hard   Food Insecurity: No Food Insecurity   • Worried About Running Out of Food in the Last Year: Never true   • Ran Out of Food in the Last Year: Never true   Transportation Needs: No Transportation Needs   • Lack of Transportation (Medical): No   • Lack of Transportation (Non-Medical): No   Physical Activity: Sufficiently Active   • Days of Exercise per Week: 4 days   • Minutes of Exercise per Session: 40 min   Stress: No Stress Concern Present   • Feeling of Stress : Not at all   Social Connections: Moderately Isolated   • Frequency of Communication with Friends and Family: More than three times a week   • Frequency of Social Gatherings with Friends and Family: Twice a week   • Attends Protestant Services: Never   • Active Member of Clubs or Organizations: No   • Attends Club or Organization Meetings: Never   • Marital Status: Living with partner   Intimate Partner Violence: Not on file   Housing Stability: Low Risk    • Unable to Pay for Housing in the Last Year: No   • Number of Places Lived in the Last Year: 2   • Unstable Housing in the Last Year: No         Vital signs:  Weight/BMI: Body mass index is 32.63 kg/m².  /78   Pulse 80   Temp 36.1 °C (96.9 °F)   Resp 20   Ht 1.702 m (5' 7\")   Wt 94.5 kg (208 lb 5.4 oz)   SpO2 100%   Vitals:    03/22/22 1130 03/22/22 1644 03/22/22 2155 03/23/22 0600   BP: (!) 85/55 104/69 114/77 110/78   Pulse: 70 81 79 80   Resp: 17 17 20 20   Temp: 36.6 °C (97.8 °F) 36.6 °C (97.8 °F) 36.5 °C (97.7 °F) 36.1 °C (96.9 °F)   TempSrc: Temporal Temporal Oral    SpO2: 95% 98% 96% 100%   Weight:       Height:         Oxygen Therapy:  Pulse Oximetry: 100 % (RA), O2 (LPM): 0, O2 Delivery Device: None - Room Air    Intake/Output Summary (Last 24 hours) at 3/23/2022 0838  Last data filed at 3/22/2022 1540  Gross per 24 hour   Intake 340 ml   Output --   Net 340 ml         Physical Exam:  Physical Exam  Vitals " and nursing note reviewed.   Constitutional:       General: She is not in acute distress.     Appearance: Normal appearance. She is not ill-appearing.   HENT:      Head: Normocephalic and atraumatic.      Nose: Nose normal.      Mouth/Throat:      Mouth: Mucous membranes are moist.      Pharynx: Oropharynx is clear.   Eyes:      General: No scleral icterus.     Extraocular Movements: Extraocular movements intact.      Conjunctiva/sclera: Conjunctivae normal.   Cardiovascular:      Rate and Rhythm: Normal rate and regular rhythm.      Heart sounds: Normal heart sounds. No murmur heard.    No gallop.   Pulmonary:      Effort: Pulmonary effort is normal. No respiratory distress.      Breath sounds: Normal breath sounds. No wheezing, rhonchi or rales.   Abdominal:      General: Abdomen is flat. Bowel sounds are normal. There is no distension.      Palpations: Abdomen is soft. There is no mass.      Tenderness: There is abdominal tenderness in the right upper quadrant and epigastric area. There is no guarding or rebound.   Musculoskeletal:         General: Normal range of motion.      Cervical back: Normal range of motion.      Right lower leg: No edema.      Left lower leg: No edema.   Skin:     General: Skin is warm and dry.      Coloration: Skin is not jaundiced.   Neurological:      General: No focal deficit present.      Mental Status: She is alert and oriented to person, place, and time.   Psychiatric:         Mood and Affect: Mood normal.         Behavior: Behavior normal.         Judgment: Judgment normal.       MP: 3      Labs:  Recent Labs     03/21/22 2307 03/22/22 0448 03/23/22  0522   SODIUM 136 137 139   POTASSIUM 3.3* 3.7 3.8   CHLORIDE 102 104 104   CO2 24 23 26   BUN 10 8 4*   CREATININE 0.58 0.60 0.66   MAGNESIUM  --  2.0 1.9   PHOSPHORUS  --   --  3.6   CALCIUM 8.5 8.8 9.3     Recent Labs     03/21/22  1510 03/21/22  2307 03/22/22  0448 03/23/22  0522   ALTSGPT 42  --  33  --    ASTSGOT 29  --  23   --    ALKPHOSPHAT 126*  --  107*  --    TBILIRUBIN 0.3  --  0.4  --    LIPASE 26  --   --   --    GLUCOSE 73 97 102* 99     Recent Labs     03/21/22 1510 03/22/22 0448 03/23/22 0522   WBC 6.6 4.9 6.4   NEUTSPOLYS 52.40 41.20* 44.40   LYMPHOCYTES 32.20 43.50* 41.60*   MONOCYTES 13.40 12.40 10.70   EOSINOPHILS 1.20 1.90 2.30   BASOPHILS 0.50 0.60 0.80   ASTSGOT 29 23  --    ALTSGPT 42 33  --    ALKPHOSPHAT 126* 107*  --    TBILIRUBIN 0.3 0.4  --      Recent Labs     03/21/22  1510 03/21/22 2307 03/22/22 0448 03/22/22  1400 03/22/22  2140 03/23/22 0522   RBC 5.12  --  4.59  --   --  4.69   HEMOGLOBIN 15.7   < > 14.2 13.3 14.1 14.1   HEMATOCRIT 46.4  --  41.4  --   --  42.7   PLATELETCT 262  --  230  --   --  263   PROTHROMBTM 13.9  --   --   --   --   --    APTT 26.1  --   --   --   --   --    INR 1.16*  --   --   --   --   --    IRON  --   --   --  104  --   --    TOTIRONBC  --   --   --  281  --   --     < > = values in this interval not displayed.     Recent Results (from the past 24 hour(s))   Histology Request    Collection Time: 03/22/22  9:00 AM   Result Value Ref Range    Pathology Request Sent to Histo    POCT glucose device results    Collection Time: 03/22/22 11:04 AM   Result Value Ref Range    POC Glucose, Blood 91 65 - 99 mg/dL   HGB (Hemoglobin) for 48 hours    Collection Time: 03/22/22  2:00 PM   Result Value Ref Range    Hemoglobin 13.3 12.0 - 16.0 g/dL   TSH WITH REFLEX TO FT4    Collection Time: 03/22/22  2:00 PM   Result Value Ref Range    TSH 1.790 0.380 - 5.330 uIU/mL   IRON/TOTAL IRON BIND    Collection Time: 03/22/22  2:00 PM   Result Value Ref Range    Iron 104 40 - 170 ug/dL    Total Iron Binding 281 250 - 450 ug/dL    Unsat Iron Binding 177 110 - 370 ug/dL    % Saturation 37 15 - 55 %   POCT glucose device results    Collection Time: 03/22/22  4:52 PM   Result Value Ref Range    POC Glucose, Blood 88 65 - 99 mg/dL   POCT glucose device results    Collection Time: 03/22/22  9:17 PM    Result Value Ref Range    POC Glucose, Blood 62 (L) 65 - 99 mg/dL   HGB (Hemoglobin) for 48 hours    Collection Time: 03/22/22  9:40 PM   Result Value Ref Range    Hemoglobin 14.1 12.0 - 16.0 g/dL   POCT glucose device results    Collection Time: 03/22/22  9:46 PM   Result Value Ref Range    POC Glucose, Blood 73 65 - 99 mg/dL   POCT glucose device results    Collection Time: 03/22/22 10:04 PM   Result Value Ref Range    POC Glucose, Blood 89 65 - 99 mg/dL   POCT glucose device results    Collection Time: 03/22/22 10:18 PM   Result Value Ref Range    POC Glucose, Blood 94 65 - 99 mg/dL   POCT glucose device results    Collection Time: 03/22/22 10:42 PM   Result Value Ref Range    POC Glucose, Blood 105 (H) 65 - 99 mg/dL   POCT glucose device results    Collection Time: 03/23/22  5:12 AM   Result Value Ref Range    POC Glucose, Blood 93 65 - 99 mg/dL   CBC WITH DIFFERENTIAL    Collection Time: 03/23/22  5:22 AM   Result Value Ref Range    WBC 6.4 4.8 - 10.8 K/uL    RBC 4.69 4.20 - 5.40 M/uL    Hemoglobin 14.1 12.0 - 16.0 g/dL    Hematocrit 42.7 37.0 - 47.0 %    MCV 91.0 81.4 - 97.8 fL    MCH 30.1 27.0 - 33.0 pg    MCHC 33.0 (L) 33.6 - 35.0 g/dL    RDW 39.6 35.9 - 50.0 fL    Platelet Count 263 164 - 446 K/uL    MPV 10.0 9.0 - 12.9 fL    Neutrophils-Polys 44.40 44.00 - 72.00 %    Lymphocytes 41.60 (H) 22.00 - 41.00 %    Monocytes 10.70 0.00 - 13.40 %    Eosinophils 2.30 0.00 - 6.90 %    Basophils 0.80 0.00 - 1.80 %    Immature Granulocytes 0.20 0.00 - 0.90 %    Nucleated RBC 0.00 /100 WBC    Neutrophils (Absolute) 2.85 2.00 - 7.15 K/uL    Lymphs (Absolute) 2.67 1.00 - 4.80 K/uL    Monos (Absolute) 0.69 0.00 - 0.85 K/uL    Eos (Absolute) 0.15 0.00 - 0.51 K/uL    Baso (Absolute) 0.05 0.00 - 0.12 K/uL    Immature Granulocytes (abs) 0.01 0.00 - 0.11 K/uL    NRBC (Absolute) 0.00 K/uL   MAGNESIUM    Collection Time: 03/23/22  5:22 AM   Result Value Ref Range    Magnesium 1.9 1.5 - 2.5 mg/dL   Renal Function Panel     Collection Time: 03/23/22  5:22 AM   Result Value Ref Range    Sodium 139 135 - 145 mmol/L    Potassium 3.8 3.6 - 5.5 mmol/L    Chloride 104 96 - 112 mmol/L    Co2 26 20 - 33 mmol/L    Glucose 99 65 - 99 mg/dL    Creatinine 0.66 0.50 - 1.40 mg/dL    Bun 4 (L) 8 - 22 mg/dL    Calcium 9.3 8.4 - 10.2 mg/dL    Phosphorus 3.6 2.5 - 4.5 mg/dL    Albumin 4.0 3.2 - 4.9 g/dL   Sed Rate    Collection Time: 03/23/22  5:22 AM   Result Value Ref Range    Sed Rate Westergren 16 0 - 25 mm/hour   CRP QUANTITIVE (NON-CARDIAC)    Collection Time: 03/23/22  5:22 AM   Result Value Ref Range    Stat C-Reactive Protein <0.30 0.00 - 0.75 mg/dL   ESTIMATED GFR    Collection Time: 03/23/22  5:22 AM   Result Value Ref Range    GFR (CKD-EPI) 127 >60 mL/min/1.73 m 2         Radiology Review:  DX-CHEST-PORTABLE (1 VIEW)   Final Result      1.  There is no acute cardiopulmonary process.      US-ABDOMEN COMPLETE SURVEY   Final Result      1.  Echogenic liver consistent with hepatic steatosis      2.  No evidence for cirrhosis or portal hypertension      3.  Prior cholecystectomy. No biliary dilation      CT-CTA CHEST WITH & W/O-POST PROCESS    (Results Pending)   CT-CTA NECK WITH & W/O-POST PROCESSING    (Results Pending)   CT-CTA HEAD WITH & W/O-POST PROCESS    (Results Pending)   CT-CTA ABDOMEN WITH & W/O-POST PROCESS    (Results Pending)         MDM (Data Review):   -Records reviewed and summarized in current documentation  -I personally reviewed and interpreted the laboratory results  -I personally reviewed the radiology images        Core Quality Measures   Reviewed items:  Labs and Medications reviewed

## 2022-03-23 NOTE — CARE PLAN
The patient is Watcher - Medium risk of patient condition declining or worsening    Shift Goals  Clinical Goals: patient will have adequate oral intake  Patient Goals: rest comfortably    Progress made toward(s) clinical / shift goals:  Patient continues to have hemoptysis, CT scan done today, hgb stable. Patient hypoglycemic at times due to n/v, currently controlled.     Patient is not progressing towards the following goals:

## 2022-03-23 NOTE — PROGRESS NOTES
[facility] Medicine Daily Progress Note    Date of Service  3/23/2022    Chief Complaint  Malena Bennett is a 21 y.o. female admitted 3/21/2022 with hemoptysis.     Hospital Course  21-year-old woman with a history of GERD, erosive gastritis, IBS-C, possible celiac sprue, secondary adrenal insufficiency, SLE, factor V Leiden deficiency, anxiety, OCD, Tourette's syndrome, type 1 diabetes, and migraine headaches who presented for evaluation of hematemesis and hemoptysis.     Interval Problem Update  Patient was seen and examined at bedside.  No acute events overnight. Patient is resting comfortably in bed and in no acute distress. Patient reports several episodes of hemoptysis over night. Continues to experience nausea, no vomiting.     Pulm consulted for hemoptysis   CTA head/neck/aorta pending    Consultants/Specialty  GI and pulmonary    Code Status  Full Code    Disposition  Patient is not medically cleared for discharge.   Anticipate discharge to to home with close outpatient follow-up.  I have placed the appropriate orders for post-discharge needs.    Review of Systems  Review of Systems   Constitutional: Negative for chills and fever.   HENT: Negative for congestion and sore throat.    Eyes: Negative for blurred vision and double vision.   Respiratory: Positive for hemoptysis. Negative for cough and shortness of breath.    Cardiovascular: Negative for chest pain and palpitations.   Gastrointestinal: Positive for nausea. Negative for abdominal pain, blood in stool, melena and vomiting.   Genitourinary: Negative for dysuria and frequency.   Musculoskeletal: Negative for back pain and falls.   Skin: Negative for rash.   Neurological: Negative for seizures and loss of consciousness.   Psychiatric/Behavioral: Negative for hallucinations and substance abuse.        Physical Exam  Temp:  [36.1 °C (96.9 °F)-36.6 °C (97.8 °F)] 36.1 °C (96.9 °F)  Pulse:  [79-90] 90  Resp:  [17-20] 18  BP: (104-117)/(69-78)  117/72  SpO2:  [96 %-100 %] 97 %    Physical Exam  Constitutional:       General: She is not in acute distress.     Appearance: She is normal weight. She is not toxic-appearing.      Comments: Pleasant, conversational   HENT:      Head: Normocephalic.      Right Ear: External ear normal.      Left Ear: External ear normal.      Nose: Nose normal. No congestion.      Mouth/Throat:      Mouth: Mucous membranes are moist.      Pharynx: No oropharyngeal exudate.   Eyes:      General: No scleral icterus.     Extraocular Movements: Extraocular movements intact.      Pupils: Pupils are equal, round, and reactive to light.   Cardiovascular:      Rate and Rhythm: Normal rate and regular rhythm.      Heart sounds: No murmur heard.  Pulmonary:      Effort: Pulmonary effort is normal. No respiratory distress.      Breath sounds: No wheezing.   Abdominal:      General: Bowel sounds are normal.      Palpations: Abdomen is soft.      Tenderness: There is no abdominal tenderness. There is no guarding or rebound.   Musculoskeletal:         General: No swelling or deformity.   Skin:     Capillary Refill: Capillary refill takes less than 2 seconds.      Coloration: Skin is not jaundiced.      Findings: No bruising.   Neurological:      General: No focal deficit present.      Mental Status: She is alert. Mental status is at baseline.      Cranial Nerves: No cranial nerve deficit.      Sensory: No sensory deficit.      Comments: A&O x4   Psychiatric:         Mood and Affect: Mood normal.         Behavior: Behavior normal.         Thought Content: Thought content normal.         Judgment: Judgment normal.         Fluids    Intake/Output Summary (Last 24 hours) at 3/23/2022 1423  Last data filed at 3/22/2022 1540  Gross per 24 hour   Intake 340 ml   Output --   Net 340 ml       Laboratory  Recent Labs     03/21/22  1510 03/21/22  2307 03/22/22  0448 03/22/22  1400 03/22/22  2140 03/23/22  0522 03/23/22  1343   WBC 6.6  --  4.9  --   --   6.4  --    RBC 5.12  --  4.59  --   --  4.69  --    HEMOGLOBIN 15.7   < > 14.2   < > 14.1 14.1 13.8   HEMATOCRIT 46.4  --  41.4  --   --  42.7  --    MCV 90.6  --  90.2  --   --  91.0  --    MCH 30.7  --  30.9  --   --  30.1  --    MCHC 33.8  --  34.3  --   --  33.0*  --    RDW 38.6  --  38.6  --   --  39.6  --    PLATELETCT 262  --  230  --   --  263  --    MPV 9.8  --  9.6  --   --  10.0  --     < > = values in this interval not displayed.     Recent Labs     03/21/22  2307 03/22/22  0448 03/23/22  0522   SODIUM 136 137 139   POTASSIUM 3.3* 3.7 3.8   CHLORIDE 102 104 104   CO2 24 23 26   GLUCOSE 97 102* 99   BUN 10 8 4*   CREATININE 0.58 0.60 0.66   CALCIUM 8.5 8.8 9.3     Recent Labs     03/21/22  1510   APTT 26.1   INR 1.16*               Imaging  CT-CTA NECK WITH & W/O-POST PROCESSING   Final Result      1.  No acute arterial abnormality of the neck.   2.  Prominent, nonspecific bilateral jugular lymph nodes.      CT-CTA HEAD WITH & W/O-POST PROCESS   Final Result      1.  CT angiogram of the [place] within normal limits.   2.  No acute intracranial abnormality.      CT-CTA COMPLETE THORACOABDOMINAL AORTA   Final Result      1.  No aortic aneurysm or dissection identified. No acute arterial abnormality in the chest, abdomen, or pelvis seen.   2.  No evidence of acute inflammation in the chest, abdomen, or pelvis.   3.  Nonspecific, prominent mesenteric lymph nodes.                     DX-CHEST-PORTABLE (1 VIEW)   Final Result      1.  There is no acute cardiopulmonary process.      US-ABDOMEN COMPLETE SURVEY   Final Result      1.  Echogenic liver consistent with hepatic steatosis      2.  No evidence for cirrhosis or portal hypertension      3.  Prior cholecystectomy. No biliary dilation           Assessment/Plan  * Hemoptysis  Assessment & Plan  Patient reports coughing up blood and clots over last several days  Patient states that coughing lead to vomiting where she again noted blood  GI was  consulted but EGD is unremarkable  Several episodes of hemoptysis over night  Hemodynamically stable, comfortable on room air  CTA head/neck/aorta pending  Pulm recs    Hypokalemia- (present on admission)  Assessment & Plan  Replacing, checking magnesium    Continue to monitor replace as needed     Hematemesis- (present on admission)  Assessment & Plan  Likely secondary to Hemoptysis  Hb stable  hemodymically stable  EGD 03/22:  normal duodenum, erythema in antrum and body, esophagitis    Lupus (HCC)- (present on admission)  Assessment & Plan  On plaquenil    Type 1 diabetes mellitus (HCC)- (present on admission)  Assessment & Plan  With hypoglycemia   Patient will use insulin pump  Start D5, sliding scale insulin for now  Accu-Checks, hypoglycemia protocol     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 In review of yesterday's note (3/22/2022), there are no changes except as documented above.

## 2022-03-23 NOTE — PROGRESS NOTES
Internal Medicine Progress Note    Date of admission  3/21/2022    Chief Complaint  21 y.o. female admitted 3/21/2022 with Hemoptysis     Hospital Course  21 y.o. female who presented 3/21/2022 with hematemesis for approximately 5 days.  She has a history of GERD, erosive gastritis, celiac disease, type 1 diabetes, SLE, factor V Leiden deficiency, anxiety, OCD and migraines.  She states she was in her normal state of health until about 3 weeks ago when she had some upper respiratory viral illness.  She developed a cough which has not improved.  She states approximately 6 days ago she was in the shower and had an intense coughing which resulted in her coughing up several blood clots.  Over the past 6 days she has had multiple episodes which start with coughing and and with her gagging and vomiting at which time she produces a large amount of blood with clots.  Is blood is mixed with gastric contents and was initially very concerning for GI bleed.  Today she was taken for EGD which was unremarkable except for erythema in the body of her stomach and some esophagitis.  Immediately upon returning from the EGD she had another episode of hematemesis.  Pulmonary was consulted for the possibility that this is originating in the lungs.  She is currently on room air saturating 98%, speaking in full sentences, with no respiratory distress or respiratory complaints aside from her ongoing cough.     Patient states she had a history of pretty severe asthma as a kid requiring frequent nebulizer treatments however since she was 10 she has had virtually no problems with her asthma whatsoever.  She denies any chronic pulmonary symptoms including shortness of breath or cough.  She is a never smoker.  She does not have any known toxic inhalant exposure.    Interval Problem Update  Patient continues to cough up blood and clots today.  She also feels like she is having a flare of her lupus.  She was off her plaquenil for about 9 days  before receiving it again today because she was unable to keep it down.     Review of Systems  Review of Systems   Constitutional: Positive for malaise/fatigue. Negative for chills, fever and weight loss.   HENT: Negative for congestion and sinus pain.    Respiratory: Positive for hemoptysis. Negative for cough.    Cardiovascular: Negative for chest pain, palpitations and leg swelling.   Gastrointestinal: Positive for abdominal pain and vomiting.   Musculoskeletal: Positive for joint pain.   All other systems reviewed and are negative.       Vital Signs for last 24 hours   Temp:  [36.1 °C (96.9 °F)-36.5 °C (97.7 °F)] 36.1 °C (96.9 °F)  Pulse:  [79-90] 80  Resp:  [18-20] 18  BP: ()/(58-78) 95/58  SpO2:  [96 %-100 %] 98 %    Hemodynamic parameters for last 24 hours       Respiratory Information for the last 24 hours       Physical Exam   Physical Exam  Constitutional:       Appearance: Normal appearance.   HENT:      Head: Normocephalic and atraumatic.   Cardiovascular:      Rate and Rhythm: Normal rate and regular rhythm.      Heart sounds: Normal heart sounds.   Pulmonary:      Effort: Pulmonary effort is normal. No respiratory distress.      Breath sounds: Normal breath sounds. No wheezing or rales.   Neurological:      Mental Status: She is alert.         Medications  Current Facility-Administered Medications   Medication Dose Route Frequency Provider Last Rate Last Admin   • DULoxetine (CYMBALTA) capsule 60 mg  60 mg Oral DAILY Eulogio Miller M.D.   60 mg at 03/23/22 0507   • gabapentin (NEURONTIN) capsule 100 mg  100 mg Oral 4X/DAY Eulogio Miller M.D.       • hydroxychloroquine (Plaquenil) tablet 400 mg  400 mg Oral DAILY Eulogio Miller M.D.   400 mg at 03/23/22 0507   • tizanidine (ZANAFLEX) tablet 8 mg  8 mg Oral Q8HRS PRN Eulogio iMller M.D.       • acetaminophen (Tylenol) tablet 650 mg  650 mg Oral Q6HRS PRN Eulogio Miller M.D.   650 mg at 03/23/22 1446   • senna-docusate (PERICOLACE or  SENOKOT S) 8.6-50 MG per tablet 2 Tablet  2 Tablet Oral BID Asem VERN Miller M.D.        And   • polyethylene glycol/lytes (MIRALAX) PACKET 1 Packet  1 Packet Oral QDAY PRN Asem VERN Miller M.D.        And   • magnesium hydroxide (MILK OF MAGNESIA) suspension 30 mL  30 mL Oral QDAY PRN Asem VERN Miller M.D.        And   • bisacodyl (DULCOLAX) suppository 10 mg  10 mg Rectal QDAY PRN Asem VERN Miller M.D.       • dextrose 5 % and 0.45 % NaCl with KCl 20 mEq   Intravenous Continuous Asem VERN Miller M.D. 75 mL/hr at 03/23/22 0509 New Bag at 03/23/22 0509   • Pharmacy Consult Request ...Pain Management Review 1 Each  1 Each Other PHARMACY TO DOSE Asem VERN Miller M.D.       • oxyCODONE immediate-release (ROXICODONE) tablet 2.5 mg  2.5 mg Oral Q3HRS PRN Asem VERN Miller M.D.        Or   • oxyCODONE immediate-release (ROXICODONE) tablet 5 mg  5 mg Oral Q3HRS PRN Asem VERN Miller M.D.   5 mg at 03/22/22 2125    Or   • HYDROmorphone (Dilaudid) injection 0.25 mg  0.25 mg Intravenous Q3HRS PRN Asem VERN Miller M.D.       • ondansetron (ZOFRAN) syringe/vial injection 4 mg  4 mg Intravenous Q4HRS PRN Asem VERN Miller M.D.   4 mg at 03/22/22 0431   • ondansetron (ZOFRAN ODT) dispertab 4 mg  4 mg Oral Q4HRS PRN Asem VERN Miller M.D.   4 mg at 03/23/22 1001   • promethazine (PHENERGAN) tablet 12.5-25 mg  12.5-25 mg Oral Q4HRS PRN Asem VERN Miller M.D.       • promethazine (PHENERGAN) suppository 12.5-25 mg  12.5-25 mg Rectal Q4HRS PRN Asem VERN Miller M.D.       • prochlorperazine (COMPAZINE) injection 5-10 mg  5-10 mg Intravenous Q4HRS PRN Eulogio Miller M.D.   5 mg at 03/22/22 1122   • insulin regular (HumuLIN R,NovoLIN R) injection  1-6 Units Subcutaneous 4X/DAY ACHS Eulogio Miller M.D.        And   • dextrose 10 % BOLUS 25 g  25 g Intravenous Q15 MIN PRN Eulogio Miller M.D.   25 g at 03/21/22 2257       Fluids  No intake or output data in the 24 hours ending 03/23/22 1713    Laboratory          Recent Labs      03/21/22 2307 03/22/22 0448 03/23/22 0522   SODIUM 136 137 139   POTASSIUM 3.3* 3.7 3.8   CHLORIDE 102 104 104   CO2 24 23 26   BUN 10 8 4*   CREATININE 0.58 0.60 0.66   MAGNESIUM  --  2.0 1.9   PHOSPHORUS  --   --  3.6   CALCIUM 8.5 8.8 9.3     Recent Labs     03/21/22  1510 03/21/22 2307 03/22/22 0448 03/23/22 0522   ALTSGPT 42  --  33  --    ASTSGOT 29  --  23  --    ALKPHOSPHAT 126*  --  107*  --    TBILIRUBIN 0.3  --  0.4  --    LIPASE 26  --   --   --    GLUCOSE 73 97 102* 99     Recent Labs     03/21/22 1510 03/22/22 0448 03/23/22 0522   WBC 6.6 4.9 6.4   NEUTSPOLYS 52.40 41.20* 44.40   LYMPHOCYTES 32.20 43.50* 41.60*   MONOCYTES 13.40 12.40 10.70   EOSINOPHILS 1.20 1.90 2.30   BASOPHILS 0.50 0.60 0.80   ASTSGOT 29 23  --    ALTSGPT 42 33  --    ALKPHOSPHAT 126* 107*  --    TBILIRUBIN 0.3 0.4  --      Recent Labs     03/21/22  1510 03/21/22 2307 03/22/22 0448 03/22/22  1400 03/22/22  2140 03/23/22  0522 03/23/22  1343   RBC 5.12  --  4.59  --   --  4.69  --    HEMOGLOBIN 15.7   < > 14.2 13.3 14.1 14.1 13.8   HEMATOCRIT 46.4  --  41.4  --   --  42.7  --    PLATELETCT 262  --  230  --   --  263  --    PROTHROMBTM 13.9  --   --   --   --   --   --    APTT 26.1  --   --   --   --   --   --    INR 1.16*  --   --   --   --   --   --    IRON  --   --   --  104  --   --   --    TOTIRONBC  --   --   --  281  --   --   --     < > = values in this interval not displayed.       Imaging  CT:    Reviewed   CTA Chest:  IMPRESSION:     1.  No aortic aneurysm or dissection identified. No acute arterial abnormality in the chest, abdomen, or pelvis seen.  2.  No evidence of acute inflammation in the chest, abdomen, or pelvis.  3.  Nonspecific, prominent mesenteric lymph nodes.    Assessment/Plan  * Hemoptysis  Assessment & Plan  Patient with large volume bloody emesis.  It is unlikely that this large volume of blood is originating in her respiratory tract while she remains so stable from a respiratory standpoint.   She is currently on room air saturating very well at 98 with no respiratory symptoms no feelings of shortness of breath.  She does report a history of mouth sores related to her lupus and states the last time she noticed them was about 2 weeks ago when this all began.  She states she currently does not have any mouth sores and cannot feel them or see them at this time.  Given the benign nature of the findings of her EGD we still have an unknown source of bleeding.  Plan  -I recommend obtaining serologies to assess for current lupus flare  -Bleeding has continued today with no source identified, GI has signed off  -CTA of neck and chest unrevealing  -Plan for bronchoscopy, Friday morning at 9:30 due to unavailable OR times tomorrow  -Pulmonary to follow         I have performed a physical exam and reviewed and updated ROS and Plan today (3/23/2022). In review of yesterday's note (3/22/2022), there are no changes except as documented above.     Discussed patient condition and risk of morbidity and/or mortality with Hospitalist, Family and RN    Denice Lucas MD RD  Pulmonary and Critical Care    Available on [employer]al[employer]

## 2022-03-23 NOTE — CARE PLAN
Problem: Pain - Standard  Goal: Alleviation of pain or a reduction in pain to the patient’s comfort goal  Outcome: Progressing     Problem: Knowledge Deficit - Standard  Goal: Patient and family/care givers will demonstrate understanding of plan of care, disease process/condition, diagnostic tests and medications  Outcome: Progressing     Problem: Gastrointestinal Irritability  Goal: Nausea and vomiting will be absent or improve  Outcome: Progressing     Problem: Diabetes Management  Goal: Patient will achieve and maintain glucose in satisfactory range  Outcome: Progressing   The patient is Stable - Low risk of patient condition declining or worsening    Shift Goals  Clinical Goals: pt pain will be less than a 3/10 by end of shift  Patient Goals: rest comfortably    Progress made toward(s) clinical / shift goals:  all goals     Patient is not progressing towards the following goals:

## 2022-03-23 NOTE — ANESTHESIA POSTPROCEDURE EVALUATION
Patient: Malena Bennett    Procedure Summary     Date: 03/22/22 Room / Location:  ENDOSCOPIC ULTRASOUND ROOM / SURGERY [facility]    Anesthesia Start: 0813 Anesthesia Stop: 0839    Procedure: GASTROSCOPY (N/A Esophagus) Diagnosis: (Gastric erythema, esophagitis)    Surgeons: Sharda Multani D.O. Responsible Provider: Josh Howard M.D.    Anesthesia Type: general ASA Status: 2 - Emergent          Final Anesthesia Type: general  Last vitals  BP   Blood Pressure: 104/69    Temp   36.6 °C (97.8 °F)    Pulse   81   Resp   17    SpO2   98 %      Anesthesia Post Evaluation    Patient location during evaluation: PACU  Patient participation: complete - patient participated  Level of consciousness: awake and alert    Airway patency: patent  Anesthetic complications: no  Cardiovascular status: hemodynamically stable  Respiratory status: acceptable  Hydration status: euvolemic    PONV: none          No complications documented.     Nurse Pain Score: 7 (NPRS)

## 2022-03-23 NOTE — CARE PLAN
Problem: Nutrition  Goal: Patient's nutritional and fluid intake will be adequate or improve  Outcome: Progressing     Problem: Nutrition Deficit - Diabetes  Goal: Patient will demonstrate adequate hydration and vital signs  Outcome: Progressing     Problem: Pain - Standard  Goal: Alleviation of pain or a reduction in pain to the patient’s comfort goal  Outcome: Progressing   The patient is Stable - Low risk of patient condition declining or worsening    Shift Goals  Clinical Goals: By 1700 N/V and BS to remain stable and tx according SS  Patient Goals: feel comfortably    Progress made toward(s) clinical / shift goals:  Yes by end of shift [place] n/v, FSBS remain controlled. No SS used for her BS. Compazine was given only once as per her request-She refused Phenergan. Pain controlled with Oxy 5mg. Tolerated her diet well all shift.    Patient is not progressing towards the following goals:

## 2022-03-24 LAB
DSDNA AB TITR SER CLIF: 7 IU (ref 0–24)
FERRITIN SERPL-MCNC: 127 NG/ML (ref 10–291)
GLUCOSE BLD STRIP.AUTO-MCNC: 112 MG/DL (ref 65–99)
GLUCOSE BLD STRIP.AUTO-MCNC: 116 MG/DL (ref 65–99)
GLUCOSE BLD STRIP.AUTO-MCNC: 91 MG/DL (ref 65–99)
GLUCOSE BLD STRIP.AUTO-MCNC: 95 MG/DL (ref 65–99)
GLUCOSE BLD STRIP.AUTO-MCNC: 98 MG/DL (ref 65–99)
HAV AB SER QL IA: POSITIVE
HAV IGM SERPL QL IA: NEGATIVE
HBV CORE AB SERPL QL IA: NONREACTIVE
HBV SURFACE AB SERPL IA-ACNC: 152 MIU/ML (ref 0–10)
HBV SURFACE AG SER QL: NORMAL
IGA SERPL-MCNC: 248 MG/DL (ref 68–408)
IGG SERPL-MCNC: 1067 MG/DL (ref 768–1632)
IGG1 SER-MCNC: 438 MG/DL (ref 240–1118)
IGG2 SER-MCNC: 420 MG/DL (ref 124–549)
IGG3 SER-MCNC: 40 MG/DL (ref 21–134)
IGG4 SER-MCNC: 102 MG/DL (ref 1–123)
IGM SERPL-MCNC: 225 MG/DL (ref 35–263)
MITOCHONDRIA M2 IGG SER-ACNC: 3.8 UNITS (ref 0–24.9)
NUCLEAR IGG SER QL IA: DETECTED
SMA IGG SER-ACNC: 6 UNITS (ref 0–19)
TTG IGA SER IA-ACNC: <2 U/ML (ref 0–3)

## 2022-03-24 PROCEDURE — 99232 SBSQ HOSP IP/OBS MODERATE 35: CPT | Performed by: INTERNAL MEDICINE

## 2022-03-24 PROCEDURE — 700111 HCHG RX REV CODE 636 W/ 250 OVERRIDE (IP): Performed by: HOSPITALIST

## 2022-03-24 PROCEDURE — A9270 NON-COVERED ITEM OR SERVICE: HCPCS | Performed by: INTERNAL MEDICINE

## 2022-03-24 PROCEDURE — 99233 SBSQ HOSP IP/OBS HIGH 50: CPT | Performed by: INTERNAL MEDICINE

## 2022-03-24 PROCEDURE — 82962 GLUCOSE BLOOD TEST: CPT | Mod: 91

## 2022-03-24 PROCEDURE — 700101 HCHG RX REV CODE 250: Performed by: HOSPITALIST

## 2022-03-24 PROCEDURE — 700102 HCHG RX REV CODE 250 W/ 637 OVERRIDE(OP): Performed by: HOSPITALIST

## 2022-03-24 PROCEDURE — A9270 NON-COVERED ITEM OR SERVICE: HCPCS | Performed by: HOSPITALIST

## 2022-03-24 PROCEDURE — 700102 HCHG RX REV CODE 250 W/ 637 OVERRIDE(OP): Performed by: INTERNAL MEDICINE

## 2022-03-24 PROCEDURE — 770006 HCHG ROOM/CARE - MED/SURG/GYN SEMI*

## 2022-03-24 RX ORDER — TRAMADOL HYDROCHLORIDE 50 MG/1
50 TABLET ORAL EVERY 4 HOURS PRN
Status: DISCONTINUED | OUTPATIENT
Start: 2022-03-24 | End: 2022-03-28 | Stop reason: HOSPADM

## 2022-03-24 RX ADMIN — HYDROXYCHLOROQUINE SULFATE 400 MG: 200 TABLET, FILM COATED ORAL at 05:13

## 2022-03-24 RX ADMIN — TRAMADOL HYDROCHLORIDE 50 MG: 50 TABLET, COATED ORAL at 15:42

## 2022-03-24 RX ADMIN — DEXTROSE MONOHYDRATE, SODIUM CHLORIDE, AND POTASSIUM CHLORIDE: 50; 4.5; 1.49 INJECTION, SOLUTION INTRAVENOUS at 08:51

## 2022-03-24 RX ADMIN — TRAMADOL HYDROCHLORIDE 50 MG: 50 TABLET, COATED ORAL at 11:03

## 2022-03-24 RX ADMIN — ACETAMINOPHEN 650 MG: 325 TABLET, FILM COATED ORAL at 08:54

## 2022-03-24 RX ADMIN — DULOXETINE HYDROCHLORIDE 60 MG: 30 CAPSULE, DELAYED RELEASE ORAL at 05:13

## 2022-03-24 RX ADMIN — ONDANSETRON 4 MG: 4 TABLET, ORALLY DISINTEGRATING ORAL at 04:35

## 2022-03-24 RX ADMIN — PROCHLORPERAZINE EDISYLATE 5 MG: 5 INJECTION INTRAMUSCULAR; INTRAVENOUS at 16:44

## 2022-03-24 RX ADMIN — ACETAMINOPHEN 650 MG: 325 TABLET, FILM COATED ORAL at 22:20

## 2022-03-24 RX ADMIN — ONDANSETRON 4 MG: 2 INJECTION INTRAMUSCULAR; INTRAVENOUS at 13:28

## 2022-03-24 RX ADMIN — ONDANSETRON 4 MG: 2 INJECTION INTRAMUSCULAR; INTRAVENOUS at 08:57

## 2022-03-24 ASSESSMENT — ENCOUNTER SYMPTOMS
WEIGHT LOSS: 0
SORE THROAT: 0
SINUS PAIN: 0
ABDOMINAL PAIN: 1
FALLS: 0
HEMOPTYSIS: 1
BLOOD IN STOOL: 0
DOUBLE VISION: 0
SHORTNESS OF BREATH: 0
HEADACHES: 1
HALLUCINATIONS: 0
SEIZURES: 0
CHILLS: 0
VOMITING: 1
PALPITATIONS: 0
ABDOMINAL PAIN: 0
LOSS OF CONSCIOUSNESS: 0
BLURRED VISION: 0
COUGH: 0
VOMITING: 0
FEVER: 0
NAUSEA: 1
BACK PAIN: 0

## 2022-03-24 ASSESSMENT — PAIN DESCRIPTION - PAIN TYPE
TYPE: ACUTE PAIN

## 2022-03-24 ASSESSMENT — LIFESTYLE VARIABLES: SUBSTANCE_ABUSE: 0

## 2022-03-24 NOTE — PROGRESS NOTES
Internal Medicine Progress Note    Date of admission  3/21/2022    Chief Complaint  21 y.o. female admitted 3/21/2022 with Hemoptysis     Hospital Course  21 y.o. female who presented 3/21/2022 with hematemesis for approximately 5 days.  She has a history of GERD, erosive gastritis, celiac disease, type 1 diabetes, SLE, factor V Leiden deficiency, anxiety, OCD and migraines.  She states she was in her normal state of health until about 3 weeks ago when she had some upper respiratory viral illness.  She developed a cough which has not improved.  She states approximately 6 days ago she was in the shower and had an intense coughing which resulted in her coughing up several blood clots.  Over the past 6 days she has had multiple episodes which start with coughing and and with her gagging and vomiting at which time she produces a large amount of blood with clots.  Is blood is mixed with gastric contents and was initially very concerning for GI bleed.  Today she was taken for EGD which was unremarkable except for erythema in the body of her stomach and some esophagitis.  Immediately upon returning from the EGD she had another episode of hematemesis.  Pulmonary was consulted for the possibility that this is originating in the lungs.  She is currently on room air saturating 98%, speaking in full sentences, with no respiratory distress or respiratory complaints aside from her ongoing cough.     Patient states she had a history of pretty severe asthma as a kid requiring frequent nebulizer treatments however since she was 10 she has had virtually no problems with her asthma whatsoever.  She denies any chronic pulmonary symptoms including shortness of breath or cough.  She is a never smoker.  She does not have any known toxic inhalant exposure.    Interval Problem Update  Still coughing up blood intermittently.   S/p bronch this morning with normal tracheobronchial anatomy and mucosa.  S/p BAL with clear fluid return    Review  of Systems  Review of Systems   Constitutional: Positive for malaise/fatigue. Negative for chills, fever and weight loss.   HENT: Negative for congestion and sinus pain.    Respiratory: Positive for hemoptysis. Negative for cough.    Cardiovascular: Negative for chest pain, palpitations and leg swelling.   Gastrointestinal: Positive for abdominal pain and vomiting.   Musculoskeletal: Positive for joint pain.   All other systems reviewed and are negative.       Vital Signs for last 24 hours   Temp:  [36.5 °C (97.7 °F)-37.1 °C (98.8 °F)] 37.1 °C (98.8 °F)  Pulse:  [] 100  Resp:  [18-22] 18  BP: ()/(51-86) 102/62  SpO2:  [93 %-100 %] 97 %    Hemodynamic parameters for last 24 hours       Respiratory Information for the last 24 hours       Physical Exam   Physical Exam  Constitutional:       Appearance: Normal appearance.   HENT:      Head: Normocephalic and atraumatic.   Cardiovascular:      Rate and Rhythm: Normal rate and regular rhythm.      Heart sounds: Normal heart sounds.   Pulmonary:      Effort: Pulmonary effort is normal. No respiratory distress.      Breath sounds: Normal breath sounds. No wheezing or rales.   Neurological:      Mental Status: She is alert.         Medications  Current Facility-Administered Medications   Medication Dose Route Frequency Provider Last Rate Last Admin   • metoclopramide (REGLAN) tablet 10 mg  10 mg Oral 4X/DAY KASHIF Carmona D.O.   10 mg at 03/25/22 1605   • traMADol (ULTRAM) 50 MG tablet 50 mg  50 mg Oral Q4HRS PRN Alexander Carmona D.O.   50 mg at 03/24/22 1542   • DULoxetine (CYMBALTA) capsule 60 mg  60 mg Oral DAILY Eulogio Miller M.D.   60 mg at 03/25/22 0509   • hydroxychloroquine (Plaquenil) tablet 400 mg  400 mg Oral DAILY Eulogio Miller M.D.   400 mg at 03/25/22 0510   • tizanidine (ZANAFLEX) tablet 8 mg  8 mg Oral Q8HRS PRN Eulogio Miller M.D.       • acetaminophen (Tylenol) tablet 650 mg  650 mg Oral Q6HRS PRN Eulogio Miller M.D.   650 mg at  03/25/22 1418   • senna-docusate (PERICOLACE or SENOKOT S) 8.6-50 MG per tablet 2 Tablet  2 Tablet Oral BID Asem VERN Miller M.D.   2 Tablet at 03/25/22 1727    And   • polyethylene glycol/lytes (MIRALAX) PACKET 1 Packet  1 Packet Oral QDAY PRN Asem VERN Miller M.D.        And   • magnesium hydroxide (MILK OF MAGNESIA) suspension 30 mL  30 mL Oral QDAY PRN Asem VERN Miller M.D.        And   • bisacodyl (DULCOLAX) suppository 10 mg  10 mg Rectal QDAY PRN Asem VERN Miller M.D.       • dextrose 5 % and 0.45 % NaCl with KCl 20 mEq   Intravenous Continuous WILL LoomisO. 50 mL/hr at 03/25/22 0338 New Bag at 03/25/22 0338   • Pharmacy Consult Request ...Pain Management Review 1 Each  1 Each Other PHARMACY TO DOSE Asedaryn Miller M.D.       • oxyCODONE immediate-release (ROXICODONE) tablet 2.5 mg  2.5 mg Oral Q3HRS PRN Asem VERN Miller M.D.        Or   • oxyCODONE immediate-release (ROXICODONE) tablet 5 mg  5 mg Oral Q3HRS PRN Asem VERN Miller M.D.   5 mg at 03/22/22 2125    Or   • HYDROmorphone (Dilaudid) injection 0.25 mg  0.25 mg Intravenous Q3HRS PRN Asem VERN Miller M.D.       • ondansetron (ZOFRAN) syringe/vial injection 4 mg  4 mg Intravenous Q4HRS PRN Asem VERN Miller M.D.   4 mg at 03/25/22 1605   • ondansetron (ZOFRAN ODT) dispertab 4 mg  4 mg Oral Q4HRS PRN Asem VERN Miller M.D.   4 mg at 03/24/22 0435   • insulin regular (HumuLIN R,NovoLIN R) injection  1-6 Units Subcutaneous 4X/DAY ACHS Asem VERN Miller M.D.        And   • dextrose 10 % BOLUS 25 g  25 g Intravenous Q15 MIN PRN Asem VERN Miller, M.D.   25 g at 03/21/22 9827       Fluids    Intake/Output Summary (Last 24 hours) at 3/25/2022 1809  Last data filed at 3/25/2022 1038  Gross per 24 hour   Intake 450 ml   Output --   Net 450 ml       Laboratory          Recent Labs     03/23/22 0522   SODIUM 139   POTASSIUM 3.8   CHLORIDE 104   CO2 26   BUN 4*   CREATININE 0.66   MAGNESIUM 1.9   PHOSPHORUS 3.6   CALCIUM 9.3     Recent Labs     03/23/22  0522    GLUCOSE 99     Recent Labs     03/23/22  0522   WBC 6.4   NEUTSPOLYS 44.40   LYMPHOCYTES 41.60*   MONOCYTES 10.70   EOSINOPHILS 2.30   BASOPHILS 0.80     Recent Labs     03/22/22  2140 03/23/22  0522 03/23/22  1343   RBC  --  4.69  --    HEMOGLOBIN 14.1 14.1 13.8   HEMATOCRIT  --  42.7  --    PLATELETCT  --  263  --        Imaging  CT:    Reviewed   CTA Chest:  IMPRESSION:     1.  No aortic aneurysm or dissection identified. No acute arterial abnormality in the chest, abdomen, or pelvis seen.  2.  No evidence of acute inflammation in the chest, abdomen, or pelvis.  3.  Nonspecific, prominent mesenteric lymph nodes.    Assessment/Plan  * Hemoptysis  Assessment & Plan  Patient with large volume bloody emesis.  It is unlikely that this large volume of blood is originating in her respiratory tract while she remains so stable from a respiratory standpoint.  She is currently on room air saturating very well at 98 with no respiratory symptoms no feelings of shortness of breath.  She does report a history of mouth sores related to her lupus and states the last time she noticed them was about 2 weeks ago when this all began.    Plan  -ESR and CRP are negative  -Bleeding has continued today with no source identified, GI has signed off, epistaxis is a serious consideration here.  -CTA of neck and chest unrevealing for source of bleeding  -No bleeding originating from the lung on bronchoscopy.  -Recommend evaluation by ENT for nose bleed.   -Pulmonary will sign off         I have performed a physical exam and reviewed and updated ROS and Plan today (3/25/2022). In review of yesterday's note (3/24/2022), there are no changes except as documented above.     Discussed patient condition and risk of morbidity and/or mortality with Hospitalist, Family and RN    Denice Lucas MD RD  Pulmonary and Critical Care    Available on Voal[employer]

## 2022-03-24 NOTE — CARE PLAN
Problem: Pain - Standard  Goal: Alleviation of pain or a reduction in pain to the patient’s comfort goal  Outcome: Progressing     Problem: Knowledge Deficit - Standard  Goal: Patient and family/care givers will demonstrate understanding of plan of care, disease process/condition, diagnostic tests and medications  Outcome: Progressing     Problem: Gastrointestinal Irritability  Goal: Nausea and vomiting will be absent or improve  Outcome: Progressing     Problem: Diabetes Management  Goal: Patient will achieve and maintain glucose in satisfactory range  Outcome: Progressing   The patient is Stable - Low risk of patient condition declining or worsening    Shift Goals  Clinical Goals: pt nausea will be controlled by end of shift  Patient Goals: restg comfortably    Progress made toward(s) clinical / shift goals:  all goals     Patient is not progressing towards the following goals:

## 2022-03-24 NOTE — DISCHARGE PLANNING
Anticipated Discharge Disposition:   Home    Action:   Chart review complete     Discussed patient during rounds. No anticipated discharge needs at this time.     RN CM will continue to follow.      Barriers to Discharge:   Medical clearance     Plan:   HCM will continue to follow and assist with discharge needs.

## 2022-03-24 NOTE — PROGRESS NOTES
[facility] Medicine Daily Progress Note    Date of Service  3/24/2022    Chief Complaint  Malena Bennett is a 21 y.o. female admitted 3/21/2022 with hemoptysis.     Hospital Course  21-year-old woman with a history of GERD, erosive gastritis, IBS-C, possible celiac sprue, secondary adrenal insufficiency, SLE, factor V Leiden deficiency, anxiety, OCD, Tourette's syndrome, type 1 diabetes, and migraine headaches who presented for evaluation of hematemesis and hemoptysis.     Interval Problem Update  Patient was seen and examined at bedside.  No acute events overnight. Patient is resting comfortably in bed and in no acute distress.     Continues to experience hemoptysis with blood clots over night  Pulm consulted for hemoptysis   CTA head/neck/aorta unremarkable  Plan for Bronch in AM    Consultants/Specialty  GI and pulmonary    Code Status  Full Code    Disposition  Patient is not medically cleared for discharge.   Anticipate discharge to to home with close outpatient follow-up.  I have placed the appropriate orders for post-discharge needs.    Review of Systems  Review of Systems   Constitutional: Negative for chills and fever.   HENT: Negative for congestion and sore throat.    Eyes: Negative for blurred vision and double vision.   Respiratory: Positive for hemoptysis. Negative for cough and shortness of breath.    Cardiovascular: Negative for chest pain and palpitations.   Gastrointestinal: Positive for nausea. Negative for abdominal pain, blood in stool, melena and vomiting.   Genitourinary: Negative for dysuria and frequency.   Musculoskeletal: Negative for back pain and falls.   Skin: Negative for rash.   Neurological: Positive for headaches. Negative for seizures and loss of consciousness.   Psychiatric/Behavioral: Negative for hallucinations and substance abuse.        Physical Exam  Temp:  [36.1 °C (96.9 °F)-37.1 °C (98.8 °F)] 37.1 °C (98.8 °F)  Pulse:  [76-80] 76  Resp:  [18] 18  BP:  ()/(58-73) 104/73  SpO2:  [95 %-98 %] 98 %    Physical Exam  Constitutional:       General: She is not in acute distress.     Appearance: She is normal weight. She is not toxic-appearing.      Comments: Pleasant, conversational   HENT:      Head: Normocephalic.      Right Ear: External ear normal.      Left Ear: External ear normal.      Nose: Nose normal. No congestion.      Mouth/Throat:      Mouth: Mucous membranes are moist.      Pharynx: No oropharyngeal exudate.   Eyes:      General: No scleral icterus.     Extraocular Movements: Extraocular movements intact.      Pupils: Pupils are equal, round, and reactive to light.   Cardiovascular:      Rate and Rhythm: Normal rate and regular rhythm.      Heart sounds: No murmur heard.  Pulmonary:      Effort: Pulmonary effort is normal. No respiratory distress.      Breath sounds: No wheezing.   Abdominal:      General: Bowel sounds are normal.      Palpations: Abdomen is soft.      Tenderness: There is no abdominal tenderness. There is no guarding or rebound.   Musculoskeletal:         General: No swelling or deformity.   Skin:     Capillary Refill: Capillary refill takes less than 2 seconds.      Coloration: Skin is not jaundiced.      Findings: No bruising.   Neurological:      General: No focal deficit present.      Mental Status: She is alert. Mental status is at baseline.      Cranial Nerves: No cranial nerve deficit.      Sensory: No sensory deficit.      Comments: A&O x4   Psychiatric:         Mood and Affect: Mood normal.         Behavior: Behavior normal.         Thought Content: Thought content normal.         Judgment: Judgment normal.     Patient was seen and examined at bedside. No changes in physical exam from prior evaluation.    Fluids    Intake/Output Summary (Last 24 hours) at 3/24/2022 1102  Last data filed at 3/24/2022 0900  Gross per 24 hour   Intake 100 ml   Output 0 ml   Net 100 ml       Laboratory  Recent Labs     03/21/22  1510  03/21/22 2307 03/22/22 0448 03/22/22  1400 03/22/22  2140 03/23/22  0522 03/23/22  1343   WBC 6.6  --  4.9  --   --  6.4  --    RBC 5.12  --  4.59  --   --  4.69  --    HEMOGLOBIN 15.7   < > 14.2   < > 14.1 14.1 13.8   HEMATOCRIT 46.4  --  41.4  --   --  42.7  --    MCV 90.6  --  90.2  --   --  91.0  --    MCH 30.7  --  30.9  --   --  30.1  --    MCHC 33.8  --  34.3  --   --  33.0*  --    RDW 38.6  --  38.6  --   --  39.6  --    PLATELETCT 262  --  230  --   --  263  --    MPV 9.8  --  9.6  --   --  10.0  --     < > = values in this interval not displayed.     Recent Labs     03/21/22 2307 03/22/22 0448 03/23/22  0522   SODIUM 136 137 139   POTASSIUM 3.3* 3.7 3.8   CHLORIDE 102 104 104   CO2 24 23 26   GLUCOSE 97 102* 99   BUN 10 8 4*   CREATININE 0.58 0.60 0.66   CALCIUM 8.5 8.8 9.3     Recent Labs     03/21/22  1510   APTT 26.1   INR 1.16*               Imaging  CT-CTA NECK WITH & W/O-POST PROCESSING   Final Result      1.  No acute arterial abnormality of the neck.   2.  Prominent, nonspecific bilateral jugular lymph nodes.      CT-CTA HEAD WITH & W/O-POST PROCESS   Final Result      1.  CT angiogram of the [place] within normal limits.   2.  No acute intracranial abnormality.      CT-CTA COMPLETE THORACOABDOMINAL AORTA   Final Result      1.  No aortic aneurysm or dissection identified. No acute arterial abnormality in the chest, abdomen, or pelvis seen.   2.  No evidence of acute inflammation in the chest, abdomen, or pelvis.   3.  Nonspecific, prominent mesenteric lymph nodes.                     DX-CHEST-PORTABLE (1 VIEW)   Final Result      1.  There is no acute cardiopulmonary process.      US-ABDOMEN COMPLETE SURVEY   Final Result      1.  Echogenic liver consistent with hepatic steatosis      2.  No evidence for cirrhosis or portal hypertension      3.  Prior cholecystectomy. No biliary dilation           Assessment/Plan  * Hemoptysis  Assessment & Plan  Patient reports coughing up blood and  clots over last several days  Patient states that coughing lead to vomiting where she again noted blood  GI was consulted but EGD is unremarkable  Continues to cough up blood clots  Hemodynamically stable, comfortable on room air  CTA head/neck/aorta unremarkable   Pulm recs  Bronch in AM    Hypokalemia- (present on admission)  Assessment & Plan  Replacing, checking magnesium    Continue to monitor replace as needed     Hematemesis- (present on admission)  Assessment & Plan  Likely secondary to Hemoptysis  Hb stable  hemodymically stable  EGD 03/22:  normal duodenum, erythema in antrum and body, esophagitis    Lupus (HCC)- (present on admission)  Assessment & Plan  On plaquenil    Type 1 diabetes mellitus (HCC)- (present on admission)  Assessment & Plan  With hypoglycemia   Patient will use insulin pump  Start D5, sliding scale insulin for now  Accu-Checks, hypoglycemia protocol     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 In review of yesterday's note (3/23/2022), there are no changes except as documented above.

## 2022-03-25 ENCOUNTER — ANESTHESIA (OUTPATIENT)
Dept: SURGERY | Facility: MEDICAL CENTER | Age: 22
DRG: 368 | End: 2022-03-25

## 2022-03-25 ENCOUNTER — APPOINTMENT (OUTPATIENT)
Dept: RADIOLOGY | Facility: MEDICAL CENTER | Age: 22
DRG: 368 | End: 2022-03-25
Attending: INTERNAL MEDICINE

## 2022-03-25 ENCOUNTER — ANESTHESIA EVENT (OUTPATIENT)
Dept: SURGERY | Facility: MEDICAL CENTER | Age: 22
DRG: 368 | End: 2022-03-25

## 2022-03-25 LAB
APPEARANCE FLD: NORMAL
BODY FLD TYPE: NORMAL
COLOR FLD: COLORLESS
CSF COMMENTS 1658: NORMAL
GLUCOSE BLD STRIP.AUTO-MCNC: 103 MG/DL (ref 65–99)
GLUCOSE BLD STRIP.AUTO-MCNC: 111 MG/DL (ref 65–99)
GLUCOSE BLD STRIP.AUTO-MCNC: 137 MG/DL (ref 65–99)
LYMPHOCYTES NFR FLD: 15 %
MONONUC CELLS NFR FLD: 26 %
NEUTROPHILS NFR FLD: 47 %
RBC # FLD: NORMAL CELLS/UL
WBC # FLD: NORMAL CELLS/UL
WBC OTHER NFR FLD: 12 %

## 2022-03-25 PROCEDURE — 87116 MYCOBACTERIA CULTURE: CPT

## 2022-03-25 PROCEDURE — 700102 HCHG RX REV CODE 250 W/ 637 OVERRIDE(OP): Performed by: INTERNAL MEDICINE

## 2022-03-25 PROCEDURE — 160029 HCHG SURGERY MINUTES - 1ST 30 MINS LEVEL 4: Performed by: INTERNAL MEDICINE

## 2022-03-25 PROCEDURE — 700101 HCHG RX REV CODE 250: Performed by: ANESTHESIOLOGY

## 2022-03-25 PROCEDURE — A9270 NON-COVERED ITEM OR SERVICE: HCPCS | Performed by: INTERNAL MEDICINE

## 2022-03-25 PROCEDURE — 89051 BODY FLUID CELL COUNT: CPT

## 2022-03-25 PROCEDURE — 160002 HCHG RECOVERY MINUTES (STAT): Performed by: INTERNAL MEDICINE

## 2022-03-25 PROCEDURE — 160035 HCHG PACU - 1ST 60 MINS PHASE I: Performed by: INTERNAL MEDICINE

## 2022-03-25 PROCEDURE — 87102 FUNGUS ISOLATION CULTURE: CPT

## 2022-03-25 PROCEDURE — 160048 HCHG OR STATISTICAL LEVEL 1-5: Performed by: INTERNAL MEDICINE

## 2022-03-25 PROCEDURE — 0B9C8ZX DRAINAGE OF RIGHT UPPER LUNG LOBE, VIA NATURAL OR ARTIFICIAL OPENING ENDOSCOPIC, DIAGNOSTIC: ICD-10-PCS | Performed by: INTERNAL MEDICINE

## 2022-03-25 PROCEDURE — 160009 HCHG ANES TIME/MIN: Performed by: INTERNAL MEDICINE

## 2022-03-25 PROCEDURE — 82962 GLUCOSE BLOOD TEST: CPT | Mod: 91

## 2022-03-25 PROCEDURE — A9270 NON-COVERED ITEM OR SERVICE: HCPCS | Performed by: HOSPITALIST

## 2022-03-25 PROCEDURE — 700102 HCHG RX REV CODE 250 W/ 637 OVERRIDE(OP): Performed by: HOSPITALIST

## 2022-03-25 PROCEDURE — 87205 SMEAR GRAM STAIN: CPT

## 2022-03-25 PROCEDURE — 700105 HCHG RX REV CODE 258: Performed by: INTERNAL MEDICINE

## 2022-03-25 PROCEDURE — 700111 HCHG RX REV CODE 636 W/ 250 OVERRIDE (IP): Performed by: ANESTHESIOLOGY

## 2022-03-25 PROCEDURE — 99233 SBSQ HOSP IP/OBS HIGH 50: CPT | Performed by: INTERNAL MEDICINE

## 2022-03-25 PROCEDURE — 31624 DX BRONCHOSCOPE/LAVAGE: CPT | Performed by: INTERNAL MEDICINE

## 2022-03-25 PROCEDURE — 700111 HCHG RX REV CODE 636 W/ 250 OVERRIDE (IP): Performed by: HOSPITALIST

## 2022-03-25 PROCEDURE — 0CJY8ZZ INSPECTION OF MOUTH AND THROAT, VIA NATURAL OR ARTIFICIAL OPENING ENDOSCOPIC: ICD-10-PCS | Performed by: OTOLARYNGOLOGY

## 2022-03-25 PROCEDURE — 87070 CULTURE OTHR SPECIMN AEROBIC: CPT

## 2022-03-25 PROCEDURE — 700101 HCHG RX REV CODE 250: Performed by: INTERNAL MEDICINE

## 2022-03-25 PROCEDURE — 770006 HCHG ROOM/CARE - MED/SURG/GYN SEMI*

## 2022-03-25 RX ORDER — ONDANSETRON 2 MG/ML
4 INJECTION INTRAMUSCULAR; INTRAVENOUS
Status: COMPLETED | OUTPATIENT
Start: 2022-03-25 | End: 2022-03-25

## 2022-03-25 RX ORDER — HALOPERIDOL 5 MG/ML
1 INJECTION INTRAMUSCULAR
Status: DISCONTINUED | OUTPATIENT
Start: 2022-03-25 | End: 2022-03-25 | Stop reason: HOSPADM

## 2022-03-25 RX ORDER — DEXAMETHASONE SODIUM PHOSPHATE 4 MG/ML
INJECTION, SOLUTION INTRA-ARTICULAR; INTRALESIONAL; INTRAMUSCULAR; INTRAVENOUS; SOFT TISSUE PRN
Status: DISCONTINUED | OUTPATIENT
Start: 2022-03-25 | End: 2022-03-25 | Stop reason: SURG

## 2022-03-25 RX ORDER — HYDROMORPHONE HYDROCHLORIDE 1 MG/ML
0.4 INJECTION, SOLUTION INTRAMUSCULAR; INTRAVENOUS; SUBCUTANEOUS
Status: DISCONTINUED | OUTPATIENT
Start: 2022-03-25 | End: 2022-03-25 | Stop reason: HOSPADM

## 2022-03-25 RX ORDER — OXYCODONE HCL 5 MG/5 ML
10 SOLUTION, ORAL ORAL
Status: DISCONTINUED | OUTPATIENT
Start: 2022-03-25 | End: 2022-03-25 | Stop reason: HOSPADM

## 2022-03-25 RX ORDER — IPRATROPIUM BROMIDE AND ALBUTEROL SULFATE 2.5; .5 MG/3ML; MG/3ML
3 SOLUTION RESPIRATORY (INHALATION)
Status: DISCONTINUED | OUTPATIENT
Start: 2022-03-25 | End: 2022-03-25 | Stop reason: HOSPADM

## 2022-03-25 RX ORDER — SODIUM CHLORIDE, SODIUM LACTATE, POTASSIUM CHLORIDE, CALCIUM CHLORIDE 600; 310; 30; 20 MG/100ML; MG/100ML; MG/100ML; MG/100ML
INJECTION, SOLUTION INTRAVENOUS CONTINUOUS
Status: ACTIVE | OUTPATIENT
Start: 2022-03-25 | End: 2022-03-25

## 2022-03-25 RX ORDER — HYDROMORPHONE HYDROCHLORIDE 1 MG/ML
0.1 INJECTION, SOLUTION INTRAMUSCULAR; INTRAVENOUS; SUBCUTANEOUS
Status: DISCONTINUED | OUTPATIENT
Start: 2022-03-25 | End: 2022-03-25 | Stop reason: HOSPADM

## 2022-03-25 RX ORDER — DIPHENHYDRAMINE HYDROCHLORIDE 50 MG/ML
12.5 INJECTION INTRAMUSCULAR; INTRAVENOUS
Status: DISCONTINUED | OUTPATIENT
Start: 2022-03-25 | End: 2022-03-25 | Stop reason: HOSPADM

## 2022-03-25 RX ORDER — HYDROMORPHONE HYDROCHLORIDE 1 MG/ML
0.2 INJECTION, SOLUTION INTRAMUSCULAR; INTRAVENOUS; SUBCUTANEOUS
Status: DISCONTINUED | OUTPATIENT
Start: 2022-03-25 | End: 2022-03-25 | Stop reason: HOSPADM

## 2022-03-25 RX ORDER — MEPERIDINE HYDROCHLORIDE 25 MG/ML
12.5 INJECTION INTRAMUSCULAR; INTRAVENOUS; SUBCUTANEOUS
Status: DISCONTINUED | OUTPATIENT
Start: 2022-03-25 | End: 2022-03-25 | Stop reason: HOSPADM

## 2022-03-25 RX ORDER — ONDANSETRON 2 MG/ML
INJECTION INTRAMUSCULAR; INTRAVENOUS PRN
Status: DISCONTINUED | OUTPATIENT
Start: 2022-03-25 | End: 2022-03-25 | Stop reason: SURG

## 2022-03-25 RX ORDER — LIDOCAINE HYDROCHLORIDE 20 MG/ML
INJECTION, SOLUTION EPIDURAL; INFILTRATION; INTRACAUDAL; PERINEURAL PRN
Status: DISCONTINUED | OUTPATIENT
Start: 2022-03-25 | End: 2022-03-25 | Stop reason: SURG

## 2022-03-25 RX ORDER — SODIUM CHLORIDE, SODIUM LACTATE, POTASSIUM CHLORIDE, CALCIUM CHLORIDE 600; 310; 30; 20 MG/100ML; MG/100ML; MG/100ML; MG/100ML
INJECTION, SOLUTION INTRAVENOUS CONTINUOUS
Status: DISCONTINUED | OUTPATIENT
Start: 2022-03-25 | End: 2022-03-25 | Stop reason: HOSPADM

## 2022-03-25 RX ORDER — OXYCODONE HCL 5 MG/5 ML
5 SOLUTION, ORAL ORAL
Status: DISCONTINUED | OUTPATIENT
Start: 2022-03-25 | End: 2022-03-25 | Stop reason: HOSPADM

## 2022-03-25 RX ORDER — METOCLOPRAMIDE 10 MG/1
10 TABLET ORAL
Status: DISCONTINUED | OUTPATIENT
Start: 2022-03-25 | End: 2022-03-28 | Stop reason: HOSPADM

## 2022-03-25 RX ADMIN — METOCLOPRAMIDE 10 MG: 10 TABLET ORAL at 20:49

## 2022-03-25 RX ADMIN — ONDANSETRON 4 MG: 2 INJECTION INTRAMUSCULAR; INTRAVENOUS at 20:49

## 2022-03-25 RX ADMIN — ONDANSETRON 4 MG: 2 INJECTION INTRAMUSCULAR; INTRAVENOUS at 10:40

## 2022-03-25 RX ADMIN — ONDANSETRON 4 MG: 2 INJECTION INTRAMUSCULAR; INTRAVENOUS at 08:16

## 2022-03-25 RX ADMIN — LIDOCAINE HYDROCHLORIDE 100 MG: 20 INJECTION, SOLUTION EPIDURAL; INFILTRATION; INTRACAUDAL; PERINEURAL at 10:11

## 2022-03-25 RX ADMIN — DULOXETINE HYDROCHLORIDE 60 MG: 30 CAPSULE, DELAYED RELEASE ORAL at 05:09

## 2022-03-25 RX ADMIN — MIDAZOLAM HYDROCHLORIDE 2 MG: 1 INJECTION, SOLUTION INTRAMUSCULAR; INTRAVENOUS at 10:06

## 2022-03-25 RX ADMIN — TRAMADOL HYDROCHLORIDE 50 MG: 50 TABLET, COATED ORAL at 18:26

## 2022-03-25 RX ADMIN — DEXTROSE MONOHYDRATE, SODIUM CHLORIDE, AND POTASSIUM CHLORIDE: 50; 4.5; 1.49 INJECTION, SOLUTION INTRAVENOUS at 03:38

## 2022-03-25 RX ADMIN — HYDROXYCHLOROQUINE SULFATE 400 MG: 200 TABLET, FILM COATED ORAL at 05:10

## 2022-03-25 RX ADMIN — PROPOFOL 100 MG: 10 INJECTION, EMULSION INTRAVENOUS at 10:17

## 2022-03-25 RX ADMIN — SODIUM CHLORIDE, POTASSIUM CHLORIDE, SODIUM LACTATE AND CALCIUM CHLORIDE: 600; 310; 30; 20 INJECTION, SOLUTION INTRAVENOUS at 10:05

## 2022-03-25 RX ADMIN — ONDANSETRON 4 MG: 2 INJECTION INTRAMUSCULAR; INTRAVENOUS at 16:05

## 2022-03-25 RX ADMIN — DEXAMETHASONE SODIUM PHOSPHATE 8 MG: 4 INJECTION, SOLUTION INTRAMUSCULAR; INTRAVENOUS at 10:11

## 2022-03-25 RX ADMIN — SENNOSIDES AND DOCUSATE SODIUM 2 TABLET: 50; 8.6 TABLET ORAL at 17:27

## 2022-03-25 RX ADMIN — METOCLOPRAMIDE 10 MG: 10 TABLET ORAL at 16:05

## 2022-03-25 RX ADMIN — FENTANYL CITRATE 50 MCG: 50 INJECTION, SOLUTION INTRAMUSCULAR; INTRAVENOUS at 10:09

## 2022-03-25 RX ADMIN — ACETAMINOPHEN 650 MG: 325 TABLET, FILM COATED ORAL at 20:49

## 2022-03-25 RX ADMIN — ONDANSETRON 4 MG: 2 INJECTION INTRAMUSCULAR; INTRAVENOUS at 10:19

## 2022-03-25 RX ADMIN — ACETAMINOPHEN 650 MG: 325 TABLET, FILM COATED ORAL at 05:09

## 2022-03-25 RX ADMIN — FENTANYL CITRATE 50 MCG: 50 INJECTION, SOLUTION INTRAMUSCULAR; INTRAVENOUS at 10:16

## 2022-03-25 RX ADMIN — PROPOFOL 200 MG: 10 INJECTION, EMULSION INTRAVENOUS at 10:11

## 2022-03-25 RX ADMIN — ACETAMINOPHEN 650 MG: 325 TABLET, FILM COATED ORAL at 14:18

## 2022-03-25 RX ADMIN — HALOPERIDOL LACTATE 1 MG: 5 INJECTION, SOLUTION INTRAMUSCULAR at 10:53

## 2022-03-25 ASSESSMENT — PAIN DESCRIPTION - PAIN TYPE
TYPE: ACUTE PAIN

## 2022-03-25 ASSESSMENT — ENCOUNTER SYMPTOMS
HEMOPTYSIS: 1
BLURRED VISION: 0
HEADACHES: 1
FALLS: 0
NAUSEA: 1
COUGH: 0
SEIZURES: 0
LOSS OF CONSCIOUSNESS: 0
SORE THROAT: 0
PALPITATIONS: 0
BACK PAIN: 0
FEVER: 0
HALLUCINATIONS: 0
ABDOMINAL PAIN: 0
BLOOD IN STOOL: 0
CHILLS: 0
DOUBLE VISION: 0
VOMITING: 0
SHORTNESS OF BREATH: 0

## 2022-03-25 ASSESSMENT — FIBROSIS 4 INDEX: FIB4 SCORE: 0.33

## 2022-03-25 ASSESSMENT — PAIN SCALES - GENERAL: PAIN_LEVEL: 0

## 2022-03-25 ASSESSMENT — LIFESTYLE VARIABLES: SUBSTANCE_ABUSE: 0

## 2022-03-25 NOTE — PROGRESS NOTES
[facility] Medicine Daily Progress Note    Date of Service  3/25/2022    Chief Complaint  Malena Bennett is a 21 y.o. female admitted 3/21/2022 with hemoptysis.     Hospital Course  21-year-old woman with a history of GERD, erosive gastritis, IBS-C, possible celiac sprue, secondary adrenal insufficiency, SLE, factor V Leiden deficiency, anxiety, OCD, Tourette's syndrome, type 1 diabetes, and migraine headaches who presented for evaluation of hematemesis and hemoptysis.     Interval Problem Update  Patient was seen and examined at bedside.  No acute events overnight. Patient is resting comfortably in bed and in no acute distress.     Continues to have blood per sputum  EGD negative  Bronch negative  CTA head/neck/aorta unremarkable  Concern for epistaxis  ENT consulted - will evaluate patient this PM     Consultants/Specialty  GI and pulmonary    Code Status  Full Code    Disposition  Patient is not medically cleared for discharge.   Anticipate discharge to to home with close outpatient follow-up.  I have placed the appropriate orders for post-discharge needs.    Review of Systems  Review of Systems   Constitutional: Negative for chills and fever.   HENT: Negative for congestion and sore throat.    Eyes: Negative for blurred vision and double vision.   Respiratory: Positive for hemoptysis. Negative for cough and shortness of breath.    Cardiovascular: Negative for chest pain and palpitations.   Gastrointestinal: Positive for nausea. Negative for abdominal pain, blood in stool, melena and vomiting.   Genitourinary: Negative for dysuria and frequency.   Musculoskeletal: Negative for back pain and falls.   Skin: Negative for rash.   Neurological: Positive for headaches. Negative for seizures and loss of consciousness.   Psychiatric/Behavioral: Negative for hallucinations and substance abuse.        Physical Exam  Temp:  [36.5 °C (97.7 °F)-37.1 °C (98.8 °F)] 37.1 °C (98.8 °F)  Pulse:  [] 81  Resp:   [18-22] 18  BP: ()/(51-86) 94/65  SpO2:  [93 %-100 %] 95 %    Physical Exam  Constitutional:       General: She is not in acute distress.     Appearance: She is normal weight. She is not toxic-appearing.      Comments: Pleasant, conversational   HENT:      Head: Normocephalic.      Right Ear: External ear normal.      Left Ear: External ear normal.      Nose: Nose normal. No congestion.      Mouth/Throat:      Mouth: Mucous membranes are moist.      Pharynx: No oropharyngeal exudate.   Eyes:      General: No scleral icterus.     Extraocular Movements: Extraocular movements intact.      Pupils: Pupils are equal, round, and reactive to light.   Cardiovascular:      Rate and Rhythm: Normal rate and regular rhythm.      Heart sounds: No murmur heard.  Pulmonary:      Effort: Pulmonary effort is normal. No respiratory distress.      Breath sounds: No wheezing.   Abdominal:      General: Bowel sounds are normal.      Palpations: Abdomen is soft.      Tenderness: There is no abdominal tenderness. There is no guarding or rebound.   Musculoskeletal:         General: No swelling or deformity.   Skin:     Capillary Refill: Capillary refill takes less than 2 seconds.      Coloration: Skin is not jaundiced.      Findings: No bruising.   Neurological:      General: No focal deficit present.      Mental Status: She is alert. Mental status is at baseline.      Cranial Nerves: No cranial nerve deficit.      Sensory: No sensory deficit.      Comments: A&O x4   Psychiatric:         Mood and Affect: Mood normal.         Behavior: Behavior normal.         Thought Content: Thought content normal.         Judgment: Judgment normal.     Patient was seen and examined at bedside. No changes in physical exam from prior evaluation.    Fluids    Intake/Output Summary (Last 24 hours) at 3/25/2022 1534  Last data filed at 3/25/2022 1038  Gross per 24 hour   Intake 450 ml   Output --   Net 450 ml       Laboratory  Recent Labs      03/22/22  2140 03/23/22  0522 03/23/22  1343   WBC  --  6.4  --    RBC  --  4.69  --    HEMOGLOBIN 14.1 14.1 13.8   HEMATOCRIT  --  42.7  --    MCV  --  91.0  --    MCH  --  30.1  --    MCHC  --  33.0*  --    RDW  --  39.6  --    PLATELETCT  --  263  --    MPV  --  10.0  --      Recent Labs     03/23/22  0522   SODIUM 139   POTASSIUM 3.8   CHLORIDE 104   CO2 26   GLUCOSE 99   BUN 4*   CREATININE 0.66   CALCIUM 9.3                   Imaging  CT-CTA NECK WITH & W/O-POST PROCESSING   Final Result      1.  No acute arterial abnormality of the neck.   2.  Prominent, nonspecific bilateral jugular lymph nodes.      CT-CTA HEAD WITH & W/O-POST PROCESS   Final Result      1.  CT angiogram of the [place] within normal limits.   2.  No acute intracranial abnormality.      CT-CTA COMPLETE THORACOABDOMINAL AORTA   Final Result      1.  No aortic aneurysm or dissection identified. No acute arterial abnormality in the chest, abdomen, or pelvis seen.   2.  No evidence of acute inflammation in the chest, abdomen, or pelvis.   3.  Nonspecific, prominent mesenteric lymph nodes.                     DX-CHEST-PORTABLE (1 VIEW)   Final Result      1.  There is no acute cardiopulmonary process.      US-ABDOMEN COMPLETE SURVEY   Final Result      1.  Echogenic liver consistent with hepatic steatosis      2.  No evidence for cirrhosis or portal hypertension      3.  Prior cholecystectomy. No biliary dilation           Assessment/Plan  * Hemoptysis  Assessment & Plan  Patient reports coughing up blood and clots over last several days  Patient states that coughing lead to vomiting where she again noted blood  Continues to have blood per sputum  EGD negative  Bronch negative  CTA head/neck/aorta unremarkable  Concern for epistaxis  ENT consulted - will evaluate patient this PM    Hypokalemia- (present on admission)  Assessment & Plan  Replacing, checking magnesium    Continue to monitor replace as needed     Hematemesis- (present on  admission)  Assessment & Plan  Likely secondary to Hemoptysis  Hb stable  hemodymically stable  EGD 03/22:  normal duodenum, erythema in antrum and body, esophagitis    Lupus (HCC)- (present on admission)  Assessment & Plan  On plaquenil    Type 1 diabetes mellitus (HCC)- (present on admission)  Assessment & Plan  With hypoglycemia   Patient will use insulin pump  Start D5, sliding scale insulin for now  Accu-Checks, hypoglycemia protocol     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 In review of yesterday's note (3/24/2022), there are no changes except as documented above.

## 2022-03-25 NOTE — ANESTHESIA TIME REPORT
Anesthesia Start and Stop Event Times     Date Time Event    3/25/2022 0948 Ready for Procedure     1005 Anesthesia Start     1038 Anesthesia Stop        Responsible Staff  03/25/22    Name Role Begin End    Stephen Johnston M.D. Anesth 1005 1038        Preop Diagnosis (Free Text):  Pre-op Diagnosis     Hemoptysis, Hematemesis and Lupus        Preop Diagnosis (Codes):    Premium Reason  Non-Premium    Comments:

## 2022-03-25 NOTE — DISCHARGE PLANNING
Anticipated Discharge Disposition:   Home    Action:   Chart review complete     Discussed patient during rounds. Pending ENT consult. Anticipate discharge tomorrow. No anticipated needs at this time.     RN CM will continue to follow.      Barriers to Discharge:   Medical clearance     Plan:   HCM will continue to follow and assist with discharge needs.

## 2022-03-25 NOTE — ANESTHESIA PROCEDURE NOTES
Airway    Date/Time: 3/25/2022 10:12 AM  Performed by: Stephen Johnston M.D.  Authorized by: Stephen Johnston M.D.     Location:  OR  Urgency:  Elective  Indications for Airway Management:  Anesthesia      Spontaneous Ventilation: absent    Sedation Level:  Deep  Preoxygenated: Yes    Final Airway Type:  Supraglottic airway  Final Supraglottic Airway:  Standard LMA    SGA Size:  4  Number of Attempts at Approach:  1   i-gel LMA

## 2022-03-25 NOTE — CARE PLAN
Problem: Pain - Standard  Goal: Alleviation of pain or a reduction in pain to the patient’s comfort goal  Outcome: Progressing     Problem: Knowledge Deficit - Standard  Goal: Patient and family/care givers will demonstrate understanding of plan of care, disease process/condition, diagnostic tests and medications  Outcome: Progressing     Problem: Diabetes Management  Goal: Patient will achieve and maintain glucose in satisfactory range  Outcome: Progressing     Problem: Nutrition Deficit - Diabetes  Goal: Patient will demonstrate adequate hydration and vital signs  Outcome: Progressing   The patient is Stable - Low risk of patient condition declining or worsening    Shift Goals  Clinical Goals: pt pain will be less than a 3/10 by the end of the shift  Patient Goals: rest comfortably    Progress made toward(s) clinical / shift goals:  all goals     Patient is not progressing towards the following goals:

## 2022-03-25 NOTE — ANESTHESIA PREPROCEDURE EVALUATION
Case: [id number] Date/Time: 03/25/22 0915    Procedure: BRONCHOSCOPY-WITH BRONCHOALVEOLAR LAVAGE AND TRANSBRONCHIAL BIOPSY    Location:  PROCEDURE ROOM / SURGERY [facility]    Surgeons: Denice Lucas M.D.          Relevant Problems   PULMONARY   (positive) SOB (shortness of breath)      NEURO   (positive) Chronic tension-type headache, not intractable   (positive) History of asthma      ENDO   (positive) Controlled type 1 diabetes mellitus (HCC)   (positive) Controlled type 2 diabetes mellitus without complication, with long-term current use of insulin (HCC)   (positive) Type 1 diabetes mellitus (HCC)       Physical Exam    Airway   Mallampati: II  TM distance: >3 FB  Neck ROM: full       Cardiovascular - normal exam  Rhythm: regular  Rate: normal  (-) murmur     Dental - normal exam           Pulmonary - normal exam  Breath sounds clear to auscultation     Abdominal    Neurological - normal exam                 Anesthesia Plan    ASA 2       Plan - general       Airway plan will be LMA    (I-gel LMA requested by surgeon)      Induction: intravenous    Postoperative Plan: Postoperative administration of opioids is intended.    Pertinent diagnostic labs and testing reviewed    Informed Consent:    Anesthetic plan and risks discussed with patient.

## 2022-03-25 NOTE — OR NURSING
1033: To PACU from Specials.  8lpm oxygen via mask. Patent airway. Respirations spontaneous and unlabored. Slightly drowsy.   1035: Pt crying, anxious. Emotional comfort and reassured pt. States feeling nauseous. Pt requested for RN Nina.  1045: Travis and trudy provide. JORDI Wood at bedside  1055: Pt states slightly nauseous. Treat per MAR. RN Supervisor Taryn at bedside.   1108: Pt MASON Graham updated via phone on progress, POC, ETA for discharge  1115: Pt calm. States nausea better.  1129: Discharge from PACU in stable condition. room air. Transport via [facility]

## 2022-03-25 NOTE — CARE PLAN
The patient is Watcher - Medium risk of patient condition declining or worsening    Shift Goals  Clinical Goals: pt pain will be less than a 3/10 by the end of the shift  Patient Goals: rest comfortably    Progress made toward(s) clinical / shift goals:    Problem: Pain - Standard  Goal: Alleviation of pain or a reduction in pain to the patient’s comfort goal  Outcome: Progressing  Note: Pt will have adequate pain control by time of discharge.     Problem: Diabetes Management  Goal: Patient will achieve and maintain glucose in satisfactory range  Outcome: Progressing  Note: Pt will have adequate glucose control during hospitalization.     Problem: Nutrition Deficit - Diabetes  Goal: Patient will demonstrate adequate hydration and vital signs  Outcome: Progressing       Patient is not progressing towards the following goals:

## 2022-03-25 NOTE — ANESTHESIA POSTPROCEDURE EVALUATION
Patient: Malena Bennett    Procedure Summary     Date: 03/25/22 Room / Location:  PROCEDURE ROOM / SURGERY [facility]    Anesthesia Start: 1005 Anesthesia Stop: 1038    Procedure: BRONCHOSCOPY-WITH BRONCHOALVEOLAR LAVAGE AND TRANSBRONCHIAL BIOPSY (N/A Chest) Diagnosis: (Hemoptysis, Hematemesis and Lupus; pending pathology)    Surgeons: Denice Lucas M.D. Responsible Provider: Stephen Johnston M.D.    Anesthesia Type: general ASA Status: 2          Final Anesthesia Type: general  Last vitals  BP   Blood Pressure: 106/75    Temp   37.1 °C (98.8 °F)    Pulse   92   Resp   18    SpO2   94 %      Anesthesia Post Evaluation    Patient location during evaluation: PACU  Patient participation: complete - patient participated  Level of consciousness: awake  Pain score: 0    Airway patency: patent  Anesthetic complications: no  Cardiovascular status: hemodynamically stable  Respiratory status: acceptable  Hydration status: balanced    PONV: none          No complications documented.     Nurse Pain Score: 0 (NPRS)

## 2022-03-25 NOTE — PROCEDURES
Bronchoscopy Procedure Note      Results/Findings: Normal airways, normal mucosa, no evidence of bleeding, no blood return on BAL    Specimen: BAL        Location: [facility] Endoscopy Suite    Date of Operation: 3/25/2022     Attending Physician Performing Procedure: Denice Lucas M.D.     Pre-op Diagnosis: Hemoptysis    Post-op Diagnosis: normal airways, active bleeding from nares when scope introduced to nasal passages.     Anesthesia: General, administered by Dr Johnston    Operation:   Diagnostic flexible fiberoptic bronchoscopy, with bronchoalveolar lavage, brushings, endobronchial biopsies, transbronchial biopsies    Estimated Blood Loss: none    Complications: none    Indications and History:    The patient is a 21 y.o. female with history of hemoptysis, negative EGD and continued coughing up of bright red blood and clots. The risks, benefits, complications, treatment options and expected outcomes were discussed with the patient. The possibilities of reaction to medication, pulmonary aspiration, perforation of a viscus, bleeding, failure to diagnose a condition and creating a complication requiring transfusion or operation were discussed with the patient who freely signed the consent.    Description of Procedure:    The patient was seen in the Pre-op area and interval H&P was verified. The patient was taken to the endoscopy suite, identified as Malena Bennett and a Time Out was held and the above information confirmed. After the induction of general anesthesia, the patient was positioned supine and the bronchoscope was passed through the LMA. The vocal cords were visualized and were normal in appearance.  No erythema or active bleeding noted. The scope was then passed into the trachea. Lidocaine 2% 4 ml was used topically on the roseanne. Careful inspection of the tracheal lumen was accomplished. The scope was sequentially passed into the left main and then left upper and lower  bronchi and segmental bronchi. The scope was then withdrawn and advanced into the right main bronchus and then into the RUL, RML, and RLL bronchi and segmental bronchi.    Findings:    Trachea: normal bronchial mucosa  Janelle: normal bronchial mucosa  Right main bronchus: normal bronchial mucosa  Right upper lobe bronchus: normal bronchial mucosa - BAL performed with clear return.   Right middle lobe bronchus: normal bronchial mucosa  Right lower lobe bronchus: normal bronchial mucosa  Left main bronchus: normal bronchial mucosa  Left upper lobe bronchus: normal bronchial mucosa  Left lower lobe bronchus: normal bronchial mucosa    The Patient was subsequently taken to the PACU in satisfactory condition.    MASON Graham and Mother and Father at bedside were notified of the results of the procedure    Denice Lucas MD RD  Pulmonary and Critical Care    Available on Voalte

## 2022-03-25 NOTE — OR NURSING
Patient allergies and NPO status verified. Patient verbalizes understanding of pain scale, expected course of stay and plan of care. Surgical site verified with patient. IV assessed for patency, pt awaiting surgery.

## 2022-03-25 NOTE — CONSULTS
DATE OF SERVICE:  03/25/2022     REFERRING PHYSICIAN:  Alexander Carmona DO     SPECIALTY:  Otolaryngology.     REASON FOR CONSULTATION:  Question of epistaxis causing hemoptysis.     HISTORY OF PRESENT ILLNESS:  On review of medical record, the patient is   21-year-old white female who was initially admitted to [facility] on   3/21/2022.  At that period of time, the patient had presented with a chief   complaint of nausea, vomiting and blood clots with hemoptysis over the last   several days and abdominal pain.  Workup and evaluation after admission   included the patient being seen by GI for question of hematemesis.  During   that evaluation, Dr. Multani had seen and evaluated the patient and she was   transfused for a goal of hemoglobin greater than 7, and an INR of less than   2.5.  During that period of time, it was noted that she had hypokalemia and   elevated liver enzymes.  Subsequent procedure performed by GI on 3/22/2022   indicates that there was on the EGD no evidence of bleeding.  The duodenum was   normal.  The stomach had some patchy mucosal erythema in the gastric antrum.    Multiple biopsies were obtained and the esophagus was examined, which showed   distal esophagitis and esophageal friability.  No clear ulcerations or   bleeding were identified at that time.  The patient then was returned to the   floor with no clear sign of etiology of the bleeding.  Subsequently, a   consultation was sent to pulmonary, who had seen her on 3/22/2022, had   evaluated her and recommended to do a BAL because of the large volume of blood   allegedly originating in the upper respiratory tract.  The patient underwent   a bronchoalveolar lavage on 3/25/2022 at [facility], the findings of which   were that she had pretty much normal tracheal carinal bronchus anatomy and no   evidence of blood was seen.  The patient had BAL performed with all clear   return.  This information was shared with the family.      I was asked to see the patient because during that procedure, the patient was   noted to have some blood in the back of the nasal cavity.     In discussing with the patient, she does not have felisa bleeding coming out of   the front of the nose rather, she feels some pressure in her chest and then   will cough up or vomit up blood.     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     ALLERGIES:  CEFDINIR AND ERYTHROMYCIN.     MEDICATIONS:  Reviewed.     PHYSICAL EXAMINATION:  Normocephalic, atraumatic cranium.  Pupils equal,   reactive to light and accommodation.  Anterior rhinoscopy reveals septum   deviated to the right.  Oral cavity reveals mucous membranes are moist.  She   has gingivitis.  Dentition is overall adequate.  No evidence of any bleeding   in the oral cavity, oropharynx or immediate hypopharynx area.  Trachea is   midline.  No thyromegaly.     PROCEDURE AT BEDSIDE:  A 0-degree endoscope was used to examine the patient's   left nasal cavity showing that she had some dryness in the nasal cavity   slightly enlarged turbinates.  No active bleeding from the area of the frontal   recess down to the maxillary region or back towards the nasopharynx.  On the   right hand side, the patient had somewhat of a deviation over to the right, so   a lower approach along the floor revealed there was no evidence of any   bleeding all the way back to the posterior nasopharynx where the eustachian   tube orifices.  Above that in the area of the right frontal sinus and   maxillary, again no active bleeding was seen.     IMPRESSION:  Recurrent hemoptysis, etiology unknown, site unknown.     RECOMMENDATIONS:  It appears to me that the patient may benefit from having   another consultation with hematology/oncology as I did see that she does have   inguinal bruising and  she does state that in the past she has had problems   with bruisability and clotting, but at this point, we see no evidence of any   bleeding in the sinonasal region, which would explain the need for   transfusion.     If you have any questions, please feel free to contact me.        ______________________________  MD MACARIO PIPER/ASS    DD:  03/25/2022 15:15  DT:  03/25/2022 15:48    Job#:  [id number]

## 2022-03-25 NOTE — CARE PLAN
The patient is Stable - Low risk of patient condition declining or worsening    Shift Goals  Clinical Goals: Rate pain at 2 or less on 0-10 scale after pain medication administration  Patient Goals: Have test tomorrow    Progress made toward(s) clinical / shift goals:  Goal partially met, pain remained > 2 after pain medications. Patient reporting pain back at baseline. PRN Tylenol and PRN Tramadol given for generalized pain and headache with good relief.     Patient is not progressing towards the following goals:

## 2022-03-26 LAB
ANA INTERPRETIVE COMMENT Q5143: ABNORMAL
ANA PATTERN Q5144: ABNORMAL
ANA TITER Q5145: ABNORMAL
ANTINUCLEAR ANTIBODY (ANA), HEP-2, IGG Q5142: DETECTED
BASOPHILS # BLD AUTO: 0.5 % (ref 0–1.8)
BASOPHILS # BLD: 0.05 K/UL (ref 0–0.12)
CERULOPLASMIN SERPL-MCNC: 25 MG/DL (ref 16–45)
EOSINOPHIL # BLD AUTO: 0.04 K/UL (ref 0–0.51)
EOSINOPHIL NFR BLD: 0.4 % (ref 0–6.9)
ERYTHROCYTE [DISTWIDTH] IN BLOOD BY AUTOMATED COUNT: 37.2 FL (ref 35.9–50)
FUNGUS SPEC FUNGUS STN: NORMAL
GLUCOSE BLD STRIP.AUTO-MCNC: 105 MG/DL (ref 65–99)
GLUCOSE BLD STRIP.AUTO-MCNC: 112 MG/DL (ref 65–99)
GLUCOSE BLD STRIP.AUTO-MCNC: 119 MG/DL (ref 65–99)
GRAM STN SPEC: NORMAL
HCT VFR BLD AUTO: 38.8 % (ref 37–47)
HGB BLD-MCNC: 13.2 G/DL (ref 12–16)
IMM GRANULOCYTES # BLD AUTO: 0.02 K/UL (ref 0–0.11)
IMM GRANULOCYTES NFR BLD AUTO: 0.2 % (ref 0–0.9)
LYMPHOCYTES # BLD AUTO: 1.68 K/UL (ref 1–4.8)
LYMPHOCYTES NFR BLD: 16.8 % (ref 22–41)
MCH RBC QN AUTO: 30.1 PG (ref 27–33)
MCHC RBC AUTO-ENTMCNC: 34 G/DL (ref 33.6–35)
MCV RBC AUTO: 88.6 FL (ref 81.4–97.8)
MONOCYTES # BLD AUTO: 0.69 K/UL (ref 0–0.85)
MONOCYTES NFR BLD AUTO: 6.9 % (ref 0–13.4)
NEUTROPHILS # BLD AUTO: 7.53 K/UL (ref 2–7.15)
NEUTROPHILS NFR BLD: 75.2 % (ref 44–72)
NRBC # BLD AUTO: 0 K/UL
NRBC BLD-RTO: 0 /100 WBC
PLATELET # BLD AUTO: 279 K/UL (ref 164–446)
PMV BLD AUTO: 9.8 FL (ref 9–12.9)
RBC # BLD AUTO: 4.38 M/UL (ref 4.2–5.4)
SIGNIFICANT IND 70042: NORMAL
SIGNIFICANT IND 70042: NORMAL
SITE SITE: NORMAL
SITE SITE: NORMAL
SOURCE SOURCE: NORMAL
SOURCE SOURCE: NORMAL
WBC # BLD AUTO: 10 K/UL (ref 4.8–10.8)

## 2022-03-26 PROCEDURE — 700102 HCHG RX REV CODE 250 W/ 637 OVERRIDE(OP): Performed by: INTERNAL MEDICINE

## 2022-03-26 PROCEDURE — 94760 N-INVAS EAR/PLS OXIMETRY 1: CPT

## 2022-03-26 PROCEDURE — 99233 SBSQ HOSP IP/OBS HIGH 50: CPT | Performed by: INTERNAL MEDICINE

## 2022-03-26 PROCEDURE — 85025 COMPLETE CBC W/AUTO DIFF WBC: CPT

## 2022-03-26 PROCEDURE — 700101 HCHG RX REV CODE 250: Performed by: INTERNAL MEDICINE

## 2022-03-26 PROCEDURE — 85520 HEPARIN ASSAY: CPT

## 2022-03-26 PROCEDURE — 85730 THROMBOPLASTIN TIME PARTIAL: CPT

## 2022-03-26 PROCEDURE — 700111 HCHG RX REV CODE 636 W/ 250 OVERRIDE (IP): Performed by: INTERNAL MEDICINE

## 2022-03-26 PROCEDURE — 700111 HCHG RX REV CODE 636 W/ 250 OVERRIDE (IP): Performed by: HOSPITALIST

## 2022-03-26 PROCEDURE — A9270 NON-COVERED ITEM OR SERVICE: HCPCS | Performed by: INTERNAL MEDICINE

## 2022-03-26 PROCEDURE — 85610 PROTHROMBIN TIME: CPT

## 2022-03-26 PROCEDURE — 82962 GLUCOSE BLOOD TEST: CPT | Mod: 91

## 2022-03-26 PROCEDURE — 36415 COLL VENOUS BLD VENIPUNCTURE: CPT

## 2022-03-26 PROCEDURE — 700102 HCHG RX REV CODE 250 W/ 637 OVERRIDE(OP): Performed by: HOSPITALIST

## 2022-03-26 PROCEDURE — 85613 RUSSELL VIPER VENOM DILUTED: CPT

## 2022-03-26 PROCEDURE — 86147 CARDIOLIPIN ANTIBODY EA IG: CPT | Mod: 91

## 2022-03-26 PROCEDURE — A9270 NON-COVERED ITEM OR SERVICE: HCPCS | Performed by: HOSPITALIST

## 2022-03-26 PROCEDURE — 770006 HCHG ROOM/CARE - MED/SURG/GYN SEMI*

## 2022-03-26 RX ORDER — METOCLOPRAMIDE 10 MG/1
10 TABLET ORAL
Qty: 56 TABLET | Refills: 0 | Status: SHIPPED | OUTPATIENT
Start: 2022-03-26 | End: 2022-03-28

## 2022-03-26 RX ORDER — PROCHLORPERAZINE MALEATE 10 MG
10 TABLET ORAL EVERY 6 HOURS PRN
Status: DISCONTINUED | OUTPATIENT
Start: 2022-03-26 | End: 2022-03-27

## 2022-03-26 RX ADMIN — ONDANSETRON 4 MG: 2 INJECTION INTRAMUSCULAR; INTRAVENOUS at 05:01

## 2022-03-26 RX ADMIN — METOCLOPRAMIDE 10 MG: 10 TABLET ORAL at 07:54

## 2022-03-26 RX ADMIN — METOCLOPRAMIDE 10 MG: 10 TABLET ORAL at 21:09

## 2022-03-26 RX ADMIN — ACETAMINOPHEN 650 MG: 325 TABLET, FILM COATED ORAL at 21:08

## 2022-03-26 RX ADMIN — SENNOSIDES AND DOCUSATE SODIUM 2 TABLET: 50; 8.6 TABLET ORAL at 17:01

## 2022-03-26 RX ADMIN — ONDANSETRON 4 MG: 2 INJECTION INTRAMUSCULAR; INTRAVENOUS at 09:04

## 2022-03-26 RX ADMIN — ONDANSETRON 4 MG: 2 INJECTION INTRAMUSCULAR; INTRAVENOUS at 00:24

## 2022-03-26 RX ADMIN — PROCHLORPERAZINE MALEATE 10 MG: 10 TABLET ORAL at 17:01

## 2022-03-26 RX ADMIN — HYDROXYCHLOROQUINE SULFATE 400 MG: 200 TABLET, FILM COATED ORAL at 05:02

## 2022-03-26 RX ADMIN — PREDNISONE 15 MG: 5 TABLET ORAL at 12:01

## 2022-03-26 RX ADMIN — DEXTROSE MONOHYDRATE, SODIUM CHLORIDE, AND POTASSIUM CHLORIDE: 50; 4.5; 1.49 INJECTION, SOLUTION INTRAVENOUS at 21:09

## 2022-03-26 RX ADMIN — METOCLOPRAMIDE 10 MG: 10 TABLET ORAL at 17:01

## 2022-03-26 RX ADMIN — METOCLOPRAMIDE 10 MG: 10 TABLET ORAL at 12:00

## 2022-03-26 RX ADMIN — SENNOSIDES AND DOCUSATE SODIUM 2 TABLET: 50; 8.6 TABLET ORAL at 05:03

## 2022-03-26 RX ADMIN — DEXTROSE MONOHYDRATE, SODIUM CHLORIDE, AND POTASSIUM CHLORIDE: 50; 4.5; 1.49 INJECTION, SOLUTION INTRAVENOUS at 05:03

## 2022-03-26 RX ADMIN — TRAMADOL HYDROCHLORIDE 50 MG: 50 TABLET, COATED ORAL at 05:03

## 2022-03-26 RX ADMIN — DULOXETINE HYDROCHLORIDE 60 MG: 30 CAPSULE, DELAYED RELEASE ORAL at 05:02

## 2022-03-26 ASSESSMENT — COGNITIVE AND FUNCTIONAL STATUS - GENERAL
SUGGESTED CMS G CODE MODIFIER MOBILITY: CH
MOBILITY SCORE: 24
DAILY ACTIVITIY SCORE: 24
SUGGESTED CMS G CODE MODIFIER DAILY ACTIVITY: CH

## 2022-03-26 ASSESSMENT — PAIN DESCRIPTION - PAIN TYPE
TYPE: CHRONIC PAIN
TYPE: ACUTE PAIN;CHRONIC PAIN
TYPE: ACUTE PAIN;CHRONIC PAIN

## 2022-03-26 ASSESSMENT — ENCOUNTER SYMPTOMS
VOMITING: 0
SEIZURES: 0
COUGH: 0
PALPITATIONS: 0
ABDOMINAL PAIN: 0
BLOOD IN STOOL: 0
DOUBLE VISION: 0
HEMOPTYSIS: 1
LOSS OF CONSCIOUSNESS: 0
SORE THROAT: 0
BLURRED VISION: 0
CHILLS: 0
SHORTNESS OF BREATH: 0
BACK PAIN: 0
HEADACHES: 1
HALLUCINATIONS: 0
FEVER: 0
NAUSEA: 1
FALLS: 0

## 2022-03-26 ASSESSMENT — LIFESTYLE VARIABLES: SUBSTANCE_ABUSE: 0

## 2022-03-26 NOTE — PROGRESS NOTES
[facility] Medicine Daily Progress Note    Date of Service  3/26/2022    Chief Complaint  Malena Bennett is a 21 y.o. female admitted 3/21/2022 with hemoptysis.     Hospital Course  21-year-old woman with a history of GERD, erosive gastritis, IBS-C, possible celiac sprue, secondary adrenal insufficiency, SLE, factor V Leiden deficiency, anxiety, OCD, Tourette's syndrome, type 1 diabetes, and migraine headaches who presented for evaluation of hematemesis and hemoptysis.     Interval Problem Update  Patient was seen and examined at bedside.  No acute events overnight. Patient is resting comfortably in bed and in no acute distress.     Continues to have blood per sputum  EGD negative  Bronch negative  CTA head/neck/aorta unremarkable  ENT performed bedside scope with no stigmata of bleeding  Will start steroid course  PT/PTT/INT/antiphospholipid/lupus anticoagulant     Consultants/Specialty  GI and pulmonary    Code Status  Full Code    Disposition  Patient is not medically cleared for discharge.   Anticipate discharge to to home with close outpatient follow-up.  I have placed the appropriate orders for post-discharge needs.    Review of Systems  Review of Systems   Constitutional: Negative for chills and fever.   HENT: Negative for congestion and sore throat.    Eyes: Negative for blurred vision and double vision.   Respiratory: Positive for hemoptysis. Negative for cough and shortness of breath.    Cardiovascular: Negative for chest pain and palpitations.   Gastrointestinal: Positive for nausea. Negative for abdominal pain, blood in stool, melena and vomiting.   Genitourinary: Negative for dysuria and frequency.   Musculoskeletal: Negative for back pain and falls.   Skin: Negative for rash.   Neurological: Positive for headaches. Negative for seizures and loss of consciousness.   Psychiatric/Behavioral: Negative for hallucinations and substance abuse.        Physical Exam  Temp:  [36.4 °C (97.5 °F)-37 °C  (98.6 °F)] 36.6 °C (97.8 °F)  Pulse:  [] 94  Resp:  [18] 18  BP: ()/(56-74) 109/74  SpO2:  [94 %-97 %] 95 %    Physical Exam  Constitutional:       General: She is not in acute distress.     Appearance: She is normal weight. She is not toxic-appearing.      Comments: Pleasant, conversational   HENT:      Head: Normocephalic.      Right Ear: External ear normal.      Left Ear: External ear normal.      Nose: Nose normal. No congestion.      Mouth/Throat:      Mouth: Mucous membranes are moist.      Pharynx: No oropharyngeal exudate.   Eyes:      General: No scleral icterus.     Extraocular Movements: Extraocular movements intact.      Pupils: Pupils are equal, round, and reactive to light.   Cardiovascular:      Rate and Rhythm: Normal rate and regular rhythm.      Heart sounds: No murmur heard.  Pulmonary:      Effort: Pulmonary effort is normal. No respiratory distress.      Breath sounds: No wheezing.   Abdominal:      General: Bowel sounds are normal.      Palpations: Abdomen is soft.      Tenderness: There is no abdominal tenderness. There is no guarding or rebound.   Musculoskeletal:         General: No swelling or deformity.   Skin:     Capillary Refill: Capillary refill takes less than 2 seconds.      Coloration: Skin is not jaundiced.      Findings: No bruising.   Neurological:      General: No focal deficit present.      Mental Status: She is alert. Mental status is at baseline.      Cranial Nerves: No cranial nerve deficit.      Sensory: No sensory deficit.      Comments: A&O x4   Psychiatric:         Mood and Affect: Mood normal.         Behavior: Behavior normal.         Thought Content: Thought content normal.         Judgment: Judgment normal.     Patient was seen and examined at bedside. No changes in physical exam from prior evaluation.    Fluids    Intake/Output Summary (Last 24 hours) at 3/26/2022 1406  Last data filed at 3/26/2022 0700  Gross per 24 hour   Intake 1050 ml   Output --    Net 1050 ml       Laboratory                        Imaging  CT-CTA NECK WITH & W/O-POST PROCESSING   Final Result      1.  No acute arterial abnormality of the neck.   2.  Prominent, nonspecific bilateral jugular lymph nodes.      CT-CTA HEAD WITH & W/O-POST PROCESS   Final Result      1.  CT angiogram of the [place] within normal limits.   2.  No acute intracranial abnormality.      CT-CTA COMPLETE THORACOABDOMINAL AORTA   Final Result      1.  No aortic aneurysm or dissection identified. No acute arterial abnormality in the chest, abdomen, or pelvis seen.   2.  No evidence of acute inflammation in the chest, abdomen, or pelvis.   3.  Nonspecific, prominent mesenteric lymph nodes.                     DX-CHEST-PORTABLE (1 VIEW)   Final Result      1.  There is no acute cardiopulmonary process.      US-ABDOMEN COMPLETE SURVEY   Final Result      1.  Echogenic liver consistent with hepatic steatosis      2.  No evidence for cirrhosis or portal hypertension      3.  Prior cholecystectomy. No biliary dilation           Assessment/Plan  * Hemoptysis  Assessment & Plan  Patient reports coughing up blood and clots over last several days  Patient states that coughing lead to vomiting where she again noted blood  Continues to have blood per sputum  EGD negative  Bronch negative  CTA head/neck/aorta unremarkable  ENT performed bedside scope with no stigmata of bleeding  Will start steroid course if her bleeding is impacted by possible lupus flare  PT/PTT/INT/antiphospholipid/lupus anticoagulant    Hypokalemia- (present on admission)  Assessment & Plan  Replacing, checking magnesium    Continue to monitor replace as needed     Lupus (HCC)- (present on admission)  Assessment & Plan  On plaquenil  Discussed case with patients rheumatologist, do not suspect patients symptoms related to lupus    Type 1 diabetes mellitus (HCC)- (present on admission)  Assessment & Plan  With hypoglycemia   Patient will use insulin  pump  Start D5, sliding scale insulin for now  Accu-Checks, hypoglycemia protocol     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 In review of yesterday's note (3/25/2022), there are no changes except as documented above.

## 2022-03-26 NOTE — PROGRESS NOTES
Received report from nightshift RN and assumed care of patient at 0700. Patient reports nausea, medicated per MAR. Call light in reach. Bed in lowest locked position. Hourly rounding to continue.

## 2022-03-26 NOTE — CARE PLAN
The patient is Stable - Low risk of patient condition declining or worsening    Shift Goals  Clinical Goals: Patients nausea will be controlled throughout shift  Patient Goals: sleep    Progress made toward(s) clinical / shift goals:  Scheduled and PRN nausea medication given per MAR.       Problem: Pain - Standard  Goal: Alleviation of pain or a reduction in pain to the patient’s comfort goal  Outcome: Progressing     Problem: Knowledge Deficit - Standard  Goal: Patient and family/care givers will demonstrate understanding of plan of care, disease process/condition, diagnostic tests and medications  Outcome: Progressing     Problem: Nutrition  Goal: Patient's nutritional and fluid intake will be adequate or improve  Outcome: Progressing

## 2022-03-26 NOTE — CARE PLAN
The patient is Stable - Low risk of patient condition declining or worsening    Shift Goals  Clinical Goals: pt's pain will be controlled and pt will be able to sleep  Patient Goals: sleep    Progress made toward(s) clinical / shift goals:  pain well controlled with use of prn tramodol and tylenol. Zofran given x3, no vomitting. Coughed up one bright red blood clot.    Patient is not progressing towards the following goals: n/a

## 2022-03-27 ENCOUNTER — APPOINTMENT (OUTPATIENT)
Dept: RADIOLOGY | Facility: MEDICAL CENTER | Age: 22
DRG: 368 | End: 2022-03-27
Attending: INTERNAL MEDICINE

## 2022-03-27 PROBLEM — R11.2 INTRACTABLE NAUSEA AND VOMITING: Status: ACTIVE | Noted: 2022-03-27

## 2022-03-27 LAB
BACTERIA SPEC RESP CULT: NORMAL
DSDNA AB TITR SER CLIF: 6 IU (ref 0–24)
ENA JO1 AB TITR SER: 3 AU/ML (ref 0–40)
ENA SCL70 IGG SER QL: 3 AU/ML (ref 0–40)
ENA SM IGG SER-ACNC: 0 AU/ML (ref 0–40)
ENA SS-B IGG SER IA-ACNC: 0 AU/ML (ref 0–40)
GLUCOSE BLD STRIP.AUTO-MCNC: 105 MG/DL (ref 65–99)
GLUCOSE BLD STRIP.AUTO-MCNC: 120 MG/DL (ref 65–99)
GLUCOSE BLD STRIP.AUTO-MCNC: 129 MG/DL (ref 65–99)
GLUCOSE BLD STRIP.AUTO-MCNC: 86 MG/DL (ref 65–99)
GRAM STN SPEC: NORMAL
INR PPP: 1.15 (ref 0.87–1.13)
PROTHROMBIN TIME: 13.8 SEC (ref 12–14.6)
SIGNIFICANT IND 70042: NORMAL
SITE SITE: NORMAL
SOURCE SOURCE: NORMAL
SSA52 R0ENA AB IGG Q0420: 0 AU/ML (ref 0–40)
SSA60 R0ENA AB IGG Q0419: 3 AU/ML (ref 0–40)
U1 SNRNP IGG SER QL: 3 UNITS (ref 0–19)

## 2022-03-27 PROCEDURE — 700102 HCHG RX REV CODE 250 W/ 637 OVERRIDE(OP): Performed by: INTERNAL MEDICINE

## 2022-03-27 PROCEDURE — 82962 GLUCOSE BLOOD TEST: CPT

## 2022-03-27 PROCEDURE — 85610 PROTHROMBIN TIME: CPT

## 2022-03-27 PROCEDURE — 700101 HCHG RX REV CODE 250: Performed by: INTERNAL MEDICINE

## 2022-03-27 PROCEDURE — A9270 NON-COVERED ITEM OR SERVICE: HCPCS | Performed by: INTERNAL MEDICINE

## 2022-03-27 PROCEDURE — 700111 HCHG RX REV CODE 636 W/ 250 OVERRIDE (IP): Performed by: HOSPITALIST

## 2022-03-27 PROCEDURE — 71275 CT ANGIOGRAPHY CHEST: CPT

## 2022-03-27 PROCEDURE — 770006 HCHG ROOM/CARE - MED/SURG/GYN SEMI*

## 2022-03-27 PROCEDURE — A9270 NON-COVERED ITEM OR SERVICE: HCPCS | Performed by: HOSPITALIST

## 2022-03-27 PROCEDURE — 700117 HCHG RX CONTRAST REV CODE 255: Performed by: INTERNAL MEDICINE

## 2022-03-27 PROCEDURE — 36415 COLL VENOUS BLD VENIPUNCTURE: CPT

## 2022-03-27 PROCEDURE — 700102 HCHG RX REV CODE 250 W/ 637 OVERRIDE(OP): Performed by: HOSPITALIST

## 2022-03-27 PROCEDURE — 99233 SBSQ HOSP IP/OBS HIGH 50: CPT | Performed by: INTERNAL MEDICINE

## 2022-03-27 PROCEDURE — 700111 HCHG RX REV CODE 636 W/ 250 OVERRIDE (IP): Performed by: INTERNAL MEDICINE

## 2022-03-27 RX ORDER — PROCHLORPERAZINE EDISYLATE 5 MG/ML
10 INJECTION INTRAMUSCULAR; INTRAVENOUS EVERY 6 HOURS PRN
Status: DISCONTINUED | OUTPATIENT
Start: 2022-03-27 | End: 2022-03-28 | Stop reason: HOSPADM

## 2022-03-27 RX ORDER — PREDNISONE 20 MG/1
40 TABLET ORAL DAILY
Status: DISCONTINUED | OUTPATIENT
Start: 2022-03-28 | End: 2022-03-28 | Stop reason: HOSPADM

## 2022-03-27 RX ADMIN — PROCHLORPERAZINE EDISYLATE 10 MG: 5 INJECTION INTRAMUSCULAR; INTRAVENOUS at 11:01

## 2022-03-27 RX ADMIN — DULOXETINE HYDROCHLORIDE 60 MG: 30 CAPSULE, DELAYED RELEASE ORAL at 05:05

## 2022-03-27 RX ADMIN — DEXTROSE MONOHYDRATE, SODIUM CHLORIDE, AND POTASSIUM CHLORIDE: 50; 4.5; 1.49 INJECTION, SOLUTION INTRAVENOUS at 21:53

## 2022-03-27 RX ADMIN — ONDANSETRON 4 MG: 2 INJECTION INTRAMUSCULAR; INTRAVENOUS at 22:04

## 2022-03-27 RX ADMIN — PREDNISONE 25 MG: 20 TABLET ORAL at 10:21

## 2022-03-27 RX ADMIN — METOCLOPRAMIDE 10 MG: 10 TABLET ORAL at 05:05

## 2022-03-27 RX ADMIN — ONDANSETRON 4 MG: 4 TABLET, ORALLY DISINTEGRATING ORAL at 05:56

## 2022-03-27 RX ADMIN — SENNOSIDES AND DOCUSATE SODIUM 2 TABLET: 50; 8.6 TABLET ORAL at 05:05

## 2022-03-27 RX ADMIN — METOCLOPRAMIDE 10 MG: 10 TABLET ORAL at 21:52

## 2022-03-27 RX ADMIN — ONDANSETRON 4 MG: 4 TABLET, ORALLY DISINTEGRATING ORAL at 02:07

## 2022-03-27 RX ADMIN — HYDROXYCHLOROQUINE SULFATE 400 MG: 200 TABLET, FILM COATED ORAL at 05:05

## 2022-03-27 RX ADMIN — ONDANSETRON 4 MG: 4 TABLET, ORALLY DISINTEGRATING ORAL at 10:21

## 2022-03-27 RX ADMIN — IOHEXOL 84 ML: 350 INJECTION, SOLUTION INTRAVENOUS at 13:30

## 2022-03-27 RX ADMIN — PREDNISONE 15 MG: 5 TABLET ORAL at 05:04

## 2022-03-27 ASSESSMENT — LIFESTYLE VARIABLES: SUBSTANCE_ABUSE: 0

## 2022-03-27 ASSESSMENT — PAIN DESCRIPTION - PAIN TYPE: TYPE: CHRONIC PAIN

## 2022-03-27 ASSESSMENT — ENCOUNTER SYMPTOMS
HALLUCINATIONS: 0
VOMITING: 0
NAUSEA: 1
FEVER: 0
HEMOPTYSIS: 1
BLOOD IN STOOL: 0
HEADACHES: 1
CHILLS: 0
SEIZURES: 0
BLURRED VISION: 0
LOSS OF CONSCIOUSNESS: 0
FALLS: 0
SHORTNESS OF BREATH: 0
BACK PAIN: 0
ABDOMINAL PAIN: 0
COUGH: 0
PALPITATIONS: 0
SORE THROAT: 0
DOUBLE VISION: 0

## 2022-03-27 NOTE — PROGRESS NOTES
[facility] Medicine Daily Progress Note    Date of Service  3/27/2022    Chief Complaint  Malena Bennett is a 21 y.o. female admitted 3/21/2022 with hemoptysis.     Hospital Course  21-year-old woman with a history of GERD, erosive gastritis, IBS-C, possible celiac sprue, secondary adrenal insufficiency, SLE, factor V Leiden deficiency, anxiety, OCD, Tourette's syndrome, type 1 diabetes, and migraine headaches who presented for evaluation of hematemesis and hemoptysis.     Interval Problem Update  Patient was seen and examined at bedside.  No acute events overnight. Patient is resting comfortably in bed and in no acute distress. Persistent nausea and vomiting.     Continues to have blood per sputum  EGD negative  Bronch negative  ENT scoped sinuses - no blood  CTA head/neck/aorta unremarkable  antiphospholipid/lupus anticoagulant pending  Will start steroid course  CTA chest ordered     Consultants/Specialty  GI and pulmonary    Code Status  Full Code    Disposition  Patient is not medically cleared for discharge.   Anticipate discharge to to home with close outpatient follow-up.  I have placed the appropriate orders for post-discharge needs.    Review of Systems  Review of Systems   Constitutional: Negative for chills and fever.   HENT: Negative for congestion and sore throat.    Eyes: Negative for blurred vision and double vision.   Respiratory: Positive for hemoptysis. Negative for cough and shortness of breath.    Cardiovascular: Negative for chest pain and palpitations.   Gastrointestinal: Positive for nausea. Negative for abdominal pain, blood in stool, melena and vomiting.   Genitourinary: Negative for dysuria and frequency.   Musculoskeletal: Negative for back pain and falls.   Skin: Negative for rash.   Neurological: Positive for headaches. Negative for seizures and loss of consciousness.   Psychiatric/Behavioral: Negative for hallucinations and substance abuse.        Physical Exam  Temp:   [36.2 °C (97.2 °F)-37 °C (98.6 °F)] 37 °C (98.6 °F)  Pulse:  [71-93] 75  Resp:  [18] 18  BP: (100-112)/(66-73) 112/73  SpO2:  [95 %-98 %] 98 %    Physical Exam  Constitutional:       General: She is not in acute distress.     Appearance: She is normal weight. She is not toxic-appearing.      Comments: Pleasant, conversational   HENT:      Head: Normocephalic.      Right Ear: External ear normal.      Left Ear: External ear normal.      Nose: Nose normal. No congestion.      Mouth/Throat:      Mouth: Mucous membranes are moist.      Pharynx: No oropharyngeal exudate.   Eyes:      General: No scleral icterus.     Extraocular Movements: Extraocular movements intact.      Pupils: Pupils are equal, round, and reactive to light.   Cardiovascular:      Rate and Rhythm: Normal rate and regular rhythm.      Heart sounds: No murmur heard.  Pulmonary:      Effort: Pulmonary effort is normal. No respiratory distress.      Breath sounds: No wheezing.   Abdominal:      General: Bowel sounds are normal.      Palpations: Abdomen is soft.      Tenderness: There is no abdominal tenderness. There is no guarding or rebound.   Musculoskeletal:         General: No swelling or deformity.   Skin:     Capillary Refill: Capillary refill takes less than 2 seconds.      Coloration: Skin is not jaundiced.      Findings: No bruising.   Neurological:      General: No focal deficit present.      Mental Status: She is alert. Mental status is at baseline.      Cranial Nerves: No cranial nerve deficit.      Sensory: No sensory deficit.      Comments: A&O x4   Psychiatric:         Mood and Affect: Mood normal.         Behavior: Behavior normal.         Thought Content: Thought content normal.         Judgment: Judgment normal.     Patient was seen and examined at bedside. No changes in physical exam from prior evaluation.    Fluids    Intake/Output Summary (Last 24 hours) at 3/27/2022 1331  Last data filed at 3/26/2022 1600  Gross per 24 hour   Intake  240 ml   Output --   Net 240 ml       Laboratory  Recent Labs     03/26/22  1420   WBC 10.0   RBC 4.38   HEMOGLOBIN 13.2   HEMATOCRIT 38.8   MCV 88.6   MCH 30.1   MCHC 34.0   RDW 37.2   PLATELETCT 279   MPV 9.8         Recent Labs     03/27/22  0130   INR 1.15*               Imaging  CT-CTA CHEST WITH & W/O-POST PROCESS   Final Result      No acute abnormality in the chest. No explanation for hemoptysis.            IR-US GUIDED PIV   Final Result    Ultrasound-guided PERIPHERAL IV INSERTION performed by    qualified nursing staff as above.      CT-CTA NECK WITH & W/O-POST PROCESSING   Final Result      1.  No acute arterial abnormality of the neck.   2.  Prominent, nonspecific bilateral jugular lymph nodes.      CT-CTA HEAD WITH & W/O-POST PROCESS   Final Result      1.  CT angiogram of the [place] within normal limits.   2.  No acute intracranial abnormality.      CT-CTA COMPLETE THORACOABDOMINAL AORTA   Final Result      1.  No aortic aneurysm or dissection identified. No acute arterial abnormality in the chest, abdomen, or pelvis seen.   2.  No evidence of acute inflammation in the chest, abdomen, or pelvis.   3.  Nonspecific, prominent mesenteric lymph nodes.                     DX-CHEST-PORTABLE (1 VIEW)   Final Result      1.  There is no acute cardiopulmonary process.      US-ABDOMEN COMPLETE SURVEY   Final Result      1.  Echogenic liver consistent with hepatic steatosis      2.  No evidence for cirrhosis or portal hypertension      3.  Prior cholecystectomy. 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  Rare eos present on gastric biopsy  Continue reglan  Start steroids if this is eosinophilic gastroenteritis    Hypokalemia- (present on admission)  Assessment & Plan  Replacing, checking magnesium    Continue to monitor replace as needed     Lupus (HCC)- (present on admission)  Assessment & Plan  On plaquenil  Discussed case with patients rheumatologist, do not suspect patients symptoms related to lupus    Type 1 diabetes mellitus (HCC)- (present on admission)  Assessment & Plan  With hypoglycemia   Patient will use insulin pump  D5 0.45NS - not tolerating PO  Accu-Checks, hypoglycemia protocol     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 In review of yesterday's note (3/26/2022), there are no changes except as documented above.

## 2022-03-27 NOTE — CARE PLAN
The patient is Stable - Low risk of patient condition declining or worsening    Shift Goals  Clinical Goals: nausea control  Patient Goals: eat    Progress made toward(s) clinical / shift goals:      Problem: Nutrition  Goal: Patient's nutritional and fluid intake will be adequate or improve  Outcome: Progressing  Encourage PO intake     Problem: Gastrointestinal Irritability  Goal: Nausea and vomiting will be absent or improve  Outcome: Progressing  Report nausea, prn nausea meds       Patient is not progressing towards the following goals:

## 2022-03-27 NOTE — CARE PLAN
The patient is Stable - Low risk of patient condition declining or worsening    Shift Goals  Clinical Goals: pt will report controlled nausea and pain  Patient Goals: sleep    Progress made toward(s) clinical / shift goals:  VSSA, emesis x1, zofran given. Tylenol given x1 with good relief, pt reports sleeping comfortably. Coughed up one small bright red clot.     Patient is not progressing towards the following goals:n/a

## 2022-03-27 NOTE — ASSESSMENT & PLAN NOTE
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

## 2022-03-28 VITALS
OXYGEN SATURATION: 93 % | HEIGHT: 67 IN | RESPIRATION RATE: 16 BRPM | WEIGHT: 208.11 LBS | BODY MASS INDEX: 32.66 KG/M2 | SYSTOLIC BLOOD PRESSURE: 101 MMHG | TEMPERATURE: 98.1 F | DIASTOLIC BLOOD PRESSURE: 74 MMHG | HEART RATE: 76 BPM

## 2022-03-28 LAB
GLUCOSE BLD STRIP.AUTO-MCNC: 108 MG/DL (ref 65–99)
GLUCOSE BLD STRIP.AUTO-MCNC: 90 MG/DL (ref 65–99)
GLUCOSE BLD STRIP.AUTO-MCNC: 91 MG/DL (ref 65–99)

## 2022-03-28 PROCEDURE — 82962 GLUCOSE BLOOD TEST: CPT | Mod: 91

## 2022-03-28 PROCEDURE — A9270 NON-COVERED ITEM OR SERVICE: HCPCS | Performed by: HOSPITALIST

## 2022-03-28 PROCEDURE — 700102 HCHG RX REV CODE 250 W/ 637 OVERRIDE(OP): Performed by: HOSPITALIST

## 2022-03-28 PROCEDURE — 94760 N-INVAS EAR/PLS OXIMETRY 1: CPT

## 2022-03-28 PROCEDURE — 700111 HCHG RX REV CODE 636 W/ 250 OVERRIDE (IP): Performed by: HOSPITALIST

## 2022-03-28 PROCEDURE — 99239 HOSP IP/OBS DSCHRG MGMT >30: CPT | Performed by: HOSPITALIST

## 2022-03-28 PROCEDURE — 83516 IMMUNOASSAY NONANTIBODY: CPT

## 2022-03-28 PROCEDURE — 36415 COLL VENOUS BLD VENIPUNCTURE: CPT

## 2022-03-28 PROCEDURE — 700111 HCHG RX REV CODE 636 W/ 250 OVERRIDE (IP): Performed by: INTERNAL MEDICINE

## 2022-03-28 PROCEDURE — 700101 HCHG RX REV CODE 250: Performed by: INTERNAL MEDICINE

## 2022-03-28 PROCEDURE — 700102 HCHG RX REV CODE 250 W/ 637 OVERRIDE(OP): Performed by: INTERNAL MEDICINE

## 2022-03-28 PROCEDURE — A9270 NON-COVERED ITEM OR SERVICE: HCPCS | Performed by: INTERNAL MEDICINE

## 2022-03-28 RX ORDER — ONDANSETRON 4 MG/1
8 TABLET, ORALLY DISINTEGRATING ORAL EVERY 4 HOURS PRN
Qty: 30 TABLET | Refills: 2 | Status: ON HOLD | OUTPATIENT
Start: 2022-03-28 | End: 2022-04-13 | Stop reason: SDUPTHER

## 2022-03-28 RX ORDER — HYDROXYCHLOROQUINE SULFATE 200 MG/1
400 TABLET, FILM COATED ORAL DAILY
Qty: 90 TABLET | Refills: 3 | Status: SHIPPED | OUTPATIENT
Start: 2022-03-28

## 2022-03-28 RX ORDER — PREDNISONE 20 MG/1
TABLET ORAL
Qty: 25 TABLET | Refills: 0 | Status: ON HOLD | OUTPATIENT
Start: 2022-03-28 | End: 2022-04-13

## 2022-03-28 RX ORDER — OMEPRAZOLE 20 MG/1
20 CAPSULE, DELAYED RELEASE ORAL 2 TIMES DAILY
Qty: 28 CAPSULE | Refills: 0 | Status: ON HOLD | OUTPATIENT
Start: 2022-03-28 | End: 2022-04-13

## 2022-03-28 RX ADMIN — HYDROXYCHLOROQUINE SULFATE 400 MG: 200 TABLET, FILM COATED ORAL at 05:06

## 2022-03-28 RX ADMIN — METOCLOPRAMIDE 10 MG: 10 TABLET ORAL at 05:11

## 2022-03-28 RX ADMIN — ONDANSETRON 4 MG: 4 TABLET, ORALLY DISINTEGRATING ORAL at 05:05

## 2022-03-28 RX ADMIN — DEXTROSE MONOHYDRATE, SODIUM CHLORIDE, AND POTASSIUM CHLORIDE: 50; 4.5; 1.49 INJECTION, SOLUTION INTRAVENOUS at 04:47

## 2022-03-28 RX ADMIN — PREDNISONE 40 MG: 20 TABLET ORAL at 05:06

## 2022-03-28 RX ADMIN — DULOXETINE HYDROCHLORIDE 60 MG: 30 CAPSULE, DELAYED RELEASE ORAL at 05:06

## 2022-03-28 RX ADMIN — SENNOSIDES AND DOCUSATE SODIUM 2 TABLET: 50; 8.6 TABLET ORAL at 05:05

## 2022-03-28 RX ADMIN — ACETAMINOPHEN 650 MG: 325 TABLET, FILM COATED ORAL at 05:06

## 2022-03-28 ASSESSMENT — PAIN DESCRIPTION - PAIN TYPE
TYPE: CHRONIC PAIN
TYPE: ACUTE PAIN

## 2022-03-28 ASSESSMENT — PATIENT HEALTH QUESTIONNAIRE - PHQ9
2. FEELING DOWN, DEPRESSED, IRRITABLE, OR HOPELESS: NOT AT ALL
SUM OF ALL RESPONSES TO PHQ9 QUESTIONS 1 AND 2: 0
1. LITTLE INTEREST OR PLEASURE IN DOING THINGS: NOT AT ALL

## 2022-03-28 NOTE — CARE PLAN
The patient is Stable - Low risk of patient condition declining or worsening    Shift Goals  Clinical Goals: nausea will be controlled  Patient Goals: sleep    Progress made toward(s) clinical / shift goals:  emesis x1, PO zofran given x2    Patient is not progressing towards the following goals:

## 2022-03-28 NOTE — CARE PLAN
The patient is Stable - Low risk of patient condition declining or worsening    Shift Goals  Clinical Goals: Patient will  have no vomiting this shift  Patient Goals: Rest and comfort    Progress made toward(s) clinical / shift goals:  patient had one episode of vomiting this morning. Patient talked to the hospitalist and he said patient is good for discharge.      Problem: Pain - Standard  Goal: Alleviation of pain or a reduction in pain to the patient’s comfort goal  Outcome: Progressing     Problem: Nutrition  Goal: Patient's nutritional and fluid intake will be adequate or improve  Outcome: Progressing     Problem: Diabetes Management  Goal: Patient will achieve and maintain glucose in satisfactory range  Outcome: Progressing

## 2022-03-28 NOTE — PROGRESS NOTES
Received report from JORDI Cardona.  Assumed Pt. Care  Pt. A&Ox4  No sign of cardiac and respiratory distress.  Pain is 5/10 on her back. No pain medication at this time.  Patient said that she vomited at 0700 am and then she cough up blood with clots. She also had two episodes of it last night.   Pt. Is in bed with family at bedside. .  Bed at lowest position.  Call light and personal belonging within reach. Continue to monitor.

## 2022-03-28 NOTE — DISCHARGE SUMMARY
"Discharge Summary    CHIEF COMPLAINT ON ADMISSION  Chief Complaint   Patient presents with   • N/V     X 5 d \" blood and clots \"  Emesis x 8 past 24 hrs Occurs after eating   • Abdominal Pain     Sharp Epigastric pain Non radiatio=ng Hx DM No fever   • Low Blood Sugar   • Headache       Reason for Admission  Vomiting Blood     Admission Date  3/21/2022    CODE STATUS  Full Code    HPI & HOSPITAL COURSE  Ms. Malena Hutchison is a very pleasant 21-year-old female whom I have the pleasure of treating today along with her family at bedside who at this point is coming to the hospital because of hemoptysis.  The patient's hemoptysis was evaluated by multitude of testing.  The patient had bronchoscopy which was negative.  The patient had an ENT evaluation which was negative.  The patient had had a gastrointestinal evaluation with an EGD and the EGD shows multiple erosions in the esophagus as well as erythematous gastric mucosa.  The patient's hemoptysis seems to be related to vomiting and esophageal injury.  The patient at this point will be placed on a 2-week course of PPI therapy.  The patient at this point is recommended to advance very slowly with her diet and at this point she should be on a full liquid diet.  We have also discussed changing her diet to eliminate certain constituents that may aggravate her underlying medical conditions including SLE, diabetes mellitus type 1, Tourette's disease as well as factor V Leyden deficiency, as well as her obsessive-compulsive disorder as well as her celiac disease.  The patient at this point understands her course of treatment as well as the necessity for ongoing therapy as an outpatient.  At this point patient should be referred to rheumatology as well as continue with endocrinology and continued with outpatient follow-ups with her primary care physician.  Patient otherwise currently is stable.  H&H has not significantly declined.  Diabetes at this point is on the controls " and thus the patient can discharge home with outpatient follow-ups monitoring and diet adjustment.    Therefore, she is discharged in good and stable condition to home with close outpatient follow-up.    The patient met 2-midnight criteria for an inpatient stay at the time of discharge.    Discharge Date  3/28/2022    FOLLOW UP ITEMS POST DISCHARGE  Follow-up with the primary care physician in 7 to 10 days    DISCHARGE DIAGNOSES  Principal Problem:    Blood in sputum POA: Unknown  Active Problems:    Factor VIII inhibitor disorder (HCC) POA: Yes    Type 1 diabetes mellitus (HCC) POA: Yes    Lupus (HCC) POA: Yes    Hypokalemia POA: Yes    Intractable nausea and vomiting POA: Unknown  Resolved Problems:    * No resolved hospital problems. *      FOLLOW UP  Future Appointments   Date Time Provider Department Center   4/8/2022  1:30 PM [facility]     NADER [facility]  [address] [address]  [phone]    Schedule an appointment as soon as possible for a visit  Follow up      MEDICATIONS ON DISCHARGE     Medication List      START taking these medications      Instructions   omeprazole 20 MG delayed-release capsule  Commonly known as: PRILOSEC   Take 1 Capsule by mouth 2 times a day for 14 days.  Dose: 20 mg     predniSONE 20 MG Tabs  Commonly known as: DELTASONE   40 mg daily for 7 days than 20 mg daily for 7 days than 10 mg daily for 7 days        CHANGE how you take these medications      Instructions   ondansetron 4 MG Tbdp  What changed:   · medication strength  · when to take this  Commonly known as: Zofran ODT   Take 2 Tablets by mouth every four hours as needed for Nausea.  Dose: 8 mg        CONTINUE taking these medications      Instructions   cyanocobalamin 1000 MCG/ML Soln  Commonly known as: VITAMIN B-12   Inject 1 mL intramuscularly every 14 days.     DULoxetine 60 MG Cpep delayed-release capsule  Commonly known as: CYMBALTA   Take 1 Capsule  by mouth every day.  Dose: 60 mg     gabapentin 100 MG Caps  Commonly known as: NEURONTIN   Take 1 Capsule by mouth 4 times a day.  Dose: 100 mg     hydroxychloroquine 200 MG Tabs  Commonly known as: Plaquenil   Take 2 Tablets by mouth every day.  Dose: 400 mg     insulin infusion pump Aria   Inject 0.7 Units/hr under the skin continuous. Patient's own SQ insulin pump    Type of Insulin: Humalog  Last change of tubing: took out pump today 3/21/22 due to hypoglycemia    Dosing:  Basal rate:   0.7 units/hr - adjusts basal rate according to Control IQ technology and CGM  Bolus ratio:   1 unit : 12 g carbohydrate at breakfast, lunch, dinner, snacks  Correction ratio:   1 units for every 50 over 150 mg/dL    Disconnect pump if patient becomes hypoglycemic and altered.  Dose: 0.7 Units/hr     Lantus SoloStar 100 UNIT/ML Sopn injection  Generic drug: insulin glargine   Inject 10 Units under the skin every day. When not able to use insulin pump  Dose: 10 Units     Methotrexate Sodium 50 MG/2ML Soln   10 mg every 7 days. 0.4 ml = 10 mg, SUBCUTANEOSLY  Dose: 10 mg     prochlorperazine 5 MG Tabs  Commonly known as: COMPAZINE   Take 1 Tablet by mouth every 6 hours as needed for Nausea/Vomiting.  Dose: 5 mg     tizanidine 4 MG Tabs  Commonly known as: ZANAFLEX   Take 8 mg by mouth every evening.  Dose: 8 mg     vitamin D2 (Ergocalciferol) 1.25 MG (74607 UT) Caps capsule  Commonly known as: Drisdol   Take 50,000 Units by mouth every 72 hours. EVERY 3 DAYS  Dose: 50,000 Units        STOP taking these medications    ketorolac 10 MG Tabs  Commonly known as: TORADOL            Allergies  Allergies   Allergen Reactions   • Cefdinir Rash     rash   • Erythromycin Vomiting and Nausea       DIET  Orders Placed This Encounter   Procedures   • Diet Order Diet: Regular; Miscellaneous modifications: (optional): Gluten Free     Standing Status:   Standing     Number of Occurrences:   1     Order Specific Question:   Diet:     Answer:    Regular [1]     Order Specific Question:   Miscellaneous modifications: (optional)     Answer:   Gluten Free [9]       ACTIVITY  As tolerated.  Weight bearing as tolerated    CONSULTATIONS  Pulmonology  Gastroenterology    PROCEDURES  Bronchoscopy  EGD with biopsies    LABORATORY  Lab Results   Component Value Date    SODIUM 139 03/23/2022    POTASSIUM 3.8 03/23/2022    CHLORIDE 104 03/23/2022    CO2 26 03/23/2022    GLUCOSE 99 03/23/2022    BUN 4 (L) 03/23/2022    CREATININE 0.66 03/23/2022        Lab Results   Component Value Date    WBC 10.0 03/26/2022    HEMOGLOBIN 13.2 03/26/2022    HEMATOCRIT 38.8 03/26/2022    PLATELETCT 279 03/26/2022        Total time of the discharge process exceeds 37 minutes.

## 2022-03-29 LAB
APTT PPP: 26.4 SEC (ref 24.7–36)
CARDIOLIPIN IGA SER IA-ACNC: <10 APL
CARDIOLIPIN IGG SER IA-ACNC: <10 GPL
CARDIOLIPIN IGM SER IA-ACNC: 13 MPL
INR PPP: 1.12 (ref 0.87–1.13)
LA PPP-IMP: NORMAL
PROTHROMBIN TIME: 14.1 SEC (ref 12–14.6)
SCREEN DRVVT: 36.7 SEC (ref 28–48)
UFH PPP CHRO-ACNC: <0.1 U/ML

## 2022-03-30 DIAGNOSIS — M79.2 NEUROPATHIC PAIN: ICD-10-CM

## 2022-03-30 LAB
MYELOPEROXIDASE AB SER-ACNC: 1 AU/ML (ref 0–19)
PROTEINASE3 AB SER-ACNC: 1 AU/ML (ref 0–19)

## 2022-03-30 PROCEDURE — 99285 EMERGENCY DEPT VISIT HI MDM: CPT

## 2022-03-30 PROCEDURE — 700111 HCHG RX REV CODE 636 W/ 250 OVERRIDE (IP)

## 2022-03-30 PROCEDURE — 96374 THER/PROPH/DIAG INJ IV PUSH: CPT

## 2022-03-30 PROCEDURE — 36415 COLL VENOUS BLD VENIPUNCTURE: CPT

## 2022-03-30 RX ORDER — ONDANSETRON 4 MG/1
4 TABLET, ORALLY DISINTEGRATING ORAL ONCE
Status: COMPLETED | OUTPATIENT
Start: 2022-03-30 | End: 2022-03-30

## 2022-03-30 RX ORDER — GABAPENTIN 100 MG/1
CAPSULE ORAL
Qty: 360 CAPSULE | Refills: 3 | Status: SHIPPED | OUTPATIENT
Start: 2022-03-30 | End: 2022-03-30

## 2022-03-30 RX ADMIN — ONDANSETRON 4 MG: 4 TABLET, ORALLY DISINTEGRATING ORAL at 22:40

## 2022-03-30 ASSESSMENT — FIBROSIS 4 INDEX: FIB4 SCORE: 0.32

## 2022-03-31 ENCOUNTER — HOSPITAL ENCOUNTER (INPATIENT)
Facility: MEDICAL CENTER | Age: 22
LOS: 11 days | DRG: 369 | End: 2022-04-13
Attending: EMERGENCY MEDICINE | Admitting: STUDENT IN AN ORGANIZED HEALTH CARE EDUCATION/TRAINING PROGRAM
Payer: COMMERCIAL

## 2022-03-31 ENCOUNTER — APPOINTMENT (OUTPATIENT)
Dept: RADIOLOGY | Facility: MEDICAL CENTER | Age: 22
DRG: 369 | End: 2022-03-31
Attending: EMERGENCY MEDICINE
Payer: COMMERCIAL

## 2022-03-31 DIAGNOSIS — R05.9 COUGH: ICD-10-CM

## 2022-03-31 DIAGNOSIS — R11.2 INTRACTABLE NAUSEA AND VOMITING: ICD-10-CM

## 2022-03-31 DIAGNOSIS — R11.0 NAUSEA WITHOUT VOMITING: ICD-10-CM

## 2022-03-31 DIAGNOSIS — R04.2 HEMOPTYSIS: ICD-10-CM

## 2022-03-31 DIAGNOSIS — R11.2 NON-INTRACTABLE VOMITING WITH NAUSEA, UNSPECIFIED VOMITING TYPE: ICD-10-CM

## 2022-03-31 DIAGNOSIS — K21.01 GASTROESOPHAGEAL REFLUX DISEASE WITH ESOPHAGITIS AND HEMORRHAGE: ICD-10-CM

## 2022-03-31 DIAGNOSIS — M32.9 LUPUS (HCC): ICD-10-CM

## 2022-03-31 PROBLEM — K21.00 GASTROESOPHAGEAL REFLUX DISEASE WITH ESOPHAGITIS WITHOUT HEMORRHAGE: Status: ACTIVE | Noted: 2022-03-31

## 2022-03-31 PROBLEM — E66.09 CLASS 1 OBESITY DUE TO EXCESS CALORIES WITH SERIOUS COMORBIDITY IN ADULT: Status: ACTIVE | Noted: 2022-03-31

## 2022-03-31 PROBLEM — K92.0 HEMATEMESIS OF UNKNOWN CAUSE: Status: ACTIVE | Noted: 2022-03-31

## 2022-03-31 LAB
ABO GROUP BLD: NORMAL
ALBUMIN SERPL BCP-MCNC: 4.6 G/DL (ref 3.2–4.9)
ALBUMIN/GLOB SERPL: 1.5 G/DL
ALP SERPL-CCNC: 101 U/L (ref 30–99)
ALT SERPL-CCNC: 26 U/L (ref 2–50)
ANION GAP SERPL CALC-SCNC: 12 MMOL/L (ref 7–16)
AST SERPL-CCNC: 17 U/L (ref 12–45)
BASOPHILS # BLD AUTO: 0.2 % (ref 0–1.8)
BASOPHILS # BLD: 0.03 K/UL (ref 0–0.12)
BILIRUB SERPL-MCNC: 0.3 MG/DL (ref 0.1–1.5)
BLD GP AB SCN SERPL QL: NORMAL
BUN SERPL-MCNC: 14 MG/DL (ref 8–22)
CALCIUM SERPL-MCNC: 9.5 MG/DL (ref 8.5–10.5)
CHLORIDE SERPL-SCNC: 101 MMOL/L (ref 96–112)
CO2 SERPL-SCNC: 23 MMOL/L (ref 20–33)
CREAT SERPL-MCNC: 0.67 MG/DL (ref 0.5–1.4)
EOSINOPHIL # BLD AUTO: 0.01 K/UL (ref 0–0.51)
EOSINOPHIL NFR BLD: 0.1 % (ref 0–6.9)
ERYTHROCYTE [DISTWIDTH] IN BLOOD BY AUTOMATED COUNT: 39 FL (ref 35.9–50)
GFR SERPLBLD CREATININE-BSD FMLA CKD-EPI: 126 ML/MIN/1.73 M 2
GLOBULIN SER CALC-MCNC: 3 G/DL (ref 1.9–3.5)
GLUCOSE SERPL-MCNC: 99 MG/DL (ref 65–99)
HCG SERPL QL: NEGATIVE
HCT VFR BLD AUTO: 42.1 % (ref 37–47)
HGB BLD-MCNC: 14.1 G/DL (ref 12–16)
IMM GRANULOCYTES # BLD AUTO: 0.12 K/UL (ref 0–0.11)
IMM GRANULOCYTES NFR BLD AUTO: 0.9 % (ref 0–0.9)
LYMPHOCYTES # BLD AUTO: 1.64 K/UL (ref 1–4.8)
LYMPHOCYTES NFR BLD: 11.8 % (ref 22–41)
MCH RBC QN AUTO: 30.4 PG (ref 27–33)
MCHC RBC AUTO-ENTMCNC: 33.5 G/DL (ref 33.6–35)
MCV RBC AUTO: 90.7 FL (ref 81.4–97.8)
MONOCYTES # BLD AUTO: 0.96 K/UL (ref 0–0.85)
MONOCYTES NFR BLD AUTO: 6.9 % (ref 0–13.4)
NEUTROPHILS # BLD AUTO: 11.16 K/UL (ref 2–7.15)
NEUTROPHILS NFR BLD: 80.1 % (ref 44–72)
NRBC # BLD AUTO: 0 K/UL
NRBC BLD-RTO: 0 /100 WBC
PLATELET # BLD AUTO: 321 K/UL (ref 164–446)
PMV BLD AUTO: 10.2 FL (ref 9–12.9)
POTASSIUM SERPL-SCNC: 4.1 MMOL/L (ref 3.6–5.5)
PROT SERPL-MCNC: 7.6 G/DL (ref 6–8.2)
RBC # BLD AUTO: 4.64 M/UL (ref 4.2–5.4)
RH BLD: NORMAL
SODIUM SERPL-SCNC: 136 MMOL/L (ref 135–145)
WBC # BLD AUTO: 13.9 K/UL (ref 4.8–10.8)

## 2022-03-31 PROCEDURE — 71275 CT ANGIOGRAPHY CHEST: CPT

## 2022-03-31 PROCEDURE — 700105 HCHG RX REV CODE 258: Performed by: EMERGENCY MEDICINE

## 2022-03-31 PROCEDURE — 80053 COMPREHEN METABOLIC PANEL: CPT

## 2022-03-31 PROCEDURE — 86850 RBC ANTIBODY SCREEN: CPT

## 2022-03-31 PROCEDURE — 96375 TX/PRO/DX INJ NEW DRUG ADDON: CPT

## 2022-03-31 PROCEDURE — A9270 NON-COVERED ITEM OR SERVICE: HCPCS | Performed by: STUDENT IN AN ORGANIZED HEALTH CARE EDUCATION/TRAINING PROGRAM

## 2022-03-31 PROCEDURE — 700111 HCHG RX REV CODE 636 W/ 250 OVERRIDE (IP): Performed by: EMERGENCY MEDICINE

## 2022-03-31 PROCEDURE — 86901 BLOOD TYPING SEROLOGIC RH(D): CPT

## 2022-03-31 PROCEDURE — 700102 HCHG RX REV CODE 250 W/ 637 OVERRIDE(OP): Performed by: STUDENT IN AN ORGANIZED HEALTH CARE EDUCATION/TRAINING PROGRAM

## 2022-03-31 PROCEDURE — 86900 BLOOD TYPING SEROLOGIC ABO: CPT

## 2022-03-31 PROCEDURE — G0378 HOSPITAL OBSERVATION PER HR: HCPCS

## 2022-03-31 PROCEDURE — 85025 COMPLETE CBC W/AUTO DIFF WBC: CPT

## 2022-03-31 PROCEDURE — 700111 HCHG RX REV CODE 636 W/ 250 OVERRIDE (IP): Performed by: STUDENT IN AN ORGANIZED HEALTH CARE EDUCATION/TRAINING PROGRAM

## 2022-03-31 PROCEDURE — 84703 CHORIONIC GONADOTROPIN ASSAY: CPT

## 2022-03-31 PROCEDURE — 700117 HCHG RX CONTRAST REV CODE 255: Performed by: EMERGENCY MEDICINE

## 2022-03-31 PROCEDURE — 99220 PR INITIAL OBSERVATION CARE,LEVL III: CPT | Performed by: STUDENT IN AN ORGANIZED HEALTH CARE EDUCATION/TRAINING PROGRAM

## 2022-03-31 RX ORDER — HYDROXYCHLOROQUINE SULFATE 200 MG/1
400 TABLET, FILM COATED ORAL DAILY
Status: DISCONTINUED | OUTPATIENT
Start: 2022-03-31 | End: 2022-04-13 | Stop reason: HOSPADM

## 2022-03-31 RX ORDER — ONDANSETRON 2 MG/ML
4 INJECTION INTRAMUSCULAR; INTRAVENOUS ONCE
Status: DISCONTINUED | OUTPATIENT
Start: 2022-03-31 | End: 2022-03-31

## 2022-03-31 RX ORDER — SODIUM CHLORIDE 9 MG/ML
1000 INJECTION, SOLUTION INTRAVENOUS ONCE
Status: COMPLETED | OUTPATIENT
Start: 2022-03-31 | End: 2022-03-31

## 2022-03-31 RX ORDER — SUCRALFATE ORAL 1 G/10ML
1 SUSPENSION ORAL EVERY 6 HOURS
Status: DISCONTINUED | OUTPATIENT
Start: 2022-03-31 | End: 2022-04-01

## 2022-03-31 RX ORDER — ONDANSETRON 4 MG/1
8 TABLET, ORALLY DISINTEGRATING ORAL EVERY 4 HOURS PRN
Status: DISCONTINUED | OUTPATIENT
Start: 2022-03-31 | End: 2022-04-09

## 2022-03-31 RX ORDER — ONDANSETRON 2 MG/ML
4 INJECTION INTRAMUSCULAR; INTRAVENOUS EVERY 6 HOURS PRN
Status: DISCONTINUED | OUTPATIENT
Start: 2022-03-31 | End: 2022-04-04

## 2022-03-31 RX ORDER — PROCHLORPERAZINE MALEATE 10 MG
5 TABLET ORAL EVERY 6 HOURS PRN
Status: DISCONTINUED | OUTPATIENT
Start: 2022-03-31 | End: 2022-04-01

## 2022-03-31 RX ORDER — LABETALOL HYDROCHLORIDE 5 MG/ML
10 INJECTION, SOLUTION INTRAVENOUS EVERY 4 HOURS PRN
Status: DISCONTINUED | OUTPATIENT
Start: 2022-03-31 | End: 2022-04-09

## 2022-03-31 RX ORDER — POLYETHYLENE GLYCOL 3350 17 G/17G
1 POWDER, FOR SOLUTION ORAL
Status: DISCONTINUED | OUTPATIENT
Start: 2022-03-31 | End: 2022-04-07

## 2022-03-31 RX ORDER — ONDANSETRON 2 MG/ML
4 INJECTION INTRAMUSCULAR; INTRAVENOUS ONCE
Status: COMPLETED | OUTPATIENT
Start: 2022-03-31 | End: 2022-03-31

## 2022-03-31 RX ORDER — AMOXICILLIN 250 MG
2 CAPSULE ORAL 2 TIMES DAILY
Status: DISCONTINUED | OUTPATIENT
Start: 2022-03-31 | End: 2022-04-07

## 2022-03-31 RX ORDER — ACETAMINOPHEN 325 MG/1
650 TABLET ORAL EVERY 6 HOURS PRN
Status: DISCONTINUED | OUTPATIENT
Start: 2022-03-31 | End: 2022-04-09

## 2022-03-31 RX ORDER — METHYLPREDNISOLONE SODIUM SUCCINATE 40 MG/ML
40 INJECTION, POWDER, LYOPHILIZED, FOR SOLUTION INTRAMUSCULAR; INTRAVENOUS DAILY
Status: DISCONTINUED | OUTPATIENT
Start: 2022-03-31 | End: 2022-04-05

## 2022-03-31 RX ORDER — OMEPRAZOLE 20 MG/1
20 CAPSULE, DELAYED RELEASE ORAL 2 TIMES DAILY
Status: DISCONTINUED | OUTPATIENT
Start: 2022-03-31 | End: 2022-03-31

## 2022-03-31 RX ORDER — TIZANIDINE 4 MG/1
8 TABLET ORAL EVERY EVENING
Status: DISCONTINUED | OUTPATIENT
Start: 2022-03-31 | End: 2022-04-11

## 2022-03-31 RX ORDER — BISACODYL 10 MG
10 SUPPOSITORY, RECTAL RECTAL
Status: DISCONTINUED | OUTPATIENT
Start: 2022-03-31 | End: 2022-04-07

## 2022-03-31 RX ORDER — OMEPRAZOLE 20 MG/1
40 CAPSULE, DELAYED RELEASE ORAL 2 TIMES DAILY
Status: DISCONTINUED | OUTPATIENT
Start: 2022-03-31 | End: 2022-04-01

## 2022-03-31 RX ORDER — PANTOPRAZOLE SODIUM 40 MG/10ML
40 INJECTION, POWDER, LYOPHILIZED, FOR SOLUTION INTRAVENOUS 2 TIMES DAILY
Status: DISCONTINUED | OUTPATIENT
Start: 2022-03-31 | End: 2022-03-31

## 2022-03-31 RX ORDER — DULOXETIN HYDROCHLORIDE 30 MG/1
60 CAPSULE, DELAYED RELEASE ORAL DAILY
Status: DISCONTINUED | OUTPATIENT
Start: 2022-03-31 | End: 2022-04-13 | Stop reason: HOSPADM

## 2022-03-31 RX ORDER — PREDNISONE 20 MG/1
40 TABLET ORAL DAILY
Status: DISCONTINUED | OUTPATIENT
Start: 2022-03-31 | End: 2022-03-31

## 2022-03-31 RX ADMIN — ONDANSETRON 8 MG: 4 TABLET, ORALLY DISINTEGRATING ORAL at 16:09

## 2022-03-31 RX ADMIN — METHYLPREDNISOLONE SODIUM SUCCINATE 40 MG: 40 INJECTION, POWDER, FOR SOLUTION INTRAMUSCULAR; INTRAVENOUS at 10:49

## 2022-03-31 RX ADMIN — ONDANSETRON 4 MG: 2 INJECTION INTRAMUSCULAR; INTRAVENOUS at 01:52

## 2022-03-31 RX ADMIN — OMEPRAZOLE 40 MG: 20 CAPSULE, DELAYED RELEASE ORAL at 18:46

## 2022-03-31 RX ADMIN — ACETAMINOPHEN 650 MG: 325 TABLET, FILM COATED ORAL at 21:03

## 2022-03-31 RX ADMIN — IOHEXOL 50 ML: 350 INJECTION, SOLUTION INTRAVENOUS at 02:32

## 2022-03-31 RX ADMIN — ONDANSETRON 8 MG: 4 TABLET, ORALLY DISINTEGRATING ORAL at 07:51

## 2022-03-31 RX ADMIN — SODIUM CHLORIDE 1000 ML: 9 INJECTION, SOLUTION INTRAVENOUS at 01:45

## 2022-03-31 RX ADMIN — SUCRALFATE 1 G: 1 SUSPENSION ORAL at 18:46

## 2022-03-31 ASSESSMENT — LIFESTYLE VARIABLES
EVER FELT BAD OR GUILTY ABOUT YOUR DRINKING: NO
EVER HAD A DRINK FIRST THING IN THE MORNING TO STEADY YOUR NERVES TO GET RID OF A HANGOVER: NO
ALCOHOL_USE: NO
HAVE PEOPLE ANNOYED YOU BY CRITICIZING YOUR DRINKING: NO

## 2022-03-31 ASSESSMENT — ENCOUNTER SYMPTOMS
VOMITING: 0
SHORTNESS OF BREATH: 0
CHILLS: 0
ROS GI COMMENTS: HEMATEMESIS
HEADACHES: 0
DIZZINESS: 0
SORE THROAT: 0
COUGH: 0
DIARRHEA: 0
FEVER: 0
ABDOMINAL PAIN: 0
NAUSEA: 0

## 2022-03-31 ASSESSMENT — COGNITIVE AND FUNCTIONAL STATUS - GENERAL
SUGGESTED CMS G CODE MODIFIER MOBILITY: CH
MOBILITY SCORE: 24
SUGGESTED CMS G CODE MODIFIER DAILY ACTIVITY: CH
DAILY ACTIVITIY SCORE: 24

## 2022-03-31 ASSESSMENT — PAIN DESCRIPTION - PAIN TYPE
TYPE: ACUTE PAIN

## 2022-03-31 NOTE — ASSESSMENT & PLAN NOTE
EGD revealed esophagitis. Thought to be exacerbated by lupus flare   Recent admission where she was evaluated by GI with an EGD, pulmonary with a bronchoscopy, ENT with a nasopharyngoscopy  Gastric biopsy negative for malignancy or infectious process   H. Pylori negative     4/7: ongoing and worsening. H/H stable. Soft blood pressures,but asymptomatic. Recommended strict NPO to allow time for esophagitis to heal, but patient and family would allow some time to think this over. In the meantime, will restart IVFs   4/8: no hematemesis x 24hours, but still persistent hemoptysis. H/H stable.Nomotensive. Continue strict NPO and monitor for improvement  4/9: No hematemesis x 48 hours. Hemoptysis less frequent with addition of delsym . Continue NPO for now and scheduled antiemetics. Trial of sips of clears tomorrow   4/10: No hematemesis . No hemoptysis overnight or this morning. Advance diet to sips of water.   4/11: Tolerated sips of water. No n/v or hemoptysis. Continue clears today and will slowly transition medications to PO as able   4/12: Hematemesis resolved.  Mild nausea controlled with current regimen.  Trial of full liquid diet today.

## 2022-03-31 NOTE — ASSESSMENT & PLAN NOTE
History of SLE on Plaquenil, says it controls her symptoms somewhat but she continues to have lupus flares.  Could not tolerate methotrexate in the past    4/4: Her case was discussed with Rheumatologist, Dr. De Los Santos , who recommended prednisone taper, continue hydroxychloroquine 400mg daily , and Benlysta 200mg SC daily to control Lupus flare as this was likely exacerbating esophagitis . He will follow as outpatient in 1-2 weeks   4/5: I sent Benlysta 200mg Q weekly to [facility] and they are working on prior authorization. I discussed with our inpatient pharmacist, who confirmed that this cannot be ordered as an inpatient, since we do not carry it on formulary.  4/6: Patient says Benlysta was approved and should be available today. I instructed her to give this medication to the RN so that our pharmacy can add it to her MAR.  4/8: Received Benlysta yesterday. Prednisone changed to IV solumedrol while NPO. Holding Plaquenil until able to tolerate PO   4/9-4/11: Continue IV solumedrol . Planning to transition to PO prednisone taper Wednesday if tolerating PO intake   4/12: Trial of full liquid diet

## 2022-03-31 NOTE — ASSESSMENT & PLAN NOTE
Initially reported T1DM, but per documentation from Endocrinologist and PCP between [id number] patient is actually Type 2   Currently holding insulin pump   Diabetic diet and blood sugar monitoring     4/6: BG 52 this morning . Patient says she has been counting her carbs and covering herself with 1 unit insulin per 15 carbs , which she says was previously agreed upon with last Hospitalist. She said she gave herself 2 units insulin before bed last night, but then vomited the little food she did eat. I instructed her to communicate her blood sugar and insulin dosage with bedside RN prior to administration to ensure we are monitoring and covering BG appropriately.   4/7: euglycemic. Continue to monitor   4/8: BG well controlled. Patient instructed NOT to inject personal insulin while NPO.   4/9-4/11: Blood sugars well controlled. Continue to monitor. Continue D5W/LR until tolerating PO intake   4/12: Well-managed on current regimen

## 2022-03-31 NOTE — ED NOTES
Patient requesting that all AM meds be given with breakfast.  Medications re timed per patient request.

## 2022-03-31 NOTE — PROGRESS NOTES
Hospital Medicine Daily Progress Note    Date of Service  3/31/2022    Chief Complaint  Malena Bennett is a 21 y.o. female admitted 3/31/2022 with cough, N/V & hematemesis     Hospital Course  A 21 y.o. female who presented 3/31/2022 with hematemesis.  PMH of DM1 with insulin pump, SLE on Plaquenil, factor V Leyden deficiency, anxiety, GERD, erosive gastritis, celiac disease. Hematemesis described as bright red blood mixed with clots, occasional coffee ground appearance, has pictures on her phone which were reviewed. Admitted from 3/21-3/28 for new onset hemoptysis, going on for about 2 weeks now. Symptoms were thought to be due to lupus flare, she was discharged on prednisone taper.  She does have some epigastric pain and says she can occasionally feel it coming on prior to vomiting the blood.  Denies fever/chills, epistaxis, melena.     During last admission she was evaluated by GI and had an EGD that showed possible small varices and esophagitis, biopsy showed inflammation and evidence of reactive changes, small eosinophilia.  Pulmonary consulted, CTA chest last admission was negative for acute changes.  She had bronchoscopy with no return of blood on BAL.  Otolaryngology was consulted, bedside procedure performed of the nasopharynx which showed no evidence of bleeding.     History of SLE on Plaquenil for the past couple years, says it improved her symptoms slightly but she still has lupus flares described as Maller rash, oral ulcers, joint pain, fatigue.  She tried methotrexate but could not tolerate it, so she was not offered DMARDs by her rheumatologist.     In the ED hemodynamically stable, BP 90s-low 100s which is baseline.  Labs unremarkable.  CTA chest done in the ED negative for acute changes.       Interval Problem Update  Patient was seen and examined at bedside.She showed pictures of the amount of hematemesis.  Reviewed progress notes in discharge summary from previous visit.  Discussed  with GI. No need for formal consult for now.   Patient does have severe esophagitis on previous EGD.  Commended PPI and Carafate.  Can further evalute the cause of N/V with gastric emptying study. Pt has a sister who was Dx with gastroparesis.   H & H daily     I have personally seen and examined the patient at bedside. I discussed the plan of care with patient and bedside RN.    Consultants/Specialty  GI ( not a formal consult , I discussed with [facility] )     Code Status  Full Code    Disposition  Patient is not medically cleared for discharge.   Anticipate discharge to to home with close outpatient follow-up.  I have placed the appropriate orders for post-discharge needs.    Review of Systems  Review of Systems   Neurological: Focal weakness:           Physical Exam  Temp:  [36.4 °C (97.6 °F)] 36.4 °C (97.6 °F)  Pulse:  [58-84] 72  Resp:  [12-20] 15  BP: ()/(57-79) 101/64  SpO2:  [92 %-98 %] 96 %    Physical Exam    Fluids    Intake/Output Summary (Last 24 hours) at 3/31/2022 1150  Last data filed at 3/31/2022 0723  Gross per 24 hour   Intake 1000 ml   Output --   Net 1000 ml       Laboratory  Recent Labs     03/31/22  0137   WBC 13.9*   RBC 4.64   HEMOGLOBIN 14.1   HEMATOCRIT 42.1   MCV 90.7   MCH 30.4   MCHC 33.5*   RDW 39.0   PLATELETCT 321   MPV 10.2     Recent Labs     03/31/22  0137   SODIUM 136   POTASSIUM 4.1   CHLORIDE 101   CO2 23   GLUCOSE 99   BUN 14   CREATININE 0.67   CALCIUM 9.5                   Imaging  CT-CTA CHEST PULMONARY ARTERY W/ RECONS   Final Result         1.  No pulmonary embolus appreciated.           Assessment/Plan  * Hematemesis of unknown cause- (present on admission)  Assessment & Plan  Hematemesis for the past 2 weeks, bright red blood as well as some coffee-ground emesis.  Pictures on her phone  Hemodynamically stable, hemoglobin 14  Recent admission where she was evaluated by GI with an EGD, pulmonary with a bronchoscopy, ENT with a nasopharyngoscopy  Only the EGD  showed possible esophageal varices and esophagitis with chronic inflammatory changes on biopsy    Symptoms blamed on lupus flare and started on prednisone taper, continue    Discussed with GI. No need for formal consult for now.   Patient does have severe esophagitis on previous EGD.  Commended PPI and Carafate.  Can further evalute the cause of N/V with gastric emptying study. Pt has a sister who was Dx with gastroparesis.   H & H daily     Class 1 obesity due to excess calories with serious comorbidity in adult- (present on admission)  Assessment & Plan  BMI 31.9    Gastroesophageal reflux disease with esophagitis and hemorrhage- (present on admission)  Assessment & Plan  History of GERD with esophagitis seen on recent EGD.  Possible varices  Continue PPI & carafate   Discussed with GI. No need for formal consult for now.   Patient does have severe esophagitis on previous EGD.  Commended PPI and Carafate.  Can further evalute the cause of N/V with gastric emptying study. Pt has a sister who was Dx with gastroparesis.   H & H daily     Lupus (HCC)- (present on admission)  Assessment & Plan  History of SLE on Plaquenil, says it controls her symptoms somewhat but she continues to have lupus flares.  Could not tolerate methotrexate in the past  Currently having hematemesis for the past 2 weeks which was blamed on lupus and was started on prednisone taper, continue      Type 1 diabetes mellitus without complication (HCC)- (present on admission)  Assessment & Plan  Continue insulin glargine and has insulin pump    Factor V Leiden (HCC)- (present on admission)  Assessment & Plan  History of factor V Leyden and SLE.  No history of DVT  SCD ordered, prophylactic anticoagulation held in case she needs a procedure  Comes in with hematemesis hemodynamically stable and hemoglobin stable       VTE prophylaxis: SCDs/TEDs and pharmacologic prophylaxis contraindicated due to Active bleeding

## 2022-03-31 NOTE — ASSESSMENT & PLAN NOTE
History of factor V Leyden and SLE.  No history of DVT  SCD ordered. Patient is ambulatory   Chemoprophylaxis contraindicated in setting of hematemesis and hemoptysis   Outpatient follow-up as needed

## 2022-03-31 NOTE — ED NOTES
"Night shift RN changed 0600 medications to be due at 0900 to be given with meal. Patient is strict NPO other than sips with medications. Patient notified. Patient refusing all medications stating \"I will 100% vomit up all the medications if I take them without eating something.\" Medications held.  "

## 2022-03-31 NOTE — ED NOTES
Med rec completed per patient  Allergies reviewed  No PO Antibiotics in the last 30 days     Patient has an insulin pump. Pharmacist has been advised. Tubing was changed yesterday.    Patient is on a 3 week tapering course of prednisone which started 03/28/2022

## 2022-03-31 NOTE — ASSESSMENT & PLAN NOTE
History of GERD with esophagitis seen on recent EGD.    4/11: IV Protonix changed to omeprazole   Advanced to full liquid diet, monitor

## 2022-03-31 NOTE — ED TRIAGE NOTES
"Chief Complaint   Patient presents with   • Cough     Coughing blood.  Recently D/C'd from [facility] was there for one week.  Started two weeks ago.  Thinks it is from the esophagus.   • N/V     Has not been able to keep food down for one week.  States semi tolerating a clear liquid diet.         Pt ambulated to triage. Pt A&Ox4, came in for above complaint. States pain present to the lower esophagus, and middle upper back.  No difficulties swallowing/breathing.  States has been taking compazine and zofran at home.  Last dose of compazine at 1700, last dose of zofran at 1400.  PO zofran ordered per protocal.    Pt to lobby . Pt educated on alerting staff in changes to condition. Pt verbalized understanding.     /74   Pulse 70   Temp 36.4 °C (97.6 °F) (Temporal)   Resp 16   Ht 1.702 m (5' 7\")   Wt 92.4 kg (203 lb 11.3 oz)   SpO2 96%   BMI 31.90 kg/m²     "

## 2022-03-31 NOTE — ED NOTES
Rounded on patient, requesting medication for nausea. Medicated per MAR. Additional warm blanket provided. Patient denies further needs at this time. Call light within reach.

## 2022-03-31 NOTE — ED NOTES
Patient medicated per MAR, tolerated well, resp even and unlabored, NAD, lights dimmed for patient comfort, denies any additional needs.  Family remains at bedside.

## 2022-03-31 NOTE — ED PROVIDER NOTES
"ED Provider Note    CHIEF COMPLAINT  Chief Complaint   Patient presents with   • Cough     Coughing blood.  Recently D/C'd from [facility] was there for one week.  Started two weeks ago.  Thinks it is from the esophagus.   • N/V     Has not been able to keep food down for one week.  States semi tolerating a clear liquid diet.         HPI  Malena Bennett is a 21 y.o. female here for evaluation of hemoptysis.  Patient states she was recently discharged from [facility] couple of days ago, for the same.  She was admitted for a week, for this hemoptysis and was noted to have multiple specialists see her.  Per the family \"nobody could come up with an answer.\"  Patient has no fever chills, and no headache.  Patient states that she has been unable to keep food down for the last few days as well,\" I threw everything up after I eat it.\"  Nothing seems to leave exacerbate her symptoms.  She has not taken anticoagulants.      ROS  See HPI for further details, o/w negative.     PAST MEDICAL HISTORY   has a past medical history of Asthma, Celiac disease, Clotting disorder (HCC), Diabetes (HCC), Factor 5 Leiden mutation, heterozygous (HCC), Lupus (HCC), OCD (obsessive compulsive disorder), and Tourette disease.    SOCIAL HISTORY  Social History     Tobacco Use   • Smoking status: Never Smoker   • Smokeless tobacco: Never Used   Vaping Use   • Vaping Use: Never used   Substance and Sexual Activity   • Alcohol use: No     Alcohol/week: 0.0 oz   • Drug use: No   • Sexual activity: Yes     Partners: Male     Birth control/protection: I.U.D.       Family History  No bleeding disorders    SURGICAL HISTORY   has a past surgical history that includes appendectomy laparoscopic (7/1/2017); tavo by laparoscopy (1/30/2017); appendectomy (2018); upper gi endoscopy,diagnosis (N/A, 3/22/2022); and bronchoscopy,diagnostic (N/A, 3/25/2022).    CURRENT MEDICATIONS  Home Medications     Reviewed by Zuleyma Greer R.N. " (Registered Nurse) on 03/30/22 at 2230  Med List Status: Partial   Medication Last Dose Status   cyanocobalamin (VITAMIN B-12) 1000 MCG/ML Solution  Active   DULoxetine (CYMBALTA) 60 MG Cap DR Particles delayed-release capsule  Active   hydroxychloroquine (PLAQUENIL) 200 MG Tab  Active   insulin glargine (LANTUS SOLOSTAR) 100 UNIT/ML Solution Pen-injector injection  Active   insulin infusion pump Device  Active   omeprazole (PRILOSEC) 20 MG delayed-release capsule  Active   ondansetron (ZOFRAN ODT) 4 MG TABLET DISPERSIBLE  Active   predniSONE (DELTASONE) 20 MG Tab  Active   prochlorperazine (COMPAZINE) 5 MG Tab  Active   tizanidine (ZANAFLEX) 4 MG Tab  Active   vitamin D2, Ergocalciferol, (DRISDOL) 1.25 MG (90218 UT) Cap capsule  Active                ALLERGIES  Allergies   Allergen Reactions   • Cefdinir Rash     rash   • Erythromycin Vomiting and Nausea       REVIEW OF SYSTEMS  See HPI for further details. Review of systems as above, otherwise all other systems are negative.     PHYSICAL EXAM  Constitutional: Well developed, well nourished. No acute distress.  HEENT: Normocephalic, atraumatic. Posterior pharynx clear and moist.  Eyes:  EOMI. Normal sclera.  Neck: Supple, Full range of motion, nontender.  Chest/Pulmonary: clear to ausculation. Symmetrical expansion.   Cardio: Regular rate and rhythm with no murmur.   Abdomen: Soft, nontender. No peritoneal signs. No guarding. No palpable masses.  Musculoskeletal: No deformity, no edema, neurovascular intact.   Neuro: Clear speech, appropriate, cooperative, cranial nerves II-XII grossly intact.  Psych: Normal mood and affect    PROCEDURES     MEDICAL RECORD  I have reviewed patient's medical record and pertinent results are listed.    COURSE & MEDICAL DECISION MAKING  I have reviewed any medical record information, laboratory studies and radiographic results as noted above.    Results for orders placed or performed during the hospital encounter of 03/31/22   CBC w/  Differential   Result Value Ref Range    WBC 13.9 (H) 4.8 - 10.8 K/uL    RBC 4.64 4.20 - 5.40 M/uL    Hemoglobin 14.1 12.0 - 16.0 g/dL    Hematocrit 42.1 37.0 - 47.0 %    MCV 90.7 81.4 - 97.8 fL    MCH 30.4 27.0 - 33.0 pg    MCHC 33.5 (L) 33.6 - 35.0 g/dL    RDW 39.0 35.9 - 50.0 fL    Platelet Count 321 164 - 446 K/uL    MPV 10.2 9.0 - 12.9 fL    Neutrophils-Polys 80.10 (H) 44.00 - 72.00 %    Lymphocytes 11.80 (L) 22.00 - 41.00 %    Monocytes 6.90 0.00 - 13.40 %    Eosinophils 0.10 0.00 - 6.90 %    Basophils 0.20 0.00 - 1.80 %    Immature Granulocytes 0.90 0.00 - 0.90 %    Nucleated RBC 0.00 /100 WBC    Neutrophils (Absolute) 11.16 (H) 2.00 - 7.15 K/uL    Lymphs (Absolute) 1.64 1.00 - 4.80 K/uL    Monos (Absolute) 0.96 (H) 0.00 - 0.85 K/uL    Eos (Absolute) 0.01 0.00 - 0.51 K/uL    Baso (Absolute) 0.03 0.00 - 0.12 K/uL    Immature Granulocytes (abs) 0.12 (H) 0.00 - 0.11 K/uL    NRBC (Absolute) 0.00 K/uL   Complete Metabolic Panel (CMP)   Result Value Ref Range    Sodium 136 135 - 145 mmol/L    Potassium 4.1 3.6 - 5.5 mmol/L    Chloride 101 96 - 112 mmol/L    Co2 23 20 - 33 mmol/L    Anion Gap 12.0 7.0 - 16.0    Glucose 99 65 - 99 mg/dL    Bun 14 8 - 22 mg/dL    Creatinine 0.67 0.50 - 1.40 mg/dL    Calcium 9.5 8.5 - 10.5 mg/dL    AST(SGOT) 17 12 - 45 U/L    ALT(SGPT) 26 2 - 50 U/L    Alkaline Phosphatase 101 (H) 30 - 99 U/L    Total Bilirubin 0.3 0.1 - 1.5 mg/dL    Albumin 4.6 3.2 - 4.9 g/dL    Total Protein 7.6 6.0 - 8.2 g/dL    Globulin 3.0 1.9 - 3.5 g/dL    A-G Ratio 1.5 g/dL   BETA-HCG QUALITATIVE SERUM   Result Value Ref Range    Beta-Hcg Qualitative Serum Negative Negative   COD - Adult (Type and Screen)   Result Value Ref Range    ABO Grouping Only A     Rh Grouping Only NEG     Antibody Screen-Cod NEG    ESTIMATED GFR   Result Value Ref Range    GFR (CKD-EPI) 126 >60 mL/min/1.73 m 2     CT-CTA CHEST PULMONARY ARTERY W/ RECONS   Final Result         1.  No pulmonary embolus appreciated.        The pt has had  felisa hemoptysis over the last couple of days, since being discharged from [facility].  She also states 'I cant keep anything down.'      FINAL IMPRESSION  hemoptysis   Vomiting       Electronically signed by: Mihai Garcia D.O., 3/31/2022 1:38 AM

## 2022-03-31 NOTE — H&P
Hospital Medicine History & Physical Note    Date of Service  3/31/2022    Primary Care Physician  NADER Wilkerson    Code Status  Full Code    Chief Complaint  Chief Complaint   Patient presents with   • Cough     Coughing blood.  Recently D/C'd from [facility] was there for one week.  Started two weeks ago.  Thinks it is from the esophagus.   • N/V     Has not been able to keep food down for one week.  States semi tolerating a clear liquid diet.         History of Presenting Illness  Malena Bennett is a 21 y.o. female who presented 3/31/2022 with hematemesis.  PMH of DM1 with insulin pump, SLE on Plaquenil, factor V Leyden deficiency, anxiety, GERD, erosive gastritis, celiac disease. Hematemesis described as bright red blood mixed with clots, occasional coffee ground appearance, has pictures on her phone which were reviewed. Admitted from 3/21-3/28 for new onset hemoptysis, going on for about 2 weeks now. Symptoms were thought to be due to lupus flare, she was discharged on prednisone taper.  She does have some epigastric pain and says she can occasionally feel it coming on prior to vomiting the blood.  Denies fever/chills, epistaxis, melena.    During last admission she was evaluated by GI and had an EGD that showed possible small varices and esophagitis, biopsy showed inflammation and evidence of reactive changes, small eosinophilia.  Pulmonary consulted, CTA chest last admission was negative for acute changes.  She had bronchoscopy with no return of blood on BAL.  Otolaryngology was consulted, bedside procedure performed of the nasopharynx which showed no evidence of bleeding.    History of SLE on Plaquenil for the past couple years, says it improved her symptoms slightly but she still has lupus flares described as Maller rash, oral ulcers, joint pain, fatigue.  She tried methotrexate but could not tolerate it, so she was not offered DMARDs by her rheumatologist.    In the ED  hemodynamically stable, BP 90s-low 100s which is baseline.  Labs unremarkable.  CTA chest done in the ED negative for acute changes.    I discussed the plan of care with patient, family, bedside RN and EDP.    Review of Systems  Review of Systems   Constitutional: Negative for chills and fever.   HENT: Negative for sore throat.    Respiratory: Negative for cough and shortness of breath.    Cardiovascular: Negative for chest pain.   Gastrointestinal: Negative for abdominal pain, diarrhea, nausea and vomiting.        Hematemesis   Genitourinary: Negative for dysuria and urgency.   Neurological: Negative for dizziness and headaches.   All other systems reviewed and are negative.      Past Medical History   has a past medical history of Asthma, Celiac disease, Clotting disorder (HCC), Diabetes (HCC), Factor 5 Leiden mutation, heterozygous (HCC), Lupus (HCC), OCD (obsessive compulsive disorder), and Tourette disease.    Surgical History   has a past surgical history that includes appendectomy laparoscopic (7/1/2017); tavo by laparoscopy (1/30/2017); appendectomy (2018); pr upper gi endoscopy,diagnosis (N/A, 3/22/2022); and pr bronchoscopy,diagnostic (N/A, 3/25/2022).     Family History  family history includes Arthritis in her maternal grandmother; Cancer in her maternal aunt, maternal grandfather, maternal grandmother, maternal uncle, and other family members; Cancer (age of onset: 53) in her mother; Genetic Disorder in her father, maternal grandfather, and maternal uncle; Heart Disease in her father, maternal aunt, maternal grandmother, maternal uncle, paternal grandmother, and another family member; Hyperlipidemia in her father and maternal grandmother; Hypertension in her maternal grandmother and paternal grandmother; Other in her maternal grandfather and mother; Stroke in her maternal grandmother and paternal grandmother.   Family history reviewed with patient. There is no family history that is pertinent to the  chief complaint.     Social History   reports that she has never smoked. She has never used smokeless tobacco. She reports that she does not drink alcohol and does not use drugs.    Allergies  Allergies   Allergen Reactions   • Cefdinir Rash     rash   • Erythromycin Vomiting and Nausea       Medications  Prior to Admission Medications   Prescriptions Last Dose Informant Patient Reported? Taking?   DULoxetine (CYMBALTA) 60 MG Cap DR Particles delayed-release capsule  Patient No No   Sig: Take 1 Capsule by mouth every day.   cyanocobalamin (VITAMIN B-12) 1000 MCG/ML Solution  Patient No No   Sig: Inject 1 mL intramuscularly every 14 days.   hydroxychloroquine (PLAQUENIL) 200 MG Tab   No No   Sig: Take 2 Tablets by mouth every day.   insulin glargine (LANTUS SOLOSTAR) 100 UNIT/ML Solution Pen-injector injection   Yes No   Sig: Inject 10 Units under the skin every day. When not able to use insulin pump   insulin infusion pump Device   Yes No   Sig: Inject 0.7 Units/hr under the skin continuous. Patient's own SQ insulin pump    Type of Insulin: Humalog  Last change of tubing: took out pump today 3/21/22 due to hypoglycemia    Dosing:  Basal rate:   0.7 units/hr - adjusts basal rate according to Control IQ technology and CGM  Bolus ratio:   1 unit : 12 g carbohydrate at breakfast, lunch, dinner, snacks  Correction ratio:   1 units for every 50 over 150 mg/dL    Disconnect pump if patient becomes hypoglycemic and altered.   omeprazole (PRILOSEC) 20 MG delayed-release capsule   No No   Sig: Take 1 Capsule by mouth 2 times a day for 14 days.   ondansetron (ZOFRAN ODT) 4 MG TABLET DISPERSIBLE   No No   Sig: Take 2 Tablets by mouth every four hours as needed for Nausea.   predniSONE (DELTASONE) 20 MG Tab   No No   Si mg daily for 7 days than 20 mg daily for 7 days than 10 mg daily for 7 days   prochlorperazine (COMPAZINE) 5 MG Tab  Patient No No   Sig: Take 1 Tablet by mouth every 6 hours as needed for Nausea/Vomiting.    tizanidine (ZANAFLEX) 4 MG Tab  Patient Yes No   Sig: Take 8 mg by mouth every evening.   vitamin D2, Ergocalciferol, (DRISDOL) 1.25 MG (89502 UT) Cap capsule  Patient Yes No   Sig: Take 50,000 Units by mouth every 72 hours. EVERY 3 DAYS      Facility-Administered Medications: None       Physical Exam  Temp:  [36.4 °C (97.6 °F)] 36.4 °C (97.6 °F)  Pulse:  [70] 70  Resp:  [16] 16  BP: (125)/(74) 125/74  SpO2:  [96 %] 96 %  Blood Pressure: 125/74   Temperature: 36.4 °C (97.6 °F)   Pulse: 70   Respiration: 16   Pulse Oximetry: 96 %       Physical Exam  Constitutional:       Appearance: Normal appearance.   HENT:      Head: Normocephalic and atraumatic.      Mouth/Throat:      Mouth: Mucous membranes are moist.      Pharynx: No oropharyngeal exudate or posterior oropharyngeal erythema.   Eyes:      General: No scleral icterus.  Cardiovascular:      Rate and Rhythm: Normal rate and regular rhythm.      Pulses: Normal pulses.      Heart sounds: Normal heart sounds. No murmur heard.  Pulmonary:      Effort: Pulmonary effort is normal. No respiratory distress.      Breath sounds: Normal breath sounds.   Abdominal:      General: There is no distension.      Palpations: Abdomen is soft.      Tenderness: There is no abdominal tenderness.   Musculoskeletal:         General: No swelling or tenderness. Normal range of motion.      Cervical back: Normal range of motion and neck supple.   Skin:     General: Skin is warm and dry.   Neurological:      General: No focal deficit present.      Mental Status: She is alert and oriented to person, place, and time.   Psychiatric:         Mood and Affect: Mood normal.         Laboratory:  Recent Labs     03/31/22 0137   WBC 13.9*   RBC 4.64   HEMOGLOBIN 14.1   HEMATOCRIT 42.1   MCV 90.7   MCH 30.4   MCHC 33.5*   RDW 39.0   PLATELETCT 321   MPV 10.2     Recent Labs     03/31/22 0137   SODIUM 136   POTASSIUM 4.1   CHLORIDE 101   CO2 23   GLUCOSE 99   BUN 14   CREATININE 0.67   CALCIUM 9.5      Recent Labs     03/31/22  0137   ALTSGPT 26   ASTSGOT 17   ALKPHOSPHAT 101*   TBILIRUBIN 0.3   GLUCOSE 99         No results for input(s): NTPROBNP in the last 72 hours.      No results for input(s): TROPONINT in the last 72 hours.    Imaging:  CT-CTA CHEST PULMONARY ARTERY W/ RECONS   Final Result         1.  No pulmonary embolus appreciated.          no X-Ray or EKG requiring interpretation    Assessment/Plan:  I anticipate this patient is appropriate for observation status at this time.    * Hematemesis of unknown cause- (present on admission)  Assessment & Plan  Hematemesis for the past 2 weeks, bright red blood as well as some coffee-ground emesis.  Pictures on her phone  Hemodynamically stable, hemoglobin 14  Recent admission where she was evaluated by GI with an EGD, pulmonary with a bronchoscopy, ENT with a nasopharyngoscopy  Only the EGD showed possible esophageal varices and esophagitis with chronic inflammatory changes on biopsy  She would like to be evaluated by different GI group for possible intervention due to recurrent, cumbersome hematemesis    Symptoms blamed on lupus flare and started on prednisone taper, continue    Gastroesophageal reflux disease with esophagitis without hemorrhage- (present on admission)  Assessment & Plan  History of GERD with esophagitis seen on recent EGD.  Possible varices  Continue PPI, she would like another GI consult with different group for second opinion    Type 1 diabetes mellitus without complication (HCC)- (present on admission)  Assessment & Plan  Continue insulin glargine and has insulin pump    Factor V Leiden (HCC)- (present on admission)  Assessment & Plan  History of factor V Leyden and SLE.  No history of DVT  SCD ordered, prophylactic anticoagulation held in case she needs a procedure  Comes in with hematemesis hemodynamically stable and hemoglobin stable    Class 1 obesity due to excess calories with serious comorbidity in adult- (present on  admission)  Assessment & Plan  BMI 31.9    Lupus (HCC)- (present on admission)  Assessment & Plan  History of SLE on Plaquenil, says it controls her symptoms somewhat but she continues to have lupus flares.  Could not tolerate methotrexate in the past  Currently having hematemesis for the past 2 weeks which was blamed on lupus and was started on prednisone taper, continue      VTE prophylaxis: SCDs/TEDs

## 2022-03-31 NOTE — PROGRESS NOTES
Assumed care of patient.  AA&Ox4. Denies CP/SOB.  Reporting 4/10 pain. Declines need for medication.   Educated patient regarding pharmacologic and non pharmacologic modalities for pain management.  Skin per flowsheet.  Diabetic diet. + N/V.  + void. Last BM 3/29  .  Pt ambulates up self.  All needs met at this time. Call light within reach. Pt calls appropriately. Bed low and locked, non skid socks in place. Hourly rounding in place.

## 2022-04-01 LAB
ALBUMIN SERPL BCP-MCNC: 4.2 G/DL (ref 3.2–4.9)
ALBUMIN/GLOB SERPL: 1.6 G/DL
ALP SERPL-CCNC: 84 U/L (ref 30–99)
ALT SERPL-CCNC: 22 U/L (ref 2–50)
ANION GAP SERPL CALC-SCNC: 10 MMOL/L (ref 7–16)
AST SERPL-CCNC: 15 U/L (ref 12–45)
BASOPHILS # BLD AUTO: 0.4 % (ref 0–1.8)
BASOPHILS # BLD: 0.04 K/UL (ref 0–0.12)
BILIRUB SERPL-MCNC: 0.2 MG/DL (ref 0.1–1.5)
BUN SERPL-MCNC: 11 MG/DL (ref 8–22)
CALCIUM SERPL-MCNC: 8.7 MG/DL (ref 8.5–10.5)
CHLORIDE SERPL-SCNC: 104 MMOL/L (ref 96–112)
CO2 SERPL-SCNC: 24 MMOL/L (ref 20–33)
CREAT SERPL-MCNC: 0.73 MG/DL (ref 0.5–1.4)
EOSINOPHIL # BLD AUTO: 0.06 K/UL (ref 0–0.51)
EOSINOPHIL NFR BLD: 0.6 % (ref 0–6.9)
ERYTHROCYTE [DISTWIDTH] IN BLOOD BY AUTOMATED COUNT: 38.5 FL (ref 35.9–50)
GFR SERPLBLD CREATININE-BSD FMLA CKD-EPI: 119 ML/MIN/1.73 M 2
GLOBULIN SER CALC-MCNC: 2.7 G/DL (ref 1.9–3.5)
GLUCOSE SERPL-MCNC: 143 MG/DL (ref 65–99)
HCT VFR BLD AUTO: 37.4 % (ref 37–47)
HGB BLD-MCNC: 12.7 G/DL (ref 12–16)
IMM GRANULOCYTES # BLD AUTO: 0.09 K/UL (ref 0–0.11)
IMM GRANULOCYTES NFR BLD AUTO: 0.8 % (ref 0–0.9)
LYMPHOCYTES # BLD AUTO: 2.14 K/UL (ref 1–4.8)
LYMPHOCYTES NFR BLD: 20.2 % (ref 22–41)
MCH RBC QN AUTO: 30.4 PG (ref 27–33)
MCHC RBC AUTO-ENTMCNC: 34 G/DL (ref 33.6–35)
MCV RBC AUTO: 89.5 FL (ref 81.4–97.8)
MONOCYTES # BLD AUTO: 1.05 K/UL (ref 0–0.85)
MONOCYTES NFR BLD AUTO: 9.9 % (ref 0–13.4)
NEUTROPHILS # BLD AUTO: 7.24 K/UL (ref 2–7.15)
NEUTROPHILS NFR BLD: 68.1 % (ref 44–72)
NRBC # BLD AUTO: 0 K/UL
NRBC BLD-RTO: 0 /100 WBC
PLATELET # BLD AUTO: 277 K/UL (ref 164–446)
PMV BLD AUTO: 10.5 FL (ref 9–12.9)
POTASSIUM SERPL-SCNC: 3.6 MMOL/L (ref 3.6–5.5)
PROT SERPL-MCNC: 6.9 G/DL (ref 6–8.2)
RBC # BLD AUTO: 4.18 M/UL (ref 4.2–5.4)
SODIUM SERPL-SCNC: 138 MMOL/L (ref 135–145)
WBC # BLD AUTO: 10.6 K/UL (ref 4.8–10.8)

## 2022-04-01 PROCEDURE — 96375 TX/PRO/DX INJ NEW DRUG ADDON: CPT

## 2022-04-01 PROCEDURE — C9113 INJ PANTOPRAZOLE SODIUM, VIA: HCPCS | Performed by: STUDENT IN AN ORGANIZED HEALTH CARE EDUCATION/TRAINING PROGRAM

## 2022-04-01 PROCEDURE — 700111 HCHG RX REV CODE 636 W/ 250 OVERRIDE (IP): Performed by: STUDENT IN AN ORGANIZED HEALTH CARE EDUCATION/TRAINING PROGRAM

## 2022-04-01 PROCEDURE — 80053 COMPREHEN METABOLIC PANEL: CPT

## 2022-04-01 PROCEDURE — 99226 PR SUBSEQUENT OBSERVATION CARE,LEVEL III: CPT | Performed by: STUDENT IN AN ORGANIZED HEALTH CARE EDUCATION/TRAINING PROGRAM

## 2022-04-01 PROCEDURE — 85025 COMPLETE CBC W/AUTO DIFF WBC: CPT

## 2022-04-01 PROCEDURE — 36415 COLL VENOUS BLD VENIPUNCTURE: CPT

## 2022-04-01 PROCEDURE — 700102 HCHG RX REV CODE 250 W/ 637 OVERRIDE(OP): Performed by: STUDENT IN AN ORGANIZED HEALTH CARE EDUCATION/TRAINING PROGRAM

## 2022-04-01 PROCEDURE — G0378 HOSPITAL OBSERVATION PER HR: HCPCS

## 2022-04-01 PROCEDURE — 96376 TX/PRO/DX INJ SAME DRUG ADON: CPT

## 2022-04-01 PROCEDURE — A9270 NON-COVERED ITEM OR SERVICE: HCPCS | Performed by: STUDENT IN AN ORGANIZED HEALTH CARE EDUCATION/TRAINING PROGRAM

## 2022-04-01 RX ORDER — PANTOPRAZOLE SODIUM 40 MG/10ML
40 INJECTION, POWDER, LYOPHILIZED, FOR SOLUTION INTRAVENOUS 2 TIMES DAILY
Status: DISCONTINUED | OUTPATIENT
Start: 2022-04-01 | End: 2022-04-11

## 2022-04-01 RX ORDER — PROCHLORPERAZINE MALEATE 10 MG
5 TABLET ORAL EVERY 6 HOURS PRN
Status: DISCONTINUED | OUTPATIENT
Start: 2022-04-01 | End: 2022-04-09

## 2022-04-01 RX ORDER — PROCHLORPERAZINE EDISYLATE 5 MG/ML
5 INJECTION INTRAMUSCULAR; INTRAVENOUS EVERY 4 HOURS PRN
Status: DISCONTINUED | OUTPATIENT
Start: 2022-04-01 | End: 2022-04-07

## 2022-04-01 RX ORDER — METOCLOPRAMIDE HYDROCHLORIDE 5 MG/ML
10 INJECTION INTRAMUSCULAR; INTRAVENOUS EVERY 6 HOURS
Status: DISCONTINUED | OUTPATIENT
Start: 2022-04-01 | End: 2022-04-01

## 2022-04-01 RX ADMIN — PANTOPRAZOLE SODIUM 40 MG: 40 INJECTION, POWDER, FOR SOLUTION INTRAVENOUS at 18:48

## 2022-04-01 RX ADMIN — METHYLPREDNISOLONE SODIUM SUCCINATE 40 MG: 40 INJECTION, POWDER, FOR SOLUTION INTRAMUSCULAR; INTRAVENOUS at 05:41

## 2022-04-01 RX ADMIN — ONDANSETRON 4 MG: 2 INJECTION INTRAMUSCULAR; INTRAVENOUS at 10:21

## 2022-04-01 RX ADMIN — OMEPRAZOLE 40 MG: 20 CAPSULE, DELAYED RELEASE ORAL at 10:20

## 2022-04-01 RX ADMIN — DULOXETINE HYDROCHLORIDE 60 MG: 30 CAPSULE, DELAYED RELEASE ORAL at 10:18

## 2022-04-01 RX ADMIN — HYDROXYCHLOROQUINE SULFATE 400 MG: 200 TABLET ORAL at 10:21

## 2022-04-01 RX ADMIN — PROCHLORPERAZINE EDISYLATE 5 MG: 5 INJECTION INTRAMUSCULAR; INTRAVENOUS at 13:25

## 2022-04-01 RX ADMIN — ONDANSETRON 4 MG: 2 INJECTION INTRAMUSCULAR; INTRAVENOUS at 23:47

## 2022-04-01 RX ADMIN — ACETAMINOPHEN 650 MG: 325 TABLET, FILM COATED ORAL at 23:47

## 2022-04-01 ASSESSMENT — ENCOUNTER SYMPTOMS
CONSTIPATION: 0
WHEEZING: 0
HEMOPTYSIS: 1
PALPITATIONS: 0
DIARRHEA: 0
NAUSEA: 1
CHILLS: 0
ABDOMINAL PAIN: 0
BLURRED VISION: 0
SHORTNESS OF BREATH: 0
HEADACHES: 0
WEAKNESS: 1
FEVER: 0
SINUS PAIN: 0
SPUTUM PRODUCTION: 0
COUGH: 0
FOCAL WEAKNESS: 0
NERVOUS/ANXIOUS: 0
SORE THROAT: 0
MYALGIAS: 0
VOMITING: 1
DIZZINESS: 0
DOUBLE VISION: 0

## 2022-04-01 ASSESSMENT — PAIN DESCRIPTION - PAIN TYPE
TYPE: ACUTE PAIN
TYPE: ACUTE PAIN

## 2022-04-01 NOTE — PROGRESS NOTES
AA&Ox4. Denies CP/SOB.  Reporting 4/10 pain.   Educated patient regarding pharmacologic and non pharmacologic modalities for pain management.  Skin per flowsheet.  Unable to tolerate food. N/V present.Gastric emptying test cancelled.   + void. Last BM 3/29 PTA.  Pt ambulates up self.  All needs met at this time. Call light within reach. Pt calls appropriately. Bed low and locked, non skid socks in place. Hourly rounding in place.

## 2022-04-01 NOTE — PROGRESS NOTES
4 Eyes Skin Assessment Completed by JORDI Davies and JORDI Driscoll.    Head WDL  Ears WDL  Nose WDL  Mouth WDL  Neck WDL  Breast/Chest WDL  Shoulder Blades WDL  Spine WDL  (R) Arm/Elbow/Hand WDL (insulin pump)  (L) Arm/Elbow/Hand WDL  Abdomen WDL  Groin WDL  Scrotum/Coccyx/Buttocks WDL  (R) Leg WDL  (L) Leg WDL  (R) Heel/Foot/Toe WDL  (L) Heel/Foot/Toe WDL          Devices In Places Insulin Pump      Interventions In Place Pillows    Possible Skin Injury No    Pictures Uploaded Into Epic N/A  Wound Consult Placed N/A  RN Wound Prevention Protocol Ordered No

## 2022-04-01 NOTE — CARE PLAN
Problem: Knowledge Deficit - Standard  Goal: Patient and family/care givers will demonstrate understanding of plan of care, disease process/condition, diagnostic tests and medications  Outcome: Progressing       The patient is Stable - Low risk of patient condition declining or worsening    Shift Goals  Clinical Goals: N/V control  Patient Goals: N/V control    Progress made toward(s) clinical / shift goals:  Monitoring patient for Nausea and vomiting    Patient is not progressing towards the following goals:

## 2022-04-01 NOTE — CARE PLAN
Problem: Nutritional:  Goal: Achieve adequate nutritional intake  Description: Patient will consume 50% (with some boost intake) or > of meals  Outcome: Not Met

## 2022-04-01 NOTE — DIETARY
"Nutrition services: Day 0 of admit.  Malena Bennett is a 21 y.o. female with admitting DX of hematemesis of unknown cause.   Consult received for diet education.  Pt also noted with unsure wt loss and poor PO intake on nutrition admit screen.  Pt appears nourished though pale with some mild loss to clavicle region.  RD was able to visit pt and family at bedside to discuss appetite/intake and wt hx.  Pt reports poor appetite and intake for 3 weeks PTA d/t n/v w/ PO intake.  During this time frame she was able to tolerate only a few french fries and some other small bites - well under what estimated nutritional needs for kcal and protein would be.  As a result, she has lost ~13 lbs unintentionally.  She further describes weakness, feeling tired w/ lack of sleep, and low energy - discussed the correlates to recent poor nutritional status.  Still c/o some n/v and lack of appetite.  We discussed gastroparesis education as this is being r/o - small, frequent meals, avoiding high fat foods, greasy or fried foods, caffeine, and acidic/spicy foods.  Pt receptive and would like to try small, frequent meals @ this time.  There is not a whole lot that sounds good to pt right now aside from smoothies - she is agreeable to BY[employer] smoothies and OK w/ RD adding additional protein powder in them.  Pt noted they are trying to avoid/limit dairy at this time 2/2 inflammation.  Discussed oral supplements 2/2 recent wt loss and diminished intake - pr agreeable to trying vanilla Boost.  Discussed other ways oral supplements can be used at home (as creamer, in smoothie base, in hot chocolate).  No further needs or questions at this time.     Assessment:  Height: 170.2 cm (5' 7\")  Weight: 92.4 kg (203 lb 11.3 oz) - via stand up scale.   Body mass index is 31.9 kg/m²., BMI classification: Obese Class I however does not appear obese per RD judgement.  Diet/Intake: Regular, gluten free - will adjust to small, frequent meals.  "     Evaluation:   1. Pt noted with GERD, lupus, T1DM, and factor V leiden.  Additional PMHx of erosive gastritis, celiac disease.  2. Recently evaluated by GI with an EGD - possible varices.   3. Not tolerating carafate or reglan per progress notes.  Current clinical picture and MD progress notes reviewed.  4. Severe 5.9% wt loss in 3 weeks.  5. Labs: Glu: 143.  Eosinophils WNL.  6. Meds: Plaquenil, Cymbalta, Solu-medrol, Zanaflex, Zofran, Compazine.   7. LBM: 3/29.    Malnutrition Risk: Severe malnutrition in the context of acute illness related malnutrition related to n/v as evidenced by a severe 5.9% wt loss in 3 weeks and <50% of estimated nutritional needs for >5 days.    Recommendations/Plan:  1. Trial Boost Plus.  2. Small, frequent meals.  Avoid high fat, greasy, fried, acidic, spicy, caffeine.  Can avoid dairy per pt preference.   3. Encourage intake of meals.  Continue to document % consumed under ADLs.   4. Monitor weight.  5. Antiemetics prior to meal times.  6. If still unable to tolerate PO intake, consider additional GI consult and/or consider nutrition support - Cortrak in the small bowel.     RD follows

## 2022-04-01 NOTE — PROGRESS NOTES
[facility] Medicine Daily Progress Note    Date of Service  4/1/2022    Chief Complaint  Malena Bennett is a 21 y.o. female admitted 3/31/2022 with cough, N/V & hematemesis     Hospital Course  A 21 y.o. female who presented 3/31/2022 with hematemesis.  PMH of DM1 with insulin pump, SLE on Plaquenil, factor V Leyden deficiency, anxiety, GERD, erosive gastritis, celiac disease. Hematemesis described as bright red blood mixed with clots, occasional coffee ground appearance, has pictures on her phone which were reviewed. Admitted from 3/21-3/28 for new onset hemoptysis, going on for about 2 weeks now. Symptoms were thought to be due to lupus flare, she was discharged on prednisone taper.  She does have some epigastric pain and says she can occasionally feel it coming on prior to vomiting the blood.  Denies fever/chills, epistaxis, melena.     During last admission she was evaluated by GI and had an EGD that showed possible small varices and esophagitis, biopsy showed inflammation and evidence of reactive changes, small eosinophilia.  Pulmonary consulted, CTA chest last admission was negative for acute changes.  She had bronchoscopy with no return of blood on BAL.  Otolaryngology was consulted, bedside procedure performed of the nasopharynx which showed no evidence of bleeding.     History of SLE on Plaquenil for the past couple years, says it improved her symptoms slightly but she still has lupus flares described as Maller rash, oral ulcers, joint pain, fatigue.  She tried methotrexate but could not tolerate it, so she was not offered DMARDs by her rheumatologist.     In the ED hemodynamically stable, BP 90s-low 100s which is baseline.  Labs unremarkable.  CTA chest done in the ED negative for acute changes.       Interval Problem Update  Patient was seen and examined at bedside. 4 family members in the room. Pt still c/o N/V & Hemoptysis.   Reviewed progress notes in discharge summary from previous  visit.  Discussed with GI.Patient does have severe esophagitis on previous EGD.  Commended PPI and Carafate.  ? Gastroparesis - pt is not tolerating Reglan .   Also discussed with previous hospitalist who took care of her last week ( He did discussed with Pulm, ENT & Rheumatology at that time ).   IV PPI   IV Steroid     Pt wants 2nd opinion from Rheumatology ( I left voice messages to [facility] )   Pt wants referral to Fatmata Loza MD ( [facility] )   Reviewed immunology panel .   Nutrition consulted   H & H daily     My total time spent caring for the patient on the day of the encounter was  minutes.   This does not include time spent on separately billable procedures/tests.    I have personally seen and examined the patient at bedside. I discussed the plan of care with patient, family, bedside RN and GI.    Consultants/Specialty  GI   Rheumatology - Awaiting call back    Code Status  Full Code    Disposition  Patient is not medically cleared for discharge.   Anticipate discharge to to home with close outpatient follow-up.  I have placed the appropriate orders for post-discharge needs.    Review of Systems  Review of Systems   Constitutional: Negative for chills, fever and malaise/fatigue.   HENT: Negative for congestion, ear discharge, ear pain, sinus pain and sore throat.    Eyes: Negative for blurred vision and double vision.   Respiratory: Positive for hemoptysis. Negative for cough, sputum production, shortness of breath and wheezing.    Cardiovascular: Negative for chest pain, palpitations and leg swelling.   Gastrointestinal: Positive for nausea and vomiting. Negative for abdominal pain, constipation and diarrhea.   Genitourinary: Negative for dysuria, frequency and urgency.   Musculoskeletal: Positive for joint pain. Negative for myalgias.   Neurological: Positive for weakness. Negative for dizziness, focal weakness (  ) and headaches.   Psychiatric/Behavioral: The  patient is not nervous/anxious.         Physical Exam  Temp:  [36.1 °C (96.9 °F)-36.6 °C (97.8 °F)] 36.6 °C (97.8 °F)  Pulse:  [64-88] 80  Resp:  [16-42] 16  BP: (101-114)/(61-77) 106/75  SpO2:  [92 %-96 %] 95 %    Physical Exam  Constitutional:       General: She is not in acute distress.  HENT:      Head: Normocephalic and atraumatic.      Nose: Nose normal.      Mouth/Throat:      Mouth: Mucous membranes are moist.      Pharynx: No posterior oropharyngeal erythema.   Eyes:      General: No scleral icterus.     Extraocular Movements: Extraocular movements intact.      Conjunctiva/sclera: Conjunctivae normal.      Pupils: Pupils are equal, round, and reactive to light.   Cardiovascular:      Rate and Rhythm: Normal rate and regular rhythm.      Pulses: Normal pulses.      Heart sounds: Normal heart sounds. No murmur heard.    No gallop.   Pulmonary:      Effort: Pulmonary effort is normal.      Breath sounds: Normal breath sounds. No stridor. No wheezing, rhonchi or rales.   Abdominal:      General: Bowel sounds are normal.      Palpations: Abdomen is soft.   Musculoskeletal:         General: No swelling or tenderness.      Cervical back: Normal range of motion and neck supple. No rigidity.   Skin:     General: Skin is warm.   Neurological:      General: No focal deficit present.      Mental Status: She is alert and oriented to person, place, and time.   Psychiatric:         Mood and Affect: Mood normal.         Behavior: Behavior normal.         Fluids    Intake/Output Summary (Last 24 hours) at 4/1/2022 1303  Last data filed at 4/1/2022 0341  Gross per 24 hour   Intake 180 ml   Output 0 ml   Net 180 ml       Laboratory  Recent Labs     03/31/22 0137 04/01/22  0142   WBC 13.9* 10.6   RBC 4.64 4.18*   HEMOGLOBIN 14.1 12.7   HEMATOCRIT 42.1 37.4   MCV 90.7 89.5   MCH 30.4 30.4   MCHC 33.5* 34.0   RDW 39.0 38.5   PLATELETCT 321 277   MPV 10.2 10.5     Recent Labs     03/31/22 0137 04/01/22  0142   SODIUM 136 138    POTASSIUM 4.1 3.6   CHLORIDE 101 104   CO2 23 24   GLUCOSE 99 143*   BUN 14 11   CREATININE 0.67 0.73   CALCIUM 9.5 8.7                   Imaging  CT-CTA CHEST PULMONARY ARTERY W/ RECONS   Final Result         1.  No pulmonary embolus appreciated.           Assessment/Plan  * Hematemesis of unknown cause- (present on admission)  Assessment & Plan  Hematemesis for the past 2 weeks, bright red blood as well as some coffee-ground emesis.  Pictures on her phone  Hemodynamically stable, hemoglobin 14  Recent admission where she was evaluated by GI with an EGD, pulmonary with a bronchoscopy, ENT with a nasopharyngoscopy  Only the EGD showed possible esophageal varices and esophagitis with chronic inflammatory changes on biopsy    Symptoms blamed on lupus flare and started on prednisone taper, continue    Discussed with GI.   Patient does have severe esophagitis on previous EGD.  Commended PPI and Carafate.  ? Gastroparesis - pt is not tolerating Reglan .   IV PPI   Nutrition consult     Class 1 obesity due to excess calories with serious comorbidity in adult- (present on admission)  Assessment & Plan  BMI 31.9    Gastroesophageal reflux disease with esophagitis and hemorrhage- (present on admission)  Assessment & Plan  History of GERD with esophagitis seen on recent EGD.  Possible varices  Continue PPI & carafate   Discussed with GI.  Patient does have severe esophagitis on previous EGD.  Commended PPI and Carafate.   H & H daily   Pt is not tolerating Carafate   IV PPI     Lupus (HCC)- (present on admission)  Assessment & Plan  History of SLE on Plaquenil, says it controls her symptoms somewhat but she continues to have lupus flares.  Could not tolerate methotrexate in the past  Currently having hematemesis for the past 2 weeks which was blamed on lupus and was started on prednisone taper, continue  Pt wants 2nd opinion from Rheumatology ( I left voice messages to [facility] & [facility] )    Pt wants referral to Fatmata Loza MD ( [facility] )   Reviewed immunology panel .       Type 1 diabetes mellitus without complication (HCC)- (present on admission)  Assessment & Plan  Continue insulin glargine and has insulin pump    Factor V Leiden (HCC)- (present on admission)  Assessment & Plan  History of factor V Leyden and SLE.  No history of DVT  SCD ordered, prophylactic anticoagulation held in case she needs a procedure  Comes in with hematemesis hemodynamically stable and hemoglobin stable       VTE prophylaxis: SCDs/TEDs and pharmacologic prophylaxis contraindicated due to Active bleeding

## 2022-04-02 PROBLEM — K92.0 HEMATEMESIS: Status: ACTIVE | Noted: 2022-04-02

## 2022-04-02 LAB
ALBUMIN SERPL BCP-MCNC: 4 G/DL (ref 3.2–4.9)
ALBUMIN/GLOB SERPL: 1.6 G/DL
ALP SERPL-CCNC: 78 U/L (ref 30–99)
ALT SERPL-CCNC: 17 U/L (ref 2–50)
AMORPH CRY #/AREA URNS HPF: PRESENT /HPF
ANION GAP SERPL CALC-SCNC: 12 MMOL/L (ref 7–16)
APPEARANCE UR: ABNORMAL
AST SERPL-CCNC: 13 U/L (ref 12–45)
BACTERIA #/AREA URNS HPF: ABNORMAL /HPF
BASOPHILS # BLD AUTO: 0.6 % (ref 0–1.8)
BASOPHILS # BLD: 0.06 K/UL (ref 0–0.12)
BILIRUB SERPL-MCNC: 0.3 MG/DL (ref 0.1–1.5)
BILIRUB UR QL STRIP.AUTO: NEGATIVE
BUN SERPL-MCNC: 14 MG/DL (ref 8–22)
CALCIUM SERPL-MCNC: 9 MG/DL (ref 8.5–10.5)
CHLORIDE SERPL-SCNC: 101 MMOL/L (ref 96–112)
CO2 SERPL-SCNC: 26 MMOL/L (ref 20–33)
COLOR UR: YELLOW
CREAT SERPL-MCNC: 0.8 MG/DL (ref 0.5–1.4)
EOSINOPHIL # BLD AUTO: 0.14 K/UL (ref 0–0.51)
EOSINOPHIL NFR BLD: 1.4 % (ref 0–6.9)
EPI CELLS #/AREA URNS HPF: ABNORMAL /HPF
ERYTHROCYTE [DISTWIDTH] IN BLOOD BY AUTOMATED COUNT: 39.5 FL (ref 35.9–50)
GFR SERPLBLD CREATININE-BSD FMLA CKD-EPI: 107 ML/MIN/1.73 M 2
GLOBULIN SER CALC-MCNC: 2.5 G/DL (ref 1.9–3.5)
GLUCOSE BLD STRIP.AUTO-MCNC: 151 MG/DL (ref 65–99)
GLUCOSE BLD STRIP.AUTO-MCNC: 231 MG/DL (ref 65–99)
GLUCOSE BLD STRIP.AUTO-MCNC: 51 MG/DL (ref 65–99)
GLUCOSE BLD STRIP.AUTO-MCNC: 64 MG/DL (ref 65–99)
GLUCOSE BLD STRIP.AUTO-MCNC: 70 MG/DL (ref 65–99)
GLUCOSE BLD STRIP.AUTO-MCNC: 82 MG/DL (ref 65–99)
GLUCOSE BLD STRIP.AUTO-MCNC: 91 MG/DL (ref 65–99)
GLUCOSE SERPL-MCNC: 76 MG/DL (ref 65–99)
GLUCOSE UR STRIP.AUTO-MCNC: NEGATIVE MG/DL
GRAN CASTS #/AREA URNS LPF: ABNORMAL /LPF
HCT VFR BLD AUTO: 36.2 % (ref 37–47)
HGB BLD-MCNC: 12.4 G/DL (ref 12–16)
HYALINE CASTS #/AREA URNS LPF: ABNORMAL /LPF
IMM GRANULOCYTES # BLD AUTO: 0.05 K/UL (ref 0–0.11)
IMM GRANULOCYTES NFR BLD AUTO: 0.5 % (ref 0–0.9)
KETONES UR STRIP.AUTO-MCNC: NEGATIVE MG/DL
LEUKOCYTE ESTERASE UR QL STRIP.AUTO: ABNORMAL
LYMPHOCYTES # BLD AUTO: 3.68 K/UL (ref 1–4.8)
LYMPHOCYTES NFR BLD: 36.7 % (ref 22–41)
MCH RBC QN AUTO: 30.8 PG (ref 27–33)
MCHC RBC AUTO-ENTMCNC: 34.3 G/DL (ref 33.6–35)
MCV RBC AUTO: 89.8 FL (ref 81.4–97.8)
MICRO URNS: ABNORMAL
MONOCYTES # BLD AUTO: 1.18 K/UL (ref 0–0.85)
MONOCYTES NFR BLD AUTO: 11.8 % (ref 0–13.4)
NEUTROPHILS # BLD AUTO: 4.92 K/UL (ref 2–7.15)
NEUTROPHILS NFR BLD: 49 % (ref 44–72)
NITRITE UR QL STRIP.AUTO: NEGATIVE
NRBC # BLD AUTO: 0 K/UL
NRBC BLD-RTO: 0 /100 WBC
PH UR STRIP.AUTO: 6.5 [PH] (ref 5–8)
PLATELET # BLD AUTO: 241 K/UL (ref 164–446)
PMV BLD AUTO: 10.5 FL (ref 9–12.9)
POTASSIUM SERPL-SCNC: 3.1 MMOL/L (ref 3.6–5.5)
POTASSIUM SERPL-SCNC: 3.5 MMOL/L (ref 3.6–5.5)
PROT SERPL-MCNC: 6.5 G/DL (ref 6–8.2)
PROT UR QL STRIP: 30 MG/DL
RBC # BLD AUTO: 4.03 M/UL (ref 4.2–5.4)
RBC # URNS HPF: ABNORMAL /HPF
RBC UR QL AUTO: NEGATIVE
SODIUM SERPL-SCNC: 139 MMOL/L (ref 135–145)
SP GR UR STRIP.AUTO: 1.02
UROBILINOGEN UR STRIP.AUTO-MCNC: 1 MG/DL
WBC # BLD AUTO: 10 K/UL (ref 4.8–10.8)
WBC #/AREA URNS HPF: ABNORMAL /HPF

## 2022-04-02 PROCEDURE — C9113 INJ PANTOPRAZOLE SODIUM, VIA: HCPCS | Performed by: STUDENT IN AN ORGANIZED HEALTH CARE EDUCATION/TRAINING PROGRAM

## 2022-04-02 PROCEDURE — 82962 GLUCOSE BLOOD TEST: CPT | Mod: 91

## 2022-04-02 PROCEDURE — 36415 COLL VENOUS BLD VENIPUNCTURE: CPT

## 2022-04-02 PROCEDURE — 700111 HCHG RX REV CODE 636 W/ 250 OVERRIDE (IP): Performed by: STUDENT IN AN ORGANIZED HEALTH CARE EDUCATION/TRAINING PROGRAM

## 2022-04-02 PROCEDURE — 700102 HCHG RX REV CODE 250 W/ 637 OVERRIDE(OP): Performed by: STUDENT IN AN ORGANIZED HEALTH CARE EDUCATION/TRAINING PROGRAM

## 2022-04-02 PROCEDURE — 700105 HCHG RX REV CODE 258: Performed by: STUDENT IN AN ORGANIZED HEALTH CARE EDUCATION/TRAINING PROGRAM

## 2022-04-02 PROCEDURE — 96376 TX/PRO/DX INJ SAME DRUG ADON: CPT

## 2022-04-02 PROCEDURE — A9270 NON-COVERED ITEM OR SERVICE: HCPCS | Performed by: STUDENT IN AN ORGANIZED HEALTH CARE EDUCATION/TRAINING PROGRAM

## 2022-04-02 PROCEDURE — 770001 HCHG ROOM/CARE - MED/SURG/GYN PRIV*

## 2022-04-02 PROCEDURE — 83036 HEMOGLOBIN GLYCOSYLATED A1C: CPT

## 2022-04-02 PROCEDURE — 84132 ASSAY OF SERUM POTASSIUM: CPT

## 2022-04-02 PROCEDURE — 80053 COMPREHEN METABOLIC PANEL: CPT

## 2022-04-02 PROCEDURE — 81001 URINALYSIS AUTO W/SCOPE: CPT

## 2022-04-02 PROCEDURE — 85025 COMPLETE CBC W/AUTO DIFF WBC: CPT

## 2022-04-02 RX ORDER — POTASSIUM CHLORIDE 7.45 MG/ML
10 INJECTION INTRAVENOUS ONCE
Status: COMPLETED | OUTPATIENT
Start: 2022-04-02 | End: 2022-04-02

## 2022-04-02 RX ORDER — POTASSIUM CHLORIDE 7.45 MG/ML
10 INJECTION INTRAVENOUS
Status: DISPENSED | OUTPATIENT
Start: 2022-04-02 | End: 2022-04-02

## 2022-04-02 RX ORDER — PROMETHAZINE HYDROCHLORIDE 6.25 MG/5ML
12.5 SYRUP ORAL EVERY 4 HOURS PRN
Status: DISCONTINUED | OUTPATIENT
Start: 2022-04-02 | End: 2022-04-03

## 2022-04-02 RX ORDER — METOCLOPRAMIDE HYDROCHLORIDE 5 MG/ML
10 INJECTION INTRAMUSCULAR; INTRAVENOUS EVERY 6 HOURS
Status: DISCONTINUED | OUTPATIENT
Start: 2022-04-02 | End: 2022-04-03

## 2022-04-02 RX ADMIN — ONDANSETRON 4 MG: 2 INJECTION INTRAMUSCULAR; INTRAVENOUS at 16:16

## 2022-04-02 RX ADMIN — POTASSIUM CHLORIDE 10 MEQ: 7.46 INJECTION, SOLUTION INTRAVENOUS at 09:28

## 2022-04-02 RX ADMIN — POTASSIUM CHLORIDE 10 MEQ: 7.46 INJECTION, SOLUTION INTRAVENOUS at 14:52

## 2022-04-02 RX ADMIN — DEXTROSE MONOHYDRATE 25 G: 100 INJECTION, SOLUTION INTRAVENOUS at 03:23

## 2022-04-02 RX ADMIN — POTASSIUM CHLORIDE 10 MEQ: 7.46 INJECTION, SOLUTION INTRAVENOUS at 16:19

## 2022-04-02 RX ADMIN — DULOXETINE HYDROCHLORIDE 60 MG: 30 CAPSULE, DELAYED RELEASE ORAL at 09:29

## 2022-04-02 RX ADMIN — ONDANSETRON 4 MG: 2 INJECTION INTRAMUSCULAR; INTRAVENOUS at 09:29

## 2022-04-02 RX ADMIN — PANTOPRAZOLE SODIUM 40 MG: 40 INJECTION, POWDER, FOR SOLUTION INTRAVENOUS at 09:29

## 2022-04-02 RX ADMIN — ACETAMINOPHEN 650 MG: 325 TABLET, FILM COATED ORAL at 09:46

## 2022-04-02 RX ADMIN — METOCLOPRAMIDE 10 MG: 5 INJECTION, SOLUTION INTRAMUSCULAR; INTRAVENOUS at 17:39

## 2022-04-02 RX ADMIN — ACETAMINOPHEN 650 MG: 325 TABLET, FILM COATED ORAL at 16:16

## 2022-04-02 RX ADMIN — PANTOPRAZOLE SODIUM 40 MG: 40 INJECTION, POWDER, FOR SOLUTION INTRAVENOUS at 17:39

## 2022-04-02 RX ADMIN — PROCHLORPERAZINE EDISYLATE 5 MG: 5 INJECTION INTRAMUSCULAR; INTRAVENOUS at 03:42

## 2022-04-02 RX ADMIN — HYDROXYCHLOROQUINE SULFATE 400 MG: 200 TABLET ORAL at 09:30

## 2022-04-02 RX ADMIN — POTASSIUM CHLORIDE 10 MEQ: 7.46 INJECTION, SOLUTION INTRAVENOUS at 13:14

## 2022-04-02 RX ADMIN — ONDANSETRON 4 MG: 2 INJECTION INTRAMUSCULAR; INTRAVENOUS at 22:34

## 2022-04-02 RX ADMIN — METOCLOPRAMIDE 10 MG: 5 INJECTION, SOLUTION INTRAMUSCULAR; INTRAVENOUS at 13:14

## 2022-04-02 RX ADMIN — METHYLPREDNISOLONE SODIUM SUCCINATE 40 MG: 40 INJECTION, POWDER, FOR SOLUTION INTRAMUSCULAR; INTRAVENOUS at 09:30

## 2022-04-02 RX ADMIN — ACETAMINOPHEN 650 MG: 325 TABLET, FILM COATED ORAL at 22:35

## 2022-04-02 RX ADMIN — PROCHLORPERAZINE MALEATE 5 MG: 10 TABLET ORAL at 13:16

## 2022-04-02 ASSESSMENT — PAIN DESCRIPTION - PAIN TYPE
TYPE: CHRONIC PAIN
TYPE: CHRONIC PAIN
TYPE: CHRONIC PAIN;ACUTE PAIN
TYPE: CHRONIC PAIN
TYPE: CHRONIC PAIN

## 2022-04-02 NOTE — CARE PLAN
Problem: Knowledge Deficit - Standard  Goal: Patient and family/care givers will demonstrate understanding of plan of care, disease process/condition, diagnostic tests and medications  Outcome: Progressing     Problem: Pain - Standard  Goal: Alleviation of pain or a reduction in pain to the patient’s comfort goal  Outcome: Progressing   The patient is Stable - Low risk of patient condition declining or worsening    Shift Goals  Clinical Goals: Pain control, N/V control  Patient Goals: Rest  Family Goals: N/A    Progress made toward(s) clinical / shift goals:  Pt resting    Patient is not progressing towards the following goals:

## 2022-04-02 NOTE — PROGRESS NOTES
Patient states she may possibly be transferring to [place]. Patient requests that photos she has taken of hemoptysis are uploaded into Epic for review by Rd Yi. Photos uploaded.

## 2022-04-02 NOTE — PROGRESS NOTES
Hypoglycemia Intervention    Hypoglycemia protocol intervention:  Blood glucose 70 at 0453.  Intervention: 25 g IV dextrose per MAR   *Pt unable to tolerate PO at this time, MD contacted  Repeat blood glucose 251 at 0643.  Intervention: NA  Additional interventions needed: None at this time  Dr. Randhawa notified of the above at 0458.

## 2022-04-02 NOTE — CARE PLAN
The patient is Stable - Low risk of patient condition declining or worsening    Shift Goals  Clinical Goals: nausea control  Patient Goals: tolerate food, nausea control  Family Goals: N/A    Progress made toward(s) clinical / shift goals:      Problem: Knowledge Deficit - Standard  Goal: Patient and family/care givers will demonstrate understanding of plan of care, disease process/condition, diagnostic tests and medications  Outcome: Progressing  Educated pt on plan of care and medications. All questions addressed.      Problem: Pain - Standard  Goal: Alleviation of pain or a reduction in pain to the patient’s comfort goal  Outcome: Progressing   Pain medication administered PRN. All needs met at this time.

## 2022-04-02 NOTE — PROGRESS NOTES
Hypoglycemia Intervention    Hypoglycemia protocol intervention:  Blood glucose 51 at 0304.  Intervention: 4 oz of fruit juice   Pt unable to tolerate. Vomited juice.  Intervention: 25 g IV dextrose per MAR   Additional interventions needed: NA  Dr. Randhawa notified of the above at 0320.

## 2022-04-02 NOTE — CARE PLAN
Problem: Knowledge Deficit - Standard  Goal: Patient and family/care givers will demonstrate understanding of plan of care, disease process/condition, diagnostic tests and medications  Outcome: Progressing     Problem: Pain - Standard  Goal: Alleviation of pain or a reduction in pain to the patient’s comfort goal  Outcome: Progressing       The patient is Stable - Low risk of patient condition declining or worsening    Shift Goals  Clinical Goals: Pain control; Improved nausea  Patient Goals: Improved nausea  Family Goals: N/A    Progress made toward(s) clinical / shift goals:  No complaints of pain. Actively coughing up blood, + for Nausea/vomiting.    Patient is not progressing towards the following goals:

## 2022-04-02 NOTE — PROGRESS NOTES
[facility] Medicine Daily Progress Note    Date of Service  4/3/2022    Chief Complaint  Malena Bennett is a 21 y.o. female admitted 3/31/2022 with cough, N/V & hematemesis     Hospital Course  A 21 y.o. female who presented 3/31/2022 with hematemesis.  PMH of DM1 with insulin pump, SLE on Plaquenil, factor V Leyden deficiency, anxiety, GERD, erosive gastritis, celiac disease. Hematemesis described as bright red blood mixed with clots, occasional coffee ground appearance, has pictures on her phone which were reviewed. Admitted from 3/21-3/28 for new onset hemoptysis, going on for about 2 weeks now. Symptoms were thought to be due to lupus flare, she was discharged on prednisone taper.  She does have some epigastric pain and says she can occasionally feel it coming on prior to vomiting the blood.  Denies fever/chills, epistaxis, melena.     During last admission she was evaluated by GI and had an EGD that showed possible small varices and esophagitis, biopsy showed inflammation and evidence of reactive changes, small eosinophilia.  Pulmonary consulted, CTA chest last admission was negative for acute changes.  She had bronchoscopy with no return of blood on BAL.  Otolaryngology was consulted, bedside procedure performed of the nasopharynx which showed no evidence of bleeding.     History of SLE on Plaquenil for the past couple years, says it improved her symptoms slightly but she still has lupus flares described as Maller rash, oral ulcers, joint pain, fatigue.  She tried methotrexate but could not tolerate it, so she was not offered DMARDs by her rheumatologist.     In the ED hemodynamically stable, BP 90s-low 100s which is baseline.  Labs unremarkable.  CTA chest done in the ED negative for acute changes.       Interval Problem Update  4/2 Patient was seen and examined at bedside. 4 family members in the room. Pt still c/o N/V & Hemoptysis.   Hypoglycemic and hypokalemic this morning.  S/p Dextorse. BG  better.   IV K - replete  Phenergan for Nausea    Discussed with GI.Patient does have severe esophagitis on previous EGD.  Commended PPI and Carafate.  ? Gastroparesis - IV Reglan    Also discussed with previous hospitalist who took care of her last week ( He did discussed with Pulm, ENT & Rheumatology at that time ).   IV PPI   IV Steroid   IV Reglan for possible gastroparesis    Pt wants 2nd opinion from Rheumatology ( I left voice messages to [facility] on 4/1 - no one called back  )   Pt wants referral to Fatmata Loza MD ( [facility] )   Reviewed immunology panel .   Nutrition consulted   H & H daily     4/3 afebrile, vitals stable, on room air.  Labs largely unremarkable. Refusing IV reglan d/t inability to tolerate, stopped.  Long discussion with patient and family at bedside.  Agreed to try sucralfate again after nausea medicine administered after we encouraged her her that it is the most effective medication for her esophagitis.  Added Phenergan and scopolamine patch to help with nausea.  Will schedule nausea medication once patient finds the 1 that works best for her.  Will attempt to get a hold of rheumatology tomorrow.  Patient is hopeful they will agree to prescribe some type of immunosuppressive therapy for lupus.  She is greatly concerned that she is having a lupus flareup.  She continues to have nausea and hematemesis.        I have personally seen and examined the patient at bedside. I discussed the plan of care with patient, family, bedside RN and GI.    Consultants/Specialty  GI   Rheumatology - Awaiting call back    Code Status  Full Code    Disposition  Patient is not medically cleared for discharge.   Anticipate discharge to to home with close outpatient follow-up.  I have placed the appropriate orders for post-discharge needs.    Review of Systems  Review of Systems   Constitutional: Negative for chills, fever and malaise/fatigue.   HENT: Negative for  congestion, ear discharge, ear pain, sinus pain and sore throat.    Eyes: Negative for blurred vision and double vision.   Respiratory: Positive for hemoptysis. Negative for cough, sputum production, shortness of breath and wheezing.    Cardiovascular: Negative for chest pain, palpitations and leg swelling.   Gastrointestinal: Positive for nausea and vomiting. Negative for abdominal pain, constipation and diarrhea.   Genitourinary: Negative for dysuria, frequency and urgency.   Musculoskeletal: Positive for joint pain. Negative for myalgias.   Neurological: Positive for weakness. Negative for dizziness, focal weakness (  ) and headaches.   Psychiatric/Behavioral: The patient is not nervous/anxious.         Physical Exam  Temp:  [36.1 °C (96.9 °F)-36.6 °C (97.9 °F)] 36.6 °C (97.9 °F)  Pulse:  [] 77  Resp:  [16-18] 18  BP: ()/(67-73) 111/69  SpO2:  [95 %-98 %] 98 %    Physical Exam  Constitutional:       General: She is not in acute distress.  HENT:      Head: Normocephalic and atraumatic.      Nose: Nose normal.      Mouth/Throat:      Mouth: Mucous membranes are moist.      Pharynx: No posterior oropharyngeal erythema.   Eyes:      General: No scleral icterus.     Extraocular Movements: Extraocular movements intact.      Conjunctiva/sclera: Conjunctivae normal.      Pupils: Pupils are equal, round, and reactive to light.   Cardiovascular:      Rate and Rhythm: Normal rate and regular rhythm.      Pulses: Normal pulses.      Heart sounds: Normal heart sounds. No murmur heard.    No gallop.   Pulmonary:      Effort: Pulmonary effort is normal.      Breath sounds: Normal breath sounds. No stridor. No wheezing, rhonchi or rales.   Abdominal:      General: Bowel sounds are normal.      Palpations: Abdomen is soft.   Musculoskeletal:         General: No swelling or tenderness.      Cervical back: Normal range of motion and neck supple. No rigidity.   Skin:     General: Skin is warm.   Neurological:       General: No focal deficit present.      Mental Status: She is alert and oriented to person, place, and time.   Psychiatric:         Mood and Affect: Mood normal.         Behavior: Behavior normal.         Fluids    Intake/Output Summary (Last 24 hours) at 4/3/2022 1248  Last data filed at 4/3/2022 1234  Gross per 24 hour   Intake 360 ml   Output 0 ml   Net 360 ml       Laboratory  Recent Labs     04/01/22  0142 04/02/22  0443 04/03/22  0729   WBC 10.6 10.0 10.2   RBC 4.18* 4.03* 4.49   HEMOGLOBIN 12.7 12.4 13.6   HEMATOCRIT 37.4 36.2* 40.2   MCV 89.5 89.8 89.5   MCH 30.4 30.8 30.3   MCHC 34.0 34.3 33.8   RDW 38.5 39.5 38.6   PLATELETCT 277 241 272   MPV 10.5 10.5 10.8     Recent Labs     04/01/22  0142 04/02/22  0443 04/02/22  1225 04/03/22  0729   SODIUM 138 139  --  138   POTASSIUM 3.6 3.1* 3.5* 3.8   CHLORIDE 104 101  --  101   CO2 24 26  --  24   GLUCOSE 143* 76  --  99   BUN 11 14  --  11   CREATININE 0.73 0.80  --  0.76   CALCIUM 8.7 9.0  --  9.6                   Imaging  CT-CTA CHEST PULMONARY ARTERY W/ RECONS   Final Result         1.  No pulmonary embolus appreciated.           Assessment/Plan  * Hematemesis of unknown cause- (present on admission)  Assessment & Plan  Hematemesis for the past 2 weeks, bright red blood as well as some coffee-ground emesis.  Pictures on her phone  Hemodynamically stable, hemoglobin 14  Recent admission where she was evaluated by GI with an EGD, pulmonary with a bronchoscopy, ENT with a nasopharyngoscopy  Only the EGD showed possible esophageal varices and esophagitis with chronic inflammatory changes on biopsy    Symptoms blamed on lupus flare and started on prednisone taper, continue    Discussed with GI.   Patient does have severe esophagitis on previous EGD.  Commended PPI and Carafate.  ? Gastroparesis - IV reglan -patient refused due to making her somnolent  IV PPI   Nutrition consult   Phenergan for Nausea  Added scopolamine patch for nausea as well  -We will schedule  antinausea medicine when patient discovers which 1 helps her the best    Gastroesophageal reflux disease with esophagitis and hemorrhage- (present on admission)  Assessment & Plan  History of GERD with esophagitis seen on recent EGD.  Possible varices  Continue PPI & carafate   Discussed with GI.  Patient does have severe esophagitis on previous EGD.  Commended PPI and Carafate.   H & H daily   Pt is not tolerating Carafate-but agreed to try again after taking nausea medicine 30 minutes before  IV PPI   ? Gastroparesis - IV Reglan-patient refused    Hypokalemia- (present on admission)  Assessment & Plan  Replete as needed     Lupus (HCC)- (present on admission)  Assessment & Plan  History of SLE on Plaquenil, says it controls her symptoms somewhat but she continues to have lupus flares.  Could not tolerate methotrexate in the past  Currently having hematemesis for the past 2 weeks which was blamed on lupus and was started on prednisone taper, continue  Pt wants 2nd opinion from Rheumatology ( I left voice messages to [facility] & [facility] [place] )   Pt wants referral to Fatmata Loza MD ( [facility] )   Reviewed immunology panel .       Type 1 diabetes mellitus without complication (HCC)- (present on admission)  Assessment & Plan  -Stopped insulin pump  -Added sliding scale insulin.    Class 1 obesity due to excess calories with serious comorbidity in adult- (present on admission)  Assessment & Plan  BMI 31.9    Factor V Leiden (HCC)- (present on admission)  Assessment & Plan  History of factor V Leyden and SLE.  No history of DVT  SCD ordered, prophylactic anticoagulation held in case she needs a procedure  Comes in with hematemesis hemodynamically stable and hemoglobin stable       VTE prophylaxis: SCDs/TEDs and pharmacologic prophylaxis contraindicated due to Active bleeding

## 2022-04-02 NOTE — PROGRESS NOTES
Report received from NOC RN, assumed care at 0700.  Assessment complete.  A&O x 4. Patient calls appropriately.  Patient ambulates independently.  Patient has 3/10 pain. Pain managed with prescribed medications.  Patient complained of nausea, medication administered.  + void, + flatus, - BM.  Patient denies SOB. On room air.  Patient calm and cooperative.  Review plan with of care with patient. Call light and personal belongings within reach. Hourly rounding in place. All needs met at this time.

## 2022-04-02 NOTE — PROGRESS NOTES
Bedside report received from day shift nurse.  Pt A&Ox4  Intermittent N/V reported.. Normoactive bowel sounds, passing flatus, LBM PTA. IV access through 20G LAC that is SL.  Saturating >90% on RA.  Pt ambulates independently.  Pain is controlled through medication orders. Updated on plan of care. Safety education provided. Bed locked in low. Call light within reach. Rounding in place.

## 2022-04-03 PROBLEM — K92.0 HEMATEMESIS: Status: RESOLVED | Noted: 2022-04-02 | Resolved: 2022-04-03

## 2022-04-03 LAB
ALBUMIN SERPL BCP-MCNC: 4.3 G/DL (ref 3.2–4.9)
ALBUMIN/GLOB SERPL: 1.6 G/DL
ALP SERPL-CCNC: 83 U/L (ref 30–99)
ALT SERPL-CCNC: 17 U/L (ref 2–50)
ANION GAP SERPL CALC-SCNC: 13 MMOL/L (ref 7–16)
AST SERPL-CCNC: 16 U/L (ref 12–45)
BASOPHILS # BLD AUTO: 0.5 % (ref 0–1.8)
BASOPHILS # BLD: 0.05 K/UL (ref 0–0.12)
BILIRUB SERPL-MCNC: 0.4 MG/DL (ref 0.1–1.5)
BUN SERPL-MCNC: 11 MG/DL (ref 8–22)
CALCIUM SERPL-MCNC: 9.6 MG/DL (ref 8.5–10.5)
CHLORIDE SERPL-SCNC: 101 MMOL/L (ref 96–112)
CO2 SERPL-SCNC: 24 MMOL/L (ref 20–33)
CREAT SERPL-MCNC: 0.76 MG/DL (ref 0.5–1.4)
CRP SERPL HS-MCNC: <0.3 MG/DL (ref 0–0.75)
EOSINOPHIL # BLD AUTO: 0.06 K/UL (ref 0–0.51)
EOSINOPHIL NFR BLD: 0.6 % (ref 0–6.9)
ERYTHROCYTE [DISTWIDTH] IN BLOOD BY AUTOMATED COUNT: 38.6 FL (ref 35.9–50)
ERYTHROCYTE [SEDIMENTATION RATE] IN BLOOD BY WESTERGREN METHOD: 7 MM/HOUR (ref 0–25)
GFR SERPLBLD CREATININE-BSD FMLA CKD-EPI: 113 ML/MIN/1.73 M 2
GLOBULIN SER CALC-MCNC: 2.7 G/DL (ref 1.9–3.5)
GLUCOSE BLD STRIP.AUTO-MCNC: 97 MG/DL (ref 65–99)
GLUCOSE BLD STRIP.AUTO-MCNC: 99 MG/DL (ref 65–99)
GLUCOSE SERPL-MCNC: 99 MG/DL (ref 65–99)
HCT VFR BLD AUTO: 40.2 % (ref 37–47)
HGB BLD-MCNC: 13.6 G/DL (ref 12–16)
IMM GRANULOCYTES # BLD AUTO: 0.07 K/UL (ref 0–0.11)
IMM GRANULOCYTES NFR BLD AUTO: 0.7 % (ref 0–0.9)
LYMPHOCYTES # BLD AUTO: 2.03 K/UL (ref 1–4.8)
LYMPHOCYTES NFR BLD: 20 % (ref 22–41)
MAGNESIUM SERPL-MCNC: 2.1 MG/DL (ref 1.5–2.5)
MCH RBC QN AUTO: 30.3 PG (ref 27–33)
MCHC RBC AUTO-ENTMCNC: 33.8 G/DL (ref 33.6–35)
MCV RBC AUTO: 89.5 FL (ref 81.4–97.8)
MONOCYTES # BLD AUTO: 0.59 K/UL (ref 0–0.85)
MONOCYTES NFR BLD AUTO: 5.8 % (ref 0–13.4)
NEUTROPHILS # BLD AUTO: 7.36 K/UL (ref 2–7.15)
NEUTROPHILS NFR BLD: 72.4 % (ref 44–72)
NRBC # BLD AUTO: 0 K/UL
NRBC BLD-RTO: 0 /100 WBC
PHOSPHATE SERPL-MCNC: 3.2 MG/DL (ref 2.5–4.5)
PLATELET # BLD AUTO: 272 K/UL (ref 164–446)
PMV BLD AUTO: 10.8 FL (ref 9–12.9)
POTASSIUM SERPL-SCNC: 3.8 MMOL/L (ref 3.6–5.5)
PROT SERPL-MCNC: 7 G/DL (ref 6–8.2)
RBC # BLD AUTO: 4.49 M/UL (ref 4.2–5.4)
SODIUM SERPL-SCNC: 138 MMOL/L (ref 135–145)
WBC # BLD AUTO: 10.2 K/UL (ref 4.8–10.8)

## 2022-04-03 PROCEDURE — 36415 COLL VENOUS BLD VENIPUNCTURE: CPT

## 2022-04-03 PROCEDURE — 770001 HCHG ROOM/CARE - MED/SURG/GYN PRIV*

## 2022-04-03 PROCEDURE — 86140 C-REACTIVE PROTEIN: CPT

## 2022-04-03 PROCEDURE — 700111 HCHG RX REV CODE 636 W/ 250 OVERRIDE (IP): Performed by: STUDENT IN AN ORGANIZED HEALTH CARE EDUCATION/TRAINING PROGRAM

## 2022-04-03 PROCEDURE — 80053 COMPREHEN METABOLIC PANEL: CPT

## 2022-04-03 PROCEDURE — 700102 HCHG RX REV CODE 250 W/ 637 OVERRIDE(OP): Performed by: STUDENT IN AN ORGANIZED HEALTH CARE EDUCATION/TRAINING PROGRAM

## 2022-04-03 PROCEDURE — A9270 NON-COVERED ITEM OR SERVICE: HCPCS | Performed by: STUDENT IN AN ORGANIZED HEALTH CARE EDUCATION/TRAINING PROGRAM

## 2022-04-03 PROCEDURE — C9113 INJ PANTOPRAZOLE SODIUM, VIA: HCPCS | Performed by: STUDENT IN AN ORGANIZED HEALTH CARE EDUCATION/TRAINING PROGRAM

## 2022-04-03 PROCEDURE — 700102 HCHG RX REV CODE 250 W/ 637 OVERRIDE(OP): Performed by: FAMILY MEDICINE

## 2022-04-03 PROCEDURE — A9270 NON-COVERED ITEM OR SERVICE: HCPCS | Performed by: FAMILY MEDICINE

## 2022-04-03 PROCEDURE — 700105 HCHG RX REV CODE 258: Performed by: FAMILY MEDICINE

## 2022-04-03 PROCEDURE — 84100 ASSAY OF PHOSPHORUS: CPT

## 2022-04-03 PROCEDURE — 99233 SBSQ HOSP IP/OBS HIGH 50: CPT | Mod: FS | Performed by: FAMILY MEDICINE

## 2022-04-03 PROCEDURE — 83735 ASSAY OF MAGNESIUM: CPT

## 2022-04-03 PROCEDURE — 700111 HCHG RX REV CODE 636 W/ 250 OVERRIDE (IP): Performed by: FAMILY MEDICINE

## 2022-04-03 PROCEDURE — 85025 COMPLETE CBC W/AUTO DIFF WBC: CPT

## 2022-04-03 PROCEDURE — 85652 RBC SED RATE AUTOMATED: CPT

## 2022-04-03 PROCEDURE — 82962 GLUCOSE BLOOD TEST: CPT | Mod: 91

## 2022-04-03 RX ORDER — INSULIN LISPRO 100 [IU]/ML
2-10 INJECTION, SOLUTION INTRAVENOUS; SUBCUTANEOUS
Status: DISCONTINUED | OUTPATIENT
Start: 2022-04-03 | End: 2022-04-06

## 2022-04-03 RX ORDER — PROMETHAZINE HYDROCHLORIDE 6.25 MG/5ML
25 SYRUP ORAL EVERY 4 HOURS PRN
Status: DISCONTINUED | OUTPATIENT
Start: 2022-04-03 | End: 2022-04-13 | Stop reason: HOSPADM

## 2022-04-03 RX ORDER — SCOLOPAMINE TRANSDERMAL SYSTEM 1 MG/1
1 PATCH, EXTENDED RELEASE TRANSDERMAL
Status: DISCONTINUED | OUTPATIENT
Start: 2022-04-03 | End: 2022-04-07

## 2022-04-03 RX ORDER — SODIUM CHLORIDE 9 MG/ML
INJECTION, SOLUTION INTRAVENOUS CONTINUOUS
Status: DISCONTINUED | OUTPATIENT
Start: 2022-04-03 | End: 2022-04-06

## 2022-04-03 RX ORDER — SUCRALFATE ORAL 1 G/10ML
1 SUSPENSION ORAL EVERY 6 HOURS
Status: DISCONTINUED | OUTPATIENT
Start: 2022-04-03 | End: 2022-04-11

## 2022-04-03 RX ADMIN — PROCHLORPERAZINE MALEATE 5 MG: 10 TABLET ORAL at 08:30

## 2022-04-03 RX ADMIN — PROCHLORPERAZINE EDISYLATE 5 MG: 5 INJECTION INTRAMUSCULAR; INTRAVENOUS at 01:21

## 2022-04-03 RX ADMIN — PROMETHAZINE HYDROCHLORIDE 25 MG: 6.25 SOLUTION ORAL at 22:57

## 2022-04-03 RX ADMIN — PANTOPRAZOLE SODIUM 40 MG: 40 INJECTION, POWDER, FOR SOLUTION INTRAVENOUS at 17:56

## 2022-04-03 RX ADMIN — SUCRALFATE 1 G: 1 SUSPENSION ORAL at 17:56

## 2022-04-03 RX ADMIN — DULOXETINE HYDROCHLORIDE 60 MG: 30 CAPSULE, DELAYED RELEASE ORAL at 05:17

## 2022-04-03 RX ADMIN — SODIUM CHLORIDE: 9 INJECTION, SOLUTION INTRAVENOUS at 23:38

## 2022-04-03 RX ADMIN — SCOPALAMINE 1 PATCH: 1 PATCH, EXTENDED RELEASE TRANSDERMAL at 12:32

## 2022-04-03 RX ADMIN — SODIUM CHLORIDE: 9 INJECTION, SOLUTION INTRAVENOUS at 11:57

## 2022-04-03 RX ADMIN — ONDANSETRON 4 MG: 2 INJECTION INTRAMUSCULAR; INTRAVENOUS at 17:56

## 2022-04-03 RX ADMIN — ACETAMINOPHEN 650 MG: 325 TABLET, FILM COATED ORAL at 08:27

## 2022-04-03 RX ADMIN — HYDROXYCHLOROQUINE SULFATE 400 MG: 200 TABLET ORAL at 05:17

## 2022-04-03 RX ADMIN — METHYLPREDNISOLONE SODIUM SUCCINATE 40 MG: 40 INJECTION, POWDER, FOR SOLUTION INTRAMUSCULAR; INTRAVENOUS at 05:17

## 2022-04-03 RX ADMIN — ONDANSETRON 8 MG: 4 TABLET, ORALLY DISINTEGRATING ORAL at 05:17

## 2022-04-03 RX ADMIN — ONDANSETRON 4 MG: 2 INJECTION INTRAMUSCULAR; INTRAVENOUS at 11:57

## 2022-04-03 RX ADMIN — SUCRALFATE 1 G: 1 SUSPENSION ORAL at 11:57

## 2022-04-03 RX ADMIN — PANTOPRAZOLE SODIUM 40 MG: 40 INJECTION, POWDER, FOR SOLUTION INTRAVENOUS at 05:16

## 2022-04-03 RX ADMIN — ACETAMINOPHEN 650 MG: 325 TABLET, FILM COATED ORAL at 18:06

## 2022-04-03 RX ADMIN — SENNOSIDES AND DOCUSATE SODIUM 2 TABLET: 50; 8.6 TABLET ORAL at 17:57

## 2022-04-03 ASSESSMENT — ENCOUNTER SYMPTOMS
FEVER: 0
SORE THROAT: 0
CHILLS: 0
SINUS PAIN: 0
DIZZINESS: 0
SHORTNESS OF BREATH: 0
COUGH: 0
DOUBLE VISION: 0
WEAKNESS: 1
HEADACHES: 0
BLURRED VISION: 0
NAUSEA: 1
HEMOPTYSIS: 1
NERVOUS/ANXIOUS: 0
MYALGIAS: 0
FOCAL WEAKNESS: 0
PALPITATIONS: 0
WHEEZING: 0
CONSTIPATION: 0
SPUTUM PRODUCTION: 0
ABDOMINAL PAIN: 0
DIARRHEA: 0
VOMITING: 1

## 2022-04-03 ASSESSMENT — PAIN DESCRIPTION - PAIN TYPE
TYPE: CHRONIC PAIN

## 2022-04-03 NOTE — CARE PLAN
The patient is Stable - Low risk of patient condition declining or worsening    Shift Goals  Clinical Goals: Nausea control  Patient Goals: no emesis  Family Goals: N/A    Progress made toward(s) clinical / shift goals:      Problem: Knowledge Deficit - Standard  Goal: Patient and family/care givers will demonstrate understanding of plan of care, disease process/condition, diagnostic tests and medications  Outcome: Progressing   Educated patient on plan of care and medications.    Problem: Pain - Standard  Goal: Alleviation of pain or a reduction in pain to the patient’s comfort goal  Outcome: Progressing   Pain medication administered PRN and reassessed frequently

## 2022-04-03 NOTE — PROGRESS NOTES
Report received from NOC RN, assumed care at 0700.  Assessment complete.  A&O x 4. Patient calls appropriately.  Patient ambulates independently.  Patient has 3/10 pain. Pain managed with prescribed medications.  Complained of nausea, medication administered. Regular diet with low intake.  + void, + flatus, - BM.  Patient denies SOB. On room air.  Patient calm and pleasant.  Review plan with of care with patient. Call light and personal belongings within reach. Hourly rounding in place. All needs met at this time.

## 2022-04-03 NOTE — PROGRESS NOTES
Bedside report received from day shift nurse.  Pt A&Ox4  Not tolerating diet, intermittent N/V reported. Normoactive bowel sounds, passing flatus, LBM PTA. IV access through 20G LFA that is SL.  Saturating >90% on RA.  Pt ambulates independently.  Pain is controlled through medication orders. Updated on plan of care. Safety education provided. Bed locked in low. Call light within reach. Rounding in place.

## 2022-04-03 NOTE — CARE PLAN
Problem: Knowledge Deficit - Standard  Goal: Patient and family/care givers will demonstrate understanding of plan of care, disease process/condition, diagnostic tests and medications  Outcome: Progressing     Problem: Pain - Standard  Goal: Alleviation of pain or a reduction in pain to the patient’s comfort goal  Outcome: Progressing   The patient is Stable - Low risk of patient condition declining or worsening    Shift Goals  Clinical Goals: Nausea control, monitor BG  Patient Goals: No emesis  Family Goals: N/A    Progress made toward(s) clinical / shift goals:  Pt resting    Patient is not progressing towards the following goals:

## 2022-04-03 NOTE — PROGRESS NOTES
Around 3:30 PM, pt and family wanted to discuss plan of care. They were thinking there is delay in care to be seen by Rheumatology. I explained that there is no delay in care. Since we are treating her esogastritis & possible gastroparesis.   Just started on IV Reglan & Phenegran on 4/2.   Again , explained that I do not think she has SLE flare.   Family & Pt has been mentioning multiple MD names from outside facilities.     She wanted to be trabnsfer to [facility] ( Family member or friend has been talking to Dr Da Ward there ).   Transfer center was called to speak with Dr Ward.   He is a medical director , Pulm & ICU MD.   He was shocked to get the info & told he did not know the pt.     I again explained the pt that I cannot follow any recommendations from MD from outside facilities , medical articles they are reading or what the pt & family think she needs. Pt understnads but she wants to be seen by another hospitalist.    Will sign out to a new team in AM.

## 2022-04-04 ENCOUNTER — APPOINTMENT (OUTPATIENT)
Dept: RADIOLOGY | Facility: MEDICAL CENTER | Age: 22
DRG: 369 | End: 2022-04-04
Payer: COMMERCIAL

## 2022-04-04 LAB
ANION GAP SERPL CALC-SCNC: 10 MMOL/L (ref 7–16)
BUN SERPL-MCNC: 11 MG/DL (ref 8–22)
C3 SERPL-MCNC: 106.2 MG/DL (ref 87–200)
C4 SERPL-MCNC: 17.5 MG/DL (ref 19–52)
CALCIUM SERPL-MCNC: 8.8 MG/DL (ref 8.5–10.5)
CHLORIDE SERPL-SCNC: 102 MMOL/L (ref 96–112)
CO2 SERPL-SCNC: 25 MMOL/L (ref 20–33)
CREAT SERPL-MCNC: 0.74 MG/DL (ref 0.5–1.4)
ERYTHROCYTE [DISTWIDTH] IN BLOOD BY AUTOMATED COUNT: 38.7 FL (ref 35.9–50)
GFR SERPLBLD CREATININE-BSD FMLA CKD-EPI: 117 ML/MIN/1.73 M 2
GLUCOSE BLD STRIP.AUTO-MCNC: 105 MG/DL (ref 65–99)
GLUCOSE BLD STRIP.AUTO-MCNC: 106 MG/DL (ref 65–99)
GLUCOSE BLD STRIP.AUTO-MCNC: 85 MG/DL (ref 65–99)
GLUCOSE BLD STRIP.AUTO-MCNC: 99 MG/DL (ref 65–99)
GLUCOSE SERPL-MCNC: 91 MG/DL (ref 65–99)
HCT VFR BLD AUTO: 35.8 % (ref 37–47)
HGB BLD-MCNC: 12 G/DL (ref 12–16)
MCH RBC QN AUTO: 30.1 PG (ref 27–33)
MCHC RBC AUTO-ENTMCNC: 33.5 G/DL (ref 33.6–35)
MCV RBC AUTO: 89.7 FL (ref 81.4–97.8)
PLATELET # BLD AUTO: 248 K/UL (ref 164–446)
PMV BLD AUTO: 10.6 FL (ref 9–12.9)
POTASSIUM SERPL-SCNC: 3.7 MMOL/L (ref 3.6–5.5)
RBC # BLD AUTO: 3.99 M/UL (ref 4.2–5.4)
SODIUM SERPL-SCNC: 137 MMOL/L (ref 135–145)
WBC # BLD AUTO: 10.3 K/UL (ref 4.8–10.8)

## 2022-04-04 PROCEDURE — 85027 COMPLETE CBC AUTOMATED: CPT

## 2022-04-04 PROCEDURE — 80048 BASIC METABOLIC PNL TOTAL CA: CPT

## 2022-04-04 PROCEDURE — A9270 NON-COVERED ITEM OR SERVICE: HCPCS | Performed by: STUDENT IN AN ORGANIZED HEALTH CARE EDUCATION/TRAINING PROGRAM

## 2022-04-04 PROCEDURE — 99232 SBSQ HOSP IP/OBS MODERATE 35: CPT | Mod: FS | Performed by: FAMILY MEDICINE

## 2022-04-04 PROCEDURE — 36415 COLL VENOUS BLD VENIPUNCTURE: CPT

## 2022-04-04 PROCEDURE — 700111 HCHG RX REV CODE 636 W/ 250 OVERRIDE (IP): Performed by: STUDENT IN AN ORGANIZED HEALTH CARE EDUCATION/TRAINING PROGRAM

## 2022-04-04 PROCEDURE — A9270 NON-COVERED ITEM OR SERVICE: HCPCS

## 2022-04-04 PROCEDURE — 86160 COMPLEMENT ANTIGEN: CPT

## 2022-04-04 PROCEDURE — 700102 HCHG RX REV CODE 250 W/ 637 OVERRIDE(OP)

## 2022-04-04 PROCEDURE — C9113 INJ PANTOPRAZOLE SODIUM, VIA: HCPCS | Performed by: STUDENT IN AN ORGANIZED HEALTH CARE EDUCATION/TRAINING PROGRAM

## 2022-04-04 PROCEDURE — 770001 HCHG ROOM/CARE - MED/SURG/GYN PRIV*

## 2022-04-04 PROCEDURE — A9270 NON-COVERED ITEM OR SERVICE: HCPCS | Performed by: FAMILY MEDICINE

## 2022-04-04 PROCEDURE — 82962 GLUCOSE BLOOD TEST: CPT

## 2022-04-04 PROCEDURE — 700105 HCHG RX REV CODE 258: Performed by: FAMILY MEDICINE

## 2022-04-04 PROCEDURE — 700102 HCHG RX REV CODE 250 W/ 637 OVERRIDE(OP): Performed by: STUDENT IN AN ORGANIZED HEALTH CARE EDUCATION/TRAINING PROGRAM

## 2022-04-04 PROCEDURE — 86225 DNA ANTIBODY NATIVE: CPT

## 2022-04-04 PROCEDURE — 700111 HCHG RX REV CODE 636 W/ 250 OVERRIDE (IP)

## 2022-04-04 PROCEDURE — 700102 HCHG RX REV CODE 250 W/ 637 OVERRIDE(OP): Performed by: FAMILY MEDICINE

## 2022-04-04 RX ORDER — ONDANSETRON 2 MG/ML
4 INJECTION INTRAMUSCULAR; INTRAVENOUS EVERY 6 HOURS
Status: DISCONTINUED | OUTPATIENT
Start: 2022-04-04 | End: 2022-04-12

## 2022-04-04 RX ORDER — TRAMADOL HYDROCHLORIDE 50 MG/1
50 TABLET ORAL EVERY 6 HOURS PRN
Status: DISCONTINUED | OUTPATIENT
Start: 2022-04-04 | End: 2022-04-09

## 2022-04-04 RX ORDER — FLUTICASONE PROPIONATE 110 UG/1
2 AEROSOL, METERED RESPIRATORY (INHALATION) EVERY 12 HOURS
Status: DISCONTINUED | OUTPATIENT
Start: 2022-04-04 | End: 2022-04-11

## 2022-04-04 RX ADMIN — SUCRALFATE 1 G: 1 SUSPENSION ORAL at 20:57

## 2022-04-04 RX ADMIN — METHYLPREDNISOLONE SODIUM SUCCINATE 40 MG: 40 INJECTION, POWDER, FOR SOLUTION INTRAMUSCULAR; INTRAVENOUS at 06:39

## 2022-04-04 RX ADMIN — HYDROXYCHLOROQUINE SULFATE 400 MG: 200 TABLET ORAL at 05:37

## 2022-04-04 RX ADMIN — SUCRALFATE 1 G: 1 SUSPENSION ORAL at 05:38

## 2022-04-04 RX ADMIN — SUCRALFATE 1 G: 1 SUSPENSION ORAL at 14:06

## 2022-04-04 RX ADMIN — SODIUM CHLORIDE: 9 INJECTION, SOLUTION INTRAVENOUS at 12:45

## 2022-04-04 RX ADMIN — PROCHLORPERAZINE MALEATE 5 MG: 10 TABLET ORAL at 09:04

## 2022-04-04 RX ADMIN — PANTOPRAZOLE SODIUM 40 MG: 40 INJECTION, POWDER, FOR SOLUTION INTRAVENOUS at 05:38

## 2022-04-04 RX ADMIN — FLUTICASONE PROPIONATE 220 MCG: 110 AEROSOL, METERED RESPIRATORY (INHALATION) at 20:58

## 2022-04-04 RX ADMIN — TRAMADOL HYDROCHLORIDE 50 MG: 50 TABLET, COATED ORAL at 16:09

## 2022-04-04 RX ADMIN — SENNOSIDES AND DOCUSATE SODIUM 2 TABLET: 50; 8.6 TABLET ORAL at 18:29

## 2022-04-04 RX ADMIN — PANTOPRAZOLE SODIUM 40 MG: 40 INJECTION, POWDER, FOR SOLUTION INTRAVENOUS at 18:29

## 2022-04-04 RX ADMIN — DULOXETINE HYDROCHLORIDE 60 MG: 30 CAPSULE, DELAYED RELEASE ORAL at 05:37

## 2022-04-04 RX ADMIN — ACETAMINOPHEN 650 MG: 325 TABLET, FILM COATED ORAL at 05:37

## 2022-04-04 RX ADMIN — ONDANSETRON 4 MG: 2 INJECTION INTRAMUSCULAR; INTRAVENOUS at 18:29

## 2022-04-04 RX ADMIN — ONDANSETRON 4 MG: 2 INJECTION INTRAMUSCULAR; INTRAVENOUS at 12:41

## 2022-04-04 ASSESSMENT — ENCOUNTER SYMPTOMS
WHEEZING: 0
MYALGIAS: 0
FOCAL WEAKNESS: 0
DIZZINESS: 0
NAUSEA: 1
SPUTUM PRODUCTION: 0
HEMOPTYSIS: 1
FEVER: 0
BLURRED VISION: 0
PALPITATIONS: 0
HEADACHES: 0
DIARRHEA: 0
NERVOUS/ANXIOUS: 0
ABDOMINAL PAIN: 0
CONSTIPATION: 0
VOMITING: 1
DOUBLE VISION: 0
CHILLS: 0
SORE THROAT: 0
COUGH: 0
SHORTNESS OF BREATH: 0
SINUS PAIN: 0
WEAKNESS: 1

## 2022-04-04 ASSESSMENT — LIFESTYLE VARIABLES
AVERAGE NUMBER OF DAYS PER WEEK YOU HAVE A DRINK CONTAINING ALCOHOL: 0
TOTAL SCORE: 0
ON A TYPICAL DAY WHEN YOU DRINK ALCOHOL HOW MANY DRINKS DO YOU HAVE: 0
EVER HAD A DRINK FIRST THING IN THE MORNING TO STEADY YOUR NERVES TO GET RID OF A HANGOVER: NO
TOTAL SCORE: 0
DOES PATIENT WANT TO STOP DRINKING: NO
CONSUMPTION TOTAL: NEGATIVE
TOTAL SCORE: 0
HAVE PEOPLE ANNOYED YOU BY CRITICIZING YOUR DRINKING: NO
HOW MANY TIMES IN THE PAST YEAR HAVE YOU HAD 5 OR MORE DRINKS IN A DAY: 0
ALCOHOL_USE: NO
HAVE YOU EVER FELT YOU SHOULD CUT DOWN ON YOUR DRINKING: NO
EVER FELT BAD OR GUILTY ABOUT YOUR DRINKING: NO

## 2022-04-04 ASSESSMENT — PAIN DESCRIPTION - PAIN TYPE
TYPE: CHRONIC PAIN
TYPE: CHRONIC PAIN

## 2022-04-04 NOTE — PROGRESS NOTES
[facility] Medicine Daily Progress Note    Date of Service  4/4/2022    Chief Complaint  Malena Bennett is a 21 y.o. female admitted 3/31/2022 with cough, N/V & hematemesis     Hospital Course  A 21 y.o. female who presented 3/31/2022 with hematemesis.  PMH of DM1 with insulin pump, SLE on Plaquenil, factor V Leyden deficiency, anxiety, GERD, erosive gastritis, celiac disease. Hematemesis described as bright red blood mixed with clots, occasional coffee ground appearance, has pictures on her phone which were reviewed. Admitted from 3/21-3/28 for new onset hemoptysis, going on for about 2 weeks now. Symptoms were thought to be due to lupus flare, she was discharged on prednisone taper.  She does have some epigastric pain and says she can occasionally feel it coming on prior to vomiting the blood.  Denies fever/chills, epistaxis, melena.     During last admission she was evaluated by GI and had an EGD that showed possible small varices and esophagitis, biopsy showed inflammation and evidence of reactive changes, small eosinophilia.  Pulmonary consulted, CTA chest last admission was negative for acute changes.  She had bronchoscopy with no return of blood on BAL.  Otolaryngology was consulted, bedside procedure performed of the nasopharynx which showed no evidence of bleeding.     History of SLE on Plaquenil for the past couple years, says it improved her symptoms slightly but she still has lupus flares described as Maller rash, oral ulcers, joint pain, fatigue.  She tried methotrexate but could not tolerate it, so she was not offered DMARDs by her rheumatologist.     In the ED hemodynamically stable, BP 90s-low 100s which is baseline.  Labs unremarkable.  CTA chest done in the ED negative for acute changes.       Interval Problem Update  4/2 Patient was seen and examined at bedside. 4 family members in the room. Pt still c/o N/V & Hemoptysis.   Hypoglycemic and hypokalemic this morning.  S/p Dextorse. BG  better.   IV K - replete  Phenergan for Nausea    Discussed with GI.Patient does have severe esophagitis on previous EGD.  Commended PPI and Carafate.  ? Gastroparesis - IV Reglan    Also discussed with previous hospitalist who took care of her last week (He did discuss with Pulm, ENT & Rheumatology at that time).   IV PPI   IV Steroid   IV Reglan for possible gastroparesis-patient refused  Reviewed immunology panel .   Nutrition consulted   H & H daily     4/3 afebrile, vitals stable, on room air.  Labs largely unremarkable. Refusing IV reglan d/t inability to tolerate, stopped.  Long discussion with patient and family at bedside.  Agreed to try sucralfate again after nausea medicine administered after we encouraged her her that it is the most effective medication for her esophagitis.  Added Phenergan and scopolamine patch to help with nausea.  Will schedule nausea medication once patient finds the 1 that works best for her.  Will attempt to get a hold of rheumatology tomorrow.  Patient is hopeful they will agree to prescribe some type of immunosuppressive therapy for lupus.  She is greatly concerned that she is having a lupus flareup.  She continues to have nausea and hematemesis.    4/4 afebrile, vitals stable, on room air.  Labs continue to be largely unremarkable.  No leukopenia, anemia or thrombocytopenia. CRP negative, sed rate negative.  Kidney function normal.  No hematuria. C3/C4 not elevated. There is slight proteinuria, which is the only clinical symptom that could indicate lupus flare.  Sugars controlled.   Patient is hopeful for some inpatient immunosuppressant treatment. Her nausea is overall improved today, but still having hemoptysis.    Called [facility], Dr De Los Santos recommended disintegrating steroid tablet (pharmacy does not carry) or fluticasone inhaler to treat her eosinophilic esophagitis.  He does recommend stopping systemic steroids and does not feel that she is in a lupus flare up,  would not recommend immunosuppressants. Patient agreeable to try inhaler, but insistent that she needs immunosuppressants.  She requested to talk to Dr De Los Santos herself,  I sent him a message and he said he would review her chart again and try to call after clinic. Patient has been updated.    I have personally seen and examined the patient at bedside. I discussed the plan of care with patient, family, bedside RN and GI.    Consultants/Specialty  GI   Rheumatology - spoke with Dr De Los Santos with [facility]      Code Status  Full Code    Disposition  Patient is not medically cleared for discharge.   Anticipate discharge to to home with close outpatient follow-up.  I have placed the appropriate orders for post-discharge needs.    Review of Systems  Review of Systems   Constitutional: Negative for chills, fever and malaise/fatigue.   HENT: Negative for congestion, ear discharge, ear pain, sinus pain and sore throat.    Eyes: Negative for blurred vision and double vision.   Respiratory: Positive for hemoptysis. Negative for cough, sputum production, shortness of breath and wheezing.    Cardiovascular: Negative for chest pain, palpitations and leg swelling.   Gastrointestinal: Positive for nausea and vomiting. Negative for abdominal pain, constipation and diarrhea.   Genitourinary: Negative for dysuria, frequency and urgency.   Musculoskeletal: Positive for joint pain. Negative for myalgias.   Neurological: Positive for weakness. Negative for dizziness, focal weakness (  ) and headaches.   Psychiatric/Behavioral: The patient is not nervous/anxious.         Physical Exam  Temp:  [36.6 °C (97.8 °F)-37 °C (98.6 °F)] 36.8 °C (98.3 °F)  Pulse:  [] 77  Resp:  [18] 18  BP: ()/(57-75) 107/71  SpO2:  [93 %-95 %] 94 %    Physical Exam  Constitutional:       General: She is not in acute distress.  HENT:      Head: Normocephalic and atraumatic.      Nose: Nose normal.      Mouth/Throat:      Mouth: Mucous membranes are  moist.      Pharynx: No posterior oropharyngeal erythema.   Eyes:      General: No scleral icterus.     Extraocular Movements: Extraocular movements intact.      Conjunctiva/sclera: Conjunctivae normal.      Pupils: Pupils are equal, round, and reactive to light.   Cardiovascular:      Rate and Rhythm: Normal rate and regular rhythm.      Pulses: Normal pulses.      Heart sounds: Normal heart sounds. No murmur heard.    No gallop.   Pulmonary:      Effort: Pulmonary effort is normal.      Breath sounds: Normal breath sounds. No stridor. No wheezing, rhonchi or rales.   Abdominal:      General: Bowel sounds are normal.      Palpations: Abdomen is soft.   Musculoskeletal:         General: No swelling or tenderness.      Cervical back: Normal range of motion and neck supple. No rigidity.   Skin:     General: Skin is warm.   Neurological:      General: No focal deficit present.      Mental Status: She is alert and oriented to person, place, and time.   Psychiatric:         Mood and Affect: Mood normal.         Behavior: Behavior normal.         Fluids    Intake/Output Summary (Last 24 hours) at 4/4/2022 1428  Last data filed at 4/4/2022 0910  Gross per 24 hour   Intake 240 ml   Output 0 ml   Net 240 ml       Laboratory  Recent Labs     04/02/22  0443 04/03/22  0729 04/04/22  0202   WBC 10.0 10.2 10.3   RBC 4.03* 4.49 3.99*   HEMOGLOBIN 12.4 13.6 12.0   HEMATOCRIT 36.2* 40.2 35.8*   MCV 89.8 89.5 89.7   MCH 30.8 30.3 30.1   MCHC 34.3 33.8 33.5*   RDW 39.5 38.6 38.7   PLATELETCT 241 272 248   MPV 10.5 10.8 10.6     Recent Labs     04/02/22  0443 04/02/22  1225 04/03/22  0729 04/04/22  0202   SODIUM 139  --  138 137   POTASSIUM 3.1* 3.5* 3.8 3.7   CHLORIDE 101  --  101 102   CO2 26  --  24 25   GLUCOSE 76  --  99 91   BUN 14  --  11 11   CREATININE 0.80  --  0.76 0.74   CALCIUM 9.0  --  9.6 8.8                   Imaging  IR-US GUIDED PIV   Final Result    Ultrasound-guided PERIPHERAL IV INSERTION performed by    qualified  nursing staff as above.      CT-CTA CHEST PULMONARY ARTERY W/ RECONS   Final Result         1.  No pulmonary embolus appreciated.           Assessment/Plan  * Hematemesis of unknown cause- (present on admission)  Assessment & Plan  Hematemesis for the past 2 weeks, bright red blood as well as some coffee-ground emesis.  Pictures on her phone  Hemodynamically stable, hemoglobin 14  Recent admission where she was evaluated by GI with an EGD, pulmonary with a bronchoscopy, ENT with a nasopharyngoscopy  Only the EGD showed possible esophageal varices and esophagitis with chronic inflammatory changes on biopsy    Symptoms blamed on lupus flare and started on prednisone taper, continue    Discussed with GI.   Patient does have severe esophagitis on previous EGD.  Commended PPI and Carafate.  ? Gastroparesis - IV reglan -patient refused due to making her somnolent  IV PPI   Nutrition consult   Phenergan for Nausea  Added scopolamine patch for nausea as well  -4/4 scheduled zofran q 6, nausea is overall improved.  Patient feels that Q6 Zofran along with PO phenergan and scopolamine patch is working.    Gastroesophageal reflux disease with esophagitis and hemorrhage- (present on admission)  Assessment & Plan  History of GERD with esophagitis seen on recent EGD.  Possible varices  Continue PPI & carafate   Discussed with GI.  Patient does have severe esophagitis on previous EGD.  Commended PPI and Carafate.   H & H daily   Pt is not tolerating Carafate-but agreed to try again after taking nausea medicine 30 minutes before  IV PPI   ? Gastroparesis - IV Reglan-patient refused  -4/4 added fluticasone BID to treat esophagitis per Rheumatology- do not use spacer and do not rise mouth, try to keep steroid in esophagus, not in lungs. Has been able to keep some doses of carafate down.    Hypokalemia- (present on admission)  Assessment & Plan  Replete as needed     Lupus (HCC)- (present on admission)  Assessment & Plan  History of  SLE on Plaquenil, says it controls her symptoms somewhat but she continues to have lupus flares.  Could not tolerate methotrexate in the past  Currently having hematemesis for the past 2 weeks which was blamed on lupus and was started on prednisone taper, continue  Pt wants 2nd opinion from Rheumatology- 4/4 spoke with Dr De Los Santos ([facility]) recommends inhaled steroids to treat esophagitis, stop systemic steroids and no immunosuppressants. Does not feel she is in a flare.  Pt wants referral to Fatmata Loza MD ( [facility] )   Reviewed immunology panel .       Type 1 diabetes mellitus without complication (HCC)- (present on admission)  Assessment & Plan  -Stopped insulin pump  -Added sliding scale insulin.    Class 1 obesity due to excess calories with serious comorbidity in adult- (present on admission)  Assessment & Plan  BMI 31.9    Factor V Leiden (HCC)- (present on admission)  Assessment & Plan  History of factor V Leyden and SLE.  No history of DVT  SCD ordered, prophylactic anticoagulation held in case she needs a procedure  Comes in with hematemesis hemodynamically stable and hemoglobin stable       VTE prophylaxis: SCDs/TEDs and pharmacologic prophylaxis contraindicated due to Active bleeding

## 2022-04-04 NOTE — PROGRESS NOTES
Received report from previous shift RN. Assumed care at 0700  Assessment complete.  A&O x 4. Patient calls appropriately. Patient ambulates independently.  Patient has 5/10 pain chronic back pain. Pain managed with prescribed medications.  Complained of nausea, medication administered. Regular diet with moderate intake.  + void, + flatus, last BM 03/29/2022 .  Patient denies SOB on room air.  Patient calm and pleasant.  Review plan with of care with patient. Call light and personal belongings with in reach. Hourly rounding in place. All needs met at this time.

## 2022-04-04 NOTE — CARE PLAN
The patient is stable    Shift Goals  Clinical Goals: Nausea control  Patient Goals: no emesis  Family Goals: N/A    Progress made toward(s) clinical / shift goals:  Pt nausea controlled with PRN meds, pt was able to get some sleep. Still having blood clots in emesis, but not as frequent.    Patient is not progressing towards the following goals:      Problem: Knowledge Deficit - Standard  Goal: Patient and family/care givers will demonstrate understanding of plan of care, disease process/condition, diagnostic tests and medications  Outcome: Progressing     Problem: Pain - Standard  Goal: Alleviation of pain or a reduction in pain to the patient’s comfort goal  Outcome: Progressing

## 2022-04-04 NOTE — DOCUMENTATION QUERY
[facility]                                                                       Query Response Note      PATIENT:               DOMINIQUE GRAMAJO  ACCT #:                  [id number]  MRN:                     [id number]  :                      2000  ADMIT DATE:       3/31/2022 1:08 AM  DISCH DATE:          RESPONDING  PROVIDER #:        [id number]           QUERY TEXT:    Based upon your judgment and the above clinical indicators, please select the most appropriate diagnosis for the findings.    NOTE:  If an appropriate response is not listed below, please respond with a new note.      The patient's Clinical Indicators include:  American Society for Parenteral and Enteral Nutrition (ASPEN) criteria (presence of at least two)  Per dietary consult dated :    - Severe 5.9% wt loss in 3 weeks   - <50% of estimated nutritional needs for  >5 days.     Additional documentation per dietary consult dated :  Severe malnutrition in the context of acute illness related malnutrition related to n/v    Treatment: Dietary eval; IVF; boost +; sm freq. meals; antiemetics; PPI; carafate; monitor wt.; nutrition rep; GF diet  Risk Factors: Nausea; hematemesis x 2 weeks; GERD w/ esophagitis; celiac disease; DM 1    Thank You,  Emani Blakely RN  Clinical    Connect via Maps In[employer]  Options provided:   -- Severe malnutrition   -- Moderate malnutrition   -- Mild malnutrition   -- Insignificant finding/no effect on patient stay and treatment   -- Unable to Determine      Query created by: Emani Blakely on 2022 8:09 AM    RESPONSE TEXT:    Insignificant finding/no effect on patient stay and treatment          Electronically signed by:  ALLISON VINCENT MD 2022 8:24 AM

## 2022-04-04 NOTE — CARE PLAN
The patient is Stable - Low risk of patient condition declining or worsening    Shift Goals  Clinical Goals: nausea management; comfort  Patient Goals: nausea management  Family Goals: N/A    Progress made toward(s) clinical / shift goals:      Problem: Knowledge Deficit - Standard  Goal: Patient and family/care givers will demonstrate understanding of plan of care, disease process/condition, diagnostic tests and medications  Outcome: Progressing  Note: Plan of care discussed with patient and family. All questions addressed.     Problem: Pain - Standard  Goal: Alleviation of pain or a reduction in pain to the patient’s comfort goal  Outcome: Progressing  Note: Pain level frequently assessed. PRN Tramadol administered per MAR.     Problem: Gastrointestinal Irritability  Goal: Nausea and vomiting will be absent or improve  Outcome: Progressing  Note: Patient reporting mild nausea throughout shift. PRN zofran and PRN compazine administered per MAR.

## 2022-04-05 PROBLEM — E87.6 HYPOKALEMIA: Status: RESOLVED | Noted: 2022-03-21 | Resolved: 2022-04-05

## 2022-04-05 PROBLEM — K22.10 ESOPHAGITIS, EROSIVE: Status: ACTIVE | Noted: 2022-03-31

## 2022-04-05 LAB
ANION GAP SERPL CALC-SCNC: 9 MMOL/L (ref 7–16)
APTT PPP: 23.6 SEC (ref 24.7–36)
BUN SERPL-MCNC: 11 MG/DL (ref 8–22)
CALCIUM SERPL-MCNC: 8.7 MG/DL (ref 8.5–10.5)
CHLORIDE SERPL-SCNC: 104 MMOL/L (ref 96–112)
CO2 SERPL-SCNC: 24 MMOL/L (ref 20–33)
CREAT SERPL-MCNC: 0.75 MG/DL (ref 0.5–1.4)
ERYTHROCYTE [DISTWIDTH] IN BLOOD BY AUTOMATED COUNT: 39.7 FL (ref 35.9–50)
EST. AVERAGE GLUCOSE BLD GHB EST-MCNC: 85 MG/DL
GFR SERPLBLD CREATININE-BSD FMLA CKD-EPI: 115 ML/MIN/1.73 M 2
GLUCOSE BLD STRIP.AUTO-MCNC: 100 MG/DL (ref 65–99)
GLUCOSE BLD STRIP.AUTO-MCNC: 106 MG/DL (ref 65–99)
GLUCOSE BLD STRIP.AUTO-MCNC: 80 MG/DL (ref 65–99)
GLUCOSE BLD STRIP.AUTO-MCNC: 85 MG/DL (ref 65–99)
GLUCOSE BLD STRIP.AUTO-MCNC: 89 MG/DL (ref 65–99)
GLUCOSE SERPL-MCNC: 90 MG/DL (ref 65–99)
HBA1C MFR BLD: 4.6 % (ref 4–5.6)
HCT VFR BLD AUTO: 35 % (ref 37–47)
HGB BLD-MCNC: 11.7 G/DL (ref 12–16)
INR PPP: 1.12 (ref 0.87–1.13)
MCH RBC QN AUTO: 30.5 PG (ref 27–33)
MCHC RBC AUTO-ENTMCNC: 33.4 G/DL (ref 33.6–35)
MCV RBC AUTO: 91.1 FL (ref 81.4–97.8)
PLATELET # BLD AUTO: 245 K/UL (ref 164–446)
PMV BLD AUTO: 10.5 FL (ref 9–12.9)
POTASSIUM SERPL-SCNC: 3.7 MMOL/L (ref 3.6–5.5)
PROTHROMBIN TIME: 14 SEC (ref 12–14.6)
RBC # BLD AUTO: 3.84 M/UL (ref 4.2–5.4)
SODIUM SERPL-SCNC: 137 MMOL/L (ref 135–145)
WBC # BLD AUTO: 9 K/UL (ref 4.8–10.8)

## 2022-04-05 PROCEDURE — 82785 ASSAY OF IGE: CPT

## 2022-04-05 PROCEDURE — C9113 INJ PANTOPRAZOLE SODIUM, VIA: HCPCS | Performed by: STUDENT IN AN ORGANIZED HEALTH CARE EDUCATION/TRAINING PROGRAM

## 2022-04-05 PROCEDURE — A9270 NON-COVERED ITEM OR SERVICE: HCPCS | Performed by: FAMILY MEDICINE

## 2022-04-05 PROCEDURE — 700111 HCHG RX REV CODE 636 W/ 250 OVERRIDE (IP)

## 2022-04-05 PROCEDURE — 700102 HCHG RX REV CODE 250 W/ 637 OVERRIDE(OP): Performed by: FAMILY MEDICINE

## 2022-04-05 PROCEDURE — 85730 THROMBOPLASTIN TIME PARTIAL: CPT

## 2022-04-05 PROCEDURE — A9270 NON-COVERED ITEM OR SERVICE: HCPCS

## 2022-04-05 PROCEDURE — 770001 HCHG ROOM/CARE - MED/SURG/GYN PRIV*

## 2022-04-05 PROCEDURE — 82962 GLUCOSE BLOOD TEST: CPT | Mod: 91

## 2022-04-05 PROCEDURE — 36415 COLL VENOUS BLD VENIPUNCTURE: CPT

## 2022-04-05 PROCEDURE — 80048 BASIC METABOLIC PNL TOTAL CA: CPT

## 2022-04-05 PROCEDURE — 700102 HCHG RX REV CODE 250 W/ 637 OVERRIDE(OP)

## 2022-04-05 PROCEDURE — 85027 COMPLETE CBC AUTOMATED: CPT

## 2022-04-05 PROCEDURE — 99232 SBSQ HOSP IP/OBS MODERATE 35: CPT | Performed by: NURSE PRACTITIONER

## 2022-04-05 PROCEDURE — 700105 HCHG RX REV CODE 258: Performed by: FAMILY MEDICINE

## 2022-04-05 PROCEDURE — 700111 HCHG RX REV CODE 636 W/ 250 OVERRIDE (IP): Performed by: FAMILY MEDICINE

## 2022-04-05 PROCEDURE — 85610 PROTHROMBIN TIME: CPT

## 2022-04-05 PROCEDURE — 700102 HCHG RX REV CODE 250 W/ 637 OVERRIDE(OP): Performed by: STUDENT IN AN ORGANIZED HEALTH CARE EDUCATION/TRAINING PROGRAM

## 2022-04-05 PROCEDURE — RXMED WILLOW AMBULATORY MEDICATION CHARGE: Performed by: NURSE PRACTITIONER

## 2022-04-05 PROCEDURE — A9270 NON-COVERED ITEM OR SERVICE: HCPCS | Performed by: STUDENT IN AN ORGANIZED HEALTH CARE EDUCATION/TRAINING PROGRAM

## 2022-04-05 PROCEDURE — 700111 HCHG RX REV CODE 636 W/ 250 OVERRIDE (IP): Performed by: STUDENT IN AN ORGANIZED HEALTH CARE EDUCATION/TRAINING PROGRAM

## 2022-04-05 RX ADMIN — ACETAMINOPHEN 650 MG: 325 TABLET, FILM COATED ORAL at 10:36

## 2022-04-05 RX ADMIN — SODIUM CHLORIDE: 9 INJECTION, SOLUTION INTRAVENOUS at 14:56

## 2022-04-05 RX ADMIN — ONDANSETRON 4 MG: 2 INJECTION INTRAMUSCULAR; INTRAVENOUS at 18:02

## 2022-04-05 RX ADMIN — HYDROXYCHLOROQUINE SULFATE 400 MG: 200 TABLET ORAL at 05:23

## 2022-04-05 RX ADMIN — SODIUM CHLORIDE: 9 INJECTION, SOLUTION INTRAVENOUS at 01:26

## 2022-04-05 RX ADMIN — PROCHLORPERAZINE MALEATE 5 MG: 10 TABLET ORAL at 16:18

## 2022-04-05 RX ADMIN — FLUTICASONE PROPIONATE 220 MCG: 110 AEROSOL, METERED RESPIRATORY (INHALATION) at 05:38

## 2022-04-05 RX ADMIN — ONDANSETRON 4 MG: 2 INJECTION INTRAMUSCULAR; INTRAVENOUS at 11:29

## 2022-04-05 RX ADMIN — ONDANSETRON 4 MG: 2 INJECTION INTRAMUSCULAR; INTRAVENOUS at 00:23

## 2022-04-05 RX ADMIN — PROMETHAZINE HYDROCHLORIDE 25 MG: 6.25 SOLUTION ORAL at 11:30

## 2022-04-05 RX ADMIN — PANTOPRAZOLE SODIUM 40 MG: 40 INJECTION, POWDER, FOR SOLUTION INTRAVENOUS at 18:02

## 2022-04-05 RX ADMIN — SENNOSIDES AND DOCUSATE SODIUM 2 TABLET: 50; 8.6 TABLET ORAL at 18:02

## 2022-04-05 RX ADMIN — SUCRALFATE 1 G: 1 SUSPENSION ORAL at 11:28

## 2022-04-05 RX ADMIN — SUCRALFATE 1 G: 1 SUSPENSION ORAL at 18:02

## 2022-04-05 RX ADMIN — TRAMADOL HYDROCHLORIDE 50 MG: 50 TABLET, COATED ORAL at 18:13

## 2022-04-05 RX ADMIN — DULOXETINE HYDROCHLORIDE 60 MG: 30 CAPSULE, DELAYED RELEASE ORAL at 05:23

## 2022-04-05 RX ADMIN — PROMETHAZINE HYDROCHLORIDE 25 MG: 6.25 SOLUTION ORAL at 01:24

## 2022-04-05 RX ADMIN — SUCRALFATE 1 G: 1 SUSPENSION ORAL at 05:21

## 2022-04-05 RX ADMIN — PANTOPRAZOLE SODIUM 40 MG: 40 INJECTION, POWDER, FOR SOLUTION INTRAVENOUS at 05:24

## 2022-04-05 RX ADMIN — FLUTICASONE PROPIONATE 220 MCG: 110 AEROSOL, METERED RESPIRATORY (INHALATION) at 21:29

## 2022-04-05 RX ADMIN — METHYLPREDNISOLONE SODIUM SUCCINATE 40 MG: 40 INJECTION, POWDER, FOR SOLUTION INTRAMUSCULAR; INTRAVENOUS at 05:23

## 2022-04-05 RX ADMIN — ONDANSETRON 4 MG: 2 INJECTION INTRAMUSCULAR; INTRAVENOUS at 05:24

## 2022-04-05 RX ADMIN — TIZANIDINE 8 MG: 4 TABLET ORAL at 21:28

## 2022-04-05 ASSESSMENT — ENCOUNTER SYMPTOMS
MYALGIAS: 0
ABDOMINAL PAIN: 0
HEARTBURN: 1
DIARRHEA: 0
VOMITING: 1
NERVOUS/ANXIOUS: 1
NAUSEA: 1
FEVER: 0
SPUTUM PRODUCTION: 0
BLURRED VISION: 0
HEADACHES: 0
PALPITATIONS: 0
HEMOPTYSIS: 1
WHEEZING: 0
DOUBLE VISION: 0
SHORTNESS OF BREATH: 0
COUGH: 0
FOCAL WEAKNESS: 0
CONSTIPATION: 0
WEAKNESS: 0
DIZZINESS: 0
CHILLS: 0
SINUS PAIN: 0
SORE THROAT: 0

## 2022-04-05 ASSESSMENT — PATIENT HEALTH QUESTIONNAIRE - PHQ9
SUM OF ALL RESPONSES TO PHQ9 QUESTIONS 1 AND 2: 0
2. FEELING DOWN, DEPRESSED, IRRITABLE, OR HOPELESS: NOT AT ALL
1. LITTLE INTEREST OR PLEASURE IN DOING THINGS: NOT AT ALL

## 2022-04-05 ASSESSMENT — PAIN DESCRIPTION - PAIN TYPE
TYPE: ACUTE PAIN
TYPE: ACUTE PAIN

## 2022-04-05 NOTE — PROGRESS NOTES
Bedside report received.  Assessment complete.  A&O x 4. Patient calls appropriately.  Patient up with no assist.   Patient does not report any pain at this time.  Patient still coughing up small amounts of blood with clots.   On RA.  Scheduled zofran for nausea.  Insulin pump to abd and monitor on left upper arm.  + void, + flatus, 3/29 BM, poor appetite.  Patient denies SOB.  Review plan with of care with patient. Call light and personal belongings with in reach. Hourly rounding in place. All needs met at this time.

## 2022-04-05 NOTE — CARE PLAN
"The patient is stable    Shift Goals  Clinical Goals: nausea management; comfort  Patient Goals: nausea management  Family Goals: N/A    Progress made toward(s) clinical / shift goals:  pt nausea is getting better with the start of scheduled Zofran. Pt stated \"I'm able to eat a little more.\"    Patient is not progressing towards the following goals:N/A      Problem: Knowledge Deficit - Standard  Goal: Patient and family/care givers will demonstrate understanding of plan of care, disease process/condition, diagnostic tests and medications  Outcome: Progressing     Problem: Pain - Standard  Goal: Alleviation of pain or a reduction in pain to the patient’s comfort goal  Outcome: Progressing     Problem: Gastrointestinal Irritability  Goal: Nausea and vomiting will be absent or improve  Outcome: Progressing     "

## 2022-04-05 NOTE — CARE PLAN
Problem: Nutritional:  Goal: Achieve adequate nutritional intake  Description: Patient will consume 50% (with some boost intake) or > of meals  Outcome: Progressing   PO avg ~50% of meals.

## 2022-04-05 NOTE — CARE PLAN
The patient is Watcher - Medium risk of patient condition declining or worsening    Shift Goals  Clinical Goals: nausea control, monitor hematemesis  Patient Goals: nausea management  Family Goals: N/A    Progress made toward(s) clinical / shift goals:      Patient is not progressing towards the following goals: pt still coughing up blood/clots. Antiemetics and tylenol given. Provider aware.      Problem: Gastrointestinal Irritability  Goal: Nausea and vomiting will be absent or improve  Outcome: Not Progressing

## 2022-04-05 NOTE — PROGRESS NOTES
[facility] Medicine Daily Progress Note    Date of Service  4/5/2022    Chief Complaint  Malena Bennett is a 21 y.o. female admitted 3/31/2022 with cough, N/V & hematemesis     Hospital Course  A 21 y.o. female who presented 3/31/2022 with hematemesis.  PMH of DM1 with insulin pump, SLE on Plaquenil, factor V Leyden deficiency, anxiety, GERD, erosive gastritis, celiac disease. Hematemesis described as bright red blood mixed with clots, occasional coffee ground appearance, has pictures on her phone which were reviewed. Admitted from 3/21-3/28 for new onset hemoptysis, going on for about 2 weeks now. Symptoms were thought to be due to lupus flare, she was discharged on prednisone taper.  She does have some epigastric pain and says she can occasionally feel it coming on prior to vomiting the blood.  Denies fever/chills, epistaxis, melena.     During last admission she was evaluated by GI and had an EGD that showed possible small varices and esophagitis, biopsy showed inflammation and evidence of reactive changes, small eosinophilia.  Pulmonary consulted, CTA chest last admission was negative for acute changes.  She had bronchoscopy with no return of blood on BAL.  Otolaryngology was consulted, bedside procedure performed of the nasopharynx which showed no evidence of bleeding.     History of SLE on Plaquenil for the past couple years, says it improved her symptoms slightly but she still has lupus flares described as Maller rash, oral ulcers, joint pain, fatigue.  She tried methotrexate but could not tolerate it, so she was not offered DMARDs by her rheumatologist.     In the ED hemodynamically stable, BP 90s-low 100s which is baseline.  Labs unremarkable.  CTA chest done in the ED negative for acute changes.       Interval Problem Update  4/2 Patient was seen and examined at bedside. 4 family members in the room. Pt still c/o N/V & Hemoptysis.   Hypoglycemic and hypokalemic this morning.  S/p Dextorse. BG  better.   IV K - replete  Phenergan for Nausea    Discussed with GI.Patient does have severe esophagitis on previous EGD.  Commended PPI and Carafate.  ? Gastroparesis - IV Reglan    Also discussed with previous hospitalist who took care of her last week (He did discuss with Pulm, ENT & Rheumatology at that time).   IV PPI   IV Steroid   IV Reglan for possible gastroparesis-patient refused  Reviewed immunology panel .   Nutrition consulted   H & H daily     4/3 afebrile, vitals stable, on room air.  Labs largely unremarkable. Refusing IV reglan d/t inability to tolerate, stopped.  Long discussion with patient and family at bedside.  Agreed to try sucralfate again after nausea medicine administered after we encouraged her her that it is the most effective medication for her esophagitis.  Added Phenergan and scopolamine patch to help with nausea.  Will schedule nausea medication once patient finds the 1 that works best for her.  Will attempt to get a hold of rheumatology tomorrow.  Patient is hopeful they will agree to prescribe some type of immunosuppressive therapy for lupus.  She is greatly concerned that she is having a lupus flareup.  She continues to have nausea and hematemesis.    4/4 afebrile, vitals stable, on room air.  Labs continue to be largely unremarkable.  No leukopenia, anemia or thrombocytopenia. CRP negative, sed rate negative.  Kidney function normal.  No hematuria. C3/C4 not elevated. There is slight proteinuria, which is the only clinical symptom that could indicate lupus flare.  Sugars controlled.   Patient is hopeful for some inpatient immunosuppressant treatment. Her nausea is overall improved today, but still having hemoptysis.  Called [facility], Dr De Los Santos recommended disintegrating steroid tablet (pharmacy does not carry) or fluticasone inhaler to treat her eosinophilic esophagitis.  He does recommend stopping systemic steroids and does not feel that she is in a lupus flare up,  would not recommend immunosuppressants. Patient agreeable to try inhaler, but insistent that she needs immunosuppressants.  She requested to talk to Dr De Los Santos herself,  I sent him a message and he said he would review her chart again and try to call after clinic. Patient has been updated.    4/5: Persistent hemoptysis , but says hematemesis is decreasing. PO intake slowly improving. H/H stable. SBPs soft, but asymptomatic and at her baseline mentation. Doing well on room air. 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 I discussed case with Oncology on-call, who said patient was seen by Dr. Montesinos in 12/2019 and hemophiliac and coagulopathic work-up was negative. APTT is not elevated. INR normal. PLT normal. Thus, no suspicion for underlying hematologic process. Continue with lupus treatment.     I have personally seen and examined the patient at bedside. I discussed the plan of care with patient, family, bedside RN and oncology.    Consultants/Specialty  GI   Rheumatology - spoke with Dr De Los Santos with [facility]      Code Status  Full Code    Disposition  Patient is not medically cleared for discharge.   Anticipate discharge to to home with close outpatient follow-up.  I have placed the appropriate orders for post-discharge needs.    Review of Systems  Review of Systems   Constitutional: Positive for malaise/fatigue. Negative for chills and fever.   HENT: Negative for congestion, ear discharge, ear pain, sinus pain and sore throat.    Eyes: Negative for blurred vision and double vision.   Respiratory: Positive for hemoptysis. Negative for cough, sputum production, shortness of breath and wheezing.    Cardiovascular: Negative for chest pain, palpitations and leg swelling.   Gastrointestinal: Positive for heartburn, nausea and vomiting. Negative for abdominal pain, constipation and diarrhea.   Genitourinary: Negative for dysuria, frequency and urgency.    Musculoskeletal: Negative for myalgias.   Neurological: Negative for dizziness, focal weakness, weakness and headaches.   Psychiatric/Behavioral: The patient is nervous/anxious.         Physical Exam  Temp:  [36.6 °C (97.8 °F)-36.9 °C (98.5 °F)] 36.6 °C (97.8 °F)  Pulse:  [62-98] 72  Resp:  [18-19] 18  BP: ()/(52-68) 95/61  SpO2:  [93 %-95 %] 93 %    Physical Exam  Vitals and nursing note reviewed.   Constitutional:       General: She is not in acute distress.     Appearance: She is not ill-appearing or toxic-appearing.   HENT:      Head: Normocephalic and atraumatic.      Nose: Nose normal.      Mouth/Throat:      Mouth: Mucous membranes are moist.      Pharynx: No posterior oropharyngeal erythema.      Comments: No petechial hemorrhages   Eyes:      General: No scleral icterus.        Right eye: No discharge.         Left eye: No discharge.      Conjunctiva/sclera: Conjunctivae normal.      Pupils: Pupils are equal, round, and reactive to light.   Cardiovascular:      Rate and Rhythm: Normal rate and regular rhythm.      Pulses: Normal pulses.      Heart sounds: Normal heart sounds. No murmur heard.    No gallop.   Pulmonary:      Effort: Pulmonary effort is normal.      Breath sounds: Normal breath sounds. No stridor. No wheezing, rhonchi or rales.   Abdominal:      General: Bowel sounds are normal.      Palpations: Abdomen is soft.   Musculoskeletal:         General: No swelling or tenderness.      Cervical back: Normal range of motion and neck supple. No rigidity.      Right lower leg: No edema.      Left lower leg: No edema.   Skin:     General: Skin is warm and dry.      Coloration: Skin is not jaundiced.      Findings: Bruising (right upper thigh; small ; resolving ) present.      Comments: No active bleeding    Neurological:      General: No focal deficit present.      Mental Status: She is alert and oriented to person, place, and time.   Psychiatric:         Mood and Affect: Mood normal.          Behavior: Behavior normal.         Thought Content: Thought content normal.         Judgment: Judgment normal.         Fluids    Intake/Output Summary (Last 24 hours) at 4/5/2022 1333  Last data filed at 4/5/2022 1015  Gross per 24 hour   Intake 240 ml   Output --   Net 240 ml       Laboratory  Recent Labs     04/03/22  0729 04/04/22  0202 04/05/22  0221   WBC 10.2 10.3 9.0   RBC 4.49 3.99* 3.84*   HEMOGLOBIN 13.6 12.0 11.7*   HEMATOCRIT 40.2 35.8* 35.0*   MCV 89.5 89.7 91.1   MCH 30.3 30.1 30.5   MCHC 33.8 33.5* 33.4*   RDW 38.6 38.7 39.7   PLATELETCT 272 248 245   MPV 10.8 10.6 10.5     Recent Labs     04/03/22  0729 04/04/22  0202 04/05/22  0221   SODIUM 138 137 137   POTASSIUM 3.8 3.7 3.7   CHLORIDE 101 102 104   CO2 24 25 24   GLUCOSE 99 91 90   BUN 11 11 11   CREATININE 0.76 0.74 0.75   CALCIUM 9.6 8.8 8.7     Recent Labs     04/05/22  1021   APTT 23.6*   INR 1.12               Imaging  IR-US GUIDED PIV   Final Result    Ultrasound-guided PERIPHERAL IV INSERTION performed by    qualified nursing staff as above.      CT-CTA CHEST PULMONARY ARTERY W/ RECONS   Final Result         1.  No pulmonary embolus appreciated.           Assessment/Plan  * Hematemesis with nausea- (present on admission)  Assessment & Plan  EGD revealed esophagitis. Thought to be exacerbated by lupus flare   Recent admission where she was evaluated by GI with an EGD, pulmonary with a bronchoscopy, ENT with a nasopharyngoscopy  Gastric biopsy negative for malignancy or infectious process   H. Pylori negative     Continue PPI and Carafate  PRN antiemetics   Advance diet as tolerated     Gastroesophageal reflux disease with esophagitis and hemorrhage- (present on admission)  Assessment & Plan  History of GERD with esophagitis seen on recent EGD.    Continue PPI & carafate   4/4 added fluticasone BID to treat esophagitis per Rheumatology- do not use spacer and do not rise mouth, try to keep steroid in esophagus, not in lungs. Has been able to  keep some doses of carafate down.    Lupus (HCC)- (present on admission)  Assessment & Plan  History of SLE on Plaquenil, says it controls her symptoms somewhat but she continues to have lupus flares.  Could not tolerate methotrexate in the past  4/4: Her case was discussed with Rheumatologist, Dr. De Los Santos , who recommended prednisone taper, continue hydroxychloroquine 400mg daily , and Benlysta 200mg SC daily to control Lupus flare as this was likely exacerbating esophagitis . He will follow as outpatient in 1-2 weeks   4/5: I sent Benlysta 200mg Q weekly to [facility] pharmacy and they are working on prior authorization. I discussed with our inpatient pharmacist, who confirmed that this cannot be ordered as an inpatient, since we do not carry it on formulary.        Type 1 diabetes mellitus without complication (HCC)- (present on admission)  Assessment & Plan  Stopped insulin pump until PO intake improved   Continue sliding scale insulin  Resume insulin pump upon discharge     Factor V Leiden (HCC)- (present on admission)  Assessment & Plan  History of factor V Leyden and SLE.  No history of DVT  SCD ordered  Chemoprophylaxis contraindicated in setting of hematemesis and hemoptysis   Outpatient follow-up as needed        VTE prophylaxis: SCDs/TEDs and pharmacologic prophylaxis contraindicated due to Active bleeding

## 2022-04-06 PROBLEM — Z79.4 TYPE 2 DIABETES MELLITUS WITH HYPERGLYCEMIA, WITH LONG-TERM CURRENT USE OF INSULIN (HCC): Status: ACTIVE | Noted: 2019-12-17

## 2022-04-06 PROBLEM — D63.8 ANEMIA OF CHRONIC DISEASE: Status: ACTIVE | Noted: 2022-04-06

## 2022-04-06 PROBLEM — E11.65 TYPE 2 DIABETES MELLITUS WITH HYPERGLYCEMIA, WITH LONG-TERM CURRENT USE OF INSULIN (HCC): Status: ACTIVE | Noted: 2019-12-17

## 2022-04-06 LAB
BASOPHILS # BLD AUTO: 0.5 % (ref 0–1.8)
BASOPHILS # BLD: 0.05 K/UL (ref 0–0.12)
DSDNA AB TITR SER CLIF: 6 IU (ref 0–24)
EOSINOPHIL # BLD AUTO: 0.12 K/UL (ref 0–0.51)
EOSINOPHIL NFR BLD: 1.3 % (ref 0–6.9)
ERYTHROCYTE [DISTWIDTH] IN BLOOD BY AUTOMATED COUNT: 41.6 FL (ref 35.9–50)
FERRITIN SERPL-MCNC: 76.9 NG/ML (ref 10–291)
GLUCOSE BLD STRIP.AUTO-MCNC: 52 MG/DL (ref 65–99)
GLUCOSE BLD STRIP.AUTO-MCNC: 61 MG/DL (ref 65–99)
GLUCOSE BLD STRIP.AUTO-MCNC: 74 MG/DL (ref 65–99)
GLUCOSE BLD STRIP.AUTO-MCNC: 84 MG/DL (ref 65–99)
HCT VFR BLD AUTO: 34.1 % (ref 37–47)
HGB BLD-MCNC: 11.2 G/DL (ref 12–16)
IGE SERPL-ACNC: 74 KU/L
IMM GRANULOCYTES # BLD AUTO: 0.09 K/UL (ref 0–0.11)
IMM GRANULOCYTES NFR BLD AUTO: 0.9 % (ref 0–0.9)
IRON SATN MFR SERPL: 33 % (ref 15–55)
IRON SERPL-MCNC: 74 UG/DL (ref 40–170)
LYMPHOCYTES # BLD AUTO: 3.04 K/UL (ref 1–4.8)
LYMPHOCYTES NFR BLD: 31.8 % (ref 22–41)
MCH RBC QN AUTO: 30.6 PG (ref 27–33)
MCHC RBC AUTO-ENTMCNC: 32.8 G/DL (ref 33.6–35)
MCV RBC AUTO: 93.2 FL (ref 81.4–97.8)
MONOCYTES # BLD AUTO: 0.95 K/UL (ref 0–0.85)
MONOCYTES NFR BLD AUTO: 9.9 % (ref 0–13.4)
NEUTROPHILS # BLD AUTO: 5.3 K/UL (ref 2–7.15)
NEUTROPHILS NFR BLD: 55.6 % (ref 44–72)
NRBC # BLD AUTO: 0 K/UL
NRBC BLD-RTO: 0 /100 WBC
PLATELET # BLD AUTO: 227 K/UL (ref 164–446)
PMV BLD AUTO: 10.4 FL (ref 9–12.9)
RBC # BLD AUTO: 3.66 M/UL (ref 4.2–5.4)
TIBC SERPL-MCNC: 221 UG/DL (ref 250–450)
UIBC SERPL-MCNC: 147 UG/DL (ref 110–370)
WBC # BLD AUTO: 9.6 K/UL (ref 4.8–10.8)

## 2022-04-06 PROCEDURE — 700111 HCHG RX REV CODE 636 W/ 250 OVERRIDE (IP)

## 2022-04-06 PROCEDURE — 770001 HCHG ROOM/CARE - MED/SURG/GYN PRIV*

## 2022-04-06 PROCEDURE — 700102 HCHG RX REV CODE 250 W/ 637 OVERRIDE(OP): Performed by: STUDENT IN AN ORGANIZED HEALTH CARE EDUCATION/TRAINING PROGRAM

## 2022-04-06 PROCEDURE — 700111 HCHG RX REV CODE 636 W/ 250 OVERRIDE (IP): Performed by: NURSE PRACTITIONER

## 2022-04-06 PROCEDURE — 700111 HCHG RX REV CODE 636 W/ 250 OVERRIDE (IP): Performed by: FAMILY MEDICINE

## 2022-04-06 PROCEDURE — 83540 ASSAY OF IRON: CPT

## 2022-04-06 PROCEDURE — 82728 ASSAY OF FERRITIN: CPT

## 2022-04-06 PROCEDURE — 36415 COLL VENOUS BLD VENIPUNCTURE: CPT

## 2022-04-06 PROCEDURE — 83550 IRON BINDING TEST: CPT

## 2022-04-06 PROCEDURE — 82962 GLUCOSE BLOOD TEST: CPT

## 2022-04-06 PROCEDURE — 700111 HCHG RX REV CODE 636 W/ 250 OVERRIDE (IP): Performed by: STUDENT IN AN ORGANIZED HEALTH CARE EDUCATION/TRAINING PROGRAM

## 2022-04-06 PROCEDURE — 700105 HCHG RX REV CODE 258: Performed by: FAMILY MEDICINE

## 2022-04-06 PROCEDURE — C9113 INJ PANTOPRAZOLE SODIUM, VIA: HCPCS | Performed by: STUDENT IN AN ORGANIZED HEALTH CARE EDUCATION/TRAINING PROGRAM

## 2022-04-06 PROCEDURE — 85025 COMPLETE CBC W/AUTO DIFF WBC: CPT

## 2022-04-06 PROCEDURE — A9270 NON-COVERED ITEM OR SERVICE: HCPCS | Performed by: STUDENT IN AN ORGANIZED HEALTH CARE EDUCATION/TRAINING PROGRAM

## 2022-04-06 PROCEDURE — A9270 NON-COVERED ITEM OR SERVICE: HCPCS | Performed by: FAMILY MEDICINE

## 2022-04-06 PROCEDURE — 700102 HCHG RX REV CODE 250 W/ 637 OVERRIDE(OP): Performed by: FAMILY MEDICINE

## 2022-04-06 PROCEDURE — 99232 SBSQ HOSP IP/OBS MODERATE 35: CPT | Performed by: NURSE PRACTITIONER

## 2022-04-06 RX ADMIN — PREDNISONE 60 MG: 50 TABLET ORAL at 06:14

## 2022-04-06 RX ADMIN — DULOXETINE HYDROCHLORIDE 60 MG: 30 CAPSULE, DELAYED RELEASE ORAL at 06:22

## 2022-04-06 RX ADMIN — FLUTICASONE PROPIONATE 220 MCG: 110 AEROSOL, METERED RESPIRATORY (INHALATION) at 06:18

## 2022-04-06 RX ADMIN — SUCRALFATE 1 G: 1 SUSPENSION ORAL at 12:13

## 2022-04-06 RX ADMIN — SCOPALAMINE 1 PATCH: 1 PATCH, EXTENDED RELEASE TRANSDERMAL at 12:06

## 2022-04-06 RX ADMIN — SUCRALFATE 1 G: 1 SUSPENSION ORAL at 06:22

## 2022-04-06 RX ADMIN — PANTOPRAZOLE SODIUM 40 MG: 40 INJECTION, POWDER, FOR SOLUTION INTRAVENOUS at 17:18

## 2022-04-06 RX ADMIN — ONDANSETRON 4 MG: 2 INJECTION INTRAMUSCULAR; INTRAVENOUS at 00:19

## 2022-04-06 RX ADMIN — PROMETHAZINE HYDROCHLORIDE 25 MG: 6.25 SOLUTION ORAL at 18:19

## 2022-04-06 RX ADMIN — ONDANSETRON 4 MG: 2 INJECTION INTRAMUSCULAR; INTRAVENOUS at 12:07

## 2022-04-06 RX ADMIN — SENNOSIDES AND DOCUSATE SODIUM 2 TABLET: 50; 8.6 TABLET ORAL at 17:18

## 2022-04-06 RX ADMIN — SUCRALFATE 1 G: 1 SUSPENSION ORAL at 17:30

## 2022-04-06 RX ADMIN — HYDROXYCHLOROQUINE SULFATE 400 MG: 200 TABLET ORAL at 06:18

## 2022-04-06 RX ADMIN — PANTOPRAZOLE SODIUM 40 MG: 40 INJECTION, POWDER, FOR SOLUTION INTRAVENOUS at 06:13

## 2022-04-06 RX ADMIN — ONDANSETRON 4 MG: 2 INJECTION INTRAMUSCULAR; INTRAVENOUS at 23:02

## 2022-04-06 RX ADMIN — ONDANSETRON 4 MG: 2 INJECTION INTRAMUSCULAR; INTRAVENOUS at 17:18

## 2022-04-06 RX ADMIN — PROMETHAZINE HYDROCHLORIDE 25 MG: 6.25 SOLUTION ORAL at 10:52

## 2022-04-06 RX ADMIN — ACETAMINOPHEN 650 MG: 325 TABLET, FILM COATED ORAL at 17:30

## 2022-04-06 RX ADMIN — ACETAMINOPHEN 650 MG: 325 TABLET, FILM COATED ORAL at 12:07

## 2022-04-06 RX ADMIN — SODIUM CHLORIDE: 9 INJECTION, SOLUTION INTRAVENOUS at 06:13

## 2022-04-06 RX ADMIN — FLUTICASONE PROPIONATE 220 MCG: 110 AEROSOL, METERED RESPIRATORY (INHALATION) at 20:35

## 2022-04-06 RX ADMIN — SENNOSIDES AND DOCUSATE SODIUM 2 TABLET: 50; 8.6 TABLET ORAL at 06:14

## 2022-04-06 RX ADMIN — ONDANSETRON 4 MG: 2 INJECTION INTRAMUSCULAR; INTRAVENOUS at 06:13

## 2022-04-06 ASSESSMENT — ENCOUNTER SYMPTOMS
FEVER: 0
DIARRHEA: 0
HEMOPTYSIS: 1
HEARTBURN: 1
HEADACHES: 0
FOCAL WEAKNESS: 0
COUGH: 0
SPUTUM PRODUCTION: 0
WEAKNESS: 0
CONSTIPATION: 1
DOUBLE VISION: 0
VOMITING: 1
DIZZINESS: 0
SHORTNESS OF BREATH: 0
PALPITATIONS: 0
WHEEZING: 0
MYALGIAS: 0
BLURRED VISION: 0
NERVOUS/ANXIOUS: 0
CHILLS: 0
SINUS PAIN: 0
NAUSEA: 1
SORE THROAT: 0
ABDOMINAL PAIN: 0

## 2022-04-06 ASSESSMENT — PAIN DESCRIPTION - PAIN TYPE
TYPE: ACUTE PAIN

## 2022-04-06 NOTE — CARE PLAN
The patient is Watcher - Medium risk of patient condition declining or worsening    Shift Goals  Clinical Goals: Monitor blood glucose  Patient Goals: nausea management  Family Goals: N/A    Progress made toward(s) clinical / shift goals:      Patient is not progressing towards the following goals:    Patient still coughing blood and vomiting, anti-emetics given      Problem: Gastrointestinal Irritability  Goal: Nausea and vomiting will be absent or improve  Outcome: Not Progressing

## 2022-04-06 NOTE — CARE PLAN
The patient is Stable - Low risk of patient condition declining or worsening    Shift Goals  Clinical Goals: nausea control, monitor hematemesis  Patient Goals: nausea management  Family Goals: N/A    Progress made toward(s) clinical / shift goals:  pt pain medicated per MAR, pt nausea medicated per MAR, pt call light and belongings within reach, bed locked and in lowest position.      Problem: Knowledge Deficit - Standard  Goal: Patient and family/care givers will demonstrate understanding of plan of care, disease process/condition, diagnostic tests and medications  Outcome: Progressing     Problem: Pain - Standard  Goal: Alleviation of pain or a reduction in pain to the patient’s comfort goal  Outcome: Progressing     Problem: Gastrointestinal Irritability  Goal: Nausea and vomiting will be absent or improve  Outcome: Progressing

## 2022-04-06 NOTE — PROGRESS NOTES
Bedside report received.  Assessment complete.  A&O x 4. Patient calls appropriately.  Patient up with no assist.   Patient says pain is ok right  now  Finger stick checked, GLU at 74, recheck in 1 hour. Pt states her insulin pump is off.  + void, + flatus, 3/29 BM, + bowel sounds, refusing bowel protocol.  Patient denies SOB.  Pt wants to shower.  Review plan with of care with patient. Call light and personal belongings with in reach. Hourly rounding in place. All needs met at this time.

## 2022-04-06 NOTE — PROGRESS NOTES
[facility] Daily Progress Note    Date of Service  4/6/2022    Chief Complaint  Malena Bennett is a 21 y.o. female admitted 3/31/2022 with cough, N/V & hematemesis     Hospital Course  A 21 y.o. female who presented 3/31/2022 with hematemesis.  PMH of DM1 with insulin pump, SLE on Plaquenil, factor V Leyden deficiency, anxiety, GERD, erosive gastritis, celiac disease. Hematemesis described as bright red blood mixed with clots, occasional coffee ground appearance, has pictures on her phone which were reviewed. Admitted from 3/21-3/28 for new onset hemoptysis, going on for about 2 weeks now. Symptoms were thought to be due to lupus flare, she was discharged on prednisone taper.  She does have some epigastric pain and says she can occasionally feel it coming on prior to vomiting the blood.  Denies fever/chills, epistaxis, melena.     During last admission she was evaluated by GI and had an EGD that showed possible small varices and esophagitis, biopsy showed inflammation and evidence of reactive changes, small eosinophilia.  Pulmonary consulted, CTA chest last admission was negative for acute changes.  She had bronchoscopy with no return of blood on BAL.  Otolaryngology was consulted, bedside procedure performed of the nasopharynx which showed no evidence of bleeding.     History of SLE on Plaquenil for the past couple years, says it improved her symptoms slightly but she still has lupus flares described as Maller rash, oral ulcers, joint pain, fatigue.  She tried methotrexate but could not tolerate it, so she was not offered DMARDs by her rheumatologist.     In the ED hemodynamically stable, BP 90s-low 100s which is baseline.  Labs unremarkable.  CTA chest done in the ED negative for acute changes.       Interval Problem Update  4/2 Patient was seen and examined at bedside. 4 family members in the room. Pt still c/o N/V & Hemoptysis.   Hypoglycemic and hypokalemic this morning.  S/p Dextorse. BG  better.   IV K - replete  Phenergan for Nausea    Discussed with GI.Patient does have severe esophagitis on previous EGD.  Commended PPI and Carafate.  ? Gastroparesis - IV Reglan    Also discussed with previous hospitalist who took care of her last week (He did discuss with Pulm, ENT & Rheumatology at that time).   IV PPI   IV Steroid   IV Reglan for possible gastroparesis-patient refused  Reviewed immunology panel .   Nutrition consulted   H & H daily     4/3 afebrile, vitals stable, on room air.  Labs largely unremarkable. Refusing IV reglan d/t inability to tolerate, stopped.  Long discussion with patient and family at bedside.  Agreed to try sucralfate again after nausea medicine administered after we encouraged her her that it is the most effective medication for her esophagitis.  Added Phenergan and scopolamine patch to help with nausea.  Will schedule nausea medication once patient finds the 1 that works best for her.  Will attempt to get a hold of rheumatology tomorrow.  Patient is hopeful they will agree to prescribe some type of immunosuppressive therapy for lupus.  She is greatly concerned that she is having a lupus flareup.  She continues to have nausea and hematemesis.    4/4 afebrile, vitals stable, on room air.  Labs continue to be largely unremarkable.  No leukopenia, anemia or thrombocytopenia. CRP negative, sed rate negative.  Kidney function normal.  No hematuria. C3/C4 not elevated. There is slight proteinuria, which is the only clinical symptom that could indicate lupus flare.  Sugars controlled.   Patient is hopeful for some inpatient immunosuppressant treatment. Her nausea is overall improved today, but still having hemoptysis.  Called [facility], Dr De Los Santos recommended disintegrating steroid tablet (pharmacy does not carry) or fluticasone inhaler to treat her eosinophilic esophagitis.  He does recommend stopping systemic steroids and does not feel that she is in a lupus flare up,  would not recommend immunosuppressants. Patient agreeable to try inhaler, but insistent that she needs immunosuppressants.  She requested to talk to Dr De Los Santos herself,  I sent him a message and he said he would review her chart again and try to call after clinic. Patient has been updated.    4/5: Persistent hemoptysis , but says hematemesis is decreasing. PO intake slowly improving. H/H stable. SBPs soft, but asymptomatic and at her baseline mentation. Doing well on room air. 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 I discussed case with Oncology on-call, who said patient was seen by Dr. Montesinos in 12/2019 and hemophiliac and coagulopathic work-up was negative. APTT is not elevated. INR normal. PLT normal. Thus, no suspicion for underlying hematologic process. Continue with lupus treatment.     4/6: Patient reports nausea and hematemesis x 2 overnight. She says the volume of emesis is slightly increased today from previous days. Also hypoglycemic this morning and received dextrose with BG improvement. Soft blood pressures but appears euvolemic on exam. Afebrile. on room air. Ambulating room independently without issue. H/H stable. Iron studies normal.     I have personally seen and examined the patient at bedside. I discussed the plan of care with patient, family, bedside RN, charge RN,  and pharmacy.    Consultants/Specialty  GI   Rheumatology - spoke with Dr De Los Santos with [facility]      Code Status  Full Code    Disposition  Patient is not medically cleared for discharge.   Anticipate discharge to to home with close outpatient follow-up.  I have placed the appropriate orders for post-discharge needs.    Review of Systems  Review of Systems   Constitutional: Positive for malaise/fatigue. Negative for chills and fever.   HENT: Negative for congestion, ear discharge, ear pain, sinus pain and sore throat.    Eyes: Negative for blurred  vision and double vision.   Respiratory: Positive for hemoptysis. Negative for cough, sputum production, shortness of breath and wheezing.    Cardiovascular: Negative for chest pain, palpitations and leg swelling.   Gastrointestinal: Positive for constipation, heartburn, nausea and vomiting. Negative for abdominal pain and diarrhea.   Genitourinary: Negative for dysuria, frequency and urgency.   Musculoskeletal: Negative for myalgias.   Neurological: Negative for dizziness, focal weakness, weakness and headaches.   Psychiatric/Behavioral: The patient is not nervous/anxious.         Physical Exam  Temp:  [36.2 °C (97.1 °F)-36.7 °C (98 °F)] 36.7 °C (98 °F)  Pulse:  [67-92] 67  Resp:  [18-20] 19  BP: ()/(52-69) 98/67  SpO2:  [95 %-98 %] 97 %    Physical Exam  Vitals and nursing note reviewed.   Constitutional:       General: She is not in acute distress.     Appearance: She is not ill-appearing or toxic-appearing.   HENT:      Head: Normocephalic and atraumatic.      Nose: Nose normal.      Mouth/Throat:      Mouth: Mucous membranes are moist.      Pharynx: No posterior oropharyngeal erythema.      Comments: No petechial hemorrhages   Eyes:      General: No scleral icterus.        Right eye: No discharge.         Left eye: No discharge.      Conjunctiva/sclera: Conjunctivae normal.      Pupils: Pupils are equal, round, and reactive to light.   Cardiovascular:      Rate and Rhythm: Normal rate and regular rhythm.      Pulses: Normal pulses.      Heart sounds: Normal heart sounds. No murmur heard.    No gallop.   Pulmonary:      Effort: Pulmonary effort is normal.      Breath sounds: Normal breath sounds. No stridor. No wheezing, rhonchi or rales.   Abdominal:      General: Bowel sounds are normal.      Palpations: Abdomen is soft.   Musculoskeletal:         General: No swelling or tenderness.      Cervical back: Normal range of motion and neck supple. No rigidity.      Right lower leg: No edema.      Left lower  leg: No edema.   Skin:     General: Skin is warm and dry.      Coloration: Skin is not jaundiced.      Findings: Bruising (right upper thigh; small ; resolving ) present.      Comments: No active bleeding    Neurological:      General: No focal deficit present.      Mental Status: She is alert and oriented to person, place, and time.   Psychiatric:         Mood and Affect: Mood normal.         Behavior: Behavior normal.         Thought Content: Thought content normal.         Judgment: Judgment normal.         Fluids    Intake/Output Summary (Last 24 hours) at 4/6/2022 0956  Last data filed at 4/6/2022 0712  Gross per 24 hour   Intake 840 ml   Output 50 ml   Net 790 ml       Laboratory  Recent Labs     04/04/22  0202 04/05/22  0221 04/06/22  0248   WBC 10.3 9.0 9.6   RBC 3.99* 3.84* 3.66*   HEMOGLOBIN 12.0 11.7* 11.2*   HEMATOCRIT 35.8* 35.0* 34.1*   MCV 89.7 91.1 93.2   MCH 30.1 30.5 30.6   MCHC 33.5* 33.4* 32.8*   RDW 38.7 39.7 41.6   PLATELETCT 248 245 227   MPV 10.6 10.5 10.4     Recent Labs     04/04/22  0202 04/05/22  0221   SODIUM 137 137   POTASSIUM 3.7 3.7   CHLORIDE 102 104   CO2 25 24   GLUCOSE 91 90   BUN 11 11   CREATININE 0.74 0.75   CALCIUM 8.8 8.7     Recent Labs     04/05/22  1021   APTT 23.6*   INR 1.12               Imaging  IR-US GUIDED PIV   Final Result    Ultrasound-guided PERIPHERAL IV INSERTION performed by    qualified nursing staff as above.      CT-CTA CHEST PULMONARY ARTERY W/ RECONS   Final Result         1.  No pulmonary embolus appreciated.           Assessment/Plan  * Hematemesis with nausea- (present on admission)  Assessment & Plan  EGD revealed esophagitis. Thought to be exacerbated by lupus flare   Recent admission where she was evaluated by GI with an EGD, pulmonary with a bronchoscopy, ENT with a nasopharyngoscopy  Gastric biopsy negative for malignancy or infectious process   H. Pylori negative     Continue PPI and Carafate  PRN antiemetics   Advance diet as tolerated      Gastroesophageal reflux disease with esophagitis and hemorrhage- (present on admission)  Assessment & Plan  History of GERD with esophagitis seen on recent EGD.    Continue PPI & carafate   4/4 added fluticasone BID to treat esophagitis per Rheumatology- do not use spacer and do not rise mouth, try to keep steroid in esophagus, not in lungs. Has been able to keep some doses of carafate down.    Lupus (HCC)- (present on admission)  Assessment & Plan  History of SLE on Plaquenil, says it controls her symptoms somewhat but she continues to have lupus flares.  Could not tolerate methotrexate in the past  4/4: Her case was discussed with Rheumatologist, Dr. De Los Santos , who recommended prednisone taper, continue hydroxychloroquine 400mg daily , and Benlysta 200mg SC daily to control Lupus flare as this was likely exacerbating esophagitis . He will follow as outpatient in 1-2 weeks   4/5: I sent Benlysta 200mg Q weekly to [facility] pharmacy and they are working on prior authorization. I discussed with our inpatient pharmacist, who confirmed that this cannot be ordered as an inpatient, since we do not carry it on formulary.  4/6: Patient says Benlysta was approved and should be available today. I instructed her to give this medication to the RN so that our pharmacy can add it to her MAR.        Type 2 diabetes mellitus with hyperglycemia, with long-term current use of insulin (HCC)- (present on admission)  Assessment & Plan  Initially reported T1DM, but per documentation from Endocrinologist and PCP between 4212-9005 patient is actually Type 2   Currently holding insulin pump   Diabetic diet and blood sugar monitoring     4/6: BG 52 this morning . Patient says she has been counting her carbs and covering herself with 1 unit insulin per 15 carbs , which she says was previously agreed upon with last Hospitalist .She said she gave herself 2 units insulin before bed last night, but then vomited the little food she did eat. I  instructed her to communicate her blood sugar and insulin dosage with bedside RN prior to administration to ensure we are monitoring and covering BG appropriately.         Factor V Leiden (HCC)- (present on admission)  Assessment & Plan  History of factor V Leyden and SLE.  No history of DVT  SCD ordered  Chemoprophylaxis contraindicated in setting of hematemesis and hemoptysis   Outpatient follow-up as needed     Anemia of chronic disease  Assessment & Plan  H/H stable   Iron studies normal. More c/w anemia of chronic disease   Follow daily CBC   Transfuse for Hgb < 7        VTE prophylaxis: pharmacologic prophylaxis contraindicated due to Active bleeding

## 2022-04-06 NOTE — PROGRESS NOTES
Hypoglycemia Intervention    Hypoglycemia protocol intervention:  Blood glucose 52 at 0628.  Intervention: 4 oz of fruit juice   Repeat blood glucose 61 at 0647.  Intervention: 4 oz of fruit juice   Additional interventions needed: waiting for IV dextrose from pharmacy. Pharmacy has been notified.

## 2022-04-06 NOTE — PROGRESS NOTES
11:10 Blood glucose 82 per patient.     13:56 Blood glucose 106  per patient.    18:20 Blood glucose 107 per patient.

## 2022-04-06 NOTE — PROGRESS NOTES
Received report from previous shift RN  Assessment complete.  A&O x 4. Patient calls appropriately.  Patient is upself. Bed alarm off.   Patient has 2/10 pain. Pain managed with prescribed medications.  Denies N&V. Tolerating reg diet.  + void, - flatus, - BM last 3/29.  Patient denies SOB.  SCD's off.    Review plan of care with patient. Call light and personal belongings with in reach. Hourly rounding in place. All needs met at this time.

## 2022-04-07 ENCOUNTER — PHARMACY VISIT (OUTPATIENT)
Dept: PHARMACY | Facility: MEDICAL CENTER | Age: 22
End: 2022-04-07
Payer: COMMERCIAL

## 2022-04-07 LAB
ERYTHROCYTE [DISTWIDTH] IN BLOOD BY AUTOMATED COUNT: 40 FL (ref 35.9–50)
GLUCOSE BLD STRIP.AUTO-MCNC: 102 MG/DL (ref 65–99)
GLUCOSE BLD STRIP.AUTO-MCNC: 86 MG/DL (ref 65–99)
HCT VFR BLD AUTO: 36.3 % (ref 37–47)
HGB BLD-MCNC: 12.2 G/DL (ref 12–16)
MCH RBC QN AUTO: 30.4 PG (ref 27–33)
MCHC RBC AUTO-ENTMCNC: 33.6 G/DL (ref 33.6–35)
MCV RBC AUTO: 90.5 FL (ref 81.4–97.8)
PLATELET # BLD AUTO: 240 K/UL (ref 164–446)
PMV BLD AUTO: 10.4 FL (ref 9–12.9)
RBC # BLD AUTO: 4.01 M/UL (ref 4.2–5.4)
WBC # BLD AUTO: 16 K/UL (ref 4.8–10.8)

## 2022-04-07 PROCEDURE — 700102 HCHG RX REV CODE 250 W/ 637 OVERRIDE(OP): Performed by: STUDENT IN AN ORGANIZED HEALTH CARE EDUCATION/TRAINING PROGRAM

## 2022-04-07 PROCEDURE — 700105 HCHG RX REV CODE 258: Performed by: NURSE PRACTITIONER

## 2022-04-07 PROCEDURE — C9113 INJ PANTOPRAZOLE SODIUM, VIA: HCPCS | Performed by: STUDENT IN AN ORGANIZED HEALTH CARE EDUCATION/TRAINING PROGRAM

## 2022-04-07 PROCEDURE — A9270 NON-COVERED ITEM OR SERVICE: HCPCS | Performed by: FAMILY MEDICINE

## 2022-04-07 PROCEDURE — 36415 COLL VENOUS BLD VENIPUNCTURE: CPT

## 2022-04-07 PROCEDURE — 700102 HCHG RX REV CODE 250 W/ 637 OVERRIDE(OP): Performed by: FAMILY MEDICINE

## 2022-04-07 PROCEDURE — 85027 COMPLETE CBC AUTOMATED: CPT

## 2022-04-07 PROCEDURE — 82962 GLUCOSE BLOOD TEST: CPT

## 2022-04-07 PROCEDURE — 99233 SBSQ HOSP IP/OBS HIGH 50: CPT | Performed by: NURSE PRACTITIONER

## 2022-04-07 PROCEDURE — 700111 HCHG RX REV CODE 636 W/ 250 OVERRIDE (IP): Performed by: FAMILY MEDICINE

## 2022-04-07 PROCEDURE — 700102 HCHG RX REV CODE 250 W/ 637 OVERRIDE(OP): Performed by: NURSE PRACTITIONER

## 2022-04-07 PROCEDURE — 700111 HCHG RX REV CODE 636 W/ 250 OVERRIDE (IP): Performed by: NURSE PRACTITIONER

## 2022-04-07 PROCEDURE — 700111 HCHG RX REV CODE 636 W/ 250 OVERRIDE (IP): Performed by: STUDENT IN AN ORGANIZED HEALTH CARE EDUCATION/TRAINING PROGRAM

## 2022-04-07 PROCEDURE — A9270 NON-COVERED ITEM OR SERVICE: HCPCS | Performed by: STUDENT IN AN ORGANIZED HEALTH CARE EDUCATION/TRAINING PROGRAM

## 2022-04-07 PROCEDURE — 770001 HCHG ROOM/CARE - MED/SURG/GYN PRIV*

## 2022-04-07 PROCEDURE — 700111 HCHG RX REV CODE 636 W/ 250 OVERRIDE (IP)

## 2022-04-07 RX ORDER — LORAZEPAM 2 MG/ML
0.5 INJECTION INTRAMUSCULAR EVERY 4 HOURS PRN
Status: DISCONTINUED | OUTPATIENT
Start: 2022-04-07 | End: 2022-04-13 | Stop reason: HOSPADM

## 2022-04-07 RX ORDER — DEXTROSE, SODIUM CHLORIDE, SODIUM LACTATE, POTASSIUM CHLORIDE, AND CALCIUM CHLORIDE 5; .6; .31; .03; .02 G/100ML; G/100ML; G/100ML; G/100ML; G/100ML
INJECTION, SOLUTION INTRAVENOUS CONTINUOUS
Status: DISCONTINUED | OUTPATIENT
Start: 2022-04-07 | End: 2022-04-12

## 2022-04-07 RX ORDER — PROCHLORPERAZINE EDISYLATE 5 MG/ML
5 INJECTION INTRAMUSCULAR; INTRAVENOUS EVERY 6 HOURS
Status: DISCONTINUED | OUTPATIENT
Start: 2022-04-07 | End: 2022-04-12

## 2022-04-07 RX ADMIN — PREDNISONE 60 MG: 50 TABLET ORAL at 05:32

## 2022-04-07 RX ADMIN — FLUTICASONE PROPIONATE 220 MCG: 110 AEROSOL, METERED RESPIRATORY (INHALATION) at 05:32

## 2022-04-07 RX ADMIN — ACETAMINOPHEN 650 MG: 325 TABLET, FILM COATED ORAL at 05:34

## 2022-04-07 RX ADMIN — PROCHLORPERAZINE EDISYLATE 5 MG: 5 INJECTION INTRAMUSCULAR; INTRAVENOUS at 12:22

## 2022-04-07 RX ADMIN — ONDANSETRON 4 MG: 2 INJECTION INTRAMUSCULAR; INTRAVENOUS at 12:22

## 2022-04-07 RX ADMIN — DULOXETINE HYDROCHLORIDE 60 MG: 30 CAPSULE, DELAYED RELEASE ORAL at 05:32

## 2022-04-07 RX ADMIN — HYDROXYCHLOROQUINE SULFATE 400 MG: 200 TABLET ORAL at 05:32

## 2022-04-07 RX ADMIN — SODIUM CHLORIDE, SODIUM LACTATE, POTASSIUM CHLORIDE, CALCIUM CHLORIDE AND DEXTROSE MONOHYDRATE: 5; 600; 310; 30; 20 INJECTION, SOLUTION INTRAVENOUS at 11:33

## 2022-04-07 RX ADMIN — LORAZEPAM 0.5 MG: 2 INJECTION INTRAMUSCULAR; INTRAVENOUS at 21:48

## 2022-04-07 RX ADMIN — SENNOSIDES AND DOCUSATE SODIUM 2 TABLET: 50; 8.6 TABLET ORAL at 05:32

## 2022-04-07 RX ADMIN — PROMETHAZINE HYDROCHLORIDE 25 MG: 6.25 SOLUTION ORAL at 02:16

## 2022-04-07 RX ADMIN — PANTOPRAZOLE SODIUM 40 MG: 40 INJECTION, POWDER, FOR SOLUTION INTRAVENOUS at 17:25

## 2022-04-07 RX ADMIN — ONDANSETRON 4 MG: 2 INJECTION INTRAMUSCULAR; INTRAVENOUS at 05:32

## 2022-04-07 RX ADMIN — PANTOPRAZOLE SODIUM 40 MG: 40 INJECTION, POWDER, FOR SOLUTION INTRAVENOUS at 05:31

## 2022-04-07 RX ADMIN — ONDANSETRON 4 MG: 2 INJECTION INTRAMUSCULAR; INTRAVENOUS at 17:25

## 2022-04-07 RX ADMIN — PROCHLORPERAZINE EDISYLATE 5 MG: 5 INJECTION INTRAMUSCULAR; INTRAVENOUS at 17:25

## 2022-04-07 RX ADMIN — SUCRALFATE 1 G: 1 SUSPENSION ORAL at 05:32

## 2022-04-07 RX ADMIN — BELIMUMAB 200 MG: 200 SOLUTION SUBCUTANEOUS at 17:26

## 2022-04-07 RX ADMIN — SODIUM CHLORIDE, SODIUM LACTATE, POTASSIUM CHLORIDE, CALCIUM CHLORIDE AND DEXTROSE MONOHYDRATE 1000 ML: 5; 600; 310; 30; 20 INJECTION, SOLUTION INTRAVENOUS at 21:50

## 2022-04-07 ASSESSMENT — ENCOUNTER SYMPTOMS
COUGH: 0
SORE THROAT: 0
FOCAL WEAKNESS: 0
CONSTIPATION: 1
SINUS PAIN: 0
NERVOUS/ANXIOUS: 0
PALPITATIONS: 0
HEARTBURN: 1
HEMOPTYSIS: 1
HEADACHES: 0
DIARRHEA: 0
NAUSEA: 1
FEVER: 0
WEAKNESS: 0
BLURRED VISION: 0
VOMITING: 1
SPUTUM PRODUCTION: 0
DIZZINESS: 0
ABDOMINAL PAIN: 0
MYALGIAS: 0
WHEEZING: 0
SHORTNESS OF BREATH: 0
CHILLS: 0
DOUBLE VISION: 0

## 2022-04-07 ASSESSMENT — PAIN DESCRIPTION - PAIN TYPE
TYPE: ACUTE PAIN
TYPE: ACUTE PAIN

## 2022-04-07 NOTE — CARE PLAN
Problem: Knowledge Deficit - Standard  Goal: Patient and family/care givers will demonstrate understanding of plan of care, disease process/condition, diagnostic tests and medications  Outcome: Progressing     Problem: Pain - Standard  Goal: Alleviation of pain or a reduction in pain to the patient’s comfort goal  Outcome: Progressing     Problem: Gastrointestinal Irritability  Goal: Nausea and vomiting will be absent or improve  Outcome: Progressing   The patient is Stable - Low risk of patient condition declining or worsening    Shift Goals  Clinical Goals: monitor blood glucose, emesis output, and nausea  Patient Goals: N/V control, immunosuppresent shot tomorrow  Family Goals: irena    Progress made toward(s) clinical / shift goals:  Pt educated on plan of care for tonight, pain managed, N/V medications in place    Patient is not progressing towards the following goals:N/A    Assumed care of patient at change of shift.   Report received at bedside rounding  Patient is calm, in bed, with no distress noted.  Call light and patient belongings are in reach, bed is locked and in lowest position- hourly rounding is in place. See flow sheets for further assessment.

## 2022-04-07 NOTE — DISCHARGE PLANNING
"  Date: 4/7/2022        Anticipated Discharge Disposition:  DC home with no needs      Action:  CM met with team for MDR.  Patient has no dc needs.         Barriers to Discharge: medical clearance       Plan:     CM to continue to follow for any needs.           Lyn Jnekins RN, BSN, CM  Case Management  \"[facility]\"  E-mail: [email or web address]  Phone: [phone]    "

## 2022-04-07 NOTE — PROGRESS NOTES
[facility] Medicine Daily Progress Note    Date of Service  4/7/2022    Chief Complaint  Malena Bennett is a 21 y.o. female admitted 3/31/2022 with cough, N/V & hematemesis     Hospital Course  A 21 y.o. female who presented 3/31/2022 with hematemesis.  PMH of DM1 with insulin pump, SLE on Plaquenil, factor V Leyden deficiency, anxiety, GERD, erosive gastritis, celiac disease. Hematemesis described as bright red blood mixed with clots, occasional coffee ground appearance, has pictures on her phone which were reviewed. Admitted from 3/21-3/28 for new onset hemoptysis, going on for about 2 weeks now. Symptoms were thought to be due to lupus flare, she was discharged on prednisone taper.  She does have some epigastric pain and says she can occasionally feel it coming on prior to vomiting the blood.  Denies fever/chills, epistaxis, melena.     During last admission she was evaluated by GI and had an EGD that showed possible small varices and esophagitis, biopsy showed inflammation and evidence of reactive changes, small eosinophilia.  Pulmonary consulted, CTA chest last admission was negative for acute changes.  She had bronchoscopy with no return of blood on BAL.  Otolaryngology was consulted, bedside procedure performed of the nasopharynx which showed no evidence of bleeding.     History of SLE on Plaquenil for the past couple years, says it improved her symptoms slightly but she still has lupus flares described as Maller rash, oral ulcers, joint pain, fatigue.  She tried methotrexate but could not tolerate it, so she was not offered DMARDs by her rheumatologist.     In the ED hemodynamically stable, BP 90s-low 100s which is baseline.  Labs unremarkable.  CTA chest done in the ED negative for acute changes.       Interval Problem Update  4/2 Patient was seen and examined at bedside. 4 family members in the room. Pt still c/o N/V & Hemoptysis.   Hypoglycemic and hypokalemic this morning.  S/p Dextorse. BG  better.   IV K - replete  Phenergan for Nausea    Discussed with GI.Patient does have severe esophagitis on previous EGD.  Commended PPI and Carafate.  ? Gastroparesis - IV Reglan    Also discussed with previous hospitalist who took care of her last week (He did discuss with Pulm, ENT & Rheumatology at that time).   IV PPI   IV Steroid   IV Reglan for possible gastroparesis-patient refused  Reviewed immunology panel .   Nutrition consulted   H & H daily     4/3 afebrile, vitals stable, on room air.  Labs largely unremarkable. Refusing IV reglan d/t inability to tolerate, stopped.  Long discussion with patient and family at bedside.  Agreed to try sucralfate again after nausea medicine administered after we encouraged her her that it is the most effective medication for her esophagitis.  Added Phenergan and scopolamine patch to help with nausea.  Will schedule nausea medication once patient finds the 1 that works best for her.  Will attempt to get a hold of rheumatology tomorrow.  Patient is hopeful they will agree to prescribe some type of immunosuppressive therapy for lupus.  She is greatly concerned that she is having a lupus flareup.  She continues to have nausea and hematemesis.    4/4 afebrile, vitals stable, on room air.  Labs continue to be largely unremarkable.  No leukopenia, anemia or thrombocytopenia. CRP negative, sed rate negative.  Kidney function normal.  No hematuria. C3/C4 not elevated. There is slight proteinuria, which is the only clinical symptom that could indicate lupus flare.  Sugars controlled.   Patient is hopeful for some inpatient immunosuppressant treatment. Her nausea is overall improved today, but still having hemoptysis.  Called [facility], Dr De Los Santos recommended disintegrating steroid tablet (pharmacy does not carry) or fluticasone inhaler to treat her eosinophilic esophagitis.  He does recommend stopping systemic steroids and does not feel that she is in a lupus flare up,  would not recommend immunosuppressants. Patient agreeable to try inhaler, but insistent that she needs immunosuppressants.  She requested to talk to Dr De Los Santos herself,  I sent him a message and he said he would review her chart again and try to call after clinic. Patient has been updated.    4/5: Persistent hemoptysis , but says hematemesis is decreasing. PO intake slowly improving. H/H stable. SBPs soft, but asymptomatic and at her baseline mentation. Doing well on room air. 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 I discussed case with Oncology on-call, who said patient was seen by Dr. Montesinos in 12/2019 and hemophiliac and coagulopathic work-up was negative. APTT is not elevated. INR normal. PLT normal. Thus, no suspicion for underlying hematologic process. Continue with lupus treatment.     4/6: Patient reports nausea and hematemesis x 2 overnight. She says the volume of emesis is slightly increased today from previous days. Also hypoglycemic this morning and received dextrose with BG improvement. Soft blood pressures but appears euvolemic on exam. Afebrile. on room air. Ambulating room independently without issue. H/H stable. Iron studies normal.      4/7: Ongoing hematemesis with increasing frequency per patient. Hemodynamically stable. H/H stable. WBC 16, which may be secondary to steroids and hypovolemia. afebrile. Since she has not been tolerating PO intake, I did recommend a trial of 48-72 hours strict NPO and monitoring for symptom improvement (this was previously recommended by GI, but patient declined at that time). I do not feel strongly about cortrak placement given her constant dry-heaving and concern for further esophogeal irritation, but can reconsider in the next 2-3 days. I also offered to re-consult GI, however patient declined since she would not want to undergo any further invasives procedures if recommended.     I have  personally seen and examined the patient at bedside. I discussed the plan of care with patient, family, bedside RN, charge RN,  and pharmacy.    Consultants/Specialty  GI   Rheumatology - spoke with Dr De Los Santos with [facility]      Code Status  Full Code    Disposition  Patient is not medically cleared for discharge.   Anticipate discharge to to home with close outpatient follow-up.  I have placed the appropriate orders for post-discharge needs.    Review of Systems  Review of Systems   Constitutional: Positive for malaise/fatigue. Negative for chills and fever.   HENT: Negative for congestion, ear discharge, ear pain, sinus pain and sore throat.    Eyes: Negative for blurred vision and double vision.   Respiratory: Positive for hemoptysis. Negative for cough, sputum production, shortness of breath and wheezing.    Cardiovascular: Negative for chest pain, palpitations and leg swelling.   Gastrointestinal: Positive for constipation, heartburn, nausea and vomiting. Negative for abdominal pain and diarrhea.   Genitourinary: Negative for dysuria, frequency and urgency.   Musculoskeletal: Negative for myalgias.   Neurological: Negative for dizziness, focal weakness, weakness and headaches.   Psychiatric/Behavioral: The patient is not nervous/anxious.         Physical Exam  Temp:  [36.6 °C (97.8 °F)-37.2 °C (98.9 °F)] 37.2 °C (98.9 °F)  Pulse:  [77-97] 88  Resp:  [17-19] 17  BP: ()/(54-71) 108/67  SpO2:  [94 %-96 %] 94 %    Physical Exam  Vitals and nursing note reviewed.   Constitutional:       General: She is not in acute distress.     Appearance: She is not ill-appearing or toxic-appearing.   HENT:      Head: Normocephalic and atraumatic.      Nose: Nose normal.      Mouth/Throat:      Mouth: Mucous membranes are moist.      Pharynx: No posterior oropharyngeal erythema.      Comments: No petechial hemorrhages   Eyes:      General: No scleral icterus.        Right eye: No discharge.         Left eye:  No discharge.      Conjunctiva/sclera: Conjunctivae normal.      Pupils: Pupils are equal, round, and reactive to light.   Cardiovascular:      Rate and Rhythm: Normal rate and regular rhythm.      Pulses: Normal pulses.      Heart sounds: Normal heart sounds. No murmur heard.    No gallop.   Pulmonary:      Effort: Pulmonary effort is normal.      Breath sounds: Normal breath sounds. No stridor. No wheezing, rhonchi or rales.   Abdominal:      General: Bowel sounds are normal.      Palpations: Abdomen is soft.   Musculoskeletal:         General: No swelling or tenderness.      Cervical back: Normal range of motion and neck supple. No rigidity.      Right lower leg: No edema.      Left lower leg: No edema.   Skin:     General: Skin is warm and dry.      Coloration: Skin is not jaundiced.      Findings: Bruising (right upper thigh; small ; resolving ) present.      Comments: No active bleeding    Neurological:      General: No focal deficit present.      Mental Status: She is alert and oriented to person, place, and time.   Psychiatric:         Mood and Affect: Mood normal.         Behavior: Behavior normal.         Thought Content: Thought content normal.         Judgment: Judgment normal.         Fluids    Intake/Output Summary (Last 24 hours) at 4/7/2022 1027  Last data filed at 4/6/2022 1546  Gross per 24 hour   Intake 240 ml   Output 0 ml   Net 240 ml       Laboratory  Recent Labs     04/05/22  0221 04/06/22  0248 04/07/22  0157   WBC 9.0 9.6 16.0*   RBC 3.84* 3.66* 4.01*   HEMOGLOBIN 11.7* 11.2* 12.2   HEMATOCRIT 35.0* 34.1* 36.3*   MCV 91.1 93.2 90.5   MCH 30.5 30.6 30.4   MCHC 33.4* 32.8* 33.6   RDW 39.7 41.6 40.0   PLATELETCT 245 227 240   MPV 10.5 10.4 10.4     Recent Labs     04/05/22  0221   SODIUM 137   POTASSIUM 3.7   CHLORIDE 104   CO2 24   GLUCOSE 90   BUN 11   CREATININE 0.75   CALCIUM 8.7     Recent Labs     04/05/22  1021   APTT 23.6*   INR 1.12               Imaging  IR-US GUIDED PIV   Final  Result    Ultrasound-guided PERIPHERAL IV INSERTION performed by    qualified nursing staff as above.      CT-CTA CHEST PULMONARY ARTERY W/ RECONS   Final Result         1.  No pulmonary embolus appreciated.           Assessment/Plan  * Hematemesis with nausea- (present on admission)  Assessment & Plan  EGD revealed esophagitis. Thought to be exacerbated by lupus flare   Recent admission where she was evaluated by GI with an EGD, pulmonary with a bronchoscopy, ENT with a nasopharyngoscopy  Gastric biopsy negative for malignancy or infectious process   H. Pylori negative     4/7: ongoing and worsening. H/H stable. Soft blood pressures,but asymptomatic. Recommended strict NPO to allow time for esophagitis to heal, but patient and family would allow some time to think this over. In the meantime, will restart IVFs     Gastroesophageal reflux disease with esophagitis and hemorrhage- (present on admission)  Assessment & Plan  History of GERD with esophagitis seen on recent EGD.    Continue PPI & carafate   4/4 added fluticasone BID to treat esophagitis per Rheumatology- do not use spacer and do not rise mouth, try to keep steroid in esophagus, not in lungs. Has been able to keep some doses of carafate down.    Lupus (HCC)- (present on admission)  Assessment & Plan  History of SLE on Plaquenil, says it controls her symptoms somewhat but she continues to have lupus flares.  Could not tolerate methotrexate in the past  4/4: Her case was discussed with Rheumatologist, Dr. De Los Santos , who recommended prednisone taper, continue hydroxychloroquine 400mg daily , and Benlysta 200mg SC daily to control Lupus flare as this was likely exacerbating esophagitis . He will follow as outpatient in 1-2 weeks   4/5: I sent Benlysta 200mg Q weekly to [facility] pharmacy and they are working on prior authorization. I discussed with our inpatient pharmacist, who confirmed that this cannot be ordered as an inpatient, since we do not carry it on  formulary.  4/6: Patient says Benlysta was approved and should be available today. I instructed her to give this medication to the RN so that our pharmacy can add it to her MAR.        Type 2 diabetes mellitus with hyperglycemia, with long-term current use of insulin (HCC)- (present on admission)  Assessment & Plan  Initially reported T1DM, but per documentation from Endocrinologist and PCP between 2019-0733 patient is actually Type 2   Currently holding insulin pump   Diabetic diet and blood sugar monitoring     4/6: BG 52 this morning . Patient says she has been counting her carbs and covering herself with 1 unit insulin per 15 carbs , which she says was previously agreed upon with last Hospitalist .She said she gave herself 2 units insulin before bed last night, but then vomited the little food she did eat. I instructed her to communicate her blood sugar and insulin dosage with bedside RN prior to administration to ensure we are monitoring and covering BG appropriately.   4/7: euglycemic. Continue to monitor         Factor V Leiden (HCC)- (present on admission)  Assessment & Plan  History of factor V Leyden and SLE.  No history of DVT  SCD ordered  Chemoprophylaxis contraindicated in setting of hematemesis and hemoptysis   Outpatient follow-up as needed     Anemia of chronic disease  Assessment & Plan  H/H stable   Iron studies normal. More c/w anemia of chronic disease   Follow daily CBC   Transfuse for Hgb < 7        VTE prophylaxis: pharmacologic prophylaxis contraindicated due to Active bleeding

## 2022-04-08 LAB
ERYTHROCYTE [DISTWIDTH] IN BLOOD BY AUTOMATED COUNT: 42.5 FL (ref 35.9–50)
GLUCOSE BLD STRIP.AUTO-MCNC: 121 MG/DL (ref 65–99)
GLUCOSE BLD STRIP.AUTO-MCNC: 92 MG/DL (ref 65–99)
GLUCOSE BLD STRIP.AUTO-MCNC: 95 MG/DL (ref 65–99)
HCT VFR BLD AUTO: 37.1 % (ref 37–47)
HGB BLD-MCNC: 11.9 G/DL (ref 12–16)
MCH RBC QN AUTO: 30.2 PG (ref 27–33)
MCHC RBC AUTO-ENTMCNC: 32.1 G/DL (ref 33.6–35)
MCV RBC AUTO: 94.2 FL (ref 81.4–97.8)
PLATELET # BLD AUTO: 193 K/UL (ref 164–446)
PMV BLD AUTO: 10.3 FL (ref 9–12.9)
RBC # BLD AUTO: 3.94 M/UL (ref 4.2–5.4)
WBC # BLD AUTO: 9.5 K/UL (ref 4.8–10.8)

## 2022-04-08 PROCEDURE — 36415 COLL VENOUS BLD VENIPUNCTURE: CPT

## 2022-04-08 PROCEDURE — 770001 HCHG ROOM/CARE - MED/SURG/GYN PRIV*

## 2022-04-08 PROCEDURE — A9270 NON-COVERED ITEM OR SERVICE: HCPCS | Performed by: NURSE PRACTITIONER

## 2022-04-08 PROCEDURE — 99233 SBSQ HOSP IP/OBS HIGH 50: CPT | Performed by: NURSE PRACTITIONER

## 2022-04-08 PROCEDURE — 700111 HCHG RX REV CODE 636 W/ 250 OVERRIDE (IP)

## 2022-04-08 PROCEDURE — 700105 HCHG RX REV CODE 258: Performed by: NURSE PRACTITIONER

## 2022-04-08 PROCEDURE — 700102 HCHG RX REV CODE 250 W/ 637 OVERRIDE(OP): Performed by: NURSE PRACTITIONER

## 2022-04-08 PROCEDURE — 85027 COMPLETE CBC AUTOMATED: CPT

## 2022-04-08 PROCEDURE — 700111 HCHG RX REV CODE 636 W/ 250 OVERRIDE (IP): Performed by: NURSE PRACTITIONER

## 2022-04-08 PROCEDURE — 700111 HCHG RX REV CODE 636 W/ 250 OVERRIDE (IP): Performed by: STUDENT IN AN ORGANIZED HEALTH CARE EDUCATION/TRAINING PROGRAM

## 2022-04-08 PROCEDURE — 82962 GLUCOSE BLOOD TEST: CPT

## 2022-04-08 PROCEDURE — C9113 INJ PANTOPRAZOLE SODIUM, VIA: HCPCS | Performed by: STUDENT IN AN ORGANIZED HEALTH CARE EDUCATION/TRAINING PROGRAM

## 2022-04-08 RX ORDER — BENZONATATE 100 MG/1
100 CAPSULE ORAL 3 TIMES DAILY PRN
Status: DISCONTINUED | OUTPATIENT
Start: 2022-04-08 | End: 2022-04-08

## 2022-04-08 RX ORDER — METHYLPREDNISOLONE SODIUM SUCCINATE 125 MG/2ML
62.5 INJECTION, POWDER, LYOPHILIZED, FOR SOLUTION INTRAMUSCULAR; INTRAVENOUS ONCE
Status: COMPLETED | OUTPATIENT
Start: 2022-04-08 | End: 2022-04-08

## 2022-04-08 RX ORDER — DEXTROMETHORPHAN POLISTIREX 30 MG/5ML
60 SUSPENSION ORAL 2 TIMES DAILY
Status: DISCONTINUED | OUTPATIENT
Start: 2022-04-08 | End: 2022-04-13 | Stop reason: HOSPADM

## 2022-04-08 RX ADMIN — PROCHLORPERAZINE EDISYLATE 5 MG: 5 INJECTION INTRAMUSCULAR; INTRAVENOUS at 12:20

## 2022-04-08 RX ADMIN — PANTOPRAZOLE SODIUM 40 MG: 40 INJECTION, POWDER, FOR SOLUTION INTRAVENOUS at 05:43

## 2022-04-08 RX ADMIN — SODIUM CHLORIDE, SODIUM LACTATE, POTASSIUM CHLORIDE, CALCIUM CHLORIDE AND DEXTROSE MONOHYDRATE: 5; 600; 310; 30; 20 INJECTION, SOLUTION INTRAVENOUS at 08:36

## 2022-04-08 RX ADMIN — ONDANSETRON 4 MG: 2 INJECTION INTRAMUSCULAR; INTRAVENOUS at 12:20

## 2022-04-08 RX ADMIN — SODIUM CHLORIDE, SODIUM LACTATE, POTASSIUM CHLORIDE, CALCIUM CHLORIDE AND DEXTROSE MONOHYDRATE: 5; 600; 310; 30; 20 INJECTION, SOLUTION INTRAVENOUS at 21:01

## 2022-04-08 RX ADMIN — PANTOPRAZOLE SODIUM 40 MG: 40 INJECTION, POWDER, FOR SOLUTION INTRAVENOUS at 17:15

## 2022-04-08 RX ADMIN — DEXTROMETHORPHAN POLISTIREX 60 MG: 30 SUSPENSION, EXTENDED RELEASE ORAL at 18:29

## 2022-04-08 RX ADMIN — ONDANSETRON 4 MG: 2 INJECTION INTRAMUSCULAR; INTRAVENOUS at 05:42

## 2022-04-08 RX ADMIN — PROCHLORPERAZINE EDISYLATE 5 MG: 5 INJECTION INTRAMUSCULAR; INTRAVENOUS at 01:03

## 2022-04-08 RX ADMIN — PROCHLORPERAZINE EDISYLATE 5 MG: 5 INJECTION INTRAMUSCULAR; INTRAVENOUS at 05:43

## 2022-04-08 RX ADMIN — ONDANSETRON 4 MG: 2 INJECTION INTRAMUSCULAR; INTRAVENOUS at 17:15

## 2022-04-08 RX ADMIN — PROCHLORPERAZINE EDISYLATE 5 MG: 5 INJECTION INTRAMUSCULAR; INTRAVENOUS at 17:15

## 2022-04-08 RX ADMIN — METHYLPREDNISOLONE SODIUM SUCCINATE 62.5 MG: 125 INJECTION, POWDER, FOR SOLUTION INTRAMUSCULAR; INTRAVENOUS at 05:43

## 2022-04-08 RX ADMIN — ONDANSETRON 4 MG: 2 INJECTION INTRAMUSCULAR; INTRAVENOUS at 01:04

## 2022-04-08 RX ADMIN — BENZONATATE 100 MG: 100 CAPSULE ORAL at 12:19

## 2022-04-08 ASSESSMENT — ENCOUNTER SYMPTOMS
NERVOUS/ANXIOUS: 0
WEAKNESS: 1
DOUBLE VISION: 0
COUGH: 1
MYALGIAS: 0
BLURRED VISION: 0
CHILLS: 0
HEARTBURN: 1
SHORTNESS OF BREATH: 0
CONSTIPATION: 1
PALPITATIONS: 0
FEVER: 0
SINUS PAIN: 0
ABDOMINAL PAIN: 0
SPUTUM PRODUCTION: 0
DIARRHEA: 0
DIZZINESS: 1
WHEEZING: 0
NAUSEA: 1
HEMOPTYSIS: 1
VOMITING: 0
SORE THROAT: 0
HEADACHES: 1
FOCAL WEAKNESS: 0

## 2022-04-08 ASSESSMENT — PAIN DESCRIPTION - PAIN TYPE
TYPE: CHRONIC PAIN;ACUTE PAIN
TYPE: ACUTE PAIN
TYPE: CHRONIC PAIN;ACUTE PAIN
TYPE: ACUTE PAIN

## 2022-04-08 NOTE — CARE PLAN
The patient is Stable - Low risk of patient condition declining or worsening    Shift Goals  Clinical Goals: Emesis output, nausea control  Patient Goals: Nausea/vomiting control  Family Goals: Unknown    Progress made toward(s) clinical / shift goals:    Problem: Knowledge Deficit - Standard  Goal: Patient and family/care givers will demonstrate understanding of plan of care, disease process/condition, diagnostic tests and medications  Outcome: Progressing     Problem: Pain - Standard  Goal: Alleviation of pain or a reduction in pain to the patient’s comfort goal  Outcome: Progressing

## 2022-04-08 NOTE — PROGRESS NOTES
"Pt bathroom call light went off. Pt had requested for supplies for a shower. Pt is up independently and ambulates independently.    When RN went in, pt was sitting on the floor. Pt reports \"I sat down because I started to feel dizzy and my eyes got blurry.\" RN asked again if she fell or willingly sat down. Pt said, \"No I sat down so I wouldn't fall.\" RN and a second RN got pt standing and ambulated back to bed. Pt still reported feeling dizzy in bed.  RN performed pupil and neuro assessment. WDL. No deficits noted. Pt is still axox4, on room air. FSBS 92. Vitals stable.  /65   Pulse (!) 102   Temp 37.4 °C (99.3 °F) (Temporal)   Resp 20   Ht 1.702 m (5' 7\")   Wt 92.4 kg (203 lb 11.3 oz)   SpO2 96%   Pt is back, sitting up in bed. Charge RN notified of this incident. Also noted that when RN entered the bathroom, pt was on a call with a family member.  RN and charge RN reeducated pt to call staff now before getting OOB and if any further symptoms arise.  "

## 2022-04-08 NOTE — PROGRESS NOTES
Received report from previous shift RN  Assessment complete.  A&O x 4. Patient calls appropriately.  Patient ambulates independently w/ steady gait. Bed alarm off.   Patient has 4/10 pain. Pain managed with prescribed medications and rest.  Pt reports nausea and vomiting, emesis is bloody. Tolerating NPO diet.  + void, + flatus, - BM last 3/39.  Patient denies SOB.  SCD's off.    Review plan of care with patient. Call light and personal belongings with in reach. Hourly rounding in place. All needs met at this time.

## 2022-04-08 NOTE — PROGRESS NOTES
Bedside report received from previous shift RN and assumed full care of patient.  Assessments complete as per flowsheets.    Alert & Oriented x4.   Patient denies chest pain/shortness of breath.  Patient reports nil/10 pain.     Medication given per MAR.     Patient tolerating diet, currently denies nausea and vomiting.    Patient ambulating independently with steady gait.    All patient needs have been met at this time, call light within reach.  Reinforced falls program rationale with patient.   Verified bed is in low and locked position, non-skid socks in place.    Hourly rounding in place.

## 2022-04-08 NOTE — CARE PLAN
The patient is Watcher - Medium risk of patient condition declining or worsening    Shift Goals  Clinical Goals: nausea/ vomit control  Patient Goals: nausea/vomiting control  Family Goals: irena    Progress made toward(s) clinical / shift goals:  Pt has had one very small amount of hemoptysis so far today.     Patient is not progressing towards the following goals:

## 2022-04-08 NOTE — PROGRESS NOTES
Notified MD that pt is strict NPO due to esophagitis. Requested medication could be switched from oral to IV form.    0500: MD responded that an alternate medication in IV form was ordered.

## 2022-04-08 NOTE — PROGRESS NOTES
[facility] Medicine Daily Progress Note    Date of Service  4/8/2022    Chief Complaint  Malena Bennett is a 21 y.o. female admitted 3/31/2022 with cough, N/V & hematemesis     Hospital Course  A 21 y.o. female who presented 3/31/2022 with hematemesis.  PMH of DM1 with insulin pump, SLE on Plaquenil, factor V Leyden deficiency, anxiety, GERD, erosive gastritis, celiac disease. Hematemesis described as bright red blood mixed with clots, occasional coffee ground appearance, has pictures on her phone which were reviewed. Admitted from 3/21-3/28 for new onset hemoptysis, going on for about 2 weeks now. Symptoms were thought to be due to lupus flare, she was discharged on prednisone taper.  She does have some epigastric pain and says she can occasionally feel it coming on prior to vomiting the blood.  Denies fever/chills, epistaxis, melena.     During last admission she was evaluated by GI and had an EGD that showed possible small varices and esophagitis, biopsy showed inflammation and evidence of reactive changes, small eosinophilia.  Pulmonary consulted, CTA chest last admission was negative for acute changes.  She had bronchoscopy with no return of blood on BAL.  Otolaryngology was consulted, bedside procedure performed of the nasopharynx which showed no evidence of bleeding.     History of SLE on Plaquenil for the past couple years, says it improved her symptoms slightly but she still has lupus flares described as Maller rash, oral ulcers, joint pain, fatigue.  She tried methotrexate but could not tolerate it, so she was not offered DMARDs by her rheumatologist.     In the ED hemodynamically stable, BP 90s-low 100s which is baseline.  Labs unremarkable.  CTA chest done in the ED negative for acute changes.       Interval Problem Update  4/2 Patient was seen and examined at bedside. 4 family members in the room. Pt still c/o N/V & Hemoptysis.   Hypoglycemic and hypokalemic this morning.  S/p Dextorse. BG  better.   IV K - replete  Phenergan for Nausea    Discussed with GI.Patient does have severe esophagitis on previous EGD.  Commended PPI and Carafate.  ? Gastroparesis - IV Reglan    Also discussed with previous hospitalist who took care of her last week (He did discuss with Pulm, ENT & Rheumatology at that time).   IV PPI   IV Steroid   IV Reglan for possible gastroparesis-patient refused  Reviewed immunology panel .   Nutrition consulted   H & H daily     4/3 afebrile, vitals stable, on room air.  Labs largely unremarkable. Refusing IV reglan d/t inability to tolerate, stopped.  Long discussion with patient and family at bedside.  Agreed to try sucralfate again after nausea medicine administered after we encouraged her her that it is the most effective medication for her esophagitis.  Added Phenergan and scopolamine patch to help with nausea.  Will schedule nausea medication once patient finds the 1 that works best for her.  Will attempt to get a hold of rheumatology tomorrow.  Patient is hopeful they will agree to prescribe some type of immunosuppressive therapy for lupus.  She is greatly concerned that she is having a lupus flareup.  She continues to have nausea and hematemesis.    4/4 afebrile, vitals stable, on room air.  Labs continue to be largely unremarkable.  No leukopenia, anemia or thrombocytopenia. CRP negative, sed rate negative.  Kidney function normal.  No hematuria. C3/C4 not elevated. There is slight proteinuria, which is the only clinical symptom that could indicate lupus flare.  Sugars controlled.   Patient is hopeful for some inpatient immunosuppressant treatment. Her nausea is overall improved today, but still having hemoptysis.  Called [facility], Dr De Los Santos recommended disintegrating steroid tablet (pharmacy does not carry) or fluticasone inhaler to treat her eosinophilic esophagitis.  He does recommend stopping systemic steroids and does not feel that she is in a lupus flare up,  would not recommend immunosuppressants. Patient agreeable to try inhaler, but insistent that she needs immunosuppressants.  She requested to talk to Dr De Los Santos herself,  I sent him a message and he said he would review her chart again and try to call after clinic. Patient has been updated.    4/5: Persistent hemoptysis , but says hematemesis is decreasing. PO intake slowly improving. H/H stable. SBPs soft, but asymptomatic and at her baseline mentation. Doing well on room air. 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 I discussed case with Oncology on-call, who said patient was seen by Dr. Montesinos in 12/2019 and hemophiliac and coagulopathic work-up was negative. APTT is not elevated. INR normal. PLT normal. Thus, no suspicion for underlying hematologic process. Continue with lupus treatment.     4/6: Patient reports nausea and hematemesis x 2 overnight. She says the volume of emesis is slightly increased today from previous days. Also hypoglycemic this morning and received dextrose with BG improvement. Soft blood pressures but appears euvolemic on exam. Afebrile. on room air. Ambulating room independently without issue. H/H stable. Iron studies normal.      4/7: Ongoing hematemesis with increasing frequency per patient. Hemodynamically stable. H/H stable. WBC 16, which may be secondary to steroids and hypovolemia. afebrile. Since she has not been tolerating PO intake, I did recommend a trial of 48-72 hours strict NPO and monitoring for symptom improvement (this was previously recommended by GI, but patient declined at that time). I do not feel strongly about cortrak placement given her constant dry-heaving and concern for further esophogeal irritation, but can reconsider in the next 2-3 days. I also offered to re-consult GI, however patient declined since she would not want to undergo any further invasives procedures if recommended.     4/8: Patient  c/o dizziness with standing overnight. May be secondary to ativan and/or hypovolemia. Orthostatic VS requested.Patient denies any hematemesis x 24 hours, but did report coughing up clots. Added tessalon capsules. H/H stable. Leukocytosis resolved. Blood pressures improved. Continuing NPO for another 24 hours to see if hemoptysis improves .. Also denies BM x 7 days, but +flatus. No abdominal pain or distension.     I have personally seen and examined the patient at bedside. I discussed the plan of care with patient, family, bedside RN, charge RN,  and pharmacy.    Consultants/Specialty  GI   Rheumatology - spoke with Dr De Los Santos with [facility]      Code Status  Full Code    Disposition  Patient is not medically cleared for discharge.   Anticipate discharge to to home with close outpatient follow-up.  I have placed the appropriate orders for post-discharge needs.    Review of Systems  Review of Systems   Constitutional: Positive for malaise/fatigue. Negative for chills and fever.   HENT: Negative for congestion, ear discharge, ear pain, sinus pain and sore throat.    Eyes: Negative for blurred vision and double vision.   Respiratory: Positive for cough and hemoptysis. Negative for sputum production, shortness of breath and wheezing.    Cardiovascular: Negative for chest pain, palpitations and leg swelling.   Gastrointestinal: Positive for constipation, heartburn and nausea. Negative for abdominal pain, diarrhea and vomiting.   Genitourinary: Negative for dysuria, frequency and urgency.   Musculoskeletal: Negative for myalgias.   Neurological: Positive for dizziness, weakness and headaches. Negative for focal weakness.   Psychiatric/Behavioral: The patient is not nervous/anxious.         Physical Exam  Temp:  [36.6 °C (97.9 °F)-37.4 °C (99.3 °F)] 37.2 °C (98.9 °F)  Pulse:  [] 90  Resp:  [16-20] 18  BP: ()/(58-67) 97/58  SpO2:  [94 %-96 %] 94 %    Physical Exam  Vitals and nursing note  reviewed.   Constitutional:       General: She is not in acute distress.     Appearance: She is not ill-appearing or toxic-appearing.   HENT:      Head: Normocephalic and atraumatic.      Nose: Nose normal.      Mouth/Throat:      Mouth: Mucous membranes are moist.      Pharynx: No posterior oropharyngeal erythema.      Comments: No petechial hemorrhages   Eyes:      General: No scleral icterus.        Right eye: No discharge.         Left eye: No discharge.      Conjunctiva/sclera: Conjunctivae normal.      Pupils: Pupils are equal, round, and reactive to light.   Cardiovascular:      Rate and Rhythm: Normal rate and regular rhythm.      Pulses: Normal pulses.      Heart sounds: Normal heart sounds. No murmur heard.    No gallop.   Pulmonary:      Effort: Pulmonary effort is normal.      Breath sounds: Normal breath sounds. No stridor. No wheezing, rhonchi or rales.   Abdominal:      General: Bowel sounds are normal.      Palpations: Abdomen is soft.   Musculoskeletal:         General: No swelling or tenderness.      Cervical back: Normal range of motion and neck supple. No rigidity.      Right lower leg: No edema.      Left lower leg: No edema.   Skin:     General: Skin is warm and dry.      Coloration: Skin is not jaundiced.      Findings: Bruising (right upper thigh; small ; resolving ) present.      Comments: No active bleeding    Neurological:      General: No focal deficit present.      Mental Status: She is alert and oriented to person, place, and time.   Psychiatric:         Mood and Affect: Mood normal.         Behavior: Behavior normal.         Thought Content: Thought content normal.         Judgment: Judgment normal.         Fluids    Intake/Output Summary (Last 24 hours) at 4/8/2022 0858  Last data filed at 4/8/2022 0500  Gross per 24 hour   Intake 1745 ml   Output --   Net 1745 ml       Laboratory  Recent Labs     04/06/22  0248 04/07/22  0157 04/08/22  0431   WBC 9.6 16.0* 9.5   RBC 3.66* 4.01* 3.94*    HEMOGLOBIN 11.2* 12.2 11.9*   HEMATOCRIT 34.1* 36.3* 37.1   MCV 93.2 90.5 94.2   MCH 30.6 30.4 30.2   MCHC 32.8* 33.6 32.1*   RDW 41.6 40.0 42.5   PLATELETCT 227 240 193   MPV 10.4 10.4 10.3         Recent Labs     04/05/22  1021   APTT 23.6*   INR 1.12               Imaging  IR-US GUIDED PIV   Final Result    Ultrasound-guided PERIPHERAL IV INSERTION performed by    qualified nursing staff as above.      CT-CTA CHEST PULMONARY ARTERY W/ RECONS   Final Result         1.  No pulmonary embolus appreciated.           Assessment/Plan  * Hematemesis with nausea- (present on admission)  Assessment & Plan  EGD revealed esophagitis. Thought to be exacerbated by lupus flare   Recent admission where she was evaluated by GI with an EGD, pulmonary with a bronchoscopy, ENT with a nasopharyngoscopy  Gastric biopsy negative for malignancy or infectious process   H. Pylori negative     4/7: ongoing and worsening. H/H stable. Soft blood pressures,but asymptomatic. Recommended strict NPO to allow time for esophagitis to heal, but patient and family would allow some time to think this over. In the meantime, will restart IVFs   4/8: no hematemesis x 24hours, but still persistent hemoptysis. H/H stable.Nomotensive. Continue strict NPO and monitor for improvement    Gastroesophageal reflux disease with esophagitis and hemorrhage- (present on admission)  Assessment & Plan  History of GERD with esophagitis seen on recent EGD.    Continue IV PPI   Hold carafate while strict NPO       Lupus (HCC)- (present on admission)  Assessment & Plan  History of SLE on Plaquenil, says it controls her symptoms somewhat but she continues to have lupus flares.  Could not tolerate methotrexate in the past    4/4: Her case was discussed with Rheumatologist, Dr. De Los Santos , who recommended prednisone taper, continue hydroxychloroquine 400mg daily , and Benlysta 200mg SC daily to control Lupus flare as this was likely exacerbating esophagitis . He will follow  as outpatient in 1-2 weeks   4/5: I sent Benlysta 200mg Q weekly to [facility] pharmacy and they are working on prior authorization. I discussed with our inpatient pharmacist, who confirmed that this cannot be ordered as an inpatient, since we do not carry it on formulary.  4/6: Patient says Benlysta was approved and should be available today. I instructed her to give this medication to the RN so that our pharmacy can add it to her MAR.  4/8: Received Benlysta yesterday. Prednisone changed to IV solumedrol while NPO. Holding Plaquenil until able to tolerate PO         Type 2 diabetes mellitus with hyperglycemia, with long-term current use of insulin ([facility])- (present on admission)  Assessment & Plan  Initially reported T1DM, but per documentation from Endocrinologist and PCP between [id number] patient is actually Type 2   Currently holding insulin pump   Diabetic diet and blood sugar monitoring     4/6: BG 52 this morning . Patient says she has been counting her carbs and covering herself with 1 unit insulin per 15 carbs , which she says was previously agreed upon with last Hospitalist .She said she gave herself 2 units insulin before bed last night, but then vomited the little food she did eat. I instructed her to communicate her blood sugar and insulin dosage with bedside RN prior to administration to ensure we are monitoring and covering BG appropriately.   4/7: euglycemic. Continue to monitor   4/8: BG well controlled. Patient instructed NOT to inject personal insulin while NPO.         Factor V Leiden ([facility])- (present on admission)  Assessment & Plan  History of factor V Leyden and SLE.  No history of DVT  SCD ordered  Chemoprophylaxis contraindicated in setting of hematemesis and hemoptysis   Outpatient follow-up as needed     Anemia of chronic disease  Assessment & Plan  H/H stable   Iron studies normal. More c/w anemia of chronic disease   Follow daily CBC   Transfuse for Hgb < 7        VTE prophylaxis:  pharmacologic prophylaxis contraindicated due to Active bleeding

## 2022-04-08 NOTE — CARE PLAN
Problem: Knowledge Deficit - Standard  Goal: Patient and family/care givers will demonstrate understanding of plan of care, disease process/condition, diagnostic tests and medications  Outcome: Progressing   The patient is Watcher - Medium risk of patient condition declining or worsening    Shift Goals  Clinical Goals: NPO, emesis output, Blood sugar monitoring  Patient Goals: nausea/vomiting control  Family Goals: irena    Progress made toward(s) clinical / shift goals:  partially    Problem: Gastrointestinal Irritability  Goal: Nausea and vomiting will be absent or improve  Outcome: Not Progressing   Nausea still present; pt still coughing up bloody clots.

## 2022-04-09 LAB
ANION GAP SERPL CALC-SCNC: 11 MMOL/L (ref 7–16)
BUN SERPL-MCNC: 10 MG/DL (ref 8–22)
CALCIUM SERPL-MCNC: 8.6 MG/DL (ref 8.5–10.5)
CHLORIDE SERPL-SCNC: 103 MMOL/L (ref 96–112)
CO2 SERPL-SCNC: 23 MMOL/L (ref 20–33)
CREAT SERPL-MCNC: 0.71 MG/DL (ref 0.5–1.4)
ERYTHROCYTE [DISTWIDTH] IN BLOOD BY AUTOMATED COUNT: 40.6 FL (ref 35.9–50)
GFR SERPLBLD CREATININE-BSD FMLA CKD-EPI: 123 ML/MIN/1.73 M 2
GLUCOSE SERPL-MCNC: 111 MG/DL (ref 65–99)
HCT VFR BLD AUTO: 34.9 % (ref 37–47)
HGB BLD-MCNC: 11.7 G/DL (ref 12–16)
MCH RBC QN AUTO: 30.6 PG (ref 27–33)
MCHC RBC AUTO-ENTMCNC: 33.5 G/DL (ref 33.6–35)
MCV RBC AUTO: 91.4 FL (ref 81.4–97.8)
PLATELET # BLD AUTO: 172 K/UL (ref 164–446)
PMV BLD AUTO: 10 FL (ref 9–12.9)
POTASSIUM SERPL-SCNC: 3.3 MMOL/L (ref 3.6–5.5)
RBC # BLD AUTO: 3.82 M/UL (ref 4.2–5.4)
SODIUM SERPL-SCNC: 137 MMOL/L (ref 135–145)
WBC # BLD AUTO: 8.1 K/UL (ref 4.8–10.8)

## 2022-04-09 PROCEDURE — 36415 COLL VENOUS BLD VENIPUNCTURE: CPT

## 2022-04-09 PROCEDURE — 700111 HCHG RX REV CODE 636 W/ 250 OVERRIDE (IP): Performed by: STUDENT IN AN ORGANIZED HEALTH CARE EDUCATION/TRAINING PROGRAM

## 2022-04-09 PROCEDURE — 85027 COMPLETE CBC AUTOMATED: CPT

## 2022-04-09 PROCEDURE — 99233 SBSQ HOSP IP/OBS HIGH 50: CPT | Performed by: NURSE PRACTITIONER

## 2022-04-09 PROCEDURE — 700105 HCHG RX REV CODE 258: Performed by: NURSE PRACTITIONER

## 2022-04-09 PROCEDURE — 770001 HCHG ROOM/CARE - MED/SURG/GYN PRIV*

## 2022-04-09 PROCEDURE — 700102 HCHG RX REV CODE 250 W/ 637 OVERRIDE(OP): Performed by: NURSE PRACTITIONER

## 2022-04-09 PROCEDURE — A9270 NON-COVERED ITEM OR SERVICE: HCPCS | Performed by: NURSE PRACTITIONER

## 2022-04-09 PROCEDURE — C9113 INJ PANTOPRAZOLE SODIUM, VIA: HCPCS | Performed by: STUDENT IN AN ORGANIZED HEALTH CARE EDUCATION/TRAINING PROGRAM

## 2022-04-09 PROCEDURE — 80048 BASIC METABOLIC PNL TOTAL CA: CPT

## 2022-04-09 PROCEDURE — 700111 HCHG RX REV CODE 636 W/ 250 OVERRIDE (IP)

## 2022-04-09 PROCEDURE — 700111 HCHG RX REV CODE 636 W/ 250 OVERRIDE (IP): Performed by: NURSE PRACTITIONER

## 2022-04-09 RX ORDER — KETOROLAC TROMETHAMINE 30 MG/ML
30 INJECTION, SOLUTION INTRAMUSCULAR; INTRAVENOUS EVERY 6 HOURS PRN
Status: DISCONTINUED | OUTPATIENT
Start: 2022-04-09 | End: 2022-04-13 | Stop reason: HOSPADM

## 2022-04-09 RX ORDER — POTASSIUM CHLORIDE 7.45 MG/ML
10 INJECTION INTRAVENOUS
Status: COMPLETED | OUTPATIENT
Start: 2022-04-09 | End: 2022-04-09

## 2022-04-09 RX ORDER — METHYLPREDNISOLONE SODIUM SUCCINATE 40 MG/ML
40 INJECTION, POWDER, LYOPHILIZED, FOR SOLUTION INTRAMUSCULAR; INTRAVENOUS DAILY
Status: COMPLETED | OUTPATIENT
Start: 2022-04-09 | End: 2022-04-11

## 2022-04-09 RX ADMIN — PROCHLORPERAZINE EDISYLATE 5 MG: 5 INJECTION INTRAMUSCULAR; INTRAVENOUS at 12:16

## 2022-04-09 RX ADMIN — DEXTROMETHORPHAN POLISTIREX 60 MG: 30 SUSPENSION, EXTENDED RELEASE ORAL at 17:35

## 2022-04-09 RX ADMIN — SODIUM CHLORIDE, SODIUM LACTATE, POTASSIUM CHLORIDE, CALCIUM CHLORIDE AND DEXTROSE MONOHYDRATE: 5; 600; 310; 30; 20 INJECTION, SOLUTION INTRAVENOUS at 09:15

## 2022-04-09 RX ADMIN — ONDANSETRON 4 MG: 2 INJECTION INTRAMUSCULAR; INTRAVENOUS at 12:16

## 2022-04-09 RX ADMIN — METHYLPREDNISOLONE SODIUM SUCCINATE 40 MG: 40 INJECTION, POWDER, FOR SOLUTION INTRAMUSCULAR; INTRAVENOUS at 09:14

## 2022-04-09 RX ADMIN — ONDANSETRON 4 MG: 2 INJECTION INTRAMUSCULAR; INTRAVENOUS at 05:00

## 2022-04-09 RX ADMIN — POTASSIUM CHLORIDE 10 MEQ: 10 INJECTION, SOLUTION INTRAVENOUS at 12:14

## 2022-04-09 RX ADMIN — KETOROLAC TROMETHAMINE 30 MG: 30 INJECTION, SOLUTION INTRAMUSCULAR at 17:34

## 2022-04-09 RX ADMIN — PANTOPRAZOLE SODIUM 40 MG: 40 INJECTION, POWDER, FOR SOLUTION INTRAVENOUS at 17:34

## 2022-04-09 RX ADMIN — KETOROLAC TROMETHAMINE 30 MG: 30 INJECTION, SOLUTION INTRAMUSCULAR at 10:03

## 2022-04-09 RX ADMIN — POTASSIUM CHLORIDE 10 MEQ: 10 INJECTION, SOLUTION INTRAVENOUS at 14:02

## 2022-04-09 RX ADMIN — DEXTROMETHORPHAN POLISTIREX 60 MG: 30 SUSPENSION, EXTENDED RELEASE ORAL at 05:00

## 2022-04-09 RX ADMIN — PROCHLORPERAZINE EDISYLATE 5 MG: 5 INJECTION INTRAMUSCULAR; INTRAVENOUS at 17:35

## 2022-04-09 RX ADMIN — ONDANSETRON 4 MG: 2 INJECTION INTRAMUSCULAR; INTRAVENOUS at 17:39

## 2022-04-09 RX ADMIN — ONDANSETRON 4 MG: 2 INJECTION INTRAMUSCULAR; INTRAVENOUS at 00:17

## 2022-04-09 RX ADMIN — PANTOPRAZOLE SODIUM 40 MG: 40 INJECTION, POWDER, FOR SOLUTION INTRAVENOUS at 05:00

## 2022-04-09 RX ADMIN — PROCHLORPERAZINE EDISYLATE 5 MG: 5 INJECTION INTRAMUSCULAR; INTRAVENOUS at 00:18

## 2022-04-09 RX ADMIN — PROCHLORPERAZINE EDISYLATE 5 MG: 5 INJECTION INTRAMUSCULAR; INTRAVENOUS at 05:00

## 2022-04-09 RX ADMIN — SODIUM CHLORIDE, SODIUM LACTATE, POTASSIUM CHLORIDE, CALCIUM CHLORIDE AND DEXTROSE MONOHYDRATE: 5; 600; 310; 30; 20 INJECTION, SOLUTION INTRAVENOUS at 18:41

## 2022-04-09 ASSESSMENT — PAIN DESCRIPTION - PAIN TYPE
TYPE: CHRONIC PAIN
TYPE: ACUTE PAIN;CHRONIC PAIN
TYPE: ACUTE PAIN;CHRONIC PAIN
TYPE: CHRONIC PAIN
TYPE: ACUTE PAIN;CHRONIC PAIN
TYPE: CHRONIC PAIN
TYPE: ACUTE PAIN;CHRONIC PAIN
TYPE: CHRONIC PAIN

## 2022-04-09 ASSESSMENT — ENCOUNTER SYMPTOMS
FEVER: 0
CHILLS: 0
NAUSEA: 1
WEAKNESS: 1
HEADACHES: 1
SORE THROAT: 0
PALPITATIONS: 0
FOCAL WEAKNESS: 0
SHORTNESS OF BREATH: 0
DOUBLE VISION: 0
HEARTBURN: 1
VOMITING: 0
WHEEZING: 0
CONSTIPATION: 1
ABDOMINAL PAIN: 0
HEMOPTYSIS: 1
BLURRED VISION: 0
DIZZINESS: 0
NERVOUS/ANXIOUS: 0
SPUTUM PRODUCTION: 0
DIARRHEA: 0
SINUS PAIN: 0
COUGH: 1
MYALGIAS: 0

## 2022-04-09 NOTE — CARE PLAN
Problem: Knowledge Deficit - Standard  Goal: Patient and family/care givers will demonstrate understanding of plan of care, disease process/condition, diagnostic tests and medications  Outcome: Progressing  Note: Discussed POC with pt. Pt demonstrates and verbalizes understanding     Problem: Fluid Balance or Risk for Fluid Volume Deficit  Goal: Patient will demonstrate adequate hydration and vital signs  Outcome: Progressing  Note: IVF running to supplement for NPO status   The patient is Stable - Low risk of patient condition declining or worsening    Shift Goals  Clinical Goals: pain management, monitor hematemesis  Patient Goals: No N/V  Family Goals: N/A

## 2022-04-09 NOTE — PROGRESS NOTES
Hospital Medicine Daily Progress Note    Date of Service  4/9/2022    Chief Complaint  Malena Bennett is a 21 y.o. female admitted 3/31/2022 with cough, N/V & hematemesis     Hospital Course  A 21 y.o. female who presented 3/31/2022 with hematemesis.  PMH of DM1 with insulin pump, SLE on Plaquenil, factor V Leyden deficiency, anxiety, GERD, erosive gastritis, celiac disease. Hematemesis described as bright red blood mixed with clots, occasional coffee ground appearance, has pictures on her phone which were reviewed. Admitted from 3/21-3/28 for new onset hemoptysis, going on for about 2 weeks now. Symptoms were thought to be due to lupus flare, she was discharged on prednisone taper.  She does have some epigastric pain and says she can occasionally feel it coming on prior to vomiting the blood.  Denies fever/chills, epistaxis, melena.     During last admission she was evaluated by GI and had an EGD that showed possible small varices and esophagitis, biopsy showed inflammation and evidence of reactive changes, small eosinophilia.  Pulmonary consulted, CTA chest last admission was negative for acute changes.  She had bronchoscopy with no return of blood on BAL.  Otolaryngology was consulted, bedside procedure performed of the nasopharynx which showed no evidence of bleeding.     History of SLE on Plaquenil for the past couple years, says it improved her symptoms slightly but she still has lupus flares described as Maller rash, oral ulcers, joint pain, fatigue.  She tried methotrexate but could not tolerate it, so she was not offered DMARDs by her rheumatologist.     In the ED hemodynamically stable, BP 90s-low 100s which is baseline.  Labs unremarkable.  CTA chest done in the ED negative for acute changes.       Interval Problem Update  4/4 afebrile, vitals stable, on room air.  Labs continue to be largely unremarkable.  No leukopenia, anemia or thrombocytopenia. CRP negative, sed rate negative.  Kidney  function normal.  No hematuria. C3/C4 not elevated. There is slight proteinuria, which is the only clinical symptom that could indicate lupus flare.  Sugars controlled.   Patient is hopeful for some inpatient immunosuppressant treatment. Her nausea is overall improved today, but still having hemoptysis.  Called [facility], Dr De Los Santos recommended disintegrating steroid tablet (pharmacy does not carry) or fluticasone inhaler to treat her eosinophilic esophagitis.  He does recommend stopping systemic steroids and does not feel that she is in a lupus flare up, would not recommend immunosuppressants. Patient agreeable to try inhaler, but insistent that she needs immunosuppressants.  She requested to talk to Dr De Los Santos herself,  I sent him a message and he said he would review her chart again and try to call after clinic. Patient has been updated.    4/5: Persistent hemoptysis , but says hematemesis is decreasing. PO intake slowly improving. H/H stable. SBPs soft, but asymptomatic and at her baseline mentation. Doing well on room air. 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 I discussed case with Oncology on-call, who said patient was seen by Dr. Montesinos in 12/2019 and hemophiliac and coagulopathic work-up was negative. APTT is not elevated. INR normal. PLT normal. Thus, no suspicion for underlying hematologic process. Continue with lupus treatment.     4/6: Patient reports nausea and hematemesis x 2 overnight. She says the volume of emesis is slightly increased today from previous days. Also hypoglycemic this morning and received dextrose with BG improvement. Soft blood pressures but appears euvolemic on exam. Afebrile. on room air. Ambulating room independently without issue. H/H stable. Iron studies normal.      4/7: Ongoing hematemesis with increasing frequency per patient. Hemodynamically stable. H/H stable. WBC 16, which may be secondary to  steroids and hypovolemia. afebrile. Since she has not been tolerating PO intake, I did recommend a trial of 48-72 hours strict NPO and monitoring for symptom improvement (this was previously recommended by GI, but patient declined at that time). I do not feel strongly about cortrak placement given her constant dry-heaving and concern for further esophogeal irritation, but can reconsider in the next 2-3 days. I also offered to re-consult GI, however patient declined since she would not want to undergo any further invasives procedures if recommended.     4/8: Patient c/o dizziness with standing overnight. May be secondary to ativan and/or hypovolemia. Orthostatic VS requested.Patient denies any hematemesis x 24 hours, but did report coughing up clots. Added tessalon capsules. H/H stable. Leukocytosis resolved. Blood pressures improved. Continuing NPO for another 24 hours to see if hemoptysis improves .. Also denies BM x 7 days, but +flatus. No abdominal pain or distension.     4/9: No hematemesis x 48 hours. Reports hemoptysis x 2 overnight. Cough less frequent with addition of delsym. Acute onset RUE swelling, erythema and tenderness at sight of PIV. US ordered to r/o thrombus. WBC normal. Hgb 11.7 from 11.9. SBPs 88-95, which I suspect is secondary to sedating medications. Orthostatic VS were negative. I observed patient ambulate in room independently without strong and steady gait. Continuing strict NPO today and can consider of clears tomorrow.     I have personally seen and examined the patient at bedside. I discussed the plan of care with patient, family, bedside RN, charge RN,  and pharmacy.    Consultants/Specialty  GI   Rheumatology - spoke with Dr De Los Santos with [facility]      Code Status  Full Code    Disposition  Patient is not medically cleared for discharge.   Anticipate discharge to to home with close outpatient follow-up.  I have placed the appropriate orders for post-discharge  needs.    Review of Systems  Review of Systems   Constitutional: Positive for malaise/fatigue. Negative for chills and fever.   HENT: Negative for congestion, ear discharge, ear pain, sinus pain and sore throat.    Eyes: Negative for blurred vision and double vision.   Respiratory: Positive for cough and hemoptysis. Negative for sputum production, shortness of breath and wheezing.    Cardiovascular: Negative for chest pain, palpitations and leg swelling.   Gastrointestinal: Positive for constipation, heartburn and nausea. Negative for abdominal pain, diarrhea and vomiting.   Genitourinary: Negative for dysuria, frequency and urgency.   Musculoskeletal: Negative for myalgias.   Neurological: Positive for weakness and headaches. Negative for dizziness and focal weakness.   Psychiatric/Behavioral: The patient is not nervous/anxious.         Physical Exam  Temp:  [36.4 °C (97.5 °F)-37.6 °C (99.7 °F)] 37.6 °C (99.7 °F)  Pulse:  [] 98  Resp:  [18-20] 18  BP: ()/(21-68) 95/68  SpO2:  [92 %-96 %] 95 %    Physical Exam  Vitals and nursing note reviewed.   Constitutional:       General: She is not in acute distress.     Appearance: She is not ill-appearing or toxic-appearing.   HENT:      Head: Normocephalic.      Nose: Nose normal.      Mouth/Throat:      Mouth: Mucous membranes are moist.      Pharynx: No posterior oropharyngeal erythema.      Comments: No petechial hemorrhages   Eyes:      General: No scleral icterus.        Right eye: No discharge.         Left eye: No discharge.      Conjunctiva/sclera: Conjunctivae normal.   Cardiovascular:      Rate and Rhythm: Normal rate.      Heart sounds: No murmur heard.    No gallop.   Pulmonary:      Effort: Pulmonary effort is normal.      Breath sounds: No stridor. No wheezing, rhonchi or rales.   Abdominal:      General: Bowel sounds are normal. There is distension.      Palpations: Abdomen is soft.      Tenderness: There is no abdominal tenderness. There is no  guarding.   Musculoskeletal:      Right forearm: Swelling and tenderness present.      Cervical back: Normal range of motion. No rigidity.      Right lower leg: No edema.      Left lower leg: No edema.   Skin:     General: Skin is warm and dry.      Coloration: Skin is not jaundiced.      Findings: Bruising present.      Comments: No active bleeding    Neurological:      Mental Status: She is alert and oriented to person, place, and time.   Psychiatric:         Mood and Affect: Mood normal.         Thought Content: Thought content normal.         Judgment: Judgment normal.         Fluids    Intake/Output Summary (Last 24 hours) at 4/9/2022 1105  Last data filed at 4/9/2022 0915  Gross per 24 hour   Intake 1500 ml   Output 0 ml   Net 1500 ml       Laboratory  Recent Labs     04/07/22  0157 04/08/22  0431 04/09/22  1024   WBC 16.0* 9.5 8.1   RBC 4.01* 3.94* 3.82*   HEMOGLOBIN 12.2 11.9* 11.7*   HEMATOCRIT 36.3* 37.1 34.9*   MCV 90.5 94.2 91.4   MCH 30.4 30.2 30.6   MCHC 33.6 32.1* 33.5*   RDW 40.0 42.5 40.6   PLATELETCT 240 193 172   MPV 10.4 10.3 10.0     Recent Labs     04/09/22  1024   SODIUM 137   POTASSIUM 3.3*   CHLORIDE 103   CO2 23   GLUCOSE 111*   BUN 10   CREATININE 0.71   CALCIUM 8.6                   Imaging  IR-US GUIDED PIV   Final Result    Ultrasound-guided PERIPHERAL IV INSERTION performed by    qualified nursing staff as above.      CT-CTA CHEST PULMONARY ARTERY W/ RECONS   Final Result         1.  No pulmonary embolus appreciated.      US-EXTREMITY VENOUS UPPER UNILAT RIGHT    (Results Pending)        Assessment/Plan  * Hematemesis with nausea- (present on admission)  Assessment & Plan  EGD revealed esophagitis. Thought to be exacerbated by lupus flare   Recent admission where she was evaluated by GI with an EGD, pulmonary with a bronchoscopy, ENT with a nasopharyngoscopy  Gastric biopsy negative for malignancy or infectious process   H. Pylori negative     4/7: ongoing and worsening. H/H stable.  Soft blood pressures,but asymptomatic. Recommended strict NPO to allow time for esophagitis to heal, but patient and family would allow some time to think this over. In the meantime, will restart IVFs   4/8: no hematemesis x 24hours, but still persistent hemoptysis. H/H stable.Nomotensive. Continue strict NPO and monitor for improvement  4/9: No hematemesis x 48 hours. Hemoptysis less frequent with addition of delsym . Continue NPO for now and scheduled antiemetics. Trial of sips of clears tomorrow     Gastroesophageal reflux disease with esophagitis and hemorrhage- (present on admission)  Assessment & Plan  History of GERD with esophagitis seen on recent EGD.    Continue IV PPI   Hold carafate while strict NPO       Lupus (HCC)- (present on admission)  Assessment & Plan  History of SLE on Plaquenil, says it controls her symptoms somewhat but she continues to have lupus flares.  Could not tolerate methotrexate in the past    4/4: Her case was discussed with Rheumatologist, Dr. De Los Santos , who recommended prednisone taper, continue hydroxychloroquine 400mg daily , and Benlysta 200mg SC daily to control Lupus flare as this was likely exacerbating esophagitis . He will follow as outpatient in 1-2 weeks   4/5: I sent Benlysta 200mg Q weekly to [facility] pharmacy and they are working on prior authorization. I discussed with our inpatient pharmacist, who confirmed that this cannot be ordered as an inpatient, since we do not carry it on formulary.  4/6: Patient says Benlysta was approved and should be available today. I instructed her to give this medication to the RN so that our pharmacy can add it to her MAR.  4/8: Received Benlysta yesterday. Prednisone changed to IV solumedrol while NPO. Holding Plaquenil until able to tolerate PO   4/9: IV solumedrol today. Can transition back to prednisone once able to tolerate PO         Type 2 diabetes mellitus with hyperglycemia, with long-term current use of insulin (HCC)- (present on  admission)  Assessment & Plan  Initially reported T1DM, but per documentation from Endocrinologist and PCP between 7515-4099 patient is actually Type 2   Currently holding insulin pump   Diabetic diet and blood sugar monitoring     4/6: BG 52 this morning . Patient says she has been counting her carbs and covering herself with 1 unit insulin per 15 carbs , which she says was previously agreed upon with last Hospitalist .She said she gave herself 2 units insulin before bed last night, but then vomited the little food she did eat. I instructed her to communicate her blood sugar and insulin dosage with bedside RN prior to administration to ensure we are monitoring and covering BG appropriately.   4/7: euglycemic. Continue to monitor   4/8: BG well controlled. Patient instructed NOT to inject personal insulin while NPO.   4/9: BG . Monitor         Factor V Leiden (HCC)- (present on admission)  Assessment & Plan  History of factor V Leyden and SLE.  No history of DVT  SCD ordered. Patient is ambulatory   Chemoprophylaxis contraindicated in setting of hematemesis and hemoptysis   Outpatient follow-up as needed     Anemia of chronic disease  Assessment & Plan  H/H stable   Iron studies normal  Follow daily CBC   Transfuse for Hgb < 7        VTE prophylaxis: pharmacologic prophylaxis contraindicated due to Active bleeding

## 2022-04-09 NOTE — PROGRESS NOTES
Report received from RN, assumed care at 0645  Pt is A0X4, and responds appropriately   Pt declines any SOB, chest pain, new onset of numbness/ tingiling  Pt rates pain at 6/10, on a scale of 1-10  This RN requested IV pain medications as pt is strict NPO and cannot take PO medications. Provider prescribed toradol and verbalized acknowledgement of bleeding risk. Declines prescribing any other IV pain medications.   Pt is voiding adequatly and without hesitancy  Pt has + flatus, + bowel sounds. Last BM PTA. Provide aware  Pt ambulates with family   Strict NPO. x2 episodes of hematemesis overnight, per pt. Last episode at 0400.   Plan of care discussed, all questions answered. Explained importance of calling before getting OOB and pt verbalizes understanding. Explained importance of oral care. Call light is within reach, treaded slipper socks on, bed in lowest/ locked position, hourly rounding in place, all needs met at this time

## 2022-04-09 NOTE — CARE PLAN
The patient is Stable - Low risk of patient condition declining or worsening    Shift Goals  Clinical Goals: Reduce nausea, comfort, sleep, no dizziness  Patient Goals: No N/V  Family Goals: N/A    Progress made toward(s) clinical / shift goals:    Problem: Knowledge Deficit - Standard  Goal: Patient and family/care givers will demonstrate understanding of plan of care, disease process/condition, diagnostic tests and medications  Outcome: Progressing     Problem: Gastrointestinal Irritability  Goal: Nausea and vomiting will be absent or improve  Outcome: Progressing     Problem: Knowledge Deficit - Diabetes  Goal: Patient will demonstrate knowledge of insulin injection, symptoms, and treatment of hypoglycemia and diet prior to discharge  Outcome: Progressing     Problem: Infection - Diabetes  Goal: Patient will remain free from signs and symptoms of infection  Outcome: Progressing       Patient is not progressing towards the following goals:

## 2022-04-09 NOTE — PROGRESS NOTES
Received report from previous shift RN  Assessment complete.  A&O x 4. Patient calls appropriately.  Patient ambulates with standby assist. Bed alarm off.   Patient has 0/10 pain. Pain managed with prescribed medications prn.  + N&V, small amount of emesis w blood clots x3 today. Tolerating strict NPO diet.  + void, + flatus, - BM since PTA per pt.  Patient denies SOB. On RA  SCD's off, pt ambulatory.  Patient resting comfortably after a walk. Rapid RN to admin an US IV this evening  Review plan with of care with patient. Call light and personal belongings with in reach. Hourly rounding in place. All needs met at this time.

## 2022-04-10 ENCOUNTER — APPOINTMENT (OUTPATIENT)
Dept: RADIOLOGY | Facility: MEDICAL CENTER | Age: 22
DRG: 369 | End: 2022-04-10
Attending: NURSE PRACTITIONER
Payer: COMMERCIAL

## 2022-04-10 PROBLEM — K59.00 CONSTIPATION: Status: ACTIVE | Noted: 2022-04-10

## 2022-04-10 LAB
ERYTHROCYTE [DISTWIDTH] IN BLOOD BY AUTOMATED COUNT: 41.2 FL (ref 35.9–50)
HCT VFR BLD AUTO: 34.4 % (ref 37–47)
HGB BLD-MCNC: 11.1 G/DL (ref 12–16)
MCH RBC QN AUTO: 30.1 PG (ref 27–33)
MCHC RBC AUTO-ENTMCNC: 32.3 G/DL (ref 33.6–35)
MCV RBC AUTO: 93.2 FL (ref 81.4–97.8)
PLATELET # BLD AUTO: 179 K/UL (ref 164–446)
PMV BLD AUTO: 10.3 FL (ref 9–12.9)
RBC # BLD AUTO: 3.69 M/UL (ref 4.2–5.4)
WBC # BLD AUTO: 7.1 K/UL (ref 4.8–10.8)

## 2022-04-10 PROCEDURE — 700111 HCHG RX REV CODE 636 W/ 250 OVERRIDE (IP): Performed by: STUDENT IN AN ORGANIZED HEALTH CARE EDUCATION/TRAINING PROGRAM

## 2022-04-10 PROCEDURE — 700102 HCHG RX REV CODE 250 W/ 637 OVERRIDE(OP): Performed by: NURSE PRACTITIONER

## 2022-04-10 PROCEDURE — C9113 INJ PANTOPRAZOLE SODIUM, VIA: HCPCS | Performed by: STUDENT IN AN ORGANIZED HEALTH CARE EDUCATION/TRAINING PROGRAM

## 2022-04-10 PROCEDURE — 700111 HCHG RX REV CODE 636 W/ 250 OVERRIDE (IP): Performed by: NURSE PRACTITIONER

## 2022-04-10 PROCEDURE — 36415 COLL VENOUS BLD VENIPUNCTURE: CPT

## 2022-04-10 PROCEDURE — 700105 HCHG RX REV CODE 258: Performed by: NURSE PRACTITIONER

## 2022-04-10 PROCEDURE — 99232 SBSQ HOSP IP/OBS MODERATE 35: CPT | Performed by: NURSE PRACTITIONER

## 2022-04-10 PROCEDURE — 93971 EXTREMITY STUDY: CPT | Mod: RT

## 2022-04-10 PROCEDURE — 770001 HCHG ROOM/CARE - MED/SURG/GYN PRIV*

## 2022-04-10 PROCEDURE — A9270 NON-COVERED ITEM OR SERVICE: HCPCS | Performed by: NURSE PRACTITIONER

## 2022-04-10 PROCEDURE — 85027 COMPLETE CBC AUTOMATED: CPT

## 2022-04-10 PROCEDURE — 700111 HCHG RX REV CODE 636 W/ 250 OVERRIDE (IP)

## 2022-04-10 RX ORDER — SCOLOPAMINE TRANSDERMAL SYSTEM 1 MG/1
1 PATCH, EXTENDED RELEASE TRANSDERMAL
Status: DISCONTINUED | OUTPATIENT
Start: 2022-04-10 | End: 2022-04-13 | Stop reason: HOSPADM

## 2022-04-10 RX ORDER — DEXAMETHASONE SODIUM PHOSPHATE 4 MG/ML
2 INJECTION, SOLUTION INTRA-ARTICULAR; INTRALESIONAL; INTRAMUSCULAR; INTRAVENOUS; SOFT TISSUE
Status: COMPLETED | OUTPATIENT
Start: 2022-04-10 | End: 2022-04-10

## 2022-04-10 RX ADMIN — SODIUM CHLORIDE, SODIUM LACTATE, POTASSIUM CHLORIDE, CALCIUM CHLORIDE AND DEXTROSE MONOHYDRATE: 5; 600; 310; 30; 20 INJECTION, SOLUTION INTRAVENOUS at 05:36

## 2022-04-10 RX ADMIN — SCOPALAMINE 1 PATCH: 1 PATCH, EXTENDED RELEASE TRANSDERMAL at 11:30

## 2022-04-10 RX ADMIN — PROCHLORPERAZINE EDISYLATE 5 MG: 5 INJECTION INTRAMUSCULAR; INTRAVENOUS at 05:28

## 2022-04-10 RX ADMIN — PROCHLORPERAZINE EDISYLATE 5 MG: 5 INJECTION INTRAMUSCULAR; INTRAVENOUS at 23:43

## 2022-04-10 RX ADMIN — ONDANSETRON 4 MG: 2 INJECTION INTRAMUSCULAR; INTRAVENOUS at 11:30

## 2022-04-10 RX ADMIN — DEXTROMETHORPHAN POLISTIREX 60 MG: 30 SUSPENSION, EXTENDED RELEASE ORAL at 05:28

## 2022-04-10 RX ADMIN — KETOROLAC TROMETHAMINE 30 MG: 30 INJECTION, SOLUTION INTRAMUSCULAR at 03:14

## 2022-04-10 RX ADMIN — DEXAMETHASONE SODIUM PHOSPHATE 2 MG: 4 INJECTION, SOLUTION INTRA-ARTICULAR; INTRALESIONAL; INTRAMUSCULAR; INTRAVENOUS; SOFT TISSUE at 11:30

## 2022-04-10 RX ADMIN — ONDANSETRON 4 MG: 2 INJECTION INTRAMUSCULAR; INTRAVENOUS at 23:44

## 2022-04-10 RX ADMIN — PANTOPRAZOLE SODIUM 40 MG: 40 INJECTION, POWDER, FOR SOLUTION INTRAVENOUS at 17:01

## 2022-04-10 RX ADMIN — METHYLPREDNISOLONE SODIUM SUCCINATE 40 MG: 40 INJECTION, POWDER, FOR SOLUTION INTRAMUSCULAR; INTRAVENOUS at 05:28

## 2022-04-10 RX ADMIN — PROCHLORPERAZINE EDISYLATE 5 MG: 5 INJECTION INTRAMUSCULAR; INTRAVENOUS at 17:01

## 2022-04-10 RX ADMIN — KETOROLAC TROMETHAMINE 30 MG: 30 INJECTION, SOLUTION INTRAMUSCULAR at 15:03

## 2022-04-10 RX ADMIN — ONDANSETRON 4 MG: 2 INJECTION INTRAMUSCULAR; INTRAVENOUS at 05:28

## 2022-04-10 RX ADMIN — PROCHLORPERAZINE EDISYLATE 5 MG: 5 INJECTION INTRAMUSCULAR; INTRAVENOUS at 00:01

## 2022-04-10 RX ADMIN — ONDANSETRON 4 MG: 2 INJECTION INTRAMUSCULAR; INTRAVENOUS at 17:01

## 2022-04-10 RX ADMIN — DEXTROMETHORPHAN POLISTIREX 60 MG: 30 SUSPENSION, EXTENDED RELEASE ORAL at 17:02

## 2022-04-10 RX ADMIN — KETOROLAC TROMETHAMINE 30 MG: 30 INJECTION, SOLUTION INTRAMUSCULAR at 23:43

## 2022-04-10 RX ADMIN — PANTOPRAZOLE SODIUM 40 MG: 40 INJECTION, POWDER, FOR SOLUTION INTRAVENOUS at 05:28

## 2022-04-10 RX ADMIN — PROCHLORPERAZINE EDISYLATE 5 MG: 5 INJECTION INTRAMUSCULAR; INTRAVENOUS at 11:30

## 2022-04-10 RX ADMIN — ONDANSETRON 4 MG: 2 INJECTION INTRAMUSCULAR; INTRAVENOUS at 00:01

## 2022-04-10 RX ADMIN — SODIUM CHLORIDE, SODIUM LACTATE, POTASSIUM CHLORIDE, CALCIUM CHLORIDE AND DEXTROSE MONOHYDRATE: 5; 600; 310; 30; 20 INJECTION, SOLUTION INTRAVENOUS at 17:16

## 2022-04-10 ASSESSMENT — ENCOUNTER SYMPTOMS
ABDOMINAL PAIN: 0
VOMITING: 0
FOCAL WEAKNESS: 0
SHORTNESS OF BREATH: 0
CHILLS: 0
FEVER: 0
HEADACHES: 0
HEMOPTYSIS: 0
DIARRHEA: 0
COUGH: 0
SPUTUM PRODUCTION: 0
WEAKNESS: 1
DIZZINESS: 0
WHEEZING: 0
DOUBLE VISION: 0
CONSTIPATION: 1
NERVOUS/ANXIOUS: 0
NAUSEA: 0
SORE THROAT: 0
SINUS PAIN: 0
BLURRED VISION: 0
HEARTBURN: 1
PALPITATIONS: 0

## 2022-04-10 ASSESSMENT — PAIN DESCRIPTION - PAIN TYPE
TYPE: CHRONIC PAIN

## 2022-04-10 NOTE — PROGRESS NOTES
Hospital Medicine Daily Progress Note    Date of Service  4/10/2022    Chief Complaint  Malena Bennett is a 21 y.o. female admitted 3/31/2022 with cough, N/V & hematemesis     Hospital Course  A 21 y.o. female who presented 3/31/2022 with hematemesis.  PMH of DM1 with insulin pump, SLE on Plaquenil, factor V Leyden deficiency, anxiety, GERD, erosive gastritis, celiac disease. Hematemesis described as bright red blood mixed with clots, occasional coffee ground appearance, has pictures on her phone which were reviewed. Admitted from 3/21-3/28 for new onset hemoptysis, going on for about 2 weeks now. Symptoms were thought to be due to lupus flare, she was discharged on prednisone taper.  She does have some epigastric pain and says she can occasionally feel it coming on prior to vomiting the blood.  Denies fever/chills, epistaxis, melena.     During last admission she was evaluated by GI and had an EGD that showed possible small varices and esophagitis, biopsy showed inflammation and evidence of reactive changes, small eosinophilia.  Pulmonary consulted, CTA chest last admission was negative for acute changes.  She had bronchoscopy with no return of blood on BAL.  Otolaryngology was consulted, bedside procedure performed of the nasopharynx which showed no evidence of bleeding.     History of SLE on Plaquenil for the past couple years, says it improved her symptoms slightly but she still has lupus flares described as Maller rash, oral ulcers, joint pain, fatigue.  She tried methotrexate but could not tolerate it, so she was not offered DMARDs by her rheumatologist.     In the ED hemodynamically stable, BP 90s-low 100s which is baseline.  Labs unremarkable.  CTA chest done in the ED negative for acute changes.       Interval Problem Update  4/4 afebrile, vitals stable, on room air.  Labs continue to be largely unremarkable.  No leukopenia, anemia or thrombocytopenia. CRP negative, sed rate negative.  Kidney  function normal.  No hematuria. C3/C4 not elevated. There is slight proteinuria, which is the only clinical symptom that could indicate lupus flare.  Sugars controlled.   Patient is hopeful for some inpatient immunosuppressant treatment. Her nausea is overall improved today, but still having hemoptysis.  Called [facility], Dr De Los Santos recommended disintegrating steroid tablet (pharmacy does not carry) or fluticasone inhaler to treat her eosinophilic esophagitis.  He does recommend stopping systemic steroids and does not feel that she is in a lupus flare up, would not recommend immunosuppressants. Patient agreeable to try inhaler, but insistent that she needs immunosuppressants.  She requested to talk to Dr De Los Santos herself,  I sent him a message and he said he would review her chart again and try to call after clinic. Patient has been updated.    4/5: Persistent hemoptysis , but says hematemesis is decreasing. PO intake slowly improving. H/H stable. SBPs soft, but asymptomatic and at her baseline mentation. Doing well on room air. 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 I discussed case with Oncology on-call, who said patient was seen by Dr. Montesinos in 12/2019 and hemophiliac and coagulopathic work-up was negative. APTT is not elevated. INR normal. PLT normal. Thus, no suspicion for underlying hematologic process. Continue with lupus treatment.     4/6: Patient reports nausea and hematemesis x 2 overnight. She says the volume of emesis is slightly increased today from previous days. Also hypoglycemic this morning and received dextrose with BG improvement. Soft blood pressures but appears euvolemic on exam. Afebrile. on room air. Ambulating room independently without issue. H/H stable. Iron studies normal.      4/7: Ongoing hematemesis with increasing frequency per patient. Hemodynamically stable. H/H stable. WBC 16, which may be secondary to  steroids and hypovolemia. afebrile. Since she has not been tolerating PO intake, I did recommend a trial of 48-72 hours strict NPO and monitoring for symptom improvement (this was previously recommended by GI, but patient declined at that time). I do not feel strongly about cortrak placement given her constant dry-heaving and concern for further esophogeal irritation, but can reconsider in the next 2-3 days. I also offered to re-consult GI, however patient declined since she would not want to undergo any further invasives procedures if recommended.     4/8: Patient c/o dizziness with standing overnight. May be secondary to ativan and/or hypovolemia. Orthostatic VS requested.Patient denies any hematemesis x 24 hours, but did report coughing up clots. Added tessalon capsules. H/H stable. Leukocytosis resolved. Blood pressures improved. Continuing NPO for another 24 hours to see if hemoptysis improves .. Also denies BM x 7 days, but +flatus. No abdominal pain or distension.     4/9: No hematemesis x 48 hours. Reports hemoptysis x 2 overnight. Cough less frequent with addition of delsym. Acute onset RUE swelling, erythema and tenderness at sight of PIV. US ordered to r/o thrombus. WBC normal. Hgb 11.7 from 11.9. SBPs 88-95, which I suspect is secondary to sedating medications. Orthostatic VS were negative. I observed patient ambulate in room independently without strong and steady gait. Continuing strict NPO today and can consider of clears tomorrow.     4/10: Still without vomiting. She denies any hemoptysis overnight or this morning. She has still not had a BM, but also minimal to no intake for > 7 days. Abdomen soft, non-tender, minimally distended . Soft blood pressures (SBPs 91-99). HR normal. US of RUE is still pending, but pain, swelling and erythema improving. Will trial sips of water today.    I have personally seen and examined the patient at bedside. I discussed the plan of care with patient, family, bedside  RN, charge RN,  and pharmacy.    Consultants/Specialty  GI   Rheumatology - spoke with Dr De Los Santos with [facility]      Code Status  Full Code    Disposition  Patient is not medically cleared for discharge.   Anticipate discharge to to home with close outpatient follow-up.  I have placed the appropriate orders for post-discharge needs.    Review of Systems  Review of Systems   Constitutional: Positive for malaise/fatigue. Negative for chills and fever.   HENT: Negative for congestion, ear discharge, ear pain, sinus pain and sore throat.    Eyes: Negative for blurred vision and double vision.   Respiratory: Negative for cough, hemoptysis, sputum production, shortness of breath and wheezing.    Cardiovascular: Negative for chest pain, palpitations and leg swelling.   Gastrointestinal: Positive for constipation and heartburn. Negative for abdominal pain, diarrhea, nausea and vomiting.   Genitourinary: Negative for dysuria, frequency and urgency.   Musculoskeletal: Positive for joint pain.   Neurological: Positive for weakness. Negative for dizziness, focal weakness and headaches.   Psychiatric/Behavioral: The patient is not nervous/anxious.         Physical Exam  Temp:  [36.3 °C (97.3 °F)-36.9 °C (98.4 °F)] 36.6 °C (97.9 °F)  Pulse:  [60-89] 70  Resp:  [18-20] 18  BP: (91-99)/(54-70) 96/70  SpO2:  [93 %-96 %] 93 %    Physical Exam  Vitals and nursing note reviewed.   Constitutional:       General: She is not in acute distress.     Appearance: She is not ill-appearing or toxic-appearing.   HENT:      Head: Normocephalic.      Nose: Nose normal.      Mouth/Throat:      Mouth: Mucous membranes are moist.      Pharynx: No posterior oropharyngeal erythema.      Comments: No petechial hemorrhages   Eyes:      General: No scleral icterus.        Right eye: No discharge.         Left eye: No discharge.      Conjunctiva/sclera: Conjunctivae normal.   Cardiovascular:      Rate and Rhythm: Normal rate.      Heart  sounds: No murmur heard.    No gallop.   Pulmonary:      Effort: Pulmonary effort is normal.      Breath sounds: No stridor. No wheezing, rhonchi or rales.   Abdominal:      General: Bowel sounds are normal. There is distension.      Palpations: Abdomen is soft.      Tenderness: There is no abdominal tenderness. There is no guarding.   Musculoskeletal:      Right forearm: Swelling and tenderness present.      Cervical back: Normal range of motion. No rigidity.      Right lower leg: No edema.      Left lower leg: No edema.   Skin:     General: Skin is warm and dry.      Coloration: Skin is not jaundiced.      Findings: Bruising present.      Comments: No active bleeding    Neurological:      Mental Status: She is alert and oriented to person, place, and time.   Psychiatric:         Mood and Affect: Mood normal.         Thought Content: Thought content normal.         Judgment: Judgment normal.         Fluids    Intake/Output Summary (Last 24 hours) at 4/10/2022 0951  Last data filed at 4/9/2022 2000  Gross per 24 hour   Intake 1000 ml   Output --   Net 1000 ml       Laboratory  Recent Labs     04/08/22  0431 04/09/22  1024 04/10/22  0206   WBC 9.5 8.1 7.1   RBC 3.94* 3.82* 3.69*   HEMOGLOBIN 11.9* 11.7* 11.1*   HEMATOCRIT 37.1 34.9* 34.4*   MCV 94.2 91.4 93.2   MCH 30.2 30.6 30.1   MCHC 32.1* 33.5* 32.3*   RDW 42.5 40.6 41.2   PLATELETCT 193 172 179   MPV 10.3 10.0 10.3     Recent Labs     04/09/22  1024   SODIUM 137   POTASSIUM 3.3*   CHLORIDE 103   CO2 23   GLUCOSE 111*   BUN 10   CREATININE 0.71   CALCIUM 8.6                   Imaging  US-EXTREMITY VENOUS UPPER UNILAT RIGHT         IR-US GUIDED PIV   Final Result    Ultrasound-guided PERIPHERAL IV INSERTION performed by    qualified nursing staff as above.      CT-CTA CHEST PULMONARY ARTERY W/ RECONS   Final Result         1.  No pulmonary embolus appreciated.           Assessment/Plan  * Hematemesis with nausea- (present on admission)  Assessment & Plan  EGD  revealed esophagitis. Thought to be exacerbated by lupus flare   Recent admission where she was evaluated by GI with an EGD, pulmonary with a bronchoscopy, ENT with a nasopharyngoscopy  Gastric biopsy negative for malignancy or infectious process   H. Pylori negative     4/7: ongoing and worsening. H/H stable. Soft blood pressures,but asymptomatic. Recommended strict NPO to allow time for esophagitis to heal, but patient and family would allow some time to think this over. In the meantime, will restart IVFs   4/8: no hematemesis x 24hours, but still persistent hemoptysis. H/H stable.Nomotensive. Continue strict NPO and monitor for improvement  4/9: No hematemesis x 48 hours. Hemoptysis less frequent with addition of delsym . Continue NPO for now and scheduled antiemetics. Trial of sips of clears tomorrow   4/10: No hematemesis . No hemoptysis overnight or this morning. Advance diet to sips of water.     Constipation- (present on admission)  Assessment & Plan  + flatus, but no BM for >7 days   Her abdominal exam is overall benign. We discussed s/s of SBO such as abdominal pain, hard/firm abdomen or not passing gas  She has had minimal to no PO intake throughout this hospitalization   Patient cannot tolerate oral bowel regimen and declines suppositories/ enemas   Will monitor closely for now   Advance bowel regimen once able to tolerate PO     Gastroesophageal reflux disease with esophagitis and hemorrhage- (present on admission)  Assessment & Plan  History of GERD with esophagitis seen on recent EGD.    Continue IV PPI   Hold carafate while strict NPO       Lupus (HCC)- (present on admission)  Assessment & Plan  History of SLE on Plaquenil, says it controls her symptoms somewhat but she continues to have lupus flares.  Could not tolerate methotrexate in the past    4/4: Her case was discussed with Rheumatologist, Dr. De Los Santos , who recommended prednisone taper, continue hydroxychloroquine 400mg daily , and Benlysta  200mg SC daily to control Lupus flare as this was likely exacerbating esophagitis . He will follow as outpatient in 1-2 weeks   4/5: I sent Benlysta 200mg Q weekly to [facility] pharmacy and they are working on prior authorization. I discussed with our inpatient pharmacist, who confirmed that this cannot be ordered as an inpatient, since we do not carry it on formulary.  4/6: Patient says Benlysta was approved and should be available today. I instructed her to give this medication to the RN so that our pharmacy can add it to her MAR.  4/8: Received Benlysta yesterday. Prednisone changed to IV solumedrol while NPO. Holding Plaquenil until able to tolerate PO   4/9-4/10: Continue IV solumedrol . Can transition back to prednisone once able to tolerate PO         Type 2 diabetes mellitus with hyperglycemia, with long-term current use of insulin (HCC)- (present on admission)  Assessment & Plan  Initially reported T1DM, but per documentation from Endocrinologist and PCP between 1022-2208 patient is actually Type 2   Currently holding insulin pump   Diabetic diet and blood sugar monitoring     4/6: BG 52 this morning . Patient says she has been counting her carbs and covering herself with 1 unit insulin per 15 carbs , which she says was previously agreed upon with last Hospitalist .She said she gave herself 2 units insulin before bed last night, but then vomited the little food she did eat. I instructed her to communicate her blood sugar and insulin dosage with bedside RN prior to administration to ensure we are monitoring and covering BG appropriately.   4/7: euglycemic. Continue to monitor   4/8: BG well controlled. Patient instructed NOT to inject personal insulin while NPO.   4/9-4/10: Blood sugars well controlled. Continue to monitor . Continue D5W/LR until tolerating PO intake         Factor V Leiden (HCC)- (present on admission)  Assessment & Plan  History of factor V Leyden and SLE.  No history of DVT  SCD ordered.  Patient is ambulatory   Chemoprophylaxis contraindicated in setting of hematemesis and hemoptysis   Outpatient follow-up as needed     Anemia of chronic disease  Assessment & Plan  H/H stable   Iron studies normal  Follow daily CBC   Transfuse for Hgb < 7        VTE prophylaxis: pharmacologic prophylaxis contraindicated due to high bleeding risk

## 2022-04-10 NOTE — PROGRESS NOTES
Received report from previous shift RN  Assessment complete.  A&O x4. Patient calls appropriately.  Patient ambulates with no assist. Bed alarm off.   Patient reports 6/10 pain at baseline. Pain managed with IV toradol, provider acknowledges active bleeding risk in pt.  + nausea, - emesis, + hemoptysis. Tolerating NPO diet.  + void, + flatus, - BM since PTA, provider made aware.  Patient denies SOB, CP, new numbness/tingling  SCD's off, pt ambulatory.  Patient ambulating steadily in hallway with partner.  Review plan with of care with patient. Call light and personal belongings with in reach. Hourly rounding in place. All needs met at this time.

## 2022-04-10 NOTE — PROGRESS NOTES
AA&Ox4.   RA. Denies SOB.  Reported pain tolerable at this time.  NPO with ice chips and sips of water. + mild nausea.   Scop patch to be applied. Awaiting to be redispensed by pharmacy.  + void. LBM PTA.  Pt upself.  All needs met at this time. Call light within reach. Pt calls appropriately.

## 2022-04-10 NOTE — ASSESSMENT & PLAN NOTE
+ flatus, but no BM for >7 days   Her abdominal exam is overall benign. We discussed s/s of SBO such as abdominal pain, hard/firm abdomen or not passing gas  She has had minimal to no PO intake throughout this hospitalization   Patient cannot tolerate oral bowel regimen and declines suppositories/ enemas   Will monitor closely for now   Advance bowel regimen once able to tolerate PO consistently   Resolved  Now with diarrhea

## 2022-04-10 NOTE — CARE PLAN
Problem: Knowledge Deficit - Standard  Goal: Patient and family/care givers will demonstrate understanding of plan of care, disease process/condition, diagnostic tests and medications  Outcome: Progressing     Problem: Pain - Standard  Goal: Alleviation of pain or a reduction in pain to the patient’s comfort goal  Outcome: Progressing     The patient is Stable - Low risk of patient condition declining or worsening    Shift Goals  Clinical Goals: pain management, tolerate sips  Patient Goals: Decrease pain and nausea  Family Goals: N/A    Progress made toward(s) clinical / shift goals:  Pain managed with current prescribed medications. 1x dose decadron administered. Patient tolerating ice chips and sips of water.    Patient is not progressing towards the following goals:

## 2022-04-11 PROBLEM — I80.8 SUPERFICIAL THROMBOPHLEBITIS OF RIGHT UPPER EXTREMITY: Status: ACTIVE | Noted: 2022-04-11

## 2022-04-11 LAB
ERYTHROCYTE [DISTWIDTH] IN BLOOD BY AUTOMATED COUNT: 40.2 FL (ref 35.9–50)
HCT VFR BLD AUTO: 32.1 % (ref 37–47)
HGB BLD-MCNC: 10.8 G/DL (ref 12–16)
MCH RBC QN AUTO: 30.3 PG (ref 27–33)
MCHC RBC AUTO-ENTMCNC: 33.6 G/DL (ref 33.6–35)
MCV RBC AUTO: 89.9 FL (ref 81.4–97.8)
PLATELET # BLD AUTO: 200 K/UL (ref 164–446)
PMV BLD AUTO: 10.2 FL (ref 9–12.9)
RBC # BLD AUTO: 3.57 M/UL (ref 4.2–5.4)
WBC # BLD AUTO: 8.3 K/UL (ref 4.8–10.8)

## 2022-04-11 PROCEDURE — 85027 COMPLETE CBC AUTOMATED: CPT

## 2022-04-11 PROCEDURE — A9270 NON-COVERED ITEM OR SERVICE: HCPCS | Performed by: NURSE PRACTITIONER

## 2022-04-11 PROCEDURE — 770001 HCHG ROOM/CARE - MED/SURG/GYN PRIV*

## 2022-04-11 PROCEDURE — 700111 HCHG RX REV CODE 636 W/ 250 OVERRIDE (IP)

## 2022-04-11 PROCEDURE — 700111 HCHG RX REV CODE 636 W/ 250 OVERRIDE (IP): Performed by: STUDENT IN AN ORGANIZED HEALTH CARE EDUCATION/TRAINING PROGRAM

## 2022-04-11 PROCEDURE — 700105 HCHG RX REV CODE 258: Performed by: NURSE PRACTITIONER

## 2022-04-11 PROCEDURE — 700102 HCHG RX REV CODE 250 W/ 637 OVERRIDE(OP): Performed by: NURSE PRACTITIONER

## 2022-04-11 PROCEDURE — 700111 HCHG RX REV CODE 636 W/ 250 OVERRIDE (IP): Performed by: NURSE PRACTITIONER

## 2022-04-11 PROCEDURE — 99232 SBSQ HOSP IP/OBS MODERATE 35: CPT | Performed by: NURSE PRACTITIONER

## 2022-04-11 PROCEDURE — C9113 INJ PANTOPRAZOLE SODIUM, VIA: HCPCS | Performed by: STUDENT IN AN ORGANIZED HEALTH CARE EDUCATION/TRAINING PROGRAM

## 2022-04-11 PROCEDURE — 36415 COLL VENOUS BLD VENIPUNCTURE: CPT

## 2022-04-11 RX ORDER — OMEPRAZOLE 20 MG/1
20 CAPSULE, DELAYED RELEASE ORAL 2 TIMES DAILY
Status: DISCONTINUED | OUTPATIENT
Start: 2022-04-11 | End: 2022-04-13 | Stop reason: HOSPADM

## 2022-04-11 RX ORDER — BENZONATATE 100 MG/1
100 CAPSULE ORAL 3 TIMES DAILY PRN
Status: DISCONTINUED | OUTPATIENT
Start: 2022-04-11 | End: 2022-04-13 | Stop reason: HOSPADM

## 2022-04-11 RX ORDER — METHYLPREDNISOLONE SODIUM SUCCINATE 40 MG/ML
40 INJECTION, POWDER, LYOPHILIZED, FOR SOLUTION INTRAMUSCULAR; INTRAVENOUS DAILY
Status: COMPLETED | OUTPATIENT
Start: 2022-04-12 | End: 2022-04-12

## 2022-04-11 RX ADMIN — DEXTROMETHORPHAN POLISTIREX 60 MG: 30 SUSPENSION, EXTENDED RELEASE ORAL at 17:18

## 2022-04-11 RX ADMIN — PROCHLORPERAZINE EDISYLATE 5 MG: 5 INJECTION INTRAMUSCULAR; INTRAVENOUS at 12:10

## 2022-04-11 RX ADMIN — PANTOPRAZOLE SODIUM 40 MG: 40 INJECTION, POWDER, FOR SOLUTION INTRAVENOUS at 04:16

## 2022-04-11 RX ADMIN — PROCHLORPERAZINE EDISYLATE 5 MG: 5 INJECTION INTRAMUSCULAR; INTRAVENOUS at 17:18

## 2022-04-11 RX ADMIN — DEXTROMETHORPHAN POLISTIREX 60 MG: 30 SUSPENSION, EXTENDED RELEASE ORAL at 04:15

## 2022-04-11 RX ADMIN — ONDANSETRON 4 MG: 2 INJECTION INTRAMUSCULAR; INTRAVENOUS at 12:10

## 2022-04-11 RX ADMIN — ONDANSETRON 4 MG: 2 INJECTION INTRAMUSCULAR; INTRAVENOUS at 17:18

## 2022-04-11 RX ADMIN — METHYLPREDNISOLONE SODIUM SUCCINATE 40 MG: 40 INJECTION, POWDER, FOR SOLUTION INTRAMUSCULAR; INTRAVENOUS at 04:17

## 2022-04-11 RX ADMIN — OMEPRAZOLE 20 MG: 20 CAPSULE, DELAYED RELEASE ORAL at 17:17

## 2022-04-11 RX ADMIN — SODIUM CHLORIDE, SODIUM LACTATE, POTASSIUM CHLORIDE, CALCIUM CHLORIDE AND DEXTROSE MONOHYDRATE: 5; 600; 310; 30; 20 INJECTION, SOLUTION INTRAVENOUS at 12:20

## 2022-04-11 RX ADMIN — ONDANSETRON 4 MG: 2 INJECTION INTRAMUSCULAR; INTRAVENOUS at 04:16

## 2022-04-11 RX ADMIN — PROCHLORPERAZINE EDISYLATE 5 MG: 5 INJECTION INTRAMUSCULAR; INTRAVENOUS at 04:15

## 2022-04-11 RX ADMIN — KETOROLAC TROMETHAMINE 30 MG: 30 INJECTION, SOLUTION INTRAMUSCULAR at 18:38

## 2022-04-11 ASSESSMENT — PAIN DESCRIPTION - PAIN TYPE
TYPE: CHRONIC PAIN

## 2022-04-11 ASSESSMENT — ENCOUNTER SYMPTOMS
BLURRED VISION: 0
WEAKNESS: 1
SINUS PAIN: 0
DOUBLE VISION: 0
NERVOUS/ANXIOUS: 0
VOMITING: 0
FOCAL WEAKNESS: 0
CHILLS: 0
DIZZINESS: 0
HEARTBURN: 0
DIARRHEA: 0
FEVER: 0
HEADACHES: 0
SPUTUM PRODUCTION: 0
CONSTIPATION: 1
HEMOPTYSIS: 0
COUGH: 0
SHORTNESS OF BREATH: 0
SORE THROAT: 0
PALPITATIONS: 0
WHEEZING: 0
ABDOMINAL PAIN: 0
NAUSEA: 0

## 2022-04-11 NOTE — CARE PLAN
Problem: Nutritional:  Goal: Achieve adequate nutritional intake  Description: Patient will consume 50% (with some boost intake) or > of meals  Outcome: Not Met     See RD note

## 2022-04-11 NOTE — PROGRESS NOTES
AA&Ox4.   RA. Denies SOB.  Reporting 3/10 pain. Denies interventions.  Diet advanced to clear liquids, no reds. + mild nausea. Denies medication at this time.  + void.   - BM / + flatus.  Pt upself.  All needs met at this time. Call light within reach. Pt calls appropriately

## 2022-04-11 NOTE — ASSESSMENT & PLAN NOTE
US revealed acute occlusive superficial thrombophlebitis in the right median antecubital vein extending into cephalic vein in the proximal to mid forearm.  No evidence of DVT   Symptom management

## 2022-04-11 NOTE — PROGRESS NOTES
Received report from previous shift RN  Assessment complete.  A&O x 4. Patient calls appropriately.  Pt up self  Patient has 6/10 chronic, baseline pain. Pain managed with prescribed medications prn per MAR  - emesis + mild nausea, scop patch applied. Tolerating sips.  + void, + flatus, - BM PTA, NPO >7 days. Bowel sounds hypoactive, stomach slightly distended, denies pain  Denies SOB. On RA  Review plan with of care with patient. Call light and personal belongings with in reach. Hourly rounding in place. All needs met at this time.

## 2022-04-11 NOTE — DIETARY
Nutrition Services: Update   Day 9 of admit.  Malena Bennett is a 21 y.o. female with admitting DX of Hematemesis of unknown cause [K92.0]  Hematemesis [K92.0]    Pt is currently on a clear liquid diet. Pt previously on a regular diet with ~50% of meals. Pt changed to NPO on 4/7 due to hematemesis. This is day 5 NPO/CLD. Per most recent APRN note pt tried broth with lunch and dinner yesterday 4/10 and diet was advanced to Clear liquid on 4/11. No intake yet recorded. Per APRN note plan to slowly transition to PO as able. RD to watch for diet advancement past clear liquid trays as a clear liquid diet does not meet patient needs.     Malnutrition Risk: Pt at risk with prolonged NPO/CLD diet    Recommendations/Plan:  1. Diet advancement as medically feasible per MD    2. Monitor weight.  3. Obtain supplement order if needed when diet advanced as needed.    RD following

## 2022-04-11 NOTE — CARE PLAN
Problem: Knowledge Deficit - Standard  Goal: Patient and family/care givers will demonstrate understanding of plan of care, disease process/condition, diagnostic tests and medications  Outcome: Progressing     Problem: Pain - Standard  Goal: Alleviation of pain or a reduction in pain to the patient’s comfort goal  Outcome: Progressing     Problem: Gastrointestinal Irritability  Goal: Nausea and vomiting will be absent or improve  Outcome: Progressing     The patient is Stable - Low risk of patient condition declining or worsening    Shift Goals  Clinical Goals: Tolerate clear liquid diet  Patient Goals: Comfort  Family Goals: N/A    Progress made toward(s) clinical / shift goals:  Pt tolerating clears. Consumed 360mL of chicken broth for lunch.    Patient is not progressing towards the following goals:

## 2022-04-11 NOTE — PROGRESS NOTES
Hospital Medicine Daily Progress Note    Date of Service  4/11/2022    Chief Complaint  Malena Bennett is a 21 y.o. female admitted 3/31/2022 with cough, N/V & hematemesis     Hospital Course  A 21 y.o. female who presented 3/31/2022 with hematemesis.  PMH of DM1 with insulin pump, SLE on Plaquenil, factor V Leyden deficiency, anxiety, GERD, erosive gastritis, celiac disease. Hematemesis described as bright red blood mixed with clots, occasional coffee ground appearance, has pictures on her phone which were reviewed. Admitted from 3/21-3/28 for new onset hemoptysis, going on for about 2 weeks now. Symptoms were thought to be due to lupus flare, she was discharged on prednisone taper.  She does have some epigastric pain and says she can occasionally feel it coming on prior to vomiting the blood.  Denies fever/chills, epistaxis, melena.     During last admission she was evaluated by GI and had an EGD that showed possible small varices and esophagitis, biopsy showed inflammation and evidence of reactive changes, small eosinophilia.  Pulmonary consulted, CTA chest last admission was negative for acute changes.  She had bronchoscopy with no return of blood on BAL.  Otolaryngology was consulted, bedside procedure performed of the nasopharynx which showed no evidence of bleeding.     History of SLE on Plaquenil for the past couple years, says it improved her symptoms slightly but she still has lupus flares described as Maller rash, oral ulcers, joint pain, fatigue.  She tried methotrexate but could not tolerate it, so she was not offered DMARDs by her rheumatologist.     In the ED hemodynamically stable, BP 90s-low 100s which is baseline.  Labs unremarkable.  CTA chest done in the ED negative for acute changes.       Interval Problem Update  4/4 afebrile, vitals stable, on room air.  Labs continue to be largely unremarkable.  No leukopenia, anemia or thrombocytopenia. CRP negative, sed rate negative.  Kidney  function normal.  No hematuria. C3/C4 not elevated. There is slight proteinuria, which is the only clinical symptom that could indicate lupus flare.  Sugars controlled.   Patient is hopeful for some inpatient immunosuppressant treatment. Her nausea is overall improved today, but still having hemoptysis.  Called [facility], Dr De Los Santos recommended disintegrating steroid tablet (pharmacy does not carry) or fluticasone inhaler to treat her eosinophilic esophagitis.  He does recommend stopping systemic steroids and does not feel that she is in a lupus flare up, would not recommend immunosuppressants. Patient agreeable to try inhaler, but insistent that she needs immunosuppressants.  She requested to talk to Dr De Los Santos herself,  I sent him a message and he said he would review her chart again and try to call after clinic. Patient has been updated.    4/5: Persistent hemoptysis , but says hematemesis is decreasing. PO intake slowly improving. H/H stable. SBPs soft, but asymptomatic and at her baseline mentation. Doing well on room air. 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 I discussed case with Oncology on-call, who said patient was seen by Dr. Montesinos in 12/2019 and hemophiliac and coagulopathic work-up was negative. APTT is not elevated. INR normal. PLT normal. Thus, no suspicion for underlying hematologic process. Continue with lupus treatment.     4/6: Patient reports nausea and hematemesis x 2 overnight. She says the volume of emesis is slightly increased today from previous days. Also hypoglycemic this morning and received dextrose with BG improvement. Soft blood pressures but appears euvolemic on exam. Afebrile. on room air. Ambulating room independently without issue. H/H stable. Iron studies normal.      4/7: Ongoing hematemesis with increasing frequency per patient. Hemodynamically stable. H/H stable. WBC 16, which may be secondary to  steroids and hypovolemia. afebrile. Since she has not been tolerating PO intake, I did recommend a trial of 48-72 hours strict NPO and monitoring for symptom improvement (this was previously recommended by GI, but patient declined at that time). I do not feel strongly about cortrak placement given her constant dry-heaving and concern for further esophogeal irritation, but can reconsider in the next 2-3 days. I also offered to re-consult GI, however patient declined since she would not want to undergo any further invasives procedures if recommended.     4/8: Patient c/o dizziness with standing overnight. May be secondary to ativan and/or hypovolemia. Orthostatic VS requested.Patient denies any hematemesis x 24 hours, but did report coughing up clots. Added tessalon capsules. H/H stable. Leukocytosis resolved. Blood pressures improved. Continuing NPO for another 24 hours to see if hemoptysis improves .. Also denies BM x 7 days, but +flatus. No abdominal pain or distension.     4/9: No hematemesis x 48 hours. Reports hemoptysis x 2 overnight. Cough less frequent with addition of delsym. Acute onset RUE swelling, erythema and tenderness at sight of PIV. US ordered to r/o thrombus. WBC normal. Hgb 11.7 from 11.9. SBPs 88-95, which I suspect is secondary to sedating medications. Orthostatic VS were negative. I observed patient ambulate in room independently without strong and steady gait. Continuing strict NPO today and can consider of clears tomorrow.     4/10: Still without vomiting. She denies any hemoptysis overnight or this morning. She has still not had a BM, but also minimal to no intake for > 7 days. Abdomen soft, non-tender, minimally distended . Soft blood pressures (SBPs 91-99). HR normal. US of RUE is still pending, but pain, swelling and erythema improving. Will trial sips of water today.    4/11: Tolerated sips of water (1 sip every hr per patient) without n/v or hemoptysis. Will trial broth for lunch/dinner  today and oral PPI. If still without n/v or cough, then patient is agreeable to transitioning to PO medications starting tomorrow. Lab reviewed and stable. US RUE showed multiple superficial thrombus- patient educated on warm/cool compress for symptom management. Blood pressures improving.abdominal exam unchanged. Ambulating room independently without compliant.     I have personally seen and examined the patient at bedside. I discussed the plan of care with patient, family, bedside RN, charge RN,  and pharmacy.    Consultants/Specialty  GI   Rheumatology - spoke with Dr De Los Santos with [facility]      Code Status  Full Code    Disposition  Patient is not medically cleared for discharge.   Anticipate discharge to to home with close outpatient follow-up.  I have placed the appropriate orders for post-discharge needs.    Review of Systems  Review of Systems   Constitutional: Positive for malaise/fatigue. Negative for chills and fever.   HENT: Negative for congestion, ear discharge, ear pain, sinus pain and sore throat.    Eyes: Negative for blurred vision and double vision.   Respiratory: Negative for cough, hemoptysis, sputum production, shortness of breath and wheezing.    Cardiovascular: Negative for chest pain, palpitations and leg swelling.   Gastrointestinal: Positive for constipation. Negative for abdominal pain, diarrhea, heartburn, nausea and vomiting.   Genitourinary: Negative for dysuria, frequency and urgency.   Musculoskeletal: Positive for joint pain.   Neurological: Positive for weakness. Negative for dizziness, focal weakness and headaches.   Psychiatric/Behavioral: The patient is not nervous/anxious.         Physical Exam  Temp:  [36.4 °C (97.5 °F)-36.8 °C (98.3 °F)] 36.8 °C (98.3 °F)  Pulse:  [57-72] 72  Resp:  [16-18] 18  BP: ()/(55-69) 103/69  SpO2:  [93 %-97 %] 96 %    Physical Exam  Vitals and nursing note reviewed.   Constitutional:       General: She is not in acute distress.      Appearance: She is not ill-appearing or toxic-appearing.   HENT:      Head: Normocephalic.      Nose: Nose normal.      Mouth/Throat:      Mouth: Mucous membranes are moist.      Pharynx: No posterior oropharyngeal erythema.      Comments: No petechial hemorrhages   Eyes:      General: No scleral icterus.        Right eye: No discharge.         Left eye: No discharge.      Conjunctiva/sclera: Conjunctivae normal.   Cardiovascular:      Rate and Rhythm: Normal rate.      Heart sounds: No murmur heard.    No gallop.   Pulmonary:      Effort: Pulmonary effort is normal.      Breath sounds: No stridor. No wheezing, rhonchi or rales.   Abdominal:      General: Bowel sounds are normal. There is distension.      Palpations: Abdomen is soft.      Tenderness: There is no abdominal tenderness. There is no guarding.   Musculoskeletal:      Right forearm: Swelling and tenderness present.      Cervical back: Normal range of motion. No rigidity.      Right lower leg: No edema.      Left lower leg: No edema.   Skin:     General: Skin is warm and dry.      Coloration: Skin is not jaundiced.      Findings: Bruising present.      Comments: No active bleeding    Neurological:      Mental Status: She is alert and oriented to person, place, and time.   Psychiatric:         Mood and Affect: Mood normal.         Thought Content: Thought content normal.         Judgment: Judgment normal.         Fluids    Intake/Output Summary (Last 24 hours) at 4/11/2022 1105  Last data filed at 4/11/2022 0816  Gross per 24 hour   Intake 2236.67 ml   Output 0 ml   Net 2236.67 ml       Laboratory  Recent Labs     04/09/22  1024 04/10/22  0206 04/11/22  0135   WBC 8.1 7.1 8.3   RBC 3.82* 3.69* 3.57*   HEMOGLOBIN 11.7* 11.1* 10.8*   HEMATOCRIT 34.9* 34.4* 32.1*   MCV 91.4 93.2 89.9   MCH 30.6 30.1 30.3   MCHC 33.5* 32.3* 33.6   RDW 40.6 41.2 40.2   PLATELETCT 172 179 200   MPV 10.0 10.3 10.2     Recent Labs     04/09/22  1024   SODIUM 137   POTASSIUM 3.3*    CHLORIDE 103   CO2 23   GLUCOSE 111*   BUN 10   CREATININE 0.71   CALCIUM 8.6                   Imaging  IR-US GUIDED PIV   Final Result    Ultrasound-guided PERIPHERAL IV INSERTION performed by    qualified nursing staff as above.      US-EXTREMITY VENOUS UPPER UNILAT RIGHT   Final Result      IR-US GUIDED PIV   Final Result    Ultrasound-guided PERIPHERAL IV INSERTION performed by    qualified nursing staff as above.      CT-CTA CHEST PULMONARY ARTERY W/ RECONS   Final Result         1.  No pulmonary embolus appreciated.           Assessment/Plan  * Hematemesis with nausea- (present on admission)  Assessment & Plan  EGD revealed esophagitis. Thought to be exacerbated by lupus flare   Recent admission where she was evaluated by GI with an EGD, pulmonary with a bronchoscopy, ENT with a nasopharyngoscopy  Gastric biopsy negative for malignancy or infectious process   H. Pylori negative     4/7: ongoing and worsening. H/H stable. Soft blood pressures,but asymptomatic. Recommended strict NPO to allow time for esophagitis to heal, but patient and family would allow some time to think this over. In the meantime, will restart IVFs   4/8: no hematemesis x 24hours, but still persistent hemoptysis. H/H stable.Nomotensive. Continue strict NPO and monitor for improvement  4/9: No hematemesis x 48 hours. Hemoptysis less frequent with addition of delsym . Continue NPO for now and scheduled antiemetics. Trial of sips of clears tomorrow   4/10: No hematemesis . No hemoptysis overnight or this morning. Advance diet to sips of water.   4/11: Tolerated sips of water. No n/v or hemoptysis. Continue clears today and will slowly transition medications to PO as able     Superficial thrombophlebitis of right upper extremity  Assessment & Plan  US revealed acute occlusive superficial thrombophlebitis in the right median antecubital vein extending into cephalic vein in the proximal to mid forearm.  No evidence of DVT   Symptom management      Constipation- (present on admission)  Assessment & Plan  + flatus, but no BM for >7 days   Her abdominal exam is overall benign. We discussed s/s of SBO such as abdominal pain, hard/firm abdomen or not passing gas  She has had minimal to no PO intake throughout this hospitalization   Patient cannot tolerate oral bowel regimen and declines suppositories/ enemas   Will monitor closely for now   Advance bowel regimen once able to tolerate PO consistently     Gastroesophageal reflux disease with esophagitis and hemorrhage- (present on admission)  Assessment & Plan  History of GERD with esophagitis seen on recent EGD.    4/11: IV Protonix changed to omeprazole   Continue clears       Lupus (HCC)- (present on admission)  Assessment & Plan  History of SLE on Plaquenil, says it controls her symptoms somewhat but she continues to have lupus flares.  Could not tolerate methotrexate in the past    4/4: Her case was discussed with Rheumatologist, Dr. De Los Santos , who recommended prednisone taper, continue hydroxychloroquine 400mg daily , and Benlysta 200mg SC daily to control Lupus flare as this was likely exacerbating esophagitis . He will follow as outpatient in 1-2 weeks   4/5: I sent Benlysta 200mg Q weekly to [facility] pharmacy and they are working on prior authorization. I discussed with our inpatient pharmacist, who confirmed that this cannot be ordered as an inpatient, since we do not carry it on formulary.  4/6: Patient says Benlysta was approved and should be available today. I instructed her to give this medication to the RN so that our pharmacy can add it to her MAR.  4/8: Received Benlysta yesterday. Prednisone changed to IV solumedrol while NPO. Holding Plaquenil until able to tolerate PO   4/9-4/11: Continue IV solumedrol . Planning to transition to PO prednisone taper Wednesday if tolerating PO intake         Type 2 diabetes mellitus with hyperglycemia, with long-term current use of insulin (HCC)- (present on  admission)  Assessment & Plan  Initially reported T1DM, but per documentation from Endocrinologist and PCP between [id number] patient is actually Type 2   Currently holding insulin pump   Diabetic diet and blood sugar monitoring     4/6: BG 52 this morning . Patient says she has been counting her carbs and covering herself with 1 unit insulin per 15 carbs , which she says was previously agreed upon with last Hospitalist .She said she gave herself 2 units insulin before bed last night, but then vomited the little food she did eat. I instructed her to communicate her blood sugar and insulin dosage with bedside RN prior to administration to ensure we are monitoring and covering BG appropriately.   4/7: euglycemic. Continue to monitor   4/8: BG well controlled. Patient instructed NOT to inject personal insulin while NPO.   4/9-4/11: Blood sugars well controlled. Continue to monitor . Continue D5W/LR until tolerating PO intake         Factor V Leiden (HCC)- (present on admission)  Assessment & Plan  History of factor V Leyden and SLE.  No history of DVT  SCD ordered. Patient is ambulatory   Chemoprophylaxis contraindicated in setting of hematemesis and hemoptysis   Outpatient follow-up as needed     Anemia of chronic disease  Assessment & Plan  H/H stable   Iron studies normal  Follow daily CBC   Transfuse for Hgb < 7        VTE prophylaxis: pharmacologic prophylaxis contraindicated due to high bleeding risk

## 2022-04-12 LAB
ANION GAP SERPL CALC-SCNC: 12 MMOL/L (ref 7–16)
BUN SERPL-MCNC: 6 MG/DL (ref 8–22)
CALCIUM SERPL-MCNC: 8.7 MG/DL (ref 8.5–10.5)
CHLORIDE SERPL-SCNC: 104 MMOL/L (ref 96–112)
CO2 SERPL-SCNC: 24 MMOL/L (ref 20–33)
CREAT SERPL-MCNC: 0.67 MG/DL (ref 0.5–1.4)
GFR SERPLBLD CREATININE-BSD FMLA CKD-EPI: 126 ML/MIN/1.73 M 2
GLUCOSE SERPL-MCNC: 86 MG/DL (ref 65–99)
MAGNESIUM SERPL-MCNC: 2 MG/DL (ref 1.5–2.5)
POTASSIUM SERPL-SCNC: 2.7 MMOL/L (ref 3.6–5.5)
POTASSIUM SERPL-SCNC: 3.6 MMOL/L (ref 3.6–5.5)
SODIUM SERPL-SCNC: 140 MMOL/L (ref 135–145)

## 2022-04-12 PROCEDURE — 700101 HCHG RX REV CODE 250: Performed by: NURSE PRACTITIONER

## 2022-04-12 PROCEDURE — 700111 HCHG RX REV CODE 636 W/ 250 OVERRIDE (IP): Performed by: NURSE PRACTITIONER

## 2022-04-12 PROCEDURE — A9270 NON-COVERED ITEM OR SERVICE: HCPCS | Performed by: NURSE PRACTITIONER

## 2022-04-12 PROCEDURE — 83735 ASSAY OF MAGNESIUM: CPT

## 2022-04-12 PROCEDURE — 99232 SBSQ HOSP IP/OBS MODERATE 35: CPT | Performed by: NURSE PRACTITIONER

## 2022-04-12 PROCEDURE — 700111 HCHG RX REV CODE 636 W/ 250 OVERRIDE (IP)

## 2022-04-12 PROCEDURE — 84132 ASSAY OF SERUM POTASSIUM: CPT

## 2022-04-12 PROCEDURE — 770020 HCHG ROOM/CARE - TELE (206)

## 2022-04-12 PROCEDURE — 700102 HCHG RX REV CODE 250 W/ 637 OVERRIDE(OP): Performed by: NURSE PRACTITIONER

## 2022-04-12 PROCEDURE — 700105 HCHG RX REV CODE 258: Performed by: NURSE PRACTITIONER

## 2022-04-12 PROCEDURE — 36415 COLL VENOUS BLD VENIPUNCTURE: CPT

## 2022-04-12 PROCEDURE — 80048 BASIC METABOLIC PNL TOTAL CA: CPT

## 2022-04-12 RX ORDER — ONDANSETRON 4 MG/1
4 TABLET, ORALLY DISINTEGRATING ORAL EVERY 6 HOURS
Status: DISCONTINUED | OUTPATIENT
Start: 2022-04-12 | End: 2022-04-13 | Stop reason: HOSPADM

## 2022-04-12 RX ORDER — PROCHLORPERAZINE MALEATE 10 MG
10 TABLET ORAL EVERY 6 HOURS
Status: DISCONTINUED | OUTPATIENT
Start: 2022-04-12 | End: 2022-04-13 | Stop reason: HOSPADM

## 2022-04-12 RX ORDER — POTASSIUM CHLORIDE 7.45 MG/ML
10 INJECTION INTRAVENOUS
Status: COMPLETED | OUTPATIENT
Start: 2022-04-12 | End: 2022-04-12

## 2022-04-12 RX ORDER — DEXTROSE MONOHYDRATE, SODIUM CHLORIDE, SODIUM LACTATE, POTASSIUM CHLORIDE, CALCIUM CHLORIDE 5; 600; 310; 179; 20 G/100ML; MG/100ML; MG/100ML; MG/100ML; MG/100ML
INJECTION, SOLUTION INTRAVENOUS CONTINUOUS
Status: DISCONTINUED | OUTPATIENT
Start: 2022-04-12 | End: 2022-04-13

## 2022-04-12 RX ADMIN — SODIUM CHLORIDE, SODIUM LACTATE, POTASSIUM CHLORIDE, CALCIUM CHLORIDE AND DEXTROSE MONOHYDRATE: 5; 600; 310; 30; 20 INJECTION, SOLUTION INTRAVENOUS at 00:04

## 2022-04-12 RX ADMIN — POTASSIUM CHLORIDE 10 MEQ: 10 INJECTION, SOLUTION INTRAVENOUS at 17:54

## 2022-04-12 RX ADMIN — PROCHLORPERAZINE EDISYLATE 5 MG: 5 INJECTION INTRAMUSCULAR; INTRAVENOUS at 04:41

## 2022-04-12 RX ADMIN — POTASSIUM CHLORIDE 10 MEQ: 10 INJECTION, SOLUTION INTRAVENOUS at 13:58

## 2022-04-12 RX ADMIN — ONDANSETRON 4 MG: 4 TABLET, ORALLY DISINTEGRATING ORAL at 12:19

## 2022-04-12 RX ADMIN — OMEPRAZOLE 20 MG: 20 CAPSULE, DELAYED RELEASE ORAL at 17:33

## 2022-04-12 RX ADMIN — PROCHLORPERAZINE MALEATE 10 MG: 10 TABLET ORAL at 17:33

## 2022-04-12 RX ADMIN — SODIUM CHLORIDE, SODIUM LACTATE, POTASSIUM CHLORIDE, CALCIUM CHLORIDE AND DEXTROSE MONOHYDRATE: 5; 600; 310; 30; 20 INJECTION, SOLUTION INTRAVENOUS at 12:20

## 2022-04-12 RX ADMIN — PROCHLORPERAZINE EDISYLATE 5 MG: 5 INJECTION INTRAMUSCULAR; INTRAVENOUS at 00:04

## 2022-04-12 RX ADMIN — DEXTROMETHORPHAN POLISTIREX 60 MG: 30 SUSPENSION, EXTENDED RELEASE ORAL at 17:33

## 2022-04-12 RX ADMIN — POTASSIUM CHLORIDE 10 MEQ: 10 INJECTION, SOLUTION INTRAVENOUS at 12:20

## 2022-04-12 RX ADMIN — ONDANSETRON 4 MG: 2 INJECTION INTRAMUSCULAR; INTRAVENOUS at 04:41

## 2022-04-12 RX ADMIN — DEXTROSE MONOHYDRATE, SODIUM CHLORIDE, SODIUM LACTATE, POTASSIUM CHLORIDE, CALCIUM CHLORIDE: 5; 600; 310; 179; 20 INJECTION, SOLUTION INTRAVENOUS at 15:26

## 2022-04-12 RX ADMIN — METHYLPREDNISOLONE SODIUM SUCCINATE 40 MG: 40 INJECTION, POWDER, FOR SOLUTION INTRAMUSCULAR; INTRAVENOUS at 17:34

## 2022-04-12 RX ADMIN — ONDANSETRON 4 MG: 2 INJECTION INTRAMUSCULAR; INTRAVENOUS at 00:05

## 2022-04-12 RX ADMIN — PROCHLORPERAZINE MALEATE 10 MG: 10 TABLET ORAL at 12:48

## 2022-04-12 RX ADMIN — POTASSIUM CHLORIDE 10 MEQ: 10 INJECTION, SOLUTION INTRAVENOUS at 15:26

## 2022-04-12 RX ADMIN — OMEPRAZOLE 20 MG: 20 CAPSULE, DELAYED RELEASE ORAL at 04:40

## 2022-04-12 RX ADMIN — DEXTROMETHORPHAN POLISTIREX 60 MG: 30 SUSPENSION, EXTENDED RELEASE ORAL at 04:41

## 2022-04-12 RX ADMIN — ONDANSETRON 4 MG: 4 TABLET, ORALLY DISINTEGRATING ORAL at 17:33

## 2022-04-12 ASSESSMENT — ENCOUNTER SYMPTOMS
NERVOUS/ANXIOUS: 0
DIZZINESS: 0
CHILLS: 0
DIARRHEA: 1
PALPITATIONS: 0
COUGH: 0
HEADACHES: 0
HEMOPTYSIS: 0
CONSTIPATION: 0
HEARTBURN: 0
SHORTNESS OF BREATH: 0
WEAKNESS: 1
FEVER: 0
ABDOMINAL PAIN: 0
WHEEZING: 0
NAUSEA: 0
FOCAL WEAKNESS: 0
VOMITING: 0
SPUTUM PRODUCTION: 0

## 2022-04-12 ASSESSMENT — PAIN DESCRIPTION - PAIN TYPE
TYPE: CHRONIC PAIN

## 2022-04-12 NOTE — CARE PLAN
The patient is Stable - Low risk of patient condition declining or worsening    Shift Goals  Clinical Goals: Tolerate clear diet, no n/v/cough, sleep  Patient Goals: Tolerate new diets, discharge home Wednesday  Family Goals: N/A    Progress made toward(s) clinical / shift goals:    Problem: Pain - Standard  Goal: Alleviation of pain or a reduction in pain to the patient’s comfort goal  4/12/2022 0227 by Shauna Mckeon R.N.  Outcome: Progressing    Problem: Knowledge Deficit - Standard  Goal: Patient and family/care givers will demonstrate understanding of plan of care, disease process/condition, diagnostic tests and medications  4/12/2022 0227 by KATINA MclaughlinN.  Outcome: Progressing  4/12/2022 0224 by KATINA MclaughlinN.  Outcome: Progressing     Problem: Gastrointestinal Irritability  Goal: Nausea and vomiting will be absent or improve  4/12/2022 0227 by Shauna Mckeon R.N.  Outcome: Progressing  4/12/2022 0224 by Shauna Mckeon RLuigiN.  Outcome: Progressing     Problem: Diabetes Management  Goal: Patient will achieve and maintain glucose in satisfactory range  Outcome: Progressing     Problem: Knowledge Deficit - Diabetes  Goal: Patient will demonstrate knowledge of insulin injection, symptoms, and treatment of hypoglycemia and diet prior to discharge  Outcome: Progressing     Problem: Discharge Planning - Diabetes  Goal: Patient's continuum of care needs will be met  Outcome: Progressing          Patient is not progressing towards the following goals:

## 2022-04-12 NOTE — PROGRESS NOTES
Jacqueline from Lab called with critical result of K 2.7. Critical lab result read back to Jacqueline.   FIDELINA Bello notified of critical lab result. New orders to be placed.

## 2022-04-12 NOTE — PROGRESS NOTES
Received report from previous shift RN  Assessment complete.  A&O x 4. Patient calls appropriately.  Patient ambulates with no assist. Bed alarm off.   Patient has 5/10 pain. Pain managed with prescribed medications.  Denies N&V currently. States nausea 3/10 earlier today. No emesis or coughing noted today. Tolerating clear diet with no reds.  + void, + flatus, + BM 4/11, tarry per pt.  Patient denies SOB.  SCD's off, patient ambulatory.  Patient s/o at bedside. Denies any questions at this time  Review plan with of care with patient. Call light and personal belongings with in reach. Hourly rounding in place. All needs met at this time.

## 2022-04-12 NOTE — PROGRESS NOTES
[facility] Medicine Daily Progress Note    Date of Service  4/12/2022    Chief Complaint  Malena Bennett is a 21 y.o. female admitted 3/31/2022 with cough, N/V & hematemesis     Hospital Course  A 21 y.o. female who presented 3/31/2022 with hematemesis.  PMH of DM1 with insulin pump, SLE on Plaquenil, factor V Leyden deficiency, anxiety, GERD, erosive gastritis, celiac disease. Hematemesis described as bright red blood mixed with clots, occasional coffee ground appearance, has pictures on her phone which were reviewed. Admitted from 3/21-3/28 for new onset hemoptysis, going on for about 2 weeks now. Symptoms were thought to be due to lupus flare, she was discharged on prednisone taper.  She does have some epigastric pain and says she can occasionally feel it coming on prior to vomiting the blood.  Denies fever/chills, epistaxis, melena.     During last admission she was evaluated by GI and had an EGD that showed possible small varices and esophagitis, biopsy showed inflammation and evidence of reactive changes, small eosinophilia.  Pulmonary consulted, CTA chest last admission was negative for acute changes.  She had bronchoscopy with no return of blood on BAL.  Otolaryngology was consulted, bedside procedure performed of the nasopharynx which showed no evidence of bleeding.     History of SLE on Plaquenil for the past couple years, says it improved her symptoms slightly but she still has lupus flares described as Maller rash, oral ulcers, joint pain, fatigue.  She tried methotrexate but could not tolerate it, so she was not offered DMARDs by her rheumatologist.     In the ED hemodynamically stable, BP 90s-low 100s which is baseline.  Labs unremarkable.  CTA chest done in the ED negative for acute changes.     4/4 afebrile, vitals stable, on room air.  Labs continue to be largely unremarkable.  No leukopenia, anemia or thrombocytopenia. CRP negative, sed rate negative.  Kidney function normal.  No  hematuria. C3/C4 not elevated. Slight proteinuria, which is the only clinical symptom that could indicate lupus flare.  Sugars controlled.   Patient is hopeful for some inpatient immunosuppressant treatment. Nausea is overall improved today, but still having hemoptysis.  Called [facility], Dr De Los Santos recommended disintegrating steroid tablet (pharmacy does not carry) or fluticasone inhaler to treat her eosinophilic esophagitis.  He does recommend stopping systemic steroids and does not feel that she is in a lupus flare up, would not recommend immunosuppressants. Patient agreeable to try inhaler, but insistent that she needs immunosuppressants.  She requested to talk to Dr De Los Santos herself.  4/5: Persistent hemoptysis, hematemesis is decreasing. PO intake improving. H/H stable. SBPs soft, but asymptomatic and at her baseline mentation. Doing well on room air. 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 Discussed case with Oncology on-call, who said patient was seen by Dr. Montesinos in 12/2019 and hemophiliac and coagulopathic work-up was negative. APTT is not elevated. INR normal. PLT normal. Thus, no suspicion for underlying hematologic process. Continue with lupus treatment.   4/6: Patient reports nausea and hematemesis x 2 overnight.  Volume of emesis is slightly increased today from previous days. Hypoglycemic this morning and received dextrose with BG improvement. Soft blood pressures but appears euvolemic on exam.    4/7: Ongoing hematemesis with increasing frequency per patient. Hemodynamically stable. Recommend a trial of 48-72 hours strict NPO and monitoring for symptom improvement (this was previously recommended by GI, but patient declined at that time). I do not feel strongly about cortrak placement given her constant dry-heaving and concern for further esophogeal irritation, but can reconsider in the next 2-3 days. I also offered to  re-consult GI, however patient declined since she would not want to undergo any further invasives procedures.   4/8: Patient c/o dizziness with standing overnight. May be secondary to ativan and/or hypovolemia. Patient denies any hematemesis x 24 hours, but did report coughing up clots. Added tessalon capsules. H/H stable. Leukocytosis resolved. Blood pressures improved. Continuing NPO for another 24 hours to see if hemoptysis improves. Also denies BM x 7 days, but +flatus. No abdominal pain or distension.   4/9: No hematemesis x 48 hours. Reports hemoptysis x 2 overnight. Cough less frequent with addition of delsym. Acute onset RUE swelling, erythema and tenderness at sight of PIV. US ordered to r/o thrombus. WBC normal. Hgb 11.7 from 11.9. SBPs 88-95, suspect is secondary to sedating medications. Orthostatic VS were negative. Continuing strict NPO today and can consider of clears tomorrow.   4/10: Still without vomiting. She denies any hemoptysis overnight or this morning. She has still not had a BM, but also minimal to no intake for > 7 days. US of RUE is still pending, but pain, swelling and erythema improving.   4/11: Tolerated sips of water (1 sip every hr per patient) without n/v or hemoptysis. Will trial broth for lunch/dinner today and oral PPI. If still without n/v or cough, then patient is agreeable to transitioning to PO medications starting tomorrow. Lab reviewed and stable. US RUE showed multiple superficial thrombus.    Interval Problem Update  Patient sitting upright in bed, pleasant and cooperative.  She is fully alert and oriented.  Her nausea is significantly improved and her pain is at baseline.  She has had no vomiting for 4 days.  She requests a trial of full liquid diet today.  Her constipation is resolved, however now she has bad diarrhea.  -Potassium 2.7.  IV repletion initiated with cardiac monitoring  -Redraw potassium at 1600  -Full liquid diet  -IV switched to oral antinausea  medications. Monitor.  -may change the rest of her medications to oral tomorrow if patient tolerates diet.    I have personally seen and examined the patient at bedside. I discussed the plan of care with patient, family, bedside RN, charge RN,  and pharmacy.    Consultants/Specialty  GI   Rheumatology - spoke with Dr De Los Santos with [facility]      Code Status  Full Code    Disposition  Patient is not medically cleared for discharge.   Anticipate discharge to to home with close outpatient follow-up.  I have placed the appropriate orders for post-discharge needs.    Review of Systems  Review of Systems   Constitutional: Positive for malaise/fatigue. Negative for chills and fever.   Respiratory: Negative for cough, hemoptysis, sputum production, shortness of breath and wheezing.    Cardiovascular: Negative for chest pain, palpitations and leg swelling.   Gastrointestinal: Positive for diarrhea. Negative for abdominal pain, constipation, heartburn, nausea and vomiting.   Genitourinary: Negative for dysuria, frequency and urgency.   Musculoskeletal: Positive for joint pain.   Neurological: Positive for weakness. Negative for dizziness, focal weakness and headaches.   Psychiatric/Behavioral: The patient is not nervous/anxious.         Physical Exam  Temp:  [36 °C (96.8 °F)-36.5 °C (97.7 °F)] 36.5 °C (97.7 °F)  Pulse:  [53-63] 63  Resp:  [16-18] 16  BP: (89-98)/(55-66) 89/55  SpO2:  [95 %-100 %] 95 %    Physical Exam  Vitals and nursing note reviewed.   Constitutional:       General: She is not in acute distress.     Appearance: She is not ill-appearing or toxic-appearing.   HENT:      Head: Normocephalic.      Nose: Nose normal.      Mouth/Throat:      Mouth: Mucous membranes are moist.      Pharynx: No posterior oropharyngeal erythema.      Comments: No petechial hemorrhages   Eyes:      General: No scleral icterus.        Right eye: No discharge.         Left eye: No discharge.      Conjunctiva/sclera:  Conjunctivae normal.   Cardiovascular:      Rate and Rhythm: Normal rate.      Heart sounds: No murmur heard.    No gallop.   Pulmonary:      Effort: Pulmonary effort is normal.      Breath sounds: No stridor. No wheezing, rhonchi or rales.   Abdominal:      General: Bowel sounds are normal. There is no distension.      Palpations: Abdomen is soft.      Tenderness: There is no abdominal tenderness. There is no guarding.   Musculoskeletal:      Right forearm: No swelling or tenderness.      Cervical back: Normal range of motion. No rigidity.      Right lower leg: No edema.      Left lower leg: No edema.   Skin:     General: Skin is warm and dry.      Coloration: Skin is not jaundiced.      Findings: Bruising present.      Comments: No active bleeding    Neurological:      Mental Status: She is alert and oriented to person, place, and time.   Psychiatric:         Mood and Affect: Mood normal.         Thought Content: Thought content normal.         Judgment: Judgment normal.         Fluids    Intake/Output Summary (Last 24 hours) at 4/12/2022 1134  Last data filed at 4/12/2022 1100  Gross per 24 hour   Intake 1490 ml   Output --   Net 1490 ml       Laboratory  Recent Labs     04/10/22  0206 04/11/22  0135   WBC 7.1 8.3   RBC 3.69* 3.57*   HEMOGLOBIN 11.1* 10.8*   HEMATOCRIT 34.4* 32.1*   MCV 93.2 89.9   MCH 30.1 30.3   MCHC 32.3* 33.6   RDW 41.2 40.2   PLATELETCT 179 200   MPV 10.3 10.2     Recent Labs     04/12/22  0954   SODIUM 140   POTASSIUM 2.7*   CHLORIDE 104   CO2 24   GLUCOSE 86   BUN 6*   CREATININE 0.67   CALCIUM 8.7                   Imaging  IR-US GUIDED PIV   Final Result    Ultrasound-guided PERIPHERAL IV INSERTION performed by    qualified nursing staff as above.      US-EXTREMITY VENOUS UPPER UNILAT RIGHT   Final Result      IR-US GUIDED PIV   Final Result    Ultrasound-guided PERIPHERAL IV INSERTION performed by    qualified nursing staff as above.      CT-CTA CHEST PULMONARY ARTERY W/ RECONS   Final  Result         1.  No pulmonary embolus appreciated.           Assessment/Plan  * Hematemesis with nausea- (present on admission)  Assessment & Plan  EGD revealed esophagitis. Thought to be exacerbated by lupus flare   Recent admission where she was evaluated by GI with an EGD, pulmonary with a bronchoscopy, ENT with a nasopharyngoscopy  Gastric biopsy negative for malignancy or infectious process   H. Pylori negative     4/7: ongoing and worsening. H/H stable. Soft blood pressures,but asymptomatic. Recommended strict NPO to allow time for esophagitis to heal, but patient and family would allow some time to think this over. In the meantime, will restart IVFs   4/8: no hematemesis x 24hours, but still persistent hemoptysis. H/H stable.Nomotensive. Continue strict NPO and monitor for improvement  4/9: No hematemesis x 48 hours. Hemoptysis less frequent with addition of delsym . Continue NPO for now and scheduled antiemetics. Trial of sips of clears tomorrow   4/10: No hematemesis . No hemoptysis overnight or this morning. Advance diet to sips of water.   4/11: Tolerated sips of water. No n/v or hemoptysis. Continue clears today and will slowly transition medications to PO as able   4/12: Hematemesis resolved.  Mild nausea controlled with current regimen.  Trial of full liquid diet today.    Superficial thrombophlebitis of right upper extremity  Assessment & Plan  US revealed acute occlusive superficial thrombophlebitis in the right median antecubital vein extending into cephalic vein in the proximal to mid forearm.  No evidence of DVT   Symptom management     Constipation- (present on admission)  Assessment & Plan  + flatus, but no BM for >7 days   Her abdominal exam is overall benign. We discussed s/s of SBO such as abdominal pain, hard/firm abdomen or not passing gas  She has had minimal to no PO intake throughout this hospitalization   Patient cannot tolerate oral bowel regimen and declines suppositories/ enemas    Will monitor closely for now   Advance bowel regimen once able to tolerate PO consistently   Resolved  Now with diarrhea    Anemia of chronic disease  Assessment & Plan  H/H stable   Iron studies normal  Follow daily CBC   Transfuse for Hgb < 7     Gastroesophageal reflux disease with esophagitis and hemorrhage- (present on admission)  Assessment & Plan  History of GERD with esophagitis seen on recent EGD.    4/11: IV Protonix changed to omeprazole   Advanced to full liquid diet, monitor    Hypokalemia due to excessive gastrointestinal loss of potassium  Assessment & Plan  Replete as needed   4/12 IV repletion for potassium of 2.7.  Cardiac monitor during IV repletion.    Lupus (HCC)- (present on admission)  Assessment & Plan  History of SLE on Plaquenil, says it controls her symptoms somewhat but she continues to have lupus flares.  Could not tolerate methotrexate in the past    4/4: Her case was discussed with Rheumatologist, Dr. De Los Santos , who recommended prednisone taper, continue hydroxychloroquine 400mg daily , and Benlysta 200mg SC daily to control Lupus flare as this was likely exacerbating esophagitis . He will follow as outpatient in 1-2 weeks   4/5: I sent Benlysta 200mg Q weekly to [facility] pharmacy and they are working on prior authorization. I discussed with our inpatient pharmacist, who confirmed that this cannot be ordered as an inpatient, since we do not carry it on formulary.  4/6: Patient says Benlysta was approved and should be available today. I instructed her to give this medication to the RN so that our pharmacy can add it to her MAR.  4/8: Received Benlysta yesterday. Prednisone changed to IV solumedrol while NPO. Holding Plaquenil until able to tolerate PO   4/9-4/11: Continue IV solumedrol . Planning to transition to PO prednisone taper Wednesday if tolerating PO intake   4/12: Trial of full liquid diet    Type 2 diabetes mellitus with hyperglycemia, with long-term current use of insulin  ([facility])- (present on admission)  Assessment & Plan  Initially reported T1DM, but per documentation from Endocrinologist and PCP between [id number] patient is actually Type 2   Currently holding insulin pump   Diabetic diet and blood sugar monitoring     4/6: BG 52 this morning . Patient says she has been counting her carbs and covering herself with 1 unit insulin per 15 carbs , which she says was previously agreed upon with last Hospitalist. She said she gave herself 2 units insulin before bed last night, but then vomited the little food she did eat. I instructed her to communicate her blood sugar and insulin dosage with bedside RN prior to administration to ensure we are monitoring and covering BG appropriately.   4/7: euglycemic. Continue to monitor   4/8: BG well controlled. Patient instructed NOT to inject personal insulin while NPO.   4/9-4/11: Blood sugars well controlled. Continue to monitor. Continue D5W/LR until tolerating PO intake   4/12: Well-managed on current regimen    Factor V Leiden ([facility])- (present on admission)  Assessment & Plan  History of factor V Leyden and SLE.  No history of DVT  SCD ordered. Patient is ambulatory   Chemoprophylaxis contraindicated in setting of hematemesis and hemoptysis   Outpatient follow-up as needed        VTE prophylaxis: pharmacologic prophylaxis contraindicated due to high bleeding risk     DWAYNE Coto.

## 2022-04-12 NOTE — CARE PLAN
The patient is {Patient Stability:[id number]}    Shift Goals  Clinical Goals: Tolerate clear diet, no n/v/cough, sleep  Patient Goals: Tolerate new diets, discharge home Wednesday  Family Goals: N/A    Progress made toward(s) clinical / shift goals:  ***    Patient is not progressing towards the following goals:      Problem: Pain - Standard  Goal: Alleviation of pain or a reduction in pain to the patient’s comfort goal  Outcome: Not Progressing

## 2022-04-13 ENCOUNTER — PHARMACY VISIT (OUTPATIENT)
Dept: PHARMACY | Facility: MEDICAL CENTER | Age: 22
End: 2022-04-13
Payer: COMMERCIAL

## 2022-04-13 VITALS
WEIGHT: 203.71 LBS | DIASTOLIC BLOOD PRESSURE: 70 MMHG | BODY MASS INDEX: 31.97 KG/M2 | HEART RATE: 77 BPM | SYSTOLIC BLOOD PRESSURE: 103 MMHG | TEMPERATURE: 97.8 F | OXYGEN SATURATION: 96 % | HEIGHT: 67 IN | RESPIRATION RATE: 18 BRPM

## 2022-04-13 PROBLEM — K59.00 CONSTIPATION: Status: RESOLVED | Noted: 2022-04-10 | Resolved: 2022-04-13

## 2022-04-13 PROBLEM — K92.0 HEMATEMESIS WITH NAUSEA: Status: RESOLVED | Noted: 2022-03-31 | Resolved: 2022-04-13

## 2022-04-13 PROBLEM — K21.01 GASTROESOPHAGEAL REFLUX DISEASE WITH ESOPHAGITIS AND HEMORRHAGE: Status: RESOLVED | Noted: 2022-03-31 | Resolved: 2022-04-13

## 2022-04-13 PROBLEM — I80.8 SUPERFICIAL THROMBOPHLEBITIS OF RIGHT UPPER EXTREMITY: Status: RESOLVED | Noted: 2022-04-11 | Resolved: 2022-04-13

## 2022-04-13 LAB
ANION GAP SERPL CALC-SCNC: 11 MMOL/L (ref 7–16)
BUN SERPL-MCNC: 3 MG/DL (ref 8–22)
CALCIUM SERPL-MCNC: 9 MG/DL (ref 8.5–10.5)
CHLORIDE SERPL-SCNC: 103 MMOL/L (ref 96–112)
CO2 SERPL-SCNC: 23 MMOL/L (ref 20–33)
CREAT SERPL-MCNC: 0.48 MG/DL (ref 0.5–1.4)
ERYTHROCYTE [DISTWIDTH] IN BLOOD BY AUTOMATED COUNT: 40.7 FL (ref 35.9–50)
GFR SERPLBLD CREATININE-BSD FMLA CKD-EPI: 137 ML/MIN/1.73 M 2
GLUCOSE SERPL-MCNC: 137 MG/DL (ref 65–99)
HCT VFR BLD AUTO: 35.9 % (ref 37–47)
HGB BLD-MCNC: 11.9 G/DL (ref 12–16)
MAGNESIUM SERPL-MCNC: 2 MG/DL (ref 1.5–2.5)
MCH RBC QN AUTO: 30.3 PG (ref 27–33)
MCHC RBC AUTO-ENTMCNC: 33.1 G/DL (ref 33.6–35)
MCV RBC AUTO: 91.3 FL (ref 81.4–97.8)
PHOSPHATE SERPL-MCNC: 4 MG/DL (ref 2.5–4.5)
PLATELET # BLD AUTO: 204 K/UL (ref 164–446)
PMV BLD AUTO: 10 FL (ref 9–12.9)
POTASSIUM SERPL-SCNC: 4 MMOL/L (ref 3.6–5.5)
RBC # BLD AUTO: 3.93 M/UL (ref 4.2–5.4)
SODIUM SERPL-SCNC: 137 MMOL/L (ref 135–145)
WBC # BLD AUTO: 6.7 K/UL (ref 4.8–10.8)

## 2022-04-13 PROCEDURE — RXMED WILLOW AMBULATORY MEDICATION CHARGE: Performed by: NURSE PRACTITIONER

## 2022-04-13 PROCEDURE — 84100 ASSAY OF PHOSPHORUS: CPT

## 2022-04-13 PROCEDURE — 36415 COLL VENOUS BLD VENIPUNCTURE: CPT

## 2022-04-13 PROCEDURE — 700102 HCHG RX REV CODE 250 W/ 637 OVERRIDE(OP): Performed by: NURSE PRACTITIONER

## 2022-04-13 PROCEDURE — 85027 COMPLETE CBC AUTOMATED: CPT

## 2022-04-13 PROCEDURE — A9270 NON-COVERED ITEM OR SERVICE: HCPCS | Performed by: NURSE PRACTITIONER

## 2022-04-13 PROCEDURE — 700111 HCHG RX REV CODE 636 W/ 250 OVERRIDE (IP): Performed by: NURSE PRACTITIONER

## 2022-04-13 PROCEDURE — 83735 ASSAY OF MAGNESIUM: CPT

## 2022-04-13 PROCEDURE — 700101 HCHG RX REV CODE 250: Performed by: NURSE PRACTITIONER

## 2022-04-13 PROCEDURE — 99239 HOSP IP/OBS DSCHRG MGMT >30: CPT | Performed by: NURSE PRACTITIONER

## 2022-04-13 PROCEDURE — 80048 BASIC METABOLIC PNL TOTAL CA: CPT

## 2022-04-13 RX ORDER — OMEPRAZOLE 20 MG/1
20 CAPSULE, DELAYED RELEASE ORAL 2 TIMES DAILY
Qty: 60 CAPSULE | Refills: 0 | Status: SHIPPED | OUTPATIENT
Start: 2022-04-13 | End: 2022-05-13

## 2022-04-13 RX ORDER — PROCHLORPERAZINE MALEATE 10 MG
10 TABLET ORAL EVERY 6 HOURS PRN
Qty: 90 TABLET | Refills: 0 | Status: SHIPPED | OUTPATIENT
Start: 2022-04-13 | End: 2022-05-13

## 2022-04-13 RX ORDER — SCOLOPAMINE TRANSDERMAL SYSTEM 1 MG/1
1 PATCH, EXTENDED RELEASE TRANSDERMAL
Qty: 4 PATCH | Refills: 3 | Status: SHIPPED | OUTPATIENT
Start: 2022-04-16

## 2022-04-13 RX ORDER — PREDNISONE 10 MG/1
TABLET ORAL
Qty: 117 TABLET | Refills: 0 | Status: SHIPPED | OUTPATIENT
Start: 2022-04-14 | End: 2022-05-21

## 2022-04-13 RX ORDER — ONDANSETRON 4 MG/1
8 TABLET, ORALLY DISINTEGRATING ORAL EVERY 4 HOURS PRN
Qty: 120 TABLET | Refills: 0 | Status: SHIPPED | OUTPATIENT
Start: 2022-04-13

## 2022-04-13 RX ORDER — PROMETHAZINE HYDROCHLORIDE 25 MG/1
25 TABLET ORAL EVERY 4 HOURS PRN
Qty: 6 TABLET | Refills: 0 | Status: SHIPPED | OUTPATIENT
Start: 2022-04-13

## 2022-04-13 RX ORDER — DEXTROMETHORPHAN POLISTIREX 30 MG/5ML
60 SUSPENSION ORAL 2 TIMES DAILY PRN
Qty: 89 ML | Refills: 0 | Status: SHIPPED | OUTPATIENT
Start: 2022-04-13 | End: 2022-04-17

## 2022-04-13 RX ADMIN — PROCHLORPERAZINE MALEATE 10 MG: 10 TABLET ORAL at 11:02

## 2022-04-13 RX ADMIN — DEXTROSE MONOHYDRATE, SODIUM CHLORIDE, SODIUM LACTATE, POTASSIUM CHLORIDE, CALCIUM CHLORIDE: 5; 600; 310; 179; 20 INJECTION, SOLUTION INTRAVENOUS at 00:34

## 2022-04-13 RX ADMIN — ONDANSETRON 4 MG: 4 TABLET, ORALLY DISINTEGRATING ORAL at 00:27

## 2022-04-13 RX ADMIN — PROCHLORPERAZINE MALEATE 10 MG: 10 TABLET ORAL at 00:27

## 2022-04-13 RX ADMIN — ONDANSETRON 4 MG: 4 TABLET, ORALLY DISINTEGRATING ORAL at 04:40

## 2022-04-13 RX ADMIN — ONDANSETRON 4 MG: 4 TABLET, ORALLY DISINTEGRATING ORAL at 11:02

## 2022-04-13 RX ADMIN — PREDNISONE 60 MG: 10 TABLET ORAL at 11:02

## 2022-04-13 RX ADMIN — DEXTROMETHORPHAN POLISTIREX 60 MG: 30 SUSPENSION, EXTENDED RELEASE ORAL at 04:39

## 2022-04-13 RX ADMIN — SCOPALAMINE 1 PATCH: 1 PATCH, EXTENDED RELEASE TRANSDERMAL at 08:16

## 2022-04-13 RX ADMIN — OMEPRAZOLE 20 MG: 20 CAPSULE, DELAYED RELEASE ORAL at 04:40

## 2022-04-13 RX ADMIN — PROCHLORPERAZINE MALEATE 10 MG: 10 TABLET ORAL at 04:39

## 2022-04-13 ASSESSMENT — PAIN DESCRIPTION - PAIN TYPE
TYPE: CHRONIC PAIN

## 2022-04-13 NOTE — PROGRESS NOTES
AA&Ox4.   RA. Denies SOB.  Reporting 5/10 pain. Tolerable per pt.  Tolerating full liquids.  + void.   LBM 4/12.   Pt up independently and ambulating frequently.  All needs met at this time. Call light within reach. Pt calls appropriately

## 2022-04-13 NOTE — PROGRESS NOTES
Monitor Strip Summary     SB-SR,-ST:   Rare PVC  .16/.08/.38      Per monitor strip summary report

## 2022-04-13 NOTE — CARE PLAN
Problem: Knowledge Deficit - Standard  Goal: Patient and family/care givers will demonstrate understanding of plan of care, disease process/condition, diagnostic tests and medications  Outcome: Progressing     Problem: Pain - Standard  Goal: Alleviation of pain or a reduction in pain to the patient’s comfort goal  Outcome: Progressing     Problem: Gastrointestinal Irritability  Goal: Nausea and vomiting will be absent or improve  Outcome: Progressing     The patient is Stable - Low risk of patient condition declining or worsening    Shift Goals  Clinical Goals: Advance and tolerate diet  Patient Goals: Tolerate new diets, discharge home Wednesday  Family Goals: N/A    Progress made toward(s) clinical / shift goals:  Pt tolerating full liquids this shift.    Patient is not progressing towards the following goals:

## 2022-04-13 NOTE — DISCHARGE INSTRUCTIONS
Hemoptysis  Hemoptysis means coughing up blood from your airways or lungs. The most common cause of hemoptysis is the least serious. It is usually a ruptured small blood vessel caused by coughing or an infection. In some cases, the cause of hemoptysis is not known. Hemoptysis may also be a sign of a more serious problem, such as cancer, pneumonia, a blood clot, or other types of lung disease. You should always contact a caregiver if you develop hemoptysis. This is important, as even mild cases of hemoptysis may lead to serious breathing problems. Major bleeding from the airway is considered a medical emergency, and needs to be evaluated and managed promptly to avoid complications, disability, or death.  Your caregiver may perform tests to find out if the bleeding is coming from your lungs. Some of these tests may include:  · A chest X-ray.   · A computerized X-ray scan (CT scan or CAT scan).   · Bronchoscopy. This test uses a flexible tube (a bronchoscope) to see inside the lungs.   TREATMENT   · Treatment for hemoptysis depends on the cause. It also depends on the quantity of blood. Infrequent, mild hemoptysis usually does not require specific, immediate treatment.   · If the cause of hemoptysis is unknown, treatment may involve monitoring for at least 2 or 3 years. If you have a normal chest X-ray and bronchoscopy, the hemoptysis usually clears within 6 months.   SEEK MEDICAL CARE IF:   · For follow-up care as directed.   · If your symptoms are not improving or are getting worse.   · If you have any other questions or concerns.   SEEK IMMEDIATE MEDICAL CARE IF:   · You begin to cough up large amounts of blood.   · You develop problems with your breathing.   · You begin vomiting blood or see blood in your stool.   · You develop chest pain.   · You feel faint or pass out.   · You develop a fever over 102° F (38.9° C), or as your caregiver suggests.   Document Released: 01/25/2006 Document Revised: 03/11/2013  Document Reviewed: 05/03/2011  [employer]® Patient Information ©2013 [employer]      Hematemesis  Hematemesis is when you vomit blood. It is a sign of bleeding in the upper GI tract (gastrointestinal tract). The upper GI tract includes the mouth, throat, esophagus, stomach, and the upper part of the small intestine (duodenum). Hematemesis is usually caused by bleeding in the esophagus or stomach.  You may suddenly vomit bright red blood. Or, the blood may look like coffee grounds. You may also have other symptoms, such as:  · Stomach pain.  · Heartburn.  · Stool (feces) that looks black and tarry.  Follow these instructions at home:    · Take over-the-counter and prescription medicines only as told by your health care provider.  · Do not take NSAIDs, including aspirin and ibuprofen, unless your health care provider approves. These medicines can increase bleeding.  · Rest as needed.  · Drink enough fluids to keep your urine pale yellow. Take small sips of fluid at a time.  · Do not drink alcohol.  · Do not use any products that contain nicotine or tobacco, such as cigarettes and e-cigarettes. If you need help quitting, ask your health care provider.  · Keep all follow-up visits as told by your health care provider. This is important.  Contact a health care provider if:  · You have more blood in your vomit.  · Your vomiting of blood begins again after it has stopped.  · You have persistent stomach pain.  · You have nausea, indigestion, or heartburn.  Get help right away if:  · You faint.  · You feel weak or dizzy.  · You are urinating less than normal or not at all.  · You vomit up:  ? Large amounts of blood or dark material that may look like coffee grounds.  ? Bright red blood.  · You have any of the following:  ? Persistent vomiting.  ? A rapid heartbeat.  ? Blood in your stool.  ? Chest pain.  ? Difficulty breathing.  These symptoms may represent a serious problem that is an emergency. Do not wait to see if the  symptoms will go away. Get medical help right away. Call your local emergency services (911 in the [place]). Do not drive yourself to the hospital.  Summary  · Hematemesis is when you vomit blood. It is a sign of bleeding in the upper GI tract (gastrointestinal tract).  · Hematemesis is usually caused by bleeding in the esophagus or stomach.  · Do not take NSAIDs (including aspirin and ibuprofen), drink alcohol, or use tobacco products.  · Take over-the-counter and prescription medicines only as told by your health care provider.  This information is not intended to replace advice given to you by your health care provider. Make sure you discuss any questions you have with your health care provider.  Document Released: 01/25/2006 Document Revised: 12/28/2018 Document Reviewed: 12/28/2018  [email or web address] Patient Education © 2020 [email or web address]        Esophagitis    Esophagitis is inflammation of the esophagus. The esophagus is the tube that carries food from your mouth to your stomach. Esophagitis can cause soreness or pain in the esophagus. This condition can make it difficult and painful to swallow.  What are the causes?  Most causes of esophagitis are not serious. Common causes of this condition include:  · Gastroesophageal reflux disease (GERD). This is when stomach contents move back up into the esophagus (reflux).  · Repeated vomiting.  · An allergic reaction, especially caused by food allergies (eosinophilic esophagitis).  · Injury to the esophagus by swallowing large pills with or without water, or swallowing certain types of medicines.  · Swallowing (ingesting) harmful chemicals, such as household cleaning products.  · Heavy alcohol use.  · An infection of the esophagus. This most often occurs in people who have a weakened immune system.  · Radiation or chemotherapy treatment for cancer.  · Certain diseases such as sarcoidosis, Crohn's disease, and scleroderma.  What are the signs or symptoms?  Symptoms of  this condition include:  · Difficult or painful swallowing.  · Pain with swallowing acidic liquids, such as citrus juices.  · Pain with burping.  · Chest pain.  · Difficulty breathing.  · Nausea.  · Vomiting.  · Pain in the abdomen.  · Weight loss.  · Ulcers in the mouth.  · Patches of white material in the mouth (candidiasis).  · Fever.  · Coughing up blood or vomiting blood.  · Stool that is black, tarry, or bright red.  How is this diagnosed?  Your health care provider will take a medical history and perform a physical exam. You may also have other tests, including:  · An endoscopy to examine your esophagus and stomach with a small flexible tube with a camera.  · A test that measures the acidity level in your esophagus.  · A test that measures how much pressure is on your esophagus.  · A barium swallow or modified barium swallow to show the shape, size, and functioning of your esophagus.  · Allergy tests.  How is this treated?  Treatment for this condition depends on the cause of your esophagitis. In some cases, steroids or other medicines may be given to help relieve your symptoms or to treat the underlying cause of your condition. You may have to make some lifestyle changes, such as:  · Avoiding alcohol.  · Quitting smoking.  · Changing your diet.  · Exercising.  · Changing your sleep habits and your sleep environment.  Follow these instructions at home:  Medicines  · Take over-the-counter and prescription medicines only as told by your health care provider.  · Do not take aspirin, ibuprofen, or other NSAIDs unless your health care provider told you to do so.  · If you have trouble taking pills:  ? Use a pill splitter to decrease the size of the pill. This will decrease the chance of the pill getting stuck or injuring your esophagus.  ? Drink water after you take a pill.  Eating and drinking    · Avoid foods and drinks that seem to make your symptoms worse.  · Follow a diet as recommended by your health care  provider. This may involve avoiding foods and drinks such as:  ? Coffee and tea (with or without caffeine).  ? Drinks that contain alcohol.  ? Energy drinks and sports drinks.  ? Carbonated drinks or sodas.  ? Chocolate and cocoa.  ? Peppermint and mint flavorings.  ? Garlic and onions.  ? Horseradish.  ? Spicy and acidic foods, including peppers, chili powder, tristan powder, vinegar, hot sauces, and barbecue sauce.  ? Citrus fruit juices and citrus fruits, such as oranges, travis, and limes.  ? Tomato-based foods, such as red sauce, chili, salsa, and pizza with red sauce.  ? Fried and fatty foods, such as donuts, french fries, potato chips, and high-fat dressings.  ? High-fat meats, such as hot dogs and fatty cuts of red and white meats, such as rib eye steak, sausage, ham, and mcmullen.  ? High-fat dairy items, such as whole milk, butter, and cream cheese.  Lifestyle  · Eat small, frequent meals instead of large meals.  · Avoid drinking large amounts of liquid with your meals.  · Avoid eating meals during the 2-3 hours before bedtime.  · Avoid lying down right after you eat.  · Do not exercise right after you eat.  · Do not use any products that contain nicotine or tobacco, such as cigarettes and e-cigarettes. If you need help quitting, ask your health care provider.  General instructions    · Pay attention to any changes in your symptoms. Let your health care provider know about them.  · Wear loose-fitting clothing. Do not wear anything tight around your waist that causes pressure on your abdomen.  · Raise (elevate) the head of your bed about 6 inches (15 cm).  · Try relaxation strategies such as yoga, deep breathing, or meditation to manage stress. If you need help reducing stress, ask your health care provider.  · If you are overweight, reduce your weight to an amount that is healthy for you. Ask your health care provider for guidance about a safe weight loss goal.  · Keep all follow-up visits as told by your  health care provider. This is important.  Contact a health care provider if:  · You have new symptoms.  · You have unexplained weight loss.  · You have difficulty swallowing, or it hurts to swallow.  · You have wheezing or a cough that does not go away.  · Your symptoms do not improve with treatment.  · You have frequent heartburn for more than two weeks.  Get help right away if:  · You have severe pain in your arms, neck, jaw, teeth, or back.  · You feel sweaty, dizzy, or light-headed.  · You have chest pain or shortness of breath.  · You vomit and your vomit looks like blood or coffee grounds.  · Your stool is bloody or black.  · You have a fever.  · You cannot swallow, drink, or eat.  Summary  · Esophagitis is inflammation of the esophagus.  · Most causes of esophagitis are not serious.  · Follow your health care provider's instructions about eating and drinking. Follow instructions on medicines.  · Contact a health care provider if you have new symptoms, have weight loss, or coughing that does not stop.  · Get help right away if you have severe pain in the arms, neck, jaw, teeth or back, or if you have chest pain, shortness of breath, or fever.  This information is not intended to replace advice given to you by your health care provider. Make sure you discuss any questions you have with your health care provider.  Document Released: 01/25/2006 Document Revised: 08/09/2019 Document Reviewed: 08/09/2019  [employer] Patient Education © 2020 [employer]        Discharge Instructions    Discharged to home by car with relative. Discharged via walking, hospital escort: Yes.  Special equipment needed: Not Applicable    Be sure to schedule a follow-up appointment with your primary care doctor or any specialists as instructed.     Discharge Plan:   Influenza Vaccine Indication: Not indicated: Previously immunized this influenza season and > 8 years of age    I understand that a diet low in cholesterol, fat, and sodium is  recommended for good health. Unless I have been given specific instructions below for another diet, I accept this instruction as my diet prescription.   Other diet: as tolerated    Special Instructions: None    · Is patient discharged on Warfarin / Coumadin?   No     Depression / Suicide Risk    As you are discharged from this [facility] facility, it is important to learn how to keep safe from harming yourself.    Recognize the warning signs:  · Abrupt changes in personality, positive or negative- including increase in energy   · Giving away possessions  · Change in eating patterns- significant weight changes-  positive or negative  · Change in sleeping patterns- unable to sleep or sleeping all the time   · Unwillingness or inability to communicate  · Depression  · Unusual sadness, discouragement and loneliness  · Talk of wanting to die  · Neglect of personal appearance   · Rebelliousness- reckless behavior  · Withdrawal from people/activities they love  · Confusion- inability to concentrate     If you or a loved one observes any of these behaviors or has concerns about self-harm, here's what you can do:  · Talk about it- your feelings and reasons for harming yourself  · Remove any means that you might use to hurt yourself (examples: pills, rope, extension cords, firearm)  · Get professional help from the community (Mental Health, Substance Abuse, psychological counseling)  · Do not be alone:Call your Safe Contact- someone whom you trust who will be there for you.  · Call your local CRISIS HOTLINE 179-1038 or 378-905-2618  · Call your local Children's Mobile Crisis Response Team [place] (431) 721-9076 or www.[employer]  · Call the toll free National Suicide Prevention Hotlines   · National Suicide Prevention Lifeline 424-323-YPQY (8077)  · National Hope Line Network 800-SUICIDE (221-3487)

## 2022-04-13 NOTE — CARE PLAN
Problem: Knowledge Deficit - Standard  Goal: Patient and family/care givers will demonstrate understanding of plan of care, disease process/condition, diagnostic tests and medications  Outcome: Progressing     Problem: Pain - Standard  Goal: Alleviation of pain or a reduction in pain to the patient’s comfort goal  Outcome: Progressing   The patient is Stable - Low risk of patient condition declining or worsening    Shift Goals  Clinical Goals: monitor K  Patient Goals: rest, pain control  Family Goals: N/A    Progress made toward(s) clinical / shift goals: pt using heat pack for pain to achieve comfort goal.  POC discussed with pt, pt verbalized understanding of plan.

## 2022-04-13 NOTE — CARE PLAN
Problem: Knowledge Deficit - Standard  Goal: Patient and family/care givers will demonstrate understanding of plan of care, disease process/condition, diagnostic tests and medications  Outcome: Progressing     Problem: Pain - Standard  Goal: Alleviation of pain or a reduction in pain to the patient’s comfort goal  Outcome: Progressing     Problem: Gastrointestinal Irritability  Goal: Nausea and vomiting will be absent or improve  Outcome: Progressing     The patient is Stable - Low risk of patient condition declining or worsening    Shift Goals  Clinical Goals: tolerate diet, discharge  Patient Goals: discharge  Family Goals: N/A    Progress made toward(s) clinical / shift goals:  Patient continues to tolerate diet. Will be discharging today.    Patient is not progressing towards the following goals:

## 2022-04-13 NOTE — DISCHARGE SUMMARY
Discharge Summary    CHIEF COMPLAINT ON ADMISSION  Chief Complaint   Patient presents with   • Cough     Coughing blood.  Recently D/C'd from [facility] was there for one week.  Started two weeks ago.  Thinks it is from the esophagus.   • N/V     Has not been able to keep food down for one week.  States semi tolerating a clear liquid diet.         Reason for Admission  Cough, nausea, vomiting blood    Admission Date  3/31/2022    CODE STATUS  Full Code    HPI & HOSPITAL COURSE  A 21 y.o. female who presented 3/31/2022 with hematemesis.  Past medical history of DM1 with insulin pump, SLE on Plaquenil, factor V Leyden deficiency, anxiety, GERD, erosive gastritis, celiac disease. Hematemesis described as bright red blood mixed with clots, occasional coffee ground appearance, has pictures on her phone which were reviewed. Admitted from 3/21-3/28 for new onset hemoptysis, going on for about 2 weeks now. Symptoms were thought to be due to lupus flare, she was discharged on prednisone taper.  She does have some epigastric pain and says she can occasionally feel it coming on prior to vomiting the blood.   During last admission she was evaluated by GI and had an EGD that showed possible small varices and esophagitis, biopsy showed inflammation and evidence of reactive changes, small eosinophilia.  Pulmonary consulted, CTA chest last admission was negative for acute changes.  She had bronchoscopy with no return of blood on BAL.  Otolaryngology was consulted, bedside procedure performed of the nasopharynx which showed no evidence of bleeding.     History of SLE on Plaquenil for the past couple years, says it improved her symptoms slightly but she still has lupus flares described as Maller rash, oral ulcers, joint pain, fatigue.  She tried methotrexate but could not tolerate it, so she was not offered DMARDs by her rheumatologist.     4/4: Slight proteinuria, which is the only clinical symptom of lupus flare.  Sugars  controlled. Patient is hopeful for some inpatient immunosuppressant treatment. Nausea is overall improved today, but still having hemoptysis.  [facility], Dr De Los Santos, recommended disintegrating steroid tablet (pharmacy does not carry) or fluticasone inhaler to treat her eosinophilic esophagitis. Patient agreeable to try inhaler, but insistent that she needs immunosuppressants.   4/5: Persistent hemoptysis, hematemesis decreasing. PO intake improving. H/H stable. Patient and family expressed concerning for underlying coagulopathy and reported factor VII deficiency. Oncology said patient was seen by Dr. Montesinos in 12/2019 and hemophiliac and coagulopathic work-up was negative. APTT is not elevated. INR normal. PLT normal. Thus, no suspicion for underlying hematologic process.    4/6: Patient reports nausea and hematemesis x 2 overnight.  Volume of emesis is slightly increased today from previous days. Hypoglycemic this morning and received dextrose with BG improvement.    4/7: Ongoing hematemesis with increasing frequency per patient. Patient declined GI consult since she would not want to undergo any further invasives procedures.   4/8: Dizziness with standing overnight. May be secondary to ativan and/or hypovolemia. Patient denies any hematemesis x 24 hours, but did report coughing up clots. Leukocytosis resolved. Continuing NPO for another 24 hours to see if hemoptysis improves. Also denies BM x 7 days, but +flatus.   4/9: No hematemesis x 48 hours. Reports hemoptysis x 2 overnight. Cough less frequent with addition of delsym. Acute onset RUE swelling, erythema and tenderness at sight of PIV.  Ultrasound ordered to rule out thrombus. Continuing strict NPO today, consider clears tomorrow.   4/10: Still without vomiting. Denies hemoptysis overnight or this morning. She has still not had a BM, but also minimal to no intake for > 7 days. Ultrasound of RUE is still pending, but pain, swelling and erythema  improving.   4/11: Tolerated sips of water without nausea, vomiting or hemoptysis. Trial broth for lunch/dinner today and oral PPI. If still without nausea, vomiting or cough, then patient is agreeable to transitioning to oral medications. US RUE showed multiple superficial thrombus.  Patient sitting upright in bed, pleasant and cooperative.  She is fully alert and oriented.  Her nausea is significantly improved and her pain is at baseline.  She has had no vomiting for 5 days.  She has tolerated a full liquid diet overnight with oral antiemetics.  Her constipation and diarrhea are resolved. She feels well enough to discharge home.  -Potassium now within normal limits  -advance slowly as tolerated    Therefore, she is discharged in good and stable condition to home with close outpatient follow-up.    The patient met 2-midnight criteria for an inpatient stay at the time of discharge.    Discharge Date  04/13/22    FOLLOW UP ITEMS POST DISCHARGE  Advance diet slowly as tolerated    DISCHARGE DIAGNOSES  Principal Problem (Resolved):    Hematemesis with nausea POA: Yes  Active Problems:    Factor V Leiden (HCC) (Chronic) POA: Yes      Overview: Heterozygous, single copy of R506Q mutation    Type 2 diabetes mellitus with hyperglycemia, with long-term current use of insulin (HCC) POA: Yes    Lupus (HCC) POA: Yes    Hypokalemia due to excessive gastrointestinal loss of potassium POA: Unknown    Class 1 obesity due to excess calories with serious comorbidity in adult POA: Yes    Anemia of chronic disease POA: Yes  Resolved Problems:    Gastroesophageal reflux disease with esophagitis and hemorrhage POA: Yes    Hematemesis POA: Yes    Constipation POA: Yes    Superficial thrombophlebitis of right upper extremity POA: Yes      FOLLOW UP  Future Appointments   Date Time Provider Department Center   4/26/2022  9:30 AM NADER Wilkerson M.D.  [address] [facility] [address]  [facility]  [address]  [phone]    Call  referral has been placed    NADER Wilkerson  [address] [id number] [phone]    Schedule an appointment as soon as possible for a visit in 1 week        MEDICATIONS ON DISCHARGE     Medication List      START taking these medications      Instructions   Benlysta 200 MG/ML Soaj  Generic drug: Belimumab   Inject 200 mg under the skin every 7 days.  Dose: 200 mg     Dextromethorphan Polistirex ER 30 MG/5ML Suer  Commonly known as: DELSYM   Take 10 mL by mouth 2 times a day as needed for Cough for up to 4 days.  Dose: 60 mg     promethazine 6.25 MG/5ML Syrp  Commonly known as: PHENERGAN   Take 20 mL by mouth every four hours as needed for Nausea/Vomiting.  Dose: 25 mg     scopolamine 1 mg/72hr Pt72  Start taking on: April 16, 2022  Commonly known as: TRANSDERM-SCOP   Place 1 Patch on the skin every 72 hours.  Dose: 1 Patch        CHANGE how you take these medications      Instructions   omeprazole 20 MG delayed-release capsule  What changed: when to take this  Commonly known as: PRILOSEC   Take 1 Capsule by mouth 2 times a day for 30 days.  Dose: 20 mg     predniSONE 10 MG Tabs  Start taking on: April 14, 2022  What changed:   · medication strength  · See the new instructions.  Commonly known as: DELTASONE   Take 6 Tablets by mouth every day for 2 days, THEN 5 Tablets every day for 7 days, THEN 4 Tablets every day for 7 days, THEN 3 Tablets every day for 7 days, THEN 2 Tablets every day for 7 days, THEN 1 Tablet every day for 7 days.     prochlorperazine 10 MG Tabs  What changed:   · medication strength  · how much to take  Commonly known as: COMPAZINE   Take 1 Tablet by mouth every 6 hours as needed for Nausea/Vomiting for up to 30 days.  Dose: 10 mg        CONTINUE taking these medications      Instructions   cyanocobalamin 1000 MCG/ML Soln  Commonly known as: VITAMIN B-12   Inject 1 mL intramuscularly every 14 days.     DULoxetine 60 MG Cpep  delayed-release capsule  Commonly known as: CYMBALTA   Take 1 Capsule by mouth every day.  Dose: 60 mg     hydroxychloroquine 200 MG Tabs  Commonly known as: Plaquenil   Take 2 Tablets by mouth every day.  Dose: 400 mg     insulin infusion pump Aria   Inject 0.7 Units/hr under the skin continuous. Patient's own SQ insulin pump    Type of Insulin: Humalog  Last change of tubing: 3/30/22    Dosing:  Basal rate:   0.7 units/hr   Bolus ratio:   1 unit : 16 g carbohydrate at breakfast, lunch, dinner, snacks  Correction ratio:   1 units for every 45 over 110 mg/dL    Disconnect pump if patient becomes hypoglycemic and altered.  Dose: 0.7 Units/hr     ondansetron 4 MG Tbdp  Commonly known as: Zofran ODT   Take 2 Tablets by mouth every four hours as needed for Nausea.  Dose: 8 mg     vitamin D2 (Ergocalciferol) 1.25 MG (51355 UT) Caps capsule  Commonly known as: Drisdol   Take 50,000 Units by mouth every 72 hours. EVERY 3 DAYS  Dose: 50,000 Units        STOP taking these medications    tizanidine 4 MG Tabs  Commonly known as: ZANAFLEX            Allergies  Allergies   Allergen Reactions   • Cefdinir Rash     rash   • Erythromycin Vomiting and Nausea   • Gluten Meal Diarrhea, Vomiting and Nausea     Celiac        DIET  Orders Placed This Encounter   Procedures   • Diet Order Diet: Full Liquid     Standing Status:   Standing     Number of Occurrences:   1     Order Specific Question:   Diet:     Answer:   Full Liquid [11]       ACTIVITY  As tolerated.  Weight bearing as tolerated    CONSULTATIONS  Rheumatology    PROCEDURES  none    LABORATORY  Lab Results   Component Value Date    SODIUM 137 04/13/2022    POTASSIUM 4.0 04/13/2022    CHLORIDE 103 04/13/2022    CO2 23 04/13/2022    GLUCOSE 137 (H) 04/13/2022    BUN 3 (L) 04/13/2022    CREATININE 0.48 (L) 04/13/2022        Lab Results   Component Value Date    WBC 6.7 04/13/2022    HEMOGLOBIN 11.9 (L) 04/13/2022    HEMATOCRIT 35.9 (L) 04/13/2022    PLATELETCT 204 04/13/2022         Total time of the discharge process exceeds 31 minutes.    DWAYNE Coto.

## 2022-04-13 NOTE — PROGRESS NOTES
Discharging patient home per physician order. Discharged with parents. Insulin pump and patient supplied med returned back to patient. Patient picking up prescription meds from [facility]. Demonstrated understanding of discharge instructions, follow up appointments, home medications, prescriptions. Verbalized understanding of discharge instructions and educational handouts. Stated several reasons why to return to ED or seek medical attention. All questions answered.  All belongings with patient at time of discharge.

## 2022-04-13 NOTE — PROGRESS NOTES
Report received from RN, assumed care at 1845  Pt is A0x4, and responds appropriately  Pt declines any SOB, chest pain, new onset of numbness/tingling  Pt rates pain at 4/10, on a scale of 1-10, pt declines pharmacological intervention at this time.  Pt is voiding adequately and without hesitancy  Pt has + flatus, + bowel sounds, + BM on 4/12  Pt ambulates with independently  Pt is tolerating a full liquid diet, pt denies any nausea/vomiting  Plan of care discussed, all questions answered. Explained importance of calling before getting OOB and pt verbalized understanding. Explained importance of oral care. Call light is within reach, treaded slipper socks on, bed in lowest/locked position, hourly rounding in place, all needs met at this time.

## 2022-04-14 ENCOUNTER — PHARMACY VISIT (OUTPATIENT)
Dept: PHARMACY | Facility: MEDICAL CENTER | Age: 22
End: 2022-04-14
Payer: COMMERCIAL

## 2022-04-19 ENCOUNTER — OFFICE VISIT (OUTPATIENT)
Dept: MEDICAL GROUP | Facility: PHYSICIAN GROUP | Age: 22
End: 2022-04-19
Payer: COMMERCIAL

## 2022-04-19 ENCOUNTER — APPOINTMENT (OUTPATIENT)
Dept: RHEUMATOLOGY | Facility: MEDICAL CENTER | Age: 22
End: 2022-04-19
Payer: COMMERCIAL

## 2022-04-19 VITALS
TEMPERATURE: 98 F | OXYGEN SATURATION: 98 % | BODY MASS INDEX: 32.88 KG/M2 | SYSTOLIC BLOOD PRESSURE: 114 MMHG | DIASTOLIC BLOOD PRESSURE: 70 MMHG | HEIGHT: 66 IN | RESPIRATION RATE: 16 BRPM | HEART RATE: 100 BPM

## 2022-04-19 DIAGNOSIS — M32.9 LUPUS (HCC): ICD-10-CM

## 2022-04-19 DIAGNOSIS — Z09 HOSPITAL DISCHARGE FOLLOW-UP: ICD-10-CM

## 2022-04-19 DIAGNOSIS — K92.0 HEMATEMESIS WITH NAUSEA: ICD-10-CM

## 2022-04-19 DIAGNOSIS — R04.2 HEMOPTYSIS: ICD-10-CM

## 2022-04-19 DIAGNOSIS — D50.8 IRON DEFICIENCY ANEMIA SECONDARY TO INADEQUATE DIETARY IRON INTAKE: ICD-10-CM

## 2022-04-19 LAB
FUNGUS SPEC CULT: NORMAL
FUNGUS SPEC FUNGUS STN: NORMAL
SIGNIFICANT IND 70042: NORMAL
SITE SITE: NORMAL
SOURCE SOURCE: NORMAL

## 2022-04-19 PROCEDURE — 99214 OFFICE O/P EST MOD 30 MIN: CPT | Performed by: NURSE PRACTITIONER

## 2022-04-19 NOTE — PROGRESS NOTES
Chief Complaint   Patient presents with   • Hospital Follow-up       HISTORY OF PRESENT ILLNESS: Patient is a 21 y.o. female, established patient who presents today to discuss medical problems as listed below:    Health Maintenance:  COMPLETED     Hospital discharge follow-up  Hospital follow-up from 3/21/2022 to 3/28/2022 and a separate ER visit from 3/31/2022 through 4/13/2022.  On 3/21/2022 patient was admitted with hemoptysis x2 weeks, GI evaluation, status post EGD with possible small varices and esophagitis.  Bronchoscopy was nondiagnostic.  On the following admission patient was admitted with hematemesis and nausea..  Patient was also evaluated by ENT and rheumatology, Dr. De Los Santos..  She was able to tolerate fluids and was discharged in stable condition on Phenergan, scopolamine, Delsym and Belimumab.  She has a follow-up with rheumatology.  Vitals today are stable.  Overall feeling better. Lat night had an episode of hemoptysis. She saw GI today, plan to possible place NG tube to let GI rest. This was discussed at the hospital however she clinically improved. GI will let her know.  Nausea persists besides zofran, compazine, pjenergan and scopolamine.   Rheumatology suspects lupus flare up thus initiated Belimumab as well as tapering off steroids.  Next wk f/u with Dr. De Los Santos.  Plan for outpt NG tube norturnally and liquid diet during the day. Pt drinks bone broth, continues to have normal BMs however GI motility slowed down/ per pt. Tolerating fluids.  Off of Cymbalta for now.   BGs under good control at baseline however during hospitalization, steroid Tx BGS are in 200 range. Seeing endocrinology Dr. Lora this afternoon to f/u with pump settings if placed on OP tube feeds.      Patient Active Problem List    Diagnosis Date Noted   • Anemia of chronic disease 04/06/2022   • Class 1 obesity due to excess calories with serious comorbidity in adult 03/31/2022   • Intractable nausea and vomiting 03/27/2022    • Blood in sputum 03/22/2022   • Hypokalemia due to excessive gastrointestinal loss of potassium 03/21/2022   • Cough 03/08/2022   • Flu-like symptoms 03/08/2022   • Lupus ([facility]) 08/10/2021   • Polymyalgia ([facility]) 08/10/2021   • SOB (shortness of breath) 03/03/2021   • History of asthma 03/03/2021   • Muscle tenderness 03/03/2021   • Hospital discharge follow-up 03/03/2021   • Acute midline thoracic back pain 02/26/2021   • Other chronic pain 01/15/2021   • Vitamin D deficiency 12/14/2020   • Elevated antinuclear antibody (SHANI) level 03/03/2020   • Type 2 diabetes mellitus with hyperglycemia, with long-term current use of insulin ([facility]) 12/17/2019   • 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   • History of cholecystectomy 08/22/2019   • Celiac disease 08/22/2019   • Controlled type 1 diabetes mellitus ([facility]) 08/12/2019   • POTS (postural orthostatic tachycardia syndrome) 12/17/2018   • Hyperglycemia 12/14/2018   • Other irritable bowel syndrome 08/10/2018   • Salt craving 02/02/2018   • Chronic tension-type headache, not intractable 11/09/2016   • Mittelschmerz 09/19/2016   • Tourette syndrome 09/19/2016   • OCD (obsessive compulsive disorder) 09/19/2016   • Factor V Leiden ([facility])         Allergies: Cefdinir, Erythromycin, and Gluten meal    Current Outpatient Medications   Medication Sig Dispense Refill   • omeprazole (PRILOSEC) 20 MG delayed-release capsule Take 1 Capsule by mouth 2 times a day for 30 days. 60 Capsule 0   • scopolamine (TRANSDERM-SCOP) 1 mg/72hr PATCH 72 HR Place 1 Patch on the skin every 72 hours. 4 Patch 3   • promethazine (PHENERGAN) 25 MG Tab Take 1 Tablet by mouth every four hours as needed for Nausea/Vomiting. 6 Tablet 0   • prochlorperazine (COMPAZINE) 10 MG Tab Take 1 Tablet by mouth every 6 hours as needed for Nausea/Vomiting for up to 30 days. 90 Tablet 0   • ondansetron (ZOFRAN ODT) 4 MG TABLET DISPERSIBLE Take 2 Tablets by mouth every four hours as needed for Nausea. 120 Tablet 0    • predniSONE (DELTASONE) 10 MG Tab Take 6 Tablets by mouth every day for 2 days, THEN 5 Tablets every day for 7 days, THEN 4 Tablets every day for 7 days, THEN 3 Tablets every day for 7 days, THEN 2 Tablets every day for 7 days, THEN 1 Tablet every day for 7 days. 117 Tablet 0   • Belimumab 200 MG/ML Solution Auto-injector Inject 200 mg under the skin every 7 days. 3.92 mL 1   • hydroxychloroquine (PLAQUENIL) 200 MG Tab Take 2 Tablets by mouth every day. 90 Tablet 3   • insulin infusion pump Device Inject 0.7 Units/hr under the skin continuous. Patient's own SQ insulin pump    Type of Insulin: Humalog  Last change of tubing: 3/30/22    Dosing:  Basal rate:   0.7 units/hr   Bolus ratio:   1 unit : 16 g carbohydrate at breakfast, lunch, dinner, snacks  Correction ratio:   1 units for every 45 over 110 mg/dL    Disconnect pump if patient becomes hypoglycemic and altered.     • vitamin D2, Ergocalciferol, (DRISDOL) 1.25 MG (82892 UT) Cap capsule Take 50,000 Units by mouth every 72 hours. EVERY 3 DAYS     • DULoxetine (CYMBALTA) 60 MG Cap DR Particles delayed-release capsule Take 1 Capsule by mouth every day. 90 Capsule 3   • cyanocobalamin (VITAMIN B-12) 1000 MCG/ML Solution Inject 1 mL intramuscularly every 14 days. 12 mL 0     No current facility-administered medications for this visit.       Social History     Tobacco Use   • Smoking status: Never Smoker   • Smokeless tobacco: Never Used   Vaping Use   • Vaping Use: Never used   Substance Use Topics   • Alcohol use: No     Alcohol/week: 0.0 oz   • Drug use: No     Social History     Social History Narrative   • Not on file       Family History   Problem Relation Age of Onset   • Other Maternal Grandfather         Parkinson's   • Genetic Disorder Maternal Grandfather         Parkinsons, and crouzon syndrome   • Cancer Maternal Grandfather         Skin Cancer   • Heart Disease Other    • Cancer Other         bile duct cancer   • Other Mother         sphincter of salud,  "pancreatic insufficiency   • Cancer Mother 53        cervical ca   • Cancer Other         great uncle brain   • Arthritis Maternal Grandmother    • Cancer Maternal Grandmother         Skin Cancer   • Heart Disease Maternal Grandmother    • Hypertension Maternal Grandmother    • Hyperlipidemia Maternal Grandmother    • Stroke Maternal Grandmother    • Genetic Disorder Father         Factor V Liden and Tourettes   • Heart Disease Father    • Hyperlipidemia Father    • Genetic Disorder Maternal Uncle         Crouzon Syndrome   • Cancer Maternal Uncle         Bile Duct, and Skin cancer   • Cancer Maternal Aunt         Multiple Aunts and Uncles passed from cancer   • Heart Disease Maternal Aunt         Open heart surgery occuring   • Heart Disease Paternal Grandmother    • Hypertension Paternal Grandmother    • Stroke Paternal Grandmother    • Heart Disease Maternal Uncle         multiple uncles.       Allergies, past medical history, past surgical history, family history, social history reviewed and updated.    Review of Systems:     - Constitutional: Negative for fever, chills, unexpected weight change, and fatigue/generalized weakness.     - HEENT: soreness around chest and throat    - Respiratory: Negative for cough, sputum production, chest congestion, dyspnea, wheezing, and crackles.      - Cardiovascular: Negative for chest pain, palpitations, orthopnea, and bilateral lower extremity edema.     - Gastrointestinal: nausea, hematemesis.     - Genitourinary: Negative for dysuria, polyuria, hematuria, pyuria, urinary urgency, and urinary incontinence.    - Psychiatric/Behavioral: Negative for depression, suicidal/homicidal ideation and memory loss.      All other systems reviewed and are negative    Exam:    /70   Pulse 100   Temp 36.7 °C (98 °F) (Temporal)   Resp 16   Ht 1.676 m (5' 6\")   SpO2 98%   BMI 32.88 kg/m²  Body mass index is 32.88 kg/m².    Physical Exam:  Constitutional: Well-developed and " well-nourished. Not diaphoretic. No distress.   Skin: Skin is warm and dry. No rash noted.  Cardiovascular: Regular rate and rhythm, S1 and S2 without murmur, rubs, or gallops.    Chest: Effort normal. Clear to auscultation throughout. No adventitious sounds. No CVA tenderness.  : Negative for dysuria, polyuria, hematuria, pyuria, urinary urgency, and urinary incontinence.  Neurological: Alert and oriented x 3.   Psychiatric:  Behavior, mood, and affect are appropriate.  MA/nursing note and vitals reviewed.    [facility] admission records / results reviewed and discussed with the patient, questions answered.     Assessment/Plan:  1. Hospital discharge follow-up    2. Lupus (HCC)  Follow up appt with endocrinology Dr. De Los Santos next wk  - Referral to Pain Clinic  - CBC WITHOUT DIFFERENTIAL; Future  - Comp Metabolic Panel; Future  - IRON/TOTAL IRON BIND; Future  - FERRITIN; Future    3. Iron deficiency anemia secondary to inadequate dietary iron intake  - CBC WITHOUT DIFFERENTIAL; Future  - Comp Metabolic Panel; Future  - IRON/TOTAL IRON BIND; Future  - FERRITIN; Future    4. Hematemesis with nausea  Pt is awaiting call from GI today on possible NG tube to let GI rest to relieve n/v?  - CBC WITHOUT DIFFERENTIAL; Future  - Comp Metabolic Panel; Future  - IRON/TOTAL IRON BIND; Future  - FERRITIN; Future    5. Hemoptysis  Pt to avoid triggering factors, dairy, food and fluids in small amounts. Fluids with electrolytes and himalayan salts. Powder Mg to promote retaining K. Encourage good hygiene with friends and family. Pt is following with specialists GI, rheumatology and endocrinology  - CBC WITHOUT DIFFERENTIAL; Future  - Comp Metabolic Panel; Future  - IRON/TOTAL IRON BIND; Future  - FERRITIN; Future       Discussed with patient possible alternative diagnoses, pt is to take all medications as prescribed. If symptoms persist FU w/PCP, if symptoms worsen go to emergency room. If experiencing any side effects from  prescribed medications reports to the office immediately or go to emergency room.  Reviewed indication, dosage, usage and potential adverse effects of prescribed medications. Reviewed risks and benefits of treatment plan. Patient verbalizes understanding of all instruction and verbally agrees to plan.    No follow-ups on file.

## 2022-04-19 NOTE — ASSESSMENT & PLAN NOTE
Hospital follow-up from 3/21/2022 to 3/28/2022 and a separate ER visit from 3/31/2022 through 4/13/2022.  On 3/21/2022 patient was admitted with hemoptysis x2 weeks, GI evaluation, status post EGD with possible small varices and esophagitis.  Bronchoscopy was nondiagnostic.  On the following admission patient was admitted with hematemesis and nausea..  Patient was also evaluated by ENT and rheumatology, Dr. De Los Santos..  She was able to tolerate fluids and was discharged in stable condition on Phenergan, scopolamine, Delsym and Belimumab.  She has a follow-up with rheumatology.  Vitals today are stable.  Overall feeling better. Lat night had an episode of hemoptysis. She saw GI today, plan to possible place NG tube to let GI rest. This was discussed at the hospital however she clinically improved. GI will let her know.  Nausea persists besides zofran, compazine, pjenergan and scopolamine.   Rheumatology suspects lupus flare up thus initiated Belimumab as well as tapering off steroids.  Next wk f/u with Dr. De Los Santos.  Plan for outpt NG tube norturnally and liquid diet during the day. Pt drinks bone broth, continues to have normal BMs however GI motility slowed down/ per pt. Tolerating fluids.  Off of Cymbalta for now.   BGs under good control at baseline however during hospitalization, steroid Tx BGS are in 200 range. Seeing endocrinology Dr. Lora this afternoon to f/u with pump settings if placed on OP tube feeds.

## 2022-04-25 ENCOUNTER — HOSPITAL ENCOUNTER (OUTPATIENT)
Facility: MEDICAL CENTER | Age: 22
End: 2022-04-29
Attending: EMERGENCY MEDICINE | Admitting: INTERNAL MEDICINE
Payer: COMMERCIAL

## 2022-04-25 ENCOUNTER — APPOINTMENT (OUTPATIENT)
Dept: CARDIOLOGY | Facility: MEDICAL CENTER | Age: 22
End: 2022-04-25
Attending: INTERNAL MEDICINE
Payer: COMMERCIAL

## 2022-04-25 ENCOUNTER — APPOINTMENT (OUTPATIENT)
Dept: RADIOLOGY | Facility: MEDICAL CENTER | Age: 22
End: 2022-04-25
Attending: INTERNAL MEDICINE
Payer: COMMERCIAL

## 2022-04-25 ENCOUNTER — APPOINTMENT (OUTPATIENT)
Dept: RADIOLOGY | Facility: MEDICAL CENTER | Age: 22
End: 2022-04-25
Attending: EMERGENCY MEDICINE
Payer: COMMERCIAL

## 2022-04-25 DIAGNOSIS — R04.2 COUGHING UP BLOOD: Primary | ICD-10-CM

## 2022-04-25 LAB
ABO GROUP BLD: NORMAL
ALBUMIN SERPL BCP-MCNC: 4.2 G/DL (ref 3.2–4.9)
ALBUMIN/GLOB SERPL: 1.4 G/DL
ALP SERPL-CCNC: 98 U/L (ref 30–99)
ALT SERPL-CCNC: 131 U/L (ref 2–50)
ANION GAP SERPL CALC-SCNC: 14 MMOL/L (ref 7–16)
APTT PPP: 23.3 SEC (ref 24.7–36)
AST SERPL-CCNC: 42 U/L (ref 12–45)
BASOPHILS # BLD AUTO: 0.4 % (ref 0–1.8)
BASOPHILS # BLD: 0.03 K/UL (ref 0–0.12)
BILIRUB SERPL-MCNC: 0.7 MG/DL (ref 0.1–1.5)
BLD GP AB SCN SERPL QL: NORMAL
BUN SERPL-MCNC: 8 MG/DL (ref 8–22)
CALCIUM SERPL-MCNC: 9.3 MG/DL (ref 8.5–10.5)
CFT BLD TEG: 4.7 MIN (ref 4.6–9.1)
CFT P HPASE BLD TEG: 4.7 MIN (ref 4.3–8.3)
CHLORIDE SERPL-SCNC: 100 MMOL/L (ref 96–112)
CLOT ANGLE BLD TEG: 75.1 DEGREES (ref 63–78)
CLOT LYSIS 30M P MA LENFR BLD TEG: 1 % (ref 0–2.6)
CO2 SERPL-SCNC: 21 MMOL/L (ref 20–33)
CREAT SERPL-MCNC: 0.65 MG/DL (ref 0.5–1.4)
CRP SERPL HS-MCNC: 0.69 MG/DL (ref 0–0.75)
CT.EXTRINSIC BLD ROTEM: 1.1 MIN (ref 0.8–2.1)
EOSINOPHIL # BLD AUTO: 0.11 K/UL (ref 0–0.51)
EOSINOPHIL NFR BLD: 1.4 % (ref 0–6.9)
ERYTHROCYTE [DISTWIDTH] IN BLOOD BY AUTOMATED COUNT: 44.1 FL (ref 35.9–50)
ERYTHROCYTE [SEDIMENTATION RATE] IN BLOOD BY WESTERGREN METHOD: 13 MM/HOUR (ref 0–25)
GFR SERPLBLD CREATININE-BSD FMLA CKD-EPI: 127 ML/MIN/1.73 M 2
GLOBULIN SER CALC-MCNC: 3.1 G/DL (ref 1.9–3.5)
GLUCOSE BLD STRIP.AUTO-MCNC: 110 MG/DL (ref 65–99)
GLUCOSE SERPL-MCNC: 110 MG/DL (ref 65–99)
HCG SERPL QL: NEGATIVE
HCT VFR BLD AUTO: 40.2 % (ref 37–47)
HGB BLD-MCNC: 13.2 G/DL (ref 12–16)
IMM GRANULOCYTES # BLD AUTO: 0.06 K/UL (ref 0–0.11)
IMM GRANULOCYTES NFR BLD AUTO: 0.8 % (ref 0–0.9)
INR PPP: 1.12 (ref 0.87–1.13)
LV EJECT FRACT  99904: 60
LV EJECT FRACT MOD 2C 99903: 62.63
LV EJECT FRACT MOD 4C 99902: 61.55
LV EJECT FRACT MOD BP 99901: 62.14
LYMPHOCYTES # BLD AUTO: 1.32 K/UL (ref 1–4.8)
LYMPHOCYTES NFR BLD: 17.2 % (ref 22–41)
MCF BLD TEG: 61.6 MM (ref 52–69)
MCF.PLATELET INHIB BLD ROTEM: 22 MM (ref 15–32)
MCH RBC QN AUTO: 30.6 PG (ref 27–33)
MCHC RBC AUTO-ENTMCNC: 32.8 G/DL (ref 33.6–35)
MCV RBC AUTO: 93.3 FL (ref 81.4–97.8)
MONOCYTES # BLD AUTO: 1.3 K/UL (ref 0–0.85)
MONOCYTES NFR BLD AUTO: 17 % (ref 0–13.4)
NEUTROPHILS # BLD AUTO: 4.84 K/UL (ref 2–7.15)
NEUTROPHILS NFR BLD: 63.2 % (ref 44–72)
NRBC # BLD AUTO: 0 K/UL
NRBC BLD-RTO: 0 /100 WBC
PA AA BLD-ACNC: 0 % (ref 0–11)
PA ADP BLD-ACNC: 3.4 % (ref 0–17)
PLATELET # BLD AUTO: 218 K/UL (ref 164–446)
PMV BLD AUTO: 10.2 FL (ref 9–12.9)
POTASSIUM SERPL-SCNC: 3.9 MMOL/L (ref 3.6–5.5)
PROT SERPL-MCNC: 7.3 G/DL (ref 6–8.2)
PROTHROMBIN TIME: 14 SEC (ref 12–14.6)
RBC # BLD AUTO: 4.31 M/UL (ref 4.2–5.4)
RH BLD: NORMAL
SODIUM SERPL-SCNC: 135 MMOL/L (ref 135–145)
TEG ALGORITHM TGALG: NORMAL
WBC # BLD AUTO: 7.7 K/UL (ref 4.8–10.8)

## 2022-04-25 PROCEDURE — 99254 IP/OBS CNSLTJ NEW/EST MOD 60: CPT | Mod: GC | Performed by: INTERNAL MEDICINE

## 2022-04-25 PROCEDURE — G0378 HOSPITAL OBSERVATION PER HR: HCPCS

## 2022-04-25 PROCEDURE — 700102 HCHG RX REV CODE 250 W/ 637 OVERRIDE(OP): Performed by: HOSPITALIST

## 2022-04-25 PROCEDURE — 85610 PROTHROMBIN TIME: CPT

## 2022-04-25 PROCEDURE — 93306 TTE W/DOPPLER COMPLETE: CPT | Mod: 26 | Performed by: INTERNAL MEDICINE

## 2022-04-25 PROCEDURE — 85576 BLOOD PLATELET AGGREGATION: CPT | Mod: 91

## 2022-04-25 PROCEDURE — 82962 GLUCOSE BLOOD TEST: CPT

## 2022-04-25 PROCEDURE — 85347 COAGULATION TIME ACTIVATED: CPT

## 2022-04-25 PROCEDURE — 84703 CHORIONIC GONADOTROPIN ASSAY: CPT

## 2022-04-25 PROCEDURE — C9113 INJ PANTOPRAZOLE SODIUM, VIA: HCPCS | Performed by: EMERGENCY MEDICINE

## 2022-04-25 PROCEDURE — 700117 HCHG RX CONTRAST REV CODE 255: Performed by: INTERNAL MEDICINE

## 2022-04-25 PROCEDURE — 36415 COLL VENOUS BLD VENIPUNCTURE: CPT

## 2022-04-25 PROCEDURE — 86900 BLOOD TYPING SEROLOGIC ABO: CPT

## 2022-04-25 PROCEDURE — 700105 HCHG RX REV CODE 258: Performed by: EMERGENCY MEDICINE

## 2022-04-25 PROCEDURE — 85730 THROMBOPLASTIN TIME PARTIAL: CPT

## 2022-04-25 PROCEDURE — 700105 HCHG RX REV CODE 258: Performed by: STUDENT IN AN ORGANIZED HEALTH CARE EDUCATION/TRAINING PROGRAM

## 2022-04-25 PROCEDURE — 71045 X-RAY EXAM CHEST 1 VIEW: CPT

## 2022-04-25 PROCEDURE — 99285 EMERGENCY DEPT VISIT HI MDM: CPT

## 2022-04-25 PROCEDURE — 86140 C-REACTIVE PROTEIN: CPT

## 2022-04-25 PROCEDURE — 93306 TTE W/DOPPLER COMPLETE: CPT

## 2022-04-25 PROCEDURE — 700111 HCHG RX REV CODE 636 W/ 250 OVERRIDE (IP): Performed by: EMERGENCY MEDICINE

## 2022-04-25 PROCEDURE — 85025 COMPLETE CBC W/AUTO DIFF WBC: CPT

## 2022-04-25 PROCEDURE — 99220 PR INITIAL OBSERVATION CARE,LEVL III: CPT | Performed by: INTERNAL MEDICINE

## 2022-04-25 PROCEDURE — 86901 BLOOD TYPING SEROLOGIC RH(D): CPT

## 2022-04-25 PROCEDURE — 85652 RBC SED RATE AUTOMATED: CPT

## 2022-04-25 PROCEDURE — 80053 COMPREHEN METABOLIC PANEL: CPT

## 2022-04-25 PROCEDURE — 86850 RBC ANTIBODY SCREEN: CPT

## 2022-04-25 PROCEDURE — A9270 NON-COVERED ITEM OR SERVICE: HCPCS | Performed by: HOSPITALIST

## 2022-04-25 PROCEDURE — 71275 CT ANGIOGRAPHY CHEST: CPT

## 2022-04-25 PROCEDURE — 85384 FIBRINOGEN ACTIVITY: CPT

## 2022-04-25 PROCEDURE — 96375 TX/PRO/DX INJ NEW DRUG ADDON: CPT

## 2022-04-25 PROCEDURE — 96374 THER/PROPH/DIAG INJ IV PUSH: CPT

## 2022-04-25 PROCEDURE — 83036 HEMOGLOBIN GLYCOSYLATED A1C: CPT

## 2022-04-25 RX ORDER — BISACODYL 10 MG
10 SUPPOSITORY, RECTAL RECTAL
Status: DISCONTINUED | OUTPATIENT
Start: 2022-04-25 | End: 2022-04-29 | Stop reason: HOSPADM

## 2022-04-25 RX ORDER — ONDANSETRON 4 MG/1
8 TABLET, ORALLY DISINTEGRATING ORAL EVERY 4 HOURS PRN
Status: DISCONTINUED | OUTPATIENT
Start: 2022-04-25 | End: 2022-04-29 | Stop reason: HOSPADM

## 2022-04-25 RX ORDER — SODIUM CHLORIDE, SODIUM LACTATE, POTASSIUM CHLORIDE, AND CALCIUM CHLORIDE .6; .31; .03; .02 G/100ML; G/100ML; G/100ML; G/100ML
1000 INJECTION, SOLUTION INTRAVENOUS ONCE
Status: COMPLETED | OUTPATIENT
Start: 2022-04-25 | End: 2022-04-25

## 2022-04-25 RX ORDER — ONDANSETRON 4 MG/1
4 TABLET, ORALLY DISINTEGRATING ORAL EVERY 4 HOURS PRN
Status: DISCONTINUED | OUTPATIENT
Start: 2022-04-25 | End: 2022-04-25

## 2022-04-25 RX ORDER — LABETALOL HYDROCHLORIDE 5 MG/ML
10 INJECTION, SOLUTION INTRAVENOUS EVERY 4 HOURS PRN
Status: DISCONTINUED | OUTPATIENT
Start: 2022-04-25 | End: 2022-04-29 | Stop reason: HOSPADM

## 2022-04-25 RX ORDER — ACETAMINOPHEN 325 MG/1
650 TABLET ORAL EVERY 6 HOURS PRN
Status: DISCONTINUED | OUTPATIENT
Start: 2022-04-25 | End: 2022-04-28

## 2022-04-25 RX ORDER — POLYETHYLENE GLYCOL 3350 17 G/17G
1 POWDER, FOR SOLUTION ORAL
Status: DISCONTINUED | OUTPATIENT
Start: 2022-04-25 | End: 2022-04-29 | Stop reason: HOSPADM

## 2022-04-25 RX ORDER — ONDANSETRON 2 MG/ML
4 INJECTION INTRAMUSCULAR; INTRAVENOUS EVERY 4 HOURS PRN
Status: DISCONTINUED | OUTPATIENT
Start: 2022-04-25 | End: 2022-04-29 | Stop reason: HOSPADM

## 2022-04-25 RX ORDER — PROMETHAZINE HYDROCHLORIDE 25 MG/1
12.5-25 SUPPOSITORY RECTAL EVERY 4 HOURS PRN
Status: DISCONTINUED | OUTPATIENT
Start: 2022-04-25 | End: 2022-04-29 | Stop reason: HOSPADM

## 2022-04-25 RX ORDER — GUAIFENESIN 600 MG/1
600 TABLET, EXTENDED RELEASE ORAL EVERY 12 HOURS PRN
Status: DISCONTINUED | OUTPATIENT
Start: 2022-04-25 | End: 2022-04-29 | Stop reason: HOSPADM

## 2022-04-25 RX ORDER — PROCHLORPERAZINE EDISYLATE 5 MG/ML
5-10 INJECTION INTRAMUSCULAR; INTRAVENOUS EVERY 4 HOURS PRN
Status: DISCONTINUED | OUTPATIENT
Start: 2022-04-25 | End: 2022-04-29 | Stop reason: HOSPADM

## 2022-04-25 RX ORDER — ONDANSETRON 2 MG/ML
4 INJECTION INTRAMUSCULAR; INTRAVENOUS ONCE
Status: COMPLETED | OUTPATIENT
Start: 2022-04-25 | End: 2022-04-25

## 2022-04-25 RX ORDER — PROMETHAZINE HYDROCHLORIDE 25 MG/1
12.5-25 TABLET ORAL EVERY 4 HOURS PRN
Status: DISCONTINUED | OUTPATIENT
Start: 2022-04-25 | End: 2022-04-29 | Stop reason: HOSPADM

## 2022-04-25 RX ORDER — AMOXICILLIN 250 MG
2 CAPSULE ORAL 2 TIMES DAILY
Status: DISCONTINUED | OUTPATIENT
Start: 2022-04-25 | End: 2022-04-29 | Stop reason: HOSPADM

## 2022-04-25 RX ORDER — OMEPRAZOLE 20 MG/1
20 CAPSULE, DELAYED RELEASE ORAL DAILY
Status: DISCONTINUED | OUTPATIENT
Start: 2022-04-25 | End: 2022-04-29 | Stop reason: HOSPADM

## 2022-04-25 RX ADMIN — ONDANSETRON 4 MG: 2 INJECTION INTRAMUSCULAR; INTRAVENOUS at 03:25

## 2022-04-25 RX ADMIN — PANTOPRAZOLE SODIUM 80 MG: 40 INJECTION, POWDER, FOR SOLUTION INTRAVENOUS at 03:25

## 2022-04-25 RX ADMIN — IOHEXOL 50 ML: 350 INJECTION, SOLUTION INTRAVENOUS at 09:25

## 2022-04-25 RX ADMIN — GUAIFENESIN 600 MG: 600 TABLET, EXTENDED RELEASE ORAL at 20:23

## 2022-04-25 RX ADMIN — SODIUM CHLORIDE, POTASSIUM CHLORIDE, SODIUM LACTATE AND CALCIUM CHLORIDE 1000 ML: 600; 310; 30; 20 INJECTION, SOLUTION INTRAVENOUS at 08:37

## 2022-04-25 ASSESSMENT — LIFESTYLE VARIABLES
HOW MANY TIMES IN THE PAST YEAR HAVE YOU HAD 5 OR MORE DRINKS IN A DAY: 0
TOTAL SCORE: 0
HAVE YOU EVER FELT YOU SHOULD CUT DOWN ON YOUR DRINKING: NO
AVERAGE NUMBER OF DAYS PER WEEK YOU HAVE A DRINK CONTAINING ALCOHOL: 0
ALCOHOL_USE: NO
DOES PATIENT WANT TO STOP DRINKING: NO
ON A TYPICAL DAY WHEN YOU DRINK ALCOHOL HOW MANY DRINKS DO YOU HAVE: 0
CONSUMPTION TOTAL: NEGATIVE
TOTAL SCORE: 0
EVER FELT BAD OR GUILTY ABOUT YOUR DRINKING: NO
TOTAL SCORE: 0
EVER HAD A DRINK FIRST THING IN THE MORNING TO STEADY YOUR NERVES TO GET RID OF A HANGOVER: NO
HAVE PEOPLE ANNOYED YOU BY CRITICIZING YOUR DRINKING: NO

## 2022-04-25 ASSESSMENT — ENCOUNTER SYMPTOMS
FEVER: 0
SPUTUM PRODUCTION: 0
BLURRED VISION: 0
HEMOPTYSIS: 1
DIARRHEA: 0
COUGH: 1
CHILLS: 0
ORTHOPNEA: 0
SHORTNESS OF BREATH: 0
VOMITING: 1
CLAUDICATION: 0
DIZZINESS: 1
HEADACHES: 1
WHEEZING: 0
VOMITING: 0
PND: 0
MYALGIAS: 0
HEADACHES: 0
NAUSEA: 0
PALPITATIONS: 0
FEVER: 1
DIZZINESS: 0
HEARTBURN: 1
NAUSEA: 1
SORE THROAT: 0
NECK PAIN: 0
INSOMNIA: 0
CONSTIPATION: 0
WEIGHT LOSS: 0
DEPRESSION: 0
NERVOUS/ANXIOUS: 0
STRIDOR: 0
ABDOMINAL PAIN: 0
DOUBLE VISION: 0
BLOOD IN STOOL: 0
BRUISES/BLEEDS EASILY: 0

## 2022-04-25 ASSESSMENT — COGNITIVE AND FUNCTIONAL STATUS - GENERAL
MOBILITY SCORE: 24
DAILY ACTIVITIY SCORE: 24
SUGGESTED CMS G CODE MODIFIER MOBILITY: CH
SUGGESTED CMS G CODE MODIFIER DAILY ACTIVITY: CH

## 2022-04-25 ASSESSMENT — PAIN DESCRIPTION - PAIN TYPE
TYPE: ACUTE PAIN

## 2022-04-25 ASSESSMENT — PATIENT HEALTH QUESTIONNAIRE - PHQ9
1. LITTLE INTEREST OR PLEASURE IN DOING THINGS: NOT AT ALL
2. FEELING DOWN, DEPRESSED, IRRITABLE, OR HOPELESS: NOT AT ALL
SUM OF ALL RESPONSES TO PHQ9 QUESTIONS 1 AND 2: 0

## 2022-04-25 ASSESSMENT — FIBROSIS 4 INDEX: FIB4 SCORE: 0.42

## 2022-04-25 NOTE — ED NOTES
Med rec completed per pt   Allergies reviewed  No PO antibiotics in the last 30 days    Pt has an Insulin pump that has been verified and is not currently connected to the pt     Pt stated a tapering course of Prednisone on 4/14/2022  Pt is currently taking 40 mg daily and started this dose on 4/22/2022

## 2022-04-25 NOTE — H&P
Hospital Medicine History & Physical Note    Date of Service  4/25/2022    Primary Care Physician  NADER Wilkerson        Code Status  Prior    Chief Complaint  Chief Complaint   Patient presents with   • Cough     Coughing up blood.        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    She had an extensive work-up for hemoptysis/hematemesis during prior hospital stay from March 21 to March 28. Consultations with pulmonology, gastroenterology, rheumatology.  She was also hospitalized  GI did an EGD at that time and consistent with esophagitis and some suggestion of possible small varices in the distal esophagus where there was edema.  No active bleeding was visualized.  Bronchoscopy had no return of blood on BAL.  ENT also consulted a bedside procedure looking through the nasopharynx and showed no evidence of bleeding.  During hospital stay from March 31 to April 13 she declined any invasive diagnostic testing.  She had CTA of the chest on March 27 and March 31.  No signs of hemorrhage or pulmonary emboli on those studies.    Patient returns 4/25/2022 with approximately 48 hours of feeling generally unwell and cough which produced several small quantities of bright red blood followed by a final episode of hemoptysis providing a photograph timed 8;45pm of bright red and clots  of approximately 200 mL just prior to arrival.   This photo is strikingly similar to the episode prompting previous admission.   Denies any chest pain, epigastric pain.  Denies shortness of breath. No blood in stools.  No lightheadedness.  No medication changes since discharge earlier this month.  She says she is compliant with prescribed medicatio the other oddity is that she has a clear premonition when it is going to  occur because the bleeding photographed is in the exact same space as the prior.  It also looks really easy to clean up there she did not choose the carpet.  LOL ns.  She continues to take Prilosec.  She takes no anticoagulants.  No fevers.  No nose bleeding.     She endorses the above, her labs are unremarkable she is referred to the hospitalist for admission.  At bedside she is lying flat, conversational and appears comfortable.    I discussed the plan of care with patient.    Review of Systems  Review of Systems   Constitutional: Negative for fever, malaise/fatigue and weight loss.   HENT: Negative for sore throat and tinnitus.    Eyes: Negative for blurred vision and double vision.   Respiratory: Positive for cough and hemoptysis. Negative for sputum production, shortness of breath, wheezing and stridor.    Cardiovascular: Negative for chest pain, palpitations, orthopnea, claudication, leg swelling and PND.   Gastrointestinal: Positive for heartburn. Negative for abdominal pain, blood in stool, constipation, diarrhea, melena, nausea and vomiting.   Genitourinary: Negative for dysuria and urgency.   Musculoskeletal: Negative for myalgias and neck pain.   Skin: Negative for itching and rash.   Neurological: Negative for dizziness and headaches.   Endo/Heme/Allergies: Does not bruise/bleed easily.   Psychiatric/Behavioral: Negative for depression. The patient does not have insomnia.        Past Medical History   has a past medical history of Asthma, Celiac disease, Clotting disorder (HCC), Diabetes (HCC), Factor 5 Leiden mutation, heterozygous (HCC), Lupus (HCC), OCD (obsessive compulsive disorder), and Tourette disease.    Surgical History   has a past surgical history that includes appendectomy laparoscopic (7/1/2017); tavo by laparoscopy (1/30/2017); appendectomy (2018); pr upper gi endoscopy,diagnosis (N/A, 3/22/2022); and pr bronchoscopy,diagnostic (N/A, 3/25/2022).     Family History  family history includes  Arthritis in her maternal grandmother; Cancer in her maternal aunt, maternal grandfather, maternal grandmother, maternal uncle, and other family members; Cancer (age of onset: 53) in her mother; Genetic Disorder in her father, maternal grandfather, and maternal uncle; Heart Disease in her father, maternal aunt, maternal grandmother, maternal uncle, paternal grandmother, and another family member; Hyperlipidemia in her father and maternal grandmother; Hypertension in her maternal grandmother and paternal grandmother; Other in her maternal grandfather and mother; Stroke in her maternal grandmother and paternal grandmother.   Family history reviewed with patient. There is no family history that is pertinent to the chief complaint.     Social History   reports that she has never smoked. She has never used smokeless tobacco. She reports that she does not drink alcohol and does not use drugs.    Allergies  Allergies   Allergen Reactions   • Cefdinir Rash     rash   • Erythromycin Vomiting and Nausea   • Gluten Meal Diarrhea, Vomiting and Nausea     Celiac        Medications  Prior to Admission Medications   Prescriptions Last Dose Informant Patient Reported? Taking?   Belimumab 200 MG/ML Solution Auto-injector   No No   Sig: Inject 200 mg under the skin every 7 days.   DULoxetine (CYMBALTA) 60 MG Cap DR Particles delayed-release capsule  Patient No No   Sig: Take 1 Capsule by mouth every day.   cyanocobalamin (VITAMIN B-12) 1000 MCG/ML Solution  Patient No No   Sig: Inject 1 mL intramuscularly every 14 days.   hydroxychloroquine (PLAQUENIL) 200 MG Tab  Patient No No   Sig: Take 2 Tablets by mouth every day.   insulin infusion pump Device  Patient Yes No   Sig: Inject 0.7 Units/hr under the skin continuous. Patient's own SQ insulin pump    Type of Insulin: Humalog  Last change of tubing: 3/30/22    Dosing:  Basal rate:   0.7 units/hr   Bolus ratio:   1 unit : 16 g carbohydrate at breakfast, lunch, dinner,  snacks  Correction ratio:   1 units for every 45 over 110 mg/dL    Disconnect pump if patient becomes hypoglycemic and altered.   omeprazole (PRILOSEC) 20 MG delayed-release capsule   No No   Sig: Take 1 Capsule by mouth 2 times a day for 30 days.   ondansetron (ZOFRAN ODT) 4 MG TABLET DISPERSIBLE   No No   Sig: Take 2 Tablets by mouth every four hours as needed for Nausea.   predniSONE (DELTASONE) 10 MG Tab   No No   Sig: Take 6 Tablets by mouth every day for 2 days, THEN 5 Tablets every day for 7 days, THEN 4 Tablets every day for 7 days, THEN 3 Tablets every day for 7 days, THEN 2 Tablets every day for 7 days, THEN 1 Tablet every day for 7 days.   prochlorperazine (COMPAZINE) 10 MG Tab   No No   Sig: Take 1 Tablet by mouth every 6 hours as needed for Nausea/Vomiting for up to 30 days.   promethazine (PHENERGAN) 25 MG Tab   No No   Sig: Take 1 Tablet by mouth every four hours as needed for Nausea/Vomiting.   scopolamine (TRANSDERM-SCOP) 1 mg/72hr PATCH 72 HR   No No   Sig: Place 1 Patch on the skin every 72 hours.   vitamin D2, Ergocalciferol, (DRISDOL) 1.25 MG (19912 UT) Cap capsule  Patient Yes No   Sig: Take 50,000 Units by mouth every 72 hours. EVERY 3 DAYS      Facility-Administered Medications: None       Physical Exam  Temp:  [36.6 °C (97.8 °F)] 36.6 °C (97.8 °F)  Pulse:  [111-126] 113  Resp:  [16-18] 16  BP: (103-134)/() 116/64  SpO2:  [94 %-96 %] 94 %  Blood Pressure: 116/64   Temperature: 36.6 °C (97.8 °F)   Pulse: (!) 113   Respiration: 16   Pulse Oximetry: 94 %       Physical Exam  Vitals and nursing note reviewed.   Constitutional:       General: She is not in acute distress.     Appearance: Normal appearance. She is normal weight. She is ill-appearing and diaphoretic. She is not toxic-appearing.   HENT:      Head: Normocephalic and atraumatic.      Nose: Nose normal. No congestion or rhinorrhea.      Mouth/Throat:      Mouth: Mucous membranes are moist.      Pharynx: Oropharynx is clear. No  oropharyngeal exudate or posterior oropharyngeal erythema.      Comments: No evidence of blood about the mouth or face.  Eyes:      Extraocular Movements: Extraocular movements intact.      Conjunctiva/sclera: Conjunctivae normal.      Pupils: Pupils are equal, round, and reactive to light.   Neck:      Vascular: No carotid bruit.   Cardiovascular:      Rate and Rhythm: Normal rate and regular rhythm.      Pulses: Normal pulses.      Heart sounds: Normal heart sounds. No murmur heard.    No friction rub. No gallop.   Pulmonary:      Effort: No respiratory distress.      Breath sounds: Normal breath sounds. No stridor. No wheezing, rhonchi or rales.   Chest:      Chest wall: No tenderness.   Abdominal:      General: Abdomen is flat. Bowel sounds are normal. There is no distension.      Palpations: Abdomen is soft. There is no mass.      Tenderness: There is abdominal tenderness. There is rebound. There is no guarding.      Hernia: No hernia is present.   Musculoskeletal:         General: No swelling, tenderness or signs of injury.      Cervical back: Normal range of motion and neck supple. No muscular tenderness.      Right lower leg: No edema.      Left lower leg: No edema.   Lymphadenopathy:      Cervical: No cervical adenopathy.   Skin:     Capillary Refill: Capillary refill takes less than 2 seconds.      Coloration: Skin is not jaundiced or pale.      Findings: No bruising, erythema, lesion or rash.   Neurological:      General: No focal deficit present.      Mental Status: She is alert and oriented to person, place, and time. Mental status is at baseline.      Cranial Nerves: No cranial nerve deficit.      Motor: No weakness.      Coordination: Coordination normal.   Psychiatric:         Mood and Affect: Mood normal.         Thought Content: Thought content normal.         Judgment: Judgment normal.         Laboratory:  Recent Labs     04/25/22  0315   WBC 7.7   RBC 4.31   HEMOGLOBIN 13.2   HEMATOCRIT 40.2    MCV 93.3   MCH 30.6   MCHC 32.8*   RDW 44.1   PLATELETCT 218   MPV 10.2     Recent Labs     04/25/22  0315   SODIUM 135   POTASSIUM 3.9   CHLORIDE 100   CO2 21   GLUCOSE 110*   BUN 8   CREATININE 0.65   CALCIUM 9.3     Recent Labs     04/25/22  0315   ALTSGPT 131*   ASTSGOT 42   ALKPHOSPHAT 98   TBILIRUBIN 0.7   GLUCOSE 110*     Recent Labs     04/25/22  0331   APTT 23.3*   INR 1.12     No results for input(s): NTPROBNP in the last 72 hours.      No results for input(s): TROPONINT in the last 72 hours.    Imaging:  DX-CHEST-PORTABLE (1 VIEW)   Final Result      No acute cardiopulmonary abnormality.             X-Ray:  I have personally reviewed the images and compared with prior images.    Assessment/Plan:  I anticipate this patient will require at least two midnights for appropriate medical management, necessitating inpatient admission.    Hemoptysis  Assessment & Plan  Labs unremarkable, images and labs do not suggest GI source, obtain pulmonology consult.    Lupus (HCC)- (present on admission)  Assessment & Plan  On Benlysta.  Consider rheumatology consult.  Follow-up ESR, CRP    Type 2 diabetes mellitus with hyperglycemia, with long-term current use of insulin (HCC)- (present on admission)  Assessment & Plan  Regular insulin sliding scale before every meal while n.p.o.    Gastritis  Assessment & Plan  Continue omeprazole    Factor V Leiden ([facility])- (present on admission)  Assessment & Plan  Patient reported, consider hematology consult      VTE prophylaxis: SCDs/TEDs

## 2022-04-25 NOTE — PROGRESS NOTES
4 Eyes Skin Assessment Completed by JORDI Henderson and JORDI Mireles.    Head WDL  Ears WDL  Nose WDL  Mouth WDL  Neck WDL  Breast/Chest WDL  Shoulder Blades WDL  Spine WDL  (R) Arm/Elbow/Hand WDL  (L) Arm/Elbow/Hand Bruising  Abdomen WDL, has insulin pump in place  Groin WDL  Scrotum/Coccyx/Buttocks WDL  (R) Leg WDL  (L) Leg WDL  (R) Heel/Foot/Toe WDL  (L) Heel/Foot/Toe WDL          Devices In Places Blood Pressure Cuff      Interventions In Place Pressure Redistribution Mattress    Possible Skin Injury No    Pictures Uploaded Into Epic N/A  Wound Consult Placed N/A  RN Wound Prevention Protocol Ordered No

## 2022-04-25 NOTE — ED TRIAGE NOTES
"Chief Complaint   Patient presents with   • Cough     Coughing up blood.      Pt was recently discharged from [facility] after being admitted for hemoptysis. Tonight she had episodes of coughing up bright red blood. Pt has picture of a significant amount of blood coughed up at home.  Also c/o generalized body pain associated to Lupus. Rated at a 6/10.    /109   Pulse (!) 126   Temp 36.6 °C (97.8 °F) (Temporal)   Resp 18   Ht 1.676 m (5' 6\")   Wt 92.3 kg (203 lb 7.8 oz)   SpO2 96% Comment: Room air  BMI 32.84 kg/m²     "

## 2022-04-25 NOTE — CARE PLAN
Problem: Knowledge Deficit - Standard  Goal: Patient and family/care givers will demonstrate understanding of plan of care, disease process/condition, diagnostic tests and medications  Outcome: Progressing     Problem: Pain - Standard  Goal: Alleviation of pain or a reduction in pain to the patient’s comfort goal  Outcome: Progressing     The patient is Stable - Low risk of patient condition declining or worsening    Shift Goals  Clinical Goals: ECHO results, monitor hemoptysis  Patient Goals: rest    Progress made toward(s) clinical / shift goals:  ECHO resulted normal, patient has had no visualized or reported hemoptysis since being on the floor    Patient is not progressing towards the following goals:

## 2022-04-25 NOTE — PROGRESS NOTES
[facility] Medicine Daily Progress Note    Date of Service  4/25/2022    Chief Complaint  Malena Bennett is a 21 y.o. female admitted 4/25/2022 with with recurrent hemoptysis.    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  Patient underwent extensive work-up for hematemesis/hemoptysis approximately 2 weeks ago discharged 4/13/2022 for similar symptoms.  She underwent extensive work-up from March 21 to March 22 during her hospitalization, which included consultation with pulmonology, gastroenterology, and rheumatology.  She had an EGD, which showed esophagitis with suggestion of possible small varices in the distal esophagus where there was edema.  There was no active bleeding visualized.  Bronchoscopy showed no return of blood and BAL.  ENT was also consulted and performed endoscopy through the nasopharynx with no visualization of bleeding.  She also was was hospitalized from March 31 to April 13 and declined any invasive diagnostic testing at that time.  CTA of the chest performed on March 27 and March 31 was unremarkable.    Patient return to the ER with 1 day history of hemoptysis and feeling unwell with a cough.  Pulmonology was consulted due to concern for recurrent hemoptysis.      Interval Problem Update  4/25: Stable overnight.  No acute events.  No further hemoptysis.  Complaining of chest discomfort, headache, nausea, and vomiting.   CTA with PE protocol negative.  Patient had more hemoptysis (up to quarter size of blood) witnessed by RN prior to transfer to floor from the ER.  TEG study unremarkable, ESR and CRP negative.  Pulmonology consultation pending.  Dr. Espinoza will see.  Patient's boyfriend at bedside.    I have personally seen and examined the patient at bedside. I discussed the plan of care with patient and bedside  RN.  Discussed plan of care also with boyfriend at bedside.  Discussed plan of care with Dr. Espinoza, pulmonologist.    Consultants/Specialty  pulmonary    Code Status  Full Code    Disposition  Patient is not medically cleared for discharge.   Anticipate discharge to to home with close outpatient follow-up.  I have placed the appropriate orders for post-discharge needs.    Review of Systems  Review of Systems   Constitutional: Positive for malaise/fatigue. Negative for chills and fever.   Respiratory: Positive for cough and hemoptysis. Negative for shortness of breath.    Cardiovascular: Positive for chest pain. Negative for palpitations.   Gastrointestinal: Positive for nausea and vomiting. Negative for abdominal pain, constipation and diarrhea.   Genitourinary: Negative for dysuria and frequency.   Musculoskeletal: Negative for joint pain and myalgias.   Neurological: Positive for dizziness and headaches.   Psychiatric/Behavioral: The patient is not nervous/anxious and does not have insomnia.         Physical Exam  Temp:  [36.6 °C (97.8 °F)-37.4 °C (99.4 °F)] 37.1 °C (98.7 °F)  Pulse:  [] 95  Resp:  [16-20] 16  BP: ()/() 95/71  SpO2:  [94 %-97 %] 95 %    Physical Exam  Vitals and nursing note reviewed.   Constitutional:       Appearance: Normal appearance. She is normal weight. She is not ill-appearing or diaphoretic.   HENT:      Head: Normocephalic and atraumatic.      Mouth/Throat:      Mouth: Mucous membranes are moist.      Pharynx: Oropharynx is clear. No oropharyngeal exudate.   Eyes:      General:         Right eye: No discharge.         Left eye: No discharge.      Conjunctiva/sclera: Conjunctivae normal.      Pupils: Pupils are equal, round, and reactive to light.   Cardiovascular:      Rate and Rhythm: Normal rate and regular rhythm.      Pulses: Normal pulses.      Heart sounds: Normal heart sounds. No murmur heard.  Pulmonary:      Effort: Pulmonary effort is normal. No respiratory  distress.      Breath sounds: Normal breath sounds.   Abdominal:      General: Abdomen is flat. Bowel sounds are normal. There is no distension.      Palpations: Abdomen is soft.      Tenderness: There is no abdominal tenderness.   Musculoskeletal:      Cervical back: Neck supple. No tenderness.      Right lower leg: No edema.      Left lower leg: No edema.   Skin:     General: Skin is warm and dry.      Findings: Bruising (Left upper extremity) present.   Neurological:      Mental Status: She is alert and oriented to person, place, and time.      Motor: No weakness.         Fluids  No intake or output data in the 24 hours ending 04/25/22 1356    Laboratory  Recent Labs     04/25/22  0315   WBC 7.7   RBC 4.31   HEMOGLOBIN 13.2   HEMATOCRIT 40.2   MCV 93.3   MCH 30.6   MCHC 32.8*   RDW 44.1   PLATELETCT 218   MPV 10.2     Recent Labs     04/25/22  0315   SODIUM 135   POTASSIUM 3.9   CHLORIDE 100   CO2 21   GLUCOSE 110*   BUN 8   CREATININE 0.65   CALCIUM 9.3     Recent Labs     04/25/22  0331   APTT 23.3*   INR 1.12               Imaging  EC-ECHOCARDIOGRAM COMPLETE W/O CONT   Final Result      CT-CTA CHEST PULMONARY ARTERY W/ RECONS   Final Result         1. No CT evidence of pulmonary embolism.      2. Minimal airspace opacity in the bilateral lung bases, right more than left, atelectasis or mild infection.         DX-CHEST-PORTABLE (1 VIEW)   Final Result      No acute cardiopulmonary abnormality.              Assessment/Plan  * Hemoptysis- (present on admission)  Assessment & Plan  Etiology unclear.  Had extensive work-up in the past including multiple subspecialties.    Previous EGD showing esophagitis with possible small varices in the distal esophagus where there was edema.  Previous bronchoscopy with no blood in BAL.    CTA with PE protocol negative.  Inflammatory markers including CRP and ESR negative.  TEG study unremarkable.    Patient continues to have intermittent hemoptysis.  This is a complicated  situation with no clear source.    Pulmonology consulted  Monitor closely    Esophagitis determined by endoscopy  Assessment & Plan  As per history.  Previous EGD showing esophagitis.    Continue omeprazole    Controlled type 1 diabetes mellitus (HCC)- (present on admission)  Assessment & Plan  As per history.  Hemoglobin A1c of 4.63 weeks ago.      Resume home insulin pump  Continue n.p.o. until cleared by pulmonology for possible studies    Factor V Leiden (HCC)- (present on admission)  Assessment & Plan  As per history.  Heterozygous mutation.    Resume chemo DVT prophylaxis when stable.    Lupus (HCC)- (present on admission)  Assessment & Plan  On Benlysta.  Consider rheumatology consult.  Follow-up ESR, CRP    Elevated antinuclear antibody (SHANI) level- (present on admission)  Assessment & Plan  As per history.    Rheumatology consult if available versus outpatient follow-up    Celiac disease- (present on admission)  Assessment & Plan  As per history.    Gluten-free diet carbohydrate consistent.    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    Outpatient hematology follow-up       VTE prophylaxis: SCDs/TEDs and pharmacologic prophylaxis contraindicated due to Hematemesis    I have performed a physical exam and reviewed and updated ROS and Plan today (4/25/2022). In review of yesterday's note (4/24/2022), there are no changes except as documented above.

## 2022-04-25 NOTE — ED NOTES
Late entry - 1130 - prior to patient being transported from the ER to his assigned room pt had an episode of hemoptysis.  Small amount with clots.  Pts assigned nurse notified.      Admitting physician notified at 1208.

## 2022-04-25 NOTE — ED NOTES
Report received, assuming care.  Pt amb to the restroom and back, slow but steady gait.  Pt denies episode of hemoptysis since episode last night at home.  Sig other at the bedside.  Pt provided with a pillow.  Pt has admit orders, awaiting a bed assignment.  Pt updated.  States no other needs at this time.

## 2022-04-25 NOTE — PROGRESS NOTES
Assumed care of patient at 1145. Patient ambulated from rney to bed, steady gait. No risk for falls. Pain reports generalized pain, but does not want to take anything at this time. 2 RN skin check complete, admit profile complete. Patient NPO at this time, reports no needs at this time. Significant other is at bedside and brought  for insulin pump, patient's pump reads glucose of 99. Q6 sugar checks in place.

## 2022-04-25 NOTE — HOSPITAL COURSE
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  Patient underwent extensive work-up for hematemesis/hemoptysis approximately 2 weeks ago discharged 4/13/2022 for similar symptoms.  She underwent extensive work-up from March 21 to March 22 during her hospitalization, which included consultation with pulmonology, gastroenterology, and rheumatology.  She had an EGD, which showed esophagitis with suggestion of possible small varices in the distal esophagus where there was edema.  There was no active bleeding visualized.  Bronchoscopy showed no return of blood and BAL.  ENT was also consulted and performed endoscopy through the nasopharynx with no visualization of bleeding.  She also was was hospitalized from March 31 to April 13 and declined any invasive diagnostic testing at that time.  CTA of the chest performed on March 27 and March 31 was unremarkable.    Patient return to the ER with 1 day history of hemoptysis and feeling unwell with a cough.  Pulmonology was consulted due to concern for recurrent hemoptysis.

## 2022-04-25 NOTE — ED PROVIDER NOTES
ED Provider Note     4/25/2022  2:41 AM    Means of Arrival: Walk In  History obtained by: patient  Limitations: None  PCP: Eva Collier  CODE STATUS: Full    CHIEF COMPLAINT  Chief Complaint   Patient presents with   • Cough     Coughing up blood.        HPI  Malena Bennett is a 21 y.o. female with history of celiac disease, lupus, factor V Leiden mutation, diabetes who presents with recurrent episodes of coughing up blood.  This is a problem that has been recurring since last month.  Just prior to arrival she had discomfort in the middle of her chest and coughed up bright red blood with blood clots.  Denies any epigastric pain.  Denies shortness of breath.  She says she has been coughing earlier in the day but had not coughed up any blood.  This was similar to previous episodes.  She shows a picture of multiple areas of bright red blood and clots in a hallway.  No blood in stools.  No lightheadedness.  No medication changes since discharge earlier this month.  She says she is compliant with prescribed medications.  She continues to take Prilosec.  She takes no anticoagulants.  No fevers.  No nose bleeding.  Now having some nausea.    Records reviewed.  See discharge summary from 4/13/2022.  She had an extensive work-up for hemoptysis/hematemesis during prior hospital stay from March 21 to March 28. Consultations with pulmonology, gastroenterology, rheumatology.  She was also hospitalized  GI did an EGD at that time and consistent with esophagitis and some suggestion of possible small varices in the distal esophagus where there was edema.  No active bleeding was visualized.  Bronchoscopy had no return of blood on BAL.  ENT also consulted a bedside procedure looking through the nasopharynx and showed no evidence of bleeding.  During hospital stay from March 31 to April 13 she declined any invasive diagnostic testing.  She had CTA of the chest on March 27 and March 31.  No signs of hemorrhage or  pulmonary emboli on those studies.    REVIEW OF SYSTEMS  Review of Systems   Constitutional: Positive for fever.   Gastrointestinal: Positive for nausea. Negative for abdominal pain.   Skin: Negative for rash.   All other systems reviewed and are negative.    See HPI for further details.    PAST MEDICAL HISTORY   has a past medical history of Asthma, Celiac disease, Clotting disorder (HCC), Diabetes (HCC), Factor 5 Leiden mutation, heterozygous (HCC), Lupus (HCC), OCD (obsessive compulsive disorder), and Tourette disease.    FAMILY HISTORY  Family History   Problem Relation Age of Onset   • Other Maternal Grandfather         Parkinson's   • Genetic Disorder Maternal Grandfather         Parkinsons, and crouzon syndrome   • Cancer Maternal Grandfather         Skin Cancer   • Heart Disease Other    • Cancer Other         bile duct cancer   • Other Mother         sphincter of salud, pancreatic insufficiency   • Cancer Mother 53        cervical ca   • Cancer Other         great uncle brain   • Arthritis Maternal Grandmother    • Cancer Maternal Grandmother         Skin Cancer   • Heart Disease Maternal Grandmother    • Hypertension Maternal Grandmother    • Hyperlipidemia Maternal Grandmother    • Stroke Maternal Grandmother    • Genetic Disorder Father         Factor V Liden and Tourettes   • Heart Disease Father    • Hyperlipidemia Father    • Genetic Disorder Maternal Uncle         Crouzon Syndrome   • Cancer Maternal Uncle         Bile Duct, and Skin cancer   • Cancer Maternal Aunt         Multiple Aunts and Uncles passed from cancer   • Heart Disease Maternal Aunt         Open heart surgery occuring   • Heart Disease Paternal Grandmother    • Hypertension Paternal Grandmother    • Stroke Paternal Grandmother    • Heart Disease Maternal Uncle         multiple uncles.       SOCIAL HISTORY  Social History     Tobacco Use   • Smoking status: Never Smoker   • Smokeless tobacco: Never Used   Vaping Use   • Vaping Use: Never  "used   Substance and Sexual Activity   • Alcohol use: No     Alcohol/week: 0.0 oz   • Drug use: No   • Sexual activity: Yes     Partners: Male     Birth control/protection: I.U.D.       SURGICAL HISTORY   has a past surgical history that includes appendectomy laparoscopic (7/1/2017); tavo by laparoscopy (1/30/2017); appendectomy (2018); upper gi endoscopy,diagnosis (N/A, 3/22/2022); and bronchoscopy,diagnostic (N/A, 3/25/2022).    CURRENT MEDICATIONS  Home Medications     Reviewed by Christelle Putnam R.N. (Registered Nurse) on 04/25/22 at 0227  Med List Status: <None>   Medication Last Dose Status   Belimumab 200 MG/ML Solution Auto-injector  Active   cyanocobalamin (VITAMIN B-12) 1000 MCG/ML Solution  Active   DULoxetine (CYMBALTA) 60 MG Cap DR Particles delayed-release capsule  Active   hydroxychloroquine (PLAQUENIL) 200 MG Tab  Active   insulin infusion pump Device  Active   omeprazole (PRILOSEC) 20 MG delayed-release capsule  Active   ondansetron (ZOFRAN ODT) 4 MG TABLET DISPERSIBLE  Active   predniSONE (DELTASONE) 10 MG Tab  Active   prochlorperazine (COMPAZINE) 10 MG Tab  Active   promethazine (PHENERGAN) 25 MG Tab  Active   scopolamine (TRANSDERM-SCOP) 1 mg/72hr PATCH 72 HR  Active   vitamin D2, Ergocalciferol, (DRISDOL) 1.25 MG (56010 UT) Cap capsule  Active                ALLERGIES  Allergies   Allergen Reactions   • Cefdinir Rash     rash   • Erythromycin Vomiting and Nausea   • Gluten Meal Diarrhea, Vomiting and Nausea     Celiac        PHYSICAL EXAM  VITAL SIGNS: /64   Pulse (!) 113   Temp 36.6 °C (97.8 °F) (Temporal)   Resp 16   Ht 1.676 m (5' 6\")   Wt 92.3 kg (203 lb 7.8 oz)   SpO2 94%   BMI 32.84 kg/m²     Pulse ox interpretation:I interpret this pulse ox as normal.  Constitutional: Alert in no apparent distress.  Quiet 21-year-old woman.  HENT: No signs of trauma, Bilateral external ears normal, Nose normal.   Eyes: Pupils are equal, Conjunctiva normal, Non-icteric.   Neck: Normal range " of motion, No tenderness, Supple, No stridor.  No JVD.  Cardiovascular: Increased rate and rhythm, no murmurs. Symmetric distal pulses. No cyanosis of extremities. No peripheral edema of extremities.  Thorax & Lungs: Normal breath sounds, No respiratory distress, No wheezing, No chest tenderness.   Abdomen: Soft, No tenderness, No masses, No pulsatile masses. No peritoneal signs.  Skin: Warm, Dry, bruising to left forearm  Back: No midline bony tenderness, No CVA tenderness.   Musculoskeletal: Good range of motion in all major joints. No tenderness to palpation or major deformities noted.   Neurologic: Alert , Normal motor function, Normal sensory function, No focal deficits noted.   Psychiatric: Affect normal, Judgment normal, Mood normal.   Physical Exam      DIAGNOSTIC STUDIES / PROCEDURES    LABS  Pertinent Labs & Imaging studies reviewed. (See chart for details)    RADIOLOGY  Pertinent Labs & Imaging studies reviewed. (See chart for details)    COURSE & MEDICAL DECISION MAKING  Pertinent Labs & Imaging studies reviewed. (See chart for details)    2:41 AM This is an emergent evaluation of a  21 y.o. female who presents after episode of coughing up blood.  She says she was coughing up blood and not vomiting.  Heart rate elevated to 120.  Unclear source of bleeding will check for signs of anemia, coagulopathy, lung abnormalities that could suggest diffuse alveolar hemorrhage.  Will give Protonix for possible upper GI bleed.  Recent endoscopy did suggest esophagitis which could result in bleeding.    4:54 AM  Hemoglobin 13.2 which is better than it was when she was discharged on April 13.  At that time was 1.9.  Metabolic panel within acceptable limits.  Not showing signs of coagulopathy with respect to PT and PTT.  Chest x-ray without any new findings.  There are some small interstitial opacities in the left lower lung.  She continues to have mild tachycardia with normal blood pressure.  She has not there has not  been any further production of blood such as hematemesis or hemoptysis here in the ER.  I am consulting with our hospitalist, Dr. Obrien to come and evaluate her for possible hospitalization.  Hospitalization for further evaluation, observation of possible upper GI bleed or hemoptysis.  Difficult to tell source here. No blood on face or posterior pharynx. Photo that she provides does have significant amount of blood, this is more than bloody sputum on a tissue paper or emesis with streaks of blood in it.          FINAL IMPRESSION    ICD-10-CM   1. Coughing up blood Active R04.2            This dictation was created using voice recognition software. The accuracy of the dictation is limited to the abilities of the software. I expect there may be some errors of grammar and possibly content. The nursing notes were reviewed and certain aspects of this information were incorporated into this note.    Electronically signed by: Ned Kulkarni II, M.D., 4/25/2022 2:41 AM

## 2022-04-26 LAB
ALBUMIN SERPL BCP-MCNC: 3.7 G/DL (ref 3.2–4.9)
ALBUMIN/GLOB SERPL: 1.2 G/DL
ALP SERPL-CCNC: 85 U/L (ref 30–99)
ALT SERPL-CCNC: 95 U/L (ref 2–50)
ANION GAP SERPL CALC-SCNC: 12 MMOL/L (ref 7–16)
AST SERPL-CCNC: 26 U/L (ref 12–45)
BASOPHILS # BLD AUTO: 0.6 % (ref 0–1.8)
BASOPHILS # BLD: 0.03 K/UL (ref 0–0.12)
BILIRUB SERPL-MCNC: 0.4 MG/DL (ref 0.1–1.5)
BUN SERPL-MCNC: 9 MG/DL (ref 8–22)
CALCIUM SERPL-MCNC: 9.1 MG/DL (ref 8.5–10.5)
CHLORIDE SERPL-SCNC: 103 MMOL/L (ref 96–112)
CO2 SERPL-SCNC: 23 MMOL/L (ref 20–33)
CREAT SERPL-MCNC: 0.73 MG/DL (ref 0.5–1.4)
EOSINOPHIL # BLD AUTO: 0.16 K/UL (ref 0–0.51)
EOSINOPHIL NFR BLD: 3.2 % (ref 0–6.9)
ERYTHROCYTE [DISTWIDTH] IN BLOOD BY AUTOMATED COUNT: 43.8 FL (ref 35.9–50)
EST. AVERAGE GLUCOSE BLD GHB EST-MCNC: 91 MG/DL
GFR SERPLBLD CREATININE-BSD FMLA CKD-EPI: 119 ML/MIN/1.73 M 2
GLOBULIN SER CALC-MCNC: 3 G/DL (ref 1.9–3.5)
GLUCOSE SERPL-MCNC: 100 MG/DL (ref 65–99)
HBA1C MFR BLD: 4.8 % (ref 4–5.6)
HCT VFR BLD AUTO: 37.4 % (ref 37–47)
HGB BLD-MCNC: 12.4 G/DL (ref 12–16)
IMM GRANULOCYTES # BLD AUTO: 0.03 K/UL (ref 0–0.11)
IMM GRANULOCYTES NFR BLD AUTO: 0.6 % (ref 0–0.9)
LYMPHOCYTES # BLD AUTO: 2.3 K/UL (ref 1–4.8)
LYMPHOCYTES NFR BLD: 45.4 % (ref 22–41)
MAGNESIUM SERPL-MCNC: 2.3 MG/DL (ref 1.5–2.5)
MCH RBC QN AUTO: 30.5 PG (ref 27–33)
MCHC RBC AUTO-ENTMCNC: 33.2 G/DL (ref 33.6–35)
MCV RBC AUTO: 92.1 FL (ref 81.4–97.8)
MONOCYTES # BLD AUTO: 0.98 K/UL (ref 0–0.85)
MONOCYTES NFR BLD AUTO: 19.3 % (ref 0–13.4)
NEUTROPHILS # BLD AUTO: 1.57 K/UL (ref 2–7.15)
NEUTROPHILS NFR BLD: 30.9 % (ref 44–72)
NRBC # BLD AUTO: 0 K/UL
NRBC BLD-RTO: 0 /100 WBC
PLATELET # BLD AUTO: 197 K/UL (ref 164–446)
PMV BLD AUTO: 10 FL (ref 9–12.9)
POTASSIUM SERPL-SCNC: 3.8 MMOL/L (ref 3.6–5.5)
PROT SERPL-MCNC: 6.7 G/DL (ref 6–8.2)
RBC # BLD AUTO: 4.06 M/UL (ref 4.2–5.4)
SODIUM SERPL-SCNC: 138 MMOL/L (ref 135–145)
WBC # BLD AUTO: 5.1 K/UL (ref 4.8–10.8)

## 2022-04-26 PROCEDURE — 700111 HCHG RX REV CODE 636 W/ 250 OVERRIDE (IP): Performed by: INTERNAL MEDICINE

## 2022-04-26 PROCEDURE — 85025 COMPLETE CBC W/AUTO DIFF WBC: CPT

## 2022-04-26 PROCEDURE — 99226 PR SUBSEQUENT OBSERVATION CARE,LEVEL III: CPT | Performed by: STUDENT IN AN ORGANIZED HEALTH CARE EDUCATION/TRAINING PROGRAM

## 2022-04-26 PROCEDURE — 80053 COMPREHEN METABOLIC PANEL: CPT

## 2022-04-26 PROCEDURE — 96375 TX/PRO/DX INJ NEW DRUG ADDON: CPT

## 2022-04-26 PROCEDURE — A9270 NON-COVERED ITEM OR SERVICE: HCPCS | Performed by: INTERNAL MEDICINE

## 2022-04-26 PROCEDURE — 700102 HCHG RX REV CODE 250 W/ 637 OVERRIDE(OP): Performed by: INTERNAL MEDICINE

## 2022-04-26 PROCEDURE — A9270 NON-COVERED ITEM OR SERVICE: HCPCS | Performed by: HOSPITALIST

## 2022-04-26 PROCEDURE — G0378 HOSPITAL OBSERVATION PER HR: HCPCS

## 2022-04-26 PROCEDURE — 700102 HCHG RX REV CODE 250 W/ 637 OVERRIDE(OP): Performed by: HOSPITALIST

## 2022-04-26 PROCEDURE — 36415 COLL VENOUS BLD VENIPUNCTURE: CPT

## 2022-04-26 PROCEDURE — 83735 ASSAY OF MAGNESIUM: CPT

## 2022-04-26 RX ORDER — IBUPROFEN 800 MG/1
400 TABLET ORAL EVERY 6 HOURS PRN
Status: DISCONTINUED | OUTPATIENT
Start: 2022-04-26 | End: 2022-04-28

## 2022-04-26 RX ADMIN — ACETAMINOPHEN 650 MG: 325 TABLET, FILM COATED ORAL at 11:47

## 2022-04-26 RX ADMIN — OMEPRAZOLE 20 MG: 20 CAPSULE, DELAYED RELEASE ORAL at 05:52

## 2022-04-26 RX ADMIN — ONDANSETRON 4 MG: 2 INJECTION INTRAMUSCULAR; INTRAVENOUS at 05:52

## 2022-04-26 RX ADMIN — IBUPROFEN 400 MG: 800 TABLET, FILM COATED ORAL at 20:22

## 2022-04-26 ASSESSMENT — ENCOUNTER SYMPTOMS
DIARRHEA: 0
NERVOUS/ANXIOUS: 0
INSOMNIA: 0
NAUSEA: 1
MYALGIAS: 0
HEMOPTYSIS: 1
FEVER: 0
SHORTNESS OF BREATH: 0
PALPITATIONS: 0
COUGH: 1
ABDOMINAL PAIN: 0
DIZZINESS: 1
HEADACHES: 1
VOMITING: 1
CONSTIPATION: 0
CHILLS: 0

## 2022-04-26 ASSESSMENT — PAIN DESCRIPTION - PAIN TYPE
TYPE: ACUTE PAIN

## 2022-04-26 NOTE — DISCHARGE PLANNING
Anticipated Discharge Disposition: Home    Action: Pulmonary consulted. CT with no PE, some atelectasis or mild infection. Intermittent hemoptysis. Continue monitoring labs.     Barriers to Discharge: medical clearance    Plan: f/u with medical team regarding treatment plan, discharge barriers, and discharge needs. F/u with pt regarding discharge barriers, needs, and plan.

## 2022-04-26 NOTE — CARE PLAN
The patient is Stable - Low risk of patient condition declining or worsening    Shift Goals  Clinical Goals: Monitor hemoptysis  Patient Goals: Rest  Family Goals: N/A    Progress made toward(s) clinical / shift goals:      Patient is not progressing towards the following goals: Pt had episode of hemoptysis, photo taken and saved to chart. Pt stated bad headache after episode, medicated per MAR      Problem: Knowledge Deficit - Standard  Goal: Patient and family/care givers will demonstrate understanding of plan of care, disease process/condition, diagnostic tests and medications  Outcome: Not Progressing     Problem: Pain - Standard  Goal: Alleviation of pain or a reduction in pain to the patient’s comfort goal  Outcome: Not Progressing

## 2022-04-26 NOTE — PROGRESS NOTES
[facility] Medicine Daily Progress Note    Date of Service  4/26/2022    Chief Complaint  Malena Bennett is a 21 y.o. female admitted 4/25/2022 with with recurrent hemoptysis.    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  Patient underwent extensive work-up for hematemesis/hemoptysis approximately 2 weeks ago discharged 4/13/2022 for similar symptoms.  She underwent extensive work-up from March 21 to March 22 during her hospitalization, which included consultation with pulmonology, gastroenterology, and rheumatology.  She had an EGD, which showed esophagitis with suggestion of possible small varices in the distal esophagus where there was edema.  There was no active bleeding visualized.  Bronchoscopy showed no return of blood and BAL.  ENT was also consulted and performed endoscopy through the nasopharynx with no visualization of bleeding.  She also was was hospitalized from March 31 to April 13 and declined any invasive diagnostic testing at that time.  CTA of the chest performed on March 27 and March 31 was unremarkable.    Patient return to the ER with 1 day history of hemoptysis and feeling unwell with a cough.  Pulmonology was consulted due to concern for recurrent hemoptysis.      Interval Problem Update  Pt seen and examined, still with complaints of nausea and poor appetite, states she had episode of hemoptysis this AM, blood noted in bathroom but episode was not witnessed, no blood seen in patients deep oropharynx noted by nursing  Post episode   Labs stable, not anemic, electrolytes stable   Pulmonology signed off, recommending against further imaging, no evidence of pulmonary source for bleeding     Patient has had a very extensive work up with labs and imaging, EGD and bronchoscopy all which without definitive answers, if  this is in-fact due to her underlying lupus, she would be best served following with her rheumatologist and may be being seen at a facility with lupus care however she does not have a medically need to be transferred to another facility at this time which I explained to patient.        I have personally seen and examined the patient at bedside. I discussed the plan of care with patient and bedside RN.      Consultants/Specialty  pulmonary    Code Status  Full Code    Disposition  Patient is medically cleared for discharge.   Anticipate discharge to to home with close outpatient follow-up.  I have placed the appropriate orders for post-discharge needs.    Review of Systems  Review of Systems   Constitutional: Positive for malaise/fatigue. Negative for chills and fever.   Respiratory: Positive for cough and hemoptysis. Negative for shortness of breath.    Cardiovascular: Positive for chest pain. Negative for palpitations.   Gastrointestinal: Positive for nausea and vomiting. Negative for abdominal pain, constipation and diarrhea.   Genitourinary: Negative for dysuria and frequency.   Musculoskeletal: Negative for joint pain and myalgias.   Neurological: Positive for dizziness and headaches.   Psychiatric/Behavioral: The patient is not nervous/anxious and does not have insomnia.         Physical Exam  Temp:  [36.6 °C (97.9 °F)-37.4 °C (99.3 °F)] 36.6 °C (97.9 °F)  Pulse:  [] 96  Resp:  [16] 16  BP: ()/(55-73) 101/73  SpO2:  [94 %-97 %] 96 %    Physical Exam  Vitals and nursing note reviewed.   Constitutional:       Appearance: Normal appearance. She is normal weight. She is not ill-appearing or diaphoretic.   HENT:      Head: Normocephalic and atraumatic.      Mouth/Throat:      Mouth: Mucous membranes are moist.      Pharynx: Oropharynx is clear. No oropharyngeal exudate.   Eyes:      General:         Right eye: No discharge.         Left eye: No discharge.      Conjunctiva/sclera: Conjunctivae normal.       Pupils: Pupils are equal, round, and reactive to light.   Cardiovascular:      Rate and Rhythm: Normal rate and regular rhythm.      Pulses: Normal pulses.      Heart sounds: Normal heart sounds. No murmur heard.  Pulmonary:      Effort: Pulmonary effort is normal. No respiratory distress.      Breath sounds: Normal breath sounds.   Abdominal:      General: Abdomen is flat. Bowel sounds are normal. There is no distension.      Palpations: Abdomen is soft.      Tenderness: There is no abdominal tenderness.   Musculoskeletal:      Cervical back: Neck supple. No tenderness.      Right lower leg: No edema.      Left lower leg: No edema.   Skin:     General: Skin is warm and dry.      Findings: Bruising (Left upper extremity) present.   Neurological:      Mental Status: She is alert and oriented to person, place, and time.      Motor: No weakness.         Fluids    Intake/Output Summary (Last 24 hours) at 4/26/2022 1532  Last data filed at 4/26/2022 1300  Gross per 24 hour   Intake 520 ml   Output --   Net 520 ml       Laboratory  Recent Labs     04/25/22 0315 04/26/22  0037   WBC 7.7 5.1   RBC 4.31 4.06*   HEMOGLOBIN 13.2 12.4   HEMATOCRIT 40.2 37.4   MCV 93.3 92.1   MCH 30.6 30.5   MCHC 32.8* 33.2*   RDW 44.1 43.8   PLATELETCT 218 197   MPV 10.2 10.0     Recent Labs     04/25/22 0315 04/26/22  0037   SODIUM 135 138   POTASSIUM 3.9 3.8   CHLORIDE 100 103   CO2 21 23   GLUCOSE 110* 100*   BUN 8 9   CREATININE 0.65 0.73   CALCIUM 9.3 9.1     Recent Labs     04/25/22  0331   APTT 23.3*   INR 1.12               Imaging  EC-ECHOCARDIOGRAM COMPLETE W/O CONT   Final Result      CT-CTA CHEST PULMONARY ARTERY W/ RECONS   Final Result         1. No CT evidence of pulmonary embolism.      2. Minimal airspace opacity in the bilateral lung bases, right more than left, atelectasis or mild infection.         DX-CHEST-PORTABLE (1 VIEW)   Final Result      No acute cardiopulmonary abnormality.              Assessment/Plan  *  Hemoptysis- (present on admission)  Assessment & Plan  Etiology unclear.  Had extensive work-up in the past including multiple subspecialties.  Previous EGD showing esophagitis with possible small varices in the distal esophagus where there was edema.  Previous bronchoscopy with no blood in BAL.  CTA with PE protocol negative.  Inflammatory markers including CRP and ESR negative.  TEG study unremarkable.  Patient continues to have intermittent hemoptysis.  This is a complicated situation with no clear source.  Pulmonology consulted  No intervention planned, source is not lungs per pulm     Lupus (HCC)- (present on admission)  Assessment & Plan  On Benlysta, per pt started 3 weeks ago  Outpatient rheumatology follow up  ESR, CRP normal     Elevated antinuclear antibody (SHANI) level- (present on admission)  Assessment & Plan  As per history.  Needs outpatient follow up    Celiac disease- (present on admission)  Assessment & Plan  As per history.    Gluten-free diet carbohydrate consistent.    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    Outpatient hematology follow-up    Controlled type 1 diabetes mellitus (HCC)- (present on admission)  Assessment & Plan  As per history.  Hemoglobin A1c of 4.63 weeks ago.    Resume home insulin pump  Diabetic diet    Esophagitis determined by endoscopy  Assessment & Plan  As per history.  Previous EGD showing esophagitis.  Continue omeprazole    Factor V Leiden (HCC)- (present on admission)  Assessment & Plan  As per history.  Heterozygous mutation.    Resume chemo DVT prophylaxis when stable.       VTE prophylaxis: SCDs/TEDs and pharmacologic prophylaxis contraindicated due to Hematemesis    I have performed a physical exam and reviewed and updated ROS and Plan today (4/26/2022). In review of yesterday's note (4/25/2022), there are no changes except as documented above.

## 2022-04-26 NOTE — CONSULTS
Critical Care/Pulmonary Consultation    Date of Service: 4/25/2022    Date of Admission:  4/25/2022  2:37 AM    Consulting Physician: Moiz Pena M.D.    Chief Complaint:  Cough (Coughing up blood. )      History of Present Illness:   Malena Bennett is a 21 y.o. female with a PMH significant for factor V Leiden, lupus, type I DM, factor VIII inhibitor, celiac disease, polymyalgia who was admitted to the hospital on 4/25 for evaluation of possible regarding hemoptysis.  Patient was admitted between 3/20 1-3/2024 for hemoptysis/hematemesis and underwent extensive work-up including bronchoscopy with BAL which was negative for any source of bleeding, EGD which showed gastritis + friable esophageal mucosa, patient was also evaluated by ENT with negative work-up.  Patient states over the last 48 hours she been having cough several small quantities of bright red blood.  Patient vitals were stable and she was satting well on room air.  Labs remarkable for INR of 1.2, normal take studies.  Patient had a CT PE done which showed no evidence of acute abnormalities.  We have been consulted for further evaluation of possible hemoptysis.      Review of Systems   All other systems reviewed and are negative.  Negative except as mentioned in HPI.    Home Medications     Reviewed by Patsy Calabrese (Pharmacy Tech) on 04/25/22 at 0739  Med List Status: Complete   Medication Last Dose Status   Belimumab 200 MG/ML Solution Auto-injector 4/21/2022 Active   cyanocobalamin (VITAMIN B-12) 1000 MCG/ML Solution ABOUT 10 DAYS AGO Active   hydroxychloroquine (PLAQUENIL) 200 MG Tab ABOUT 2 WEEKS AGO Active   insulin infusion pump Device NOT CONNECTED Active   omeprazole (PRILOSEC) 20 MG delayed-release capsule 4/24/2022 Active   ondansetron (ZOFRAN ODT) 4 MG TABLET DISPERSIBLE 4/24/2022 Active   predniSONE (DELTASONE) 10 MG Tab 4/24/2022 Active   prochlorperazine (COMPAZINE) 10 MG Tab FEW DAYS AGO Active   promethazine  (PHENERGAN) 25 MG Tab ABOUT 1 WEEK AGO Active   scopolamine (TRANSDERM-SCOP) 1 mg/72hr PATCH 72 HR PATCH OFF Active   vitamin D2, Ergocalciferol, (DRISDOL) 1.25 MG (56972 UT) Cap capsule 4/22/2022 Active                Social History     Tobacco Use   • Smoking status: Never Smoker   • Smokeless tobacco: Never Used   Vaping Use   • Vaping Use: Never used   Substance Use Topics   • Alcohol use: No     Alcohol/week: 0.0 oz   • Drug use: No        Past Medical History:   Diagnosis Date   • Asthma     resolved   • Celiac disease    • Clotting disorder (HCC)     Factor VIII per patient   • Diabetes (HCC)    • Factor 5 Leiden mutation, heterozygous (HCC)    • Lupus (HCC)    • OCD (obsessive compulsive disorder)    • Tourette disease        Past Surgical History:   Procedure Laterality Date   • AL BRONCHOSCOPY,DIAGNOSTIC N/A 3/25/2022    Procedure: BRONCHOSCOPY-WITH BRONCHOALVEOLAR LAVAGE AND TRANSBRONCHIAL BIOPSY;  Surgeon: Denice Lucas M.D.;  Location: SURGERY [facility];  Service: Ent   • AL UPPER GI ENDOSCOPY,DIAGNOSIS N/A 3/22/2022    Procedure: GASTROSCOPY;  Surgeon: Sharda Multani D.O.;  Location: [facility];  Service: Gastroenterology   • APPENDECTOMY  2018   • APPENDECTOMY LAPAROSCOPIC  7/1/2017    Procedure: APPENDECTOMY LAPAROSCOPIC;  Surgeon: Derick Arthur M.D.;  Location: [facility];  Service:    • ELIGIO BY LAPAROSCOPY  1/30/2017    Procedure: ELIGIO BY LAPAROSCOPY;  Surgeon: Fina Perkins M.D.;  Location: SURGERY SAME DAY [facility];  Service:        Allergies: Cefdinir, Erythromycin, and Gluten meal    Family History   Problem Relation Age of Onset   • Other Maternal Grandfather         Parkinson's   • Genetic Disorder Maternal Grandfather         Parkinsons, and crouzon syndrome   • Cancer Maternal Grandfather         Skin Cancer   • Heart Disease Other    • Cancer Other         bile duct cancer   • Other Mother         sphincter of salud, pancreatic insufficiency   •  "Cancer Mother 53        cervical ca   • Cancer Other         great uncle brain   • Arthritis Maternal Grandmother    • Cancer Maternal Grandmother         Skin Cancer   • Heart Disease Maternal Grandmother    • Hypertension Maternal Grandmother    • Hyperlipidemia Maternal Grandmother    • Stroke Maternal Grandmother    • Genetic Disorder Father         Factor V Liden and Tourettes   • Heart Disease Father    • Hyperlipidemia Father    • Genetic Disorder Maternal Uncle         Crouzon Syndrome   • Cancer Maternal Uncle         Bile Duct, and Skin cancer   • Cancer Maternal Aunt         Multiple Aunts and Uncles passed from cancer   • Heart Disease Maternal Aunt         Open heart surgery occuring   • Heart Disease Paternal Grandmother    • Hypertension Paternal Grandmother    • Stroke Paternal Grandmother    • Heart Disease Maternal Uncle         multiple uncles.       Vitals:    04/25/22 0221 04/25/22 0224 04/25/22 0400 04/25/22 0500   Height:  1.676 m (5' 6\")     Weight:  92.3 kg (203 lb 7.8 oz)     Weight % change since last entry.:  0 %     BP: 134/109  103/75 116/64   Pulse: (!) 126  (!) 111 (!) 113   BMI (Calculated):  32.84     Resp: 18 16 16   Temp: 36.6 °C (97.8 °F)      TempSrc: Temporal       04/25/22 0703 04/25/22 0818 04/25/22 0847 04/25/22 0900   Height:       Weight:       Weight % change since last entry.:       BP: 101/62 (!) 90/54 (!) 90/64 (!) 93/59   Pulse: 97 (!) 102 (!) 104 (!) 102   BMI (Calculated):       Resp:   20 18   Temp:       TempSrc:        04/25/22 1001 04/25/22 1031 04/25/22 1101 04/25/22 1150   Height:       Weight:       Weight % change since last entry.:       BP: (!) 93/61 100/67 (!) 97/67 (!) 95/71   Pulse: (!) 101 100 98 95   BMI (Calculated):       Resp: 18 20 18 16   Temp:  37.4 °C (99.4 °F)  37.1 °C (98.7 °F)   TempSrc:  Temporal  Temporal       Physical Examination  General: In no acute distress, AAO x4  Neuro/Psych: Grossly intact, CN II/XII intact, no focal " weakness.  HEENT: NC/AT, clear oropharynx, no epistaxis.  CVS: RRR, normal S1/S2, no MRG.  Respiratory: CTA B/L, no wheezing or crackles, no other adventitious breath sounds.  Abdomen/: Soft,NT/ND, + BS.  Extremities: No lower extremity edema noted.  Skin: No petechiae or easy bruising noticed.    No intake or output data in the 24 hours ending 04/25/22 1708    Recent Labs     04/25/22  0315   WBC 7.7   NEUTSPOLYS 63.20   LYMPHOCYTES 17.20*   MONOCYTES 17.00*   EOSINOPHILS 1.40   BASOPHILS 0.40   ASTSGOT 42   ALTSGPT 131*   ALKPHOSPHAT 98   TBILIRUBIN 0.7     Recent Labs     04/25/22  0315   SODIUM 135   POTASSIUM 3.9   CHLORIDE 100   CO2 21   BUN 8   CREATININE 0.65   CALCIUM 9.3     Recent Labs     04/25/22  0315   ALTSGPT 131*   ASTSGOT 42   ALKPHOSPHAT 98   TBILIRUBIN 0.7   GLUCOSE 110*         EC-ECHOCARDIOGRAM COMPLETE W/O CONT   Final Result      CT-CTA CHEST PULMONARY ARTERY W/ RECONS   Final Result         1. No CT evidence of pulmonary embolism.      2. Minimal airspace opacity in the bilateral lung bases, right more than left, atelectasis or mild infection.         DX-CHEST-PORTABLE (1 VIEW)   Final Result      No acute cardiopulmonary abnormality.             Patient Active Problem List   Diagnosis   • Factor V Leiden (HCC)   • [place]   • Tourette syndrome   • OCD (obsessive compulsive disorder)   • Chronic tension-type headache, not intractable   • Esophagitis determined by endoscopy   • Salt craving   • Other irritable bowel syndrome   • Hyperglycemia   • POTS (postural orthostatic tachycardia syndrome)   • Controlled type 1 diabetes mellitus (HCC)   • Factor VIII inhibitor disorder (HCC)   • History of cholecystectomy   • Celiac disease   • Elevated antinuclear antibody (SHANI) level   • Vitamin D deficiency   • Other chronic pain   • Acute midline thoracic back pain   • SOB (shortness of breath)   • History of asthma   • Muscle tenderness   • Hospital discharge follow-up   • Lupus (HCC)   •  Polymyalgia (HCC)   • Cough   • Flu-like symptoms   • Hypokalemia due to excessive gastrointestinal loss of potassium   • Hemoptysis   • Intractable nausea and vomiting   • Class 1 obesity due to excess calories with serious comorbidity in adult   • Anemia of chronic disease       Assessment and Plan:  #Evaluation for possible hemoptysis.  - Bleeding issues most likely secondary to underlying factor V Leiden, factor VIII inhibitors.  Bleeding source could be originating from gastrointestinal tract vs. upper airway disease vs. less likely pulmonary in nature.  - Given clear CT chest + normal bronchoscopy with BAL done as a part of work-up for similar complaint on 3/25/2022, the bleeding is unlikely pulmonary in nature and would not recommend any further work-up from pulmonology standpoint at this time.    Thank for you allowing us to take part in your patient's care, pulmonology will sign off. please call should you have any questions or would like to discuss this patient.

## 2022-04-26 NOTE — PROGRESS NOTES
Assumed care of patient at 0700 from Tessie BACON. Patient is A&Ox 4, states pain level is 0/10. Bed locked in lowest position with 2 rails up. Call light in place, belongings at bedside. Patient expresses zero needs at this time and hourly rounding is in place. Pt is a no fall risk.

## 2022-04-26 NOTE — PROGRESS NOTES
Assumed care of pt at 1900. Report received by Day RN. Pt is A&O x 4. Pain level is 6/10, heat packs provided per patient request. Bed in lowest locked position, call light within reach, hourly rounding in place. Labs reviewed, orders reviewed, communication board updated.

## 2022-04-26 NOTE — PROGRESS NOTES
Hemoptysis episode this am per pt. This RN called into room by CNA. Patient holding quarter-sized blood clot in left palm. Small amount of blood noted to patients front upper teeth and outer portion of upper/lower lips. Keychain light used to visualize oral cavity. No blood noted in mouth beyond outer lips and front teeth. No blood noted in throat.

## 2022-04-26 NOTE — DIETARY
"Nutrition services: Day 0 of admit.  Malena Bennett is a 21 y.o. female with admitting DX of Hemoptysis.  Consult received for Malnutrition Screening Tool score of 2 for unplanned weight loss of 2-13 lb x 1 month and poor appetite.    Assessment:  Height: 167.6 cm (5' 6\")  Weight: 92.3 kg (203 lb 7.8 oz)  Body mass index is 32.84 kg/m²., BMI classification: Class 1 Obesity  Diet/Intake: Consistent CHO, Gluten Free    Evaluation:   1. Pt states eating poorly for about a month and lost about 18 lb. Per chart review, weight one month ago on 3/21/22 = 94.5 kg. 3/30/22 = 92.4 kg. Pt did lose 2.2% x 9 days in March, but current weight stable from end of March.  2. Recorded PO intake = 0% breakfast and <25% lunch today. Observed pt with additional outside food at bedside. Discussed current diet order with pt. Nutrition Rep to review menu with pt.    Malnutrition Risk: Criteria not met, but at risk with poor appetite.    Recommendations/Plan:   1. Encourage intake of meals >50%.  2. Document intake of all meals as % taken in ADL's to provide interdisciplinary communication across all shifts.   3. Monitor weight.  4. Nutrition rep will continue to see patient for ongoing meal and snack preferences.     RD following        "

## 2022-04-26 NOTE — CARE PLAN
The patient is Stable - Low risk of patient condition declining or worsening    Shift Goals  Clinical Goals: monitor hemoptysis  Patient Goals: rest, comfort    Progress made toward(s) clinical / shift goals:  Pt progressing towards goals during shift.       Problem: Knowledge Deficit - Standard  Goal: Patient and family/care givers will demonstrate understanding of plan of care, disease process/condition, diagnostic tests and medications  Outcome: Progressing   Pt updated on plan of care. Verbalized understanding.   Problem: Pain - Standard  Goal: Alleviation of pain or a reduction in pain to the patient’s comfort goal  Outcome: Progressing   Pt requested heat pads as intervention. Pain level decreased with heat pads.

## 2022-04-27 LAB
ANION GAP SERPL CALC-SCNC: 11 MMOL/L (ref 7–16)
BUN SERPL-MCNC: 13 MG/DL (ref 8–22)
CALCIUM SERPL-MCNC: 8.8 MG/DL (ref 8.5–10.5)
CHLORIDE SERPL-SCNC: 102 MMOL/L (ref 96–112)
CO2 SERPL-SCNC: 23 MMOL/L (ref 20–33)
CREAT SERPL-MCNC: 0.66 MG/DL (ref 0.5–1.4)
ERYTHROCYTE [DISTWIDTH] IN BLOOD BY AUTOMATED COUNT: 42.9 FL (ref 35.9–50)
GFR SERPLBLD CREATININE-BSD FMLA CKD-EPI: 127 ML/MIN/1.73 M 2
GLUCOSE SERPL-MCNC: 101 MG/DL (ref 65–99)
HCT VFR BLD AUTO: 38.8 % (ref 37–47)
HGB BLD-MCNC: 12.6 G/DL (ref 12–16)
MCH RBC QN AUTO: 29.9 PG (ref 27–33)
MCHC RBC AUTO-ENTMCNC: 32.5 G/DL (ref 33.6–35)
MCV RBC AUTO: 92.2 FL (ref 81.4–97.8)
PLATELET # BLD AUTO: 183 K/UL (ref 164–446)
PMV BLD AUTO: 10.1 FL (ref 9–12.9)
POTASSIUM SERPL-SCNC: 3.9 MMOL/L (ref 3.6–5.5)
RBC # BLD AUTO: 4.21 M/UL (ref 4.2–5.4)
SODIUM SERPL-SCNC: 136 MMOL/L (ref 135–145)
WBC # BLD AUTO: 5.8 K/UL (ref 4.8–10.8)

## 2022-04-27 PROCEDURE — 80048 BASIC METABOLIC PNL TOTAL CA: CPT

## 2022-04-27 PROCEDURE — 99226 PR SUBSEQUENT OBSERVATION CARE,LEVEL III: CPT | Performed by: STUDENT IN AN ORGANIZED HEALTH CARE EDUCATION/TRAINING PROGRAM

## 2022-04-27 PROCEDURE — 36415 COLL VENOUS BLD VENIPUNCTURE: CPT

## 2022-04-27 PROCEDURE — 700102 HCHG RX REV CODE 250 W/ 637 OVERRIDE(OP): Performed by: INTERNAL MEDICINE

## 2022-04-27 PROCEDURE — A9270 NON-COVERED ITEM OR SERVICE: HCPCS | Performed by: INTERNAL MEDICINE

## 2022-04-27 PROCEDURE — G0378 HOSPITAL OBSERVATION PER HR: HCPCS

## 2022-04-27 PROCEDURE — A9270 NON-COVERED ITEM OR SERVICE: HCPCS | Performed by: STUDENT IN AN ORGANIZED HEALTH CARE EDUCATION/TRAINING PROGRAM

## 2022-04-27 PROCEDURE — 700102 HCHG RX REV CODE 250 W/ 637 OVERRIDE(OP): Performed by: STUDENT IN AN ORGANIZED HEALTH CARE EDUCATION/TRAINING PROGRAM

## 2022-04-27 PROCEDURE — 85027 COMPLETE CBC AUTOMATED: CPT

## 2022-04-27 PROCEDURE — 700111 HCHG RX REV CODE 636 W/ 250 OVERRIDE (IP): Performed by: STUDENT IN AN ORGANIZED HEALTH CARE EDUCATION/TRAINING PROGRAM

## 2022-04-27 RX ORDER — HYDROXYCHLOROQUINE SULFATE 200 MG/1
400 TABLET, FILM COATED ORAL DAILY
Status: DISCONTINUED | OUTPATIENT
Start: 2022-04-27 | End: 2022-04-29 | Stop reason: HOSPADM

## 2022-04-27 RX ORDER — PREDNISONE 20 MG/1
40 TABLET ORAL DAILY
Status: DISCONTINUED | OUTPATIENT
Start: 2022-04-27 | End: 2022-04-27

## 2022-04-27 RX ORDER — PREDNISONE 20 MG/1
40 TABLET ORAL DAILY
Status: COMPLETED | OUTPATIENT
Start: 2022-04-27 | End: 2022-04-28

## 2022-04-27 RX ORDER — PREDNISONE 10 MG/1
10 TABLET ORAL DAILY
Status: DISCONTINUED | OUTPATIENT
Start: 2022-05-14 | End: 2022-04-29 | Stop reason: HOSPADM

## 2022-04-27 RX ORDER — PREDNISONE 20 MG/1
20 TABLET ORAL DAILY
Status: DISCONTINUED | OUTPATIENT
Start: 2022-05-07 | End: 2022-04-29 | Stop reason: HOSPADM

## 2022-04-27 RX ADMIN — OMEPRAZOLE 20 MG: 20 CAPSULE, DELAYED RELEASE ORAL at 06:02

## 2022-04-27 RX ADMIN — HYDROXYCHLOROQUINE SULFATE 400 MG: 200 TABLET ORAL at 11:11

## 2022-04-27 RX ADMIN — ACETAMINOPHEN 650 MG: 325 TABLET, FILM COATED ORAL at 06:03

## 2022-04-27 RX ADMIN — PREDNISONE 40 MG: 20 TABLET ORAL at 11:11

## 2022-04-27 ASSESSMENT — PAIN DESCRIPTION - PAIN TYPE
TYPE: ACUTE PAIN

## 2022-04-27 ASSESSMENT — ENCOUNTER SYMPTOMS
HEMOPTYSIS: 1
DIARRHEA: 0
MYALGIAS: 0
FEVER: 0
COUGH: 1
VOMITING: 1
SHORTNESS OF BREATH: 0
INSOMNIA: 0
HEADACHES: 1
NAUSEA: 1
PALPITATIONS: 0
CHILLS: 0
NERVOUS/ANXIOUS: 0
ABDOMINAL PAIN: 0
CONSTIPATION: 0
DIZZINESS: 1

## 2022-04-27 NOTE — CARE PLAN
Problem: Knowledge Deficit - Standard  Goal: Patient and family/care givers will demonstrate understanding of plan of care, disease process/condition, diagnostic tests and medications  Outcome: Progressing     Problem: Pain - Standard  Goal: Alleviation of pain or a reduction in pain to the patient’s comfort goal  Outcome: Progressing   The patient is Watcher - Medium risk of patient condition declining or worsening    Shift Goals  Clinical Goals: monitor hemoptysis episodes, pain control  Patient Goals: pain control, rest/comfort  Family Goals: N/A    Progress made toward(s) clinical / shift goals:  reviewed POC with pt. Pain medication administered as needed per MAR. RN at bedside during 2nd hemoptysis/hematemesis episode.     Patient is not progressing towards the following goal:

## 2022-04-27 NOTE — PROGRESS NOTES
Received report from day shift RN. Assumed pt care at 1900. Assessment completed. Pt A&Ox4 . Reports pain of 7/10, declined intervention. No s/s of discomfort/distress noted. Bed in lowest position, bed locked, call light within reach. Labs and orders reviewed. Communication board updated. Hourly rounding in place.

## 2022-04-27 NOTE — PROGRESS NOTES
Pt experienced 2 episodes of hemoptysis/hematemesis at around 2314 and 2318. RN and CNA at bedside during 2nd episode. This RN witnessed pt vomit multiple marcus-sized blood clots into an emesis bag. Assisted pt with cleaning self up, linen changed, floor and bathroom door cleaned.

## 2022-04-27 NOTE — PROGRESS NOTES
[facility] Medicine Daily Progress Note    Date of Service  4/27/2022    Chief Complaint  Malena Bennett is a 21 y.o. female admitted 4/25/2022 with with recurrent hemoptysis.    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  Patient underwent extensive work-up for hematemesis/hemoptysis approximately 2 weeks ago discharged 4/13/2022 for similar symptoms.  She underwent extensive work-up from March 21 to March 22 during her hospitalization, which included consultation with pulmonology, gastroenterology, and rheumatology.  She had an EGD, which showed esophagitis with suggestion of possible small varices in the distal esophagus where there was edema.  There was no active bleeding visualized.  Bronchoscopy showed no return of blood and BAL.  ENT was also consulted and performed endoscopy through the nasopharynx with no visualization of bleeding.  She also was was hospitalized from March 31 to April 13 and declined any invasive diagnostic testing at that time.  CTA of the chest performed on March 27 and March 31 was unremarkable.    Patient return to the ER with 1 day history of hemoptysis and feeling unwell with a cough.  Pulmonology was consulted due to concern for recurrent hemoptysis.      Interval Problem Update  Pt seen and examined, still with complaints of nausea and poor appetite, is tolerating fluids.   - continues to have episodes of hemoptysis/hematmesis, one episode witnessed overnight by nursing, characterized as dime sized blood clot   - still very unclear as to the exact source given her previous pulm/ENT workup were not suggestive of source and although she has gastritis/esopahgitis noted last month on EGD it would be abnormal for that to produce blood clots to this magnitude. It appears to be intermittent and not a large  amount thus I dont believe a tagged RBC scan would provide any useful information/source.   - H/H remains stable, electrolytes stable, vitals stable, soft pressures but suspect this may be her baseline as they are consistent   - coagulopathy panel thus far unremarkable, CRP normal     Complex patient with complex medical history and no obvious source of bleeding. Spent 62 minutes discussing with patient and family limitations to care and plan of care moving forward. She was recently started on Belimumab by her rheumatologist for improved control of her SLE. Both the patient and her parents at bedside are very concerned regarding her ongoing hemoptysis/hematemesis and desire that she be transferred to a facility that could provide her with rheumatology care given this is her 3rd admission without adequate control of her symptoms. I will discuss with her outpatient rheumatologist and together hopefully come up with a plan moving forward and possible transfer to another facility if possible.         I have personally seen and examined the patient at bedside. I discussed the plan of care with patient and bedside RN.      Consultants/Specialty  pulmonary    Code Status  Full Code    Disposition  Patient is not medically cleared for discharge.   Anticipate discharge to to home with close outpatient follow-up.  I have placed the appropriate orders for post-discharge needs.    Review of Systems  Review of Systems   Constitutional: Positive for malaise/fatigue. Negative for chills and fever.   Respiratory: Positive for cough and hemoptysis. Negative for shortness of breath.    Cardiovascular: Positive for chest pain. Negative for palpitations.   Gastrointestinal: Positive for nausea and vomiting. Negative for abdominal pain, constipation and diarrhea.   Genitourinary: Negative for dysuria and frequency.   Musculoskeletal: Negative for joint pain and myalgias.   Neurological: Positive for dizziness and headaches.    Psychiatric/Behavioral: The patient is not nervous/anxious and does not have insomnia.         Physical Exam  Temp:  [36.2 °C (97.1 °F)-36.9 °C (98.4 °F)] 36.4 °C (97.5 °F)  Pulse:  [] 74  Resp:  [16-19] 18  BP: ()/(55-75) 90/55  SpO2:  [96 %-99 %] 99 %    Physical Exam  Vitals and nursing note reviewed.   Constitutional:       Appearance: Normal appearance. She is obese. She is not ill-appearing, toxic-appearing or diaphoretic.   HENT:      Head: Normocephalic and atraumatic.      Nose: Rhinorrhea present. No congestion.      Comments: No signs of epistaxis     Mouth/Throat:      Mouth: Mucous membranes are moist.      Pharynx: Oropharynx is clear. No oropharyngeal exudate or posterior oropharyngeal erythema.   Eyes:      General:         Right eye: No discharge.         Left eye: No discharge.      Conjunctiva/sclera: Conjunctivae normal.      Comments: Wears corrective lenses   Cardiovascular:      Rate and Rhythm: Normal rate and regular rhythm.      Pulses: Normal pulses.      Heart sounds: Normal heart sounds. No murmur heard.  Pulmonary:      Effort: Pulmonary effort is normal. No respiratory distress.      Breath sounds: Normal breath sounds. No wheezing or rales.   Abdominal:      General: Abdomen is flat. Bowel sounds are normal. There is no distension.      Palpations: Abdomen is soft.      Tenderness: There is no abdominal tenderness.   Musculoskeletal:      Cervical back: Neck supple. No rigidity or tenderness.      Right lower leg: No edema.      Left lower leg: No edema.   Lymphadenopathy:      Cervical: No cervical adenopathy.   Skin:     General: Skin is warm and dry.      Findings: Bruising (scattered from previous IVs) present.   Neurological:      General: No focal deficit present.      Mental Status: She is alert and oriented to person, place, and time.      Motor: No weakness.   Psychiatric:         Thought Content: Thought content normal.         Judgment: Judgment normal.       Comments: Distraught, emotional over situation         Fluids  No intake or output data in the 24 hours ending 04/27/22 1325    Laboratory  Recent Labs     04/25/22  0315 04/26/22  0037 04/27/22  0136   WBC 7.7 5.1 5.8   RBC 4.31 4.06* 4.21   HEMOGLOBIN 13.2 12.4 12.6   HEMATOCRIT 40.2 37.4 38.8   MCV 93.3 92.1 92.2   MCH 30.6 30.5 29.9   MCHC 32.8* 33.2* 32.5*   RDW 44.1 43.8 42.9   PLATELETCT 218 197 183   MPV 10.2 10.0 10.1     Recent Labs     04/25/22  0315 04/26/22  0037 04/27/22  0136   SODIUM 135 138 136   POTASSIUM 3.9 3.8 3.9   CHLORIDE 100 103 102   CO2 21 23 23   GLUCOSE 110* 100* 101*   BUN 8 9 13   CREATININE 0.65 0.73 0.66   CALCIUM 9.3 9.1 8.8     Recent Labs     04/25/22  0331   APTT 23.3*   INR 1.12               Imaging  EC-ECHOCARDIOGRAM COMPLETE W/O CONT   Final Result      CT-CTA CHEST PULMONARY ARTERY W/ RECONS   Final Result         1. No CT evidence of pulmonary embolism.      2. Minimal airspace opacity in the bilateral lung bases, right more than left, atelectasis or mild infection.         DX-CHEST-PORTABLE (1 VIEW)   Final Result      No acute cardiopulmonary abnormality.              Assessment/Plan  * Hemoptysis- (present on admission)  Assessment & Plan  Etiology unclear.  Had extensive work-up in the past including multiple subspecialties.  Previous EGD showing esophagitis with possible small varices in the distal esophagus where there was edema, biopsy with mild chronic gastritis, H pylori neg   Previous bronchoscopy with normal airways and no no blood in BAL, noted to have nasal passage way bleeding  ENT did bedside evaluation, not obvious trauma/bleeding in nares/oropharynx   CTA with PE protocol negative.  Inflammatory markers including CRP and ESR negative.  TEG study unremarkable.  Patient continues to have intermittent hemoptysis.  This is a complicated situation with no clear source.  Pulmonology consulted  No intervention planned, source is not lungs per pulm   Will reach out  to patient rheumatologist, pt would like to be transferred to facility with onsite rheum, will attempt to reach out and see if we can get accepting facility     Lupus (HCC)- (present on admission)  Assessment & Plan  On Benlysta, per pt started 3 weeks ago  ESR, CRP normal   Continue Plaguenil and resume home steroid taper     Elevated antinuclear antibody (SHANI) level- (present on admission)  Assessment & Plan  As per history. Known SLE, follows with rheum, on chronic suppressive therapy   Will need continued outpatient follow up    Celiac disease- (present on admission)  Assessment & Plan  As per history.    Gluten-free diet carbohydrate consistent.    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  TEG study and coagu panel stable     Controlled type 1 diabetes mellitus (HCC)- (present on admission)  Assessment & Plan  As per history.  Hemoglobin A1c of 4.63 weeks ago.    Resume home insulin pump  Diabetic diet    Esophagitis determined by endoscopy  Assessment & Plan  As per history.  Previous EGD showing esophagitis, biopsy with mild chronic gastritis   Continue omeprazole    Factor V Leiden (HCC)- (present on admission)  Assessment & Plan  As per history.  Heterozygous mutation.    Resume chemo DVT prophylaxis when stable.       VTE prophylaxis: SCDs/TEDs and pharmacologic prophylaxis contraindicated due to Hematemesis    I have performed a physical exam and reviewed and updated ROS and Plan today (4/27/2022). In review of yesterday's note (4/26/2022), there are no changes except as documented above.

## 2022-04-27 NOTE — PROGRESS NOTES
Pt has episode of hemoptysis at approx. 1430. Approx. 5-6 dime sized clots noticed, cleaning provided. Photo taken and uploaded to chart, pt pain 6/10 denying interventions. Appetite continues to be diminished. Family members at bedside, pt requesting no further needs at this time

## 2022-04-27 NOTE — PROGRESS NOTES
Assumed care of patient at 0700 from Maggie BACON. Patient is A&Ox 4, states pain level is 6/10, declines intervention at this time. Bed locked in lowest position with 2 rails up. Call light in place, belongings at bedside. Patient expresses zero needs at this time and hourly rounding is in place. Pt is a no fall risk, calls appropriately.

## 2022-04-28 LAB
ALBUMIN SERPL BCP-MCNC: 4.1 G/DL (ref 3.2–4.9)
ALBUMIN/GLOB SERPL: 1.3 G/DL
ALP SERPL-CCNC: 89 U/L (ref 30–99)
ALT SERPL-CCNC: 59 U/L (ref 2–50)
ANION GAP SERPL CALC-SCNC: 13 MMOL/L (ref 7–16)
AST SERPL-CCNC: 18 U/L (ref 12–45)
BASOPHILS # BLD AUTO: 0.1 % (ref 0–1.8)
BASOPHILS # BLD: 0.01 K/UL (ref 0–0.12)
BILIRUB SERPL-MCNC: 0.3 MG/DL (ref 0.1–1.5)
BUN SERPL-MCNC: 11 MG/DL (ref 8–22)
CALCIUM SERPL-MCNC: 9.4 MG/DL (ref 8.5–10.5)
CHLORIDE SERPL-SCNC: 104 MMOL/L (ref 96–112)
CO2 SERPL-SCNC: 21 MMOL/L (ref 20–33)
CREAT SERPL-MCNC: 0.53 MG/DL (ref 0.5–1.4)
EOSINOPHIL # BLD AUTO: 0.02 K/UL (ref 0–0.51)
EOSINOPHIL NFR BLD: 0.3 % (ref 0–6.9)
ERYTHROCYTE [DISTWIDTH] IN BLOOD BY AUTOMATED COUNT: 41.2 FL (ref 35.9–50)
GFR SERPLBLD CREATININE-BSD FMLA CKD-EPI: 134 ML/MIN/1.73 M 2
GGT SERPL-CCNC: 61 U/L (ref 7–34)
GLOBULIN SER CALC-MCNC: 3.2 G/DL (ref 1.9–3.5)
GLUCOSE SERPL-MCNC: 155 MG/DL (ref 65–99)
HCT VFR BLD AUTO: 39.4 % (ref 37–47)
HGB BLD-MCNC: 12.9 G/DL (ref 12–16)
IMM GRANULOCYTES # BLD AUTO: 0.02 K/UL (ref 0–0.11)
IMM GRANULOCYTES NFR BLD AUTO: 0.3 % (ref 0–0.9)
LYMPHOCYTES # BLD AUTO: 1.25 K/UL (ref 1–4.8)
LYMPHOCYTES NFR BLD: 17.5 % (ref 22–41)
MCH RBC QN AUTO: 30.1 PG (ref 27–33)
MCHC RBC AUTO-ENTMCNC: 32.7 G/DL (ref 33.6–35)
MCV RBC AUTO: 91.8 FL (ref 81.4–97.8)
MONOCYTES # BLD AUTO: 0.49 K/UL (ref 0–0.85)
MONOCYTES NFR BLD AUTO: 6.9 % (ref 0–13.4)
NEUTROPHILS # BLD AUTO: 5.35 K/UL (ref 2–7.15)
NEUTROPHILS NFR BLD: 74.9 % (ref 44–72)
NRBC # BLD AUTO: 0 K/UL
NRBC BLD-RTO: 0 /100 WBC
PLATELET # BLD AUTO: 243 K/UL (ref 164–446)
PMV BLD AUTO: 9.8 FL (ref 9–12.9)
POTASSIUM SERPL-SCNC: 3.6 MMOL/L (ref 3.6–5.5)
PROT SERPL-MCNC: 7.3 G/DL (ref 6–8.2)
RBC # BLD AUTO: 4.29 M/UL (ref 4.2–5.4)
SODIUM SERPL-SCNC: 138 MMOL/L (ref 135–145)
WBC # BLD AUTO: 7.1 K/UL (ref 4.8–10.8)

## 2022-04-28 PROCEDURE — A9270 NON-COVERED ITEM OR SERVICE: HCPCS | Performed by: STUDENT IN AN ORGANIZED HEALTH CARE EDUCATION/TRAINING PROGRAM

## 2022-04-28 PROCEDURE — G0378 HOSPITAL OBSERVATION PER HR: HCPCS

## 2022-04-28 PROCEDURE — 700102 HCHG RX REV CODE 250 W/ 637 OVERRIDE(OP): Performed by: STUDENT IN AN ORGANIZED HEALTH CARE EDUCATION/TRAINING PROGRAM

## 2022-04-28 PROCEDURE — 700102 HCHG RX REV CODE 250 W/ 637 OVERRIDE(OP): Performed by: INTERNAL MEDICINE

## 2022-04-28 PROCEDURE — A9270 NON-COVERED ITEM OR SERVICE: HCPCS | Performed by: INTERNAL MEDICINE

## 2022-04-28 PROCEDURE — 700111 HCHG RX REV CODE 636 W/ 250 OVERRIDE (IP): Performed by: STUDENT IN AN ORGANIZED HEALTH CARE EDUCATION/TRAINING PROGRAM

## 2022-04-28 PROCEDURE — 86381 MITOCHONDRIAL ANTIBODY EACH: CPT

## 2022-04-28 PROCEDURE — 82977 ASSAY OF GGT: CPT

## 2022-04-28 PROCEDURE — 99226 PR SUBSEQUENT OBSERVATION CARE,LEVEL III: CPT | Performed by: STUDENT IN AN ORGANIZED HEALTH CARE EDUCATION/TRAINING PROGRAM

## 2022-04-28 PROCEDURE — 86015 ACTIN ANTIBODY EACH: CPT

## 2022-04-28 PROCEDURE — 36415 COLL VENOUS BLD VENIPUNCTURE: CPT

## 2022-04-28 PROCEDURE — 85025 COMPLETE CBC W/AUTO DIFF WBC: CPT

## 2022-04-28 PROCEDURE — 80053 COMPREHEN METABOLIC PANEL: CPT

## 2022-04-28 PROCEDURE — 86235 NUCLEAR ANTIGEN ANTIBODY: CPT

## 2022-04-28 RX ORDER — ACETAMINOPHEN 325 MG/1
650 TABLET ORAL EVERY 6 HOURS PRN
Status: DISCONTINUED | OUTPATIENT
Start: 2022-04-28 | End: 2022-04-28

## 2022-04-28 RX ORDER — ACETAMINOPHEN 325 MG/1
650 TABLET ORAL ONCE
Status: COMPLETED | OUTPATIENT
Start: 2022-04-28 | End: 2022-04-28

## 2022-04-28 RX ORDER — ACETAMINOPHEN 325 MG/1
650 TABLET ORAL EVERY 6 HOURS PRN
Status: DISCONTINUED | OUTPATIENT
Start: 2022-04-28 | End: 2022-04-29 | Stop reason: HOSPADM

## 2022-04-28 RX ADMIN — PREDNISONE 40 MG: 20 TABLET ORAL at 06:00

## 2022-04-28 RX ADMIN — HYDROXYCHLOROQUINE SULFATE 400 MG: 200 TABLET ORAL at 06:00

## 2022-04-28 RX ADMIN — OMEPRAZOLE 20 MG: 20 CAPSULE, DELAYED RELEASE ORAL at 06:00

## 2022-04-28 RX ADMIN — ACETAMINOPHEN 650 MG: 325 TABLET, FILM COATED ORAL at 15:44

## 2022-04-28 RX ADMIN — BELIMUMAB 200 MG: 200 SOLUTION SUBCUTANEOUS at 10:00

## 2022-04-28 ASSESSMENT — ENCOUNTER SYMPTOMS
NERVOUS/ANXIOUS: 0
NAUSEA: 1
COUGH: 1
INSOMNIA: 0
CHILLS: 0
PALPITATIONS: 0
HEADACHES: 1
SHORTNESS OF BREATH: 0
BLOOD IN STOOL: 0
VOMITING: 0
DIZZINESS: 1
MYALGIAS: 0
CONSTIPATION: 0
DIARRHEA: 0
FEVER: 0
HEMOPTYSIS: 1
ABDOMINAL PAIN: 0

## 2022-04-28 ASSESSMENT — PAIN DESCRIPTION - PAIN TYPE
TYPE: ACUTE PAIN

## 2022-04-28 NOTE — CARE PLAN
The patient is Stable - Low risk of patient condition declining or worsening    Shift Goals  Clinical Goals: monitor hemotysis, pain control  Patient Goals: Rest  Family Goals: More frequent check in's from staff    Progress made toward(s) clinical / shift goals:  Pt updated on poc, pt states she has no needs at the moment. Pt shows no signs of anxiety.       Problem: Knowledge Deficit - Standard  Goal: Patient and family/care givers will demonstrate understanding of plan of care, disease process/condition, diagnostic tests and medications  Outcome: Progressing     Problem: Pain - Standard  Goal: Alleviation of pain or a reduction in pain to the patient’s comfort goal  Outcome: Progressing     Problem: Psychosocial  Goal: Patient's level of anxiety will decrease  Outcome: Progressing     Problem: Communication  Goal: The ability to communicate needs accurately and effectively will improve  Outcome: Progressing       Patient is not progressing towards the following goals:

## 2022-04-28 NOTE — CONSULTS
Gastroenterology Initial Consult Note               Author:  NADER Mahoney Date & Time Created: 4/28/2022 3:29 PM       Patient ID:  Name:             Malena Bennett  YOB: 2000  Age:                 21 y.o.  female  MRN:               [id number]      Referring Provider:  Deanna Avina MD      Presenting Chief Complaint:  Hemoptysis      History of Present Illness:    She is a 21-year-old female patient who is seen in consultation for complaint of hemoptysis.  The patient was admitted on 4/25/2022 with complaint of continued hemoptysis.  She originally was hospitalized in March of this year with similar complaints.  GI was consulted at that time with Dr. Sharda Multani who saw her.  At that time she had an EGD which demonstrated normal-appearing duodenum, LA grade to LA grade B esophagitis with some friability but no bleeding.  There was also noted distal esophageal edema with question of small esophageal varices.  Ultrasound was ordered to evaluate for possible portal hypertension or varices which demonstrated an echogenic liver but no evidence of cirrhosis, portal hypertension, or varicosities.  No  biliary dilatation was seen either.  She underwent ENT and pulmonary evaluation at that time which was reportedly normal.  She was placed on PPI therapy which patient states that she has continued and is currently on.  The patient states that after her hospitalization her hemoptysis went away for approximately 1 week, but then has returned.  She reports intermittent coughing episodes with splattering of dark and red blood during coughing that do produce clots as well.  She denies vomiting.      The patient also underwent a serological liver work-up considering she had on and off elevated liver enzymes since 2021 which previously had been attributed to methotrexate use for her lupus, but despite discontinuing the methotrexate 1.5 years ago her liver enzymes continue to elevate at  times.  Most recent labs from today demonstrate AST 18, ALT 59, alkaline phosphatase 89, bilirubin 0.3.  GGT 61.  Previous antinuclear antibody positive, however smooth muscle antibody and IgG both normal or negative.  Previous hepatitis testing negative for acute or chronic infection.  Previous antimitochondrial antibody and TTG antibody for celiac both negative.  Her BMI is 32.84 this admission.    She has a past medical history including lupus, factor V Leiden, diabetes, factor V Leiden, Tourette's syndrome, celiac disease.  There is a positive dual antigen from 2017 and patient states that she did have positive small bowel biopsies for celiac sprue.      Review of Systems:  Review of Systems   Constitutional: Positive for malaise/fatigue. Negative for chills and fever.   Respiratory: Positive for cough and hemoptysis. Negative for shortness of breath.    Cardiovascular: Negative for chest pain and palpitations.   Gastrointestinal: Negative for blood in stool, melena and vomiting.             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]    • Celiac disease [K90.0]    • Controlled type 1 diabetes mellitus (HCC) [E10.9]    • Esophagitis determined by endoscopy [K20.90]    • Factor V Leiden (HCC) [D68.51]          Past Surgical History:  Past Surgical History:   Procedure Laterality Date   • ID BRONCHOSCOPY,DIAGNOSTIC N/A 3/25/2022    Procedure: BRONCHOSCOPY-WITH BRONCHOALVEOLAR LAVAGE AND TRANSBRONCHIAL BIOPSY;  Surgeon:  Denice Lucas M.D.;  Location: SURGERY [facility];  Service: Ent   • AL UPPER GI ENDOSCOPY,DIAGNOSIS N/A 3/22/2022    Procedure: GASTROSCOPY;  Surgeon: Sharda Multani D.O.;  Location: SURGERY [facility];  Service: Gastroenterology   • APPENDECTOMY  2018   • APPENDECTOMY LAPAROSCOPIC  7/1/2017    Procedure: APPENDECTOMY LAPAROSCOPIC;  Surgeon: Derick Arthur M.D.;  Location: SURGERY [facility];  Service:    • ELIGIO BY LAPAROSCOPY  1/30/2017    Procedure: ELIGIO BY LAPAROSCOPY;  Surgeon: Fina Perkins M.D.;  Location: SURGERY SAME DAY [facility];  Service:            Hospital Medications:  Current Facility-Administered Medications   Medication Dose Frequency Provider Last Rate Last Admin   • acetaminophen (Tylenol) tablet 650 mg  650 mg Once Deanna Avina M.D.       • hydroxychloroquine (Plaquenil) tablet 400 mg  400 mg DAILY Deanna Avina M.D.   400 mg at 04/28/22 0600   • [START ON 4/29/2022] predniSONE (DELTASONE) tablet 30 mg  30 mg DAILY Deanna Avina M.D.        Followed by   • [START ON 5/6/2022] predniSONE (DELTASONE) tablet 20 mg  20 mg DAILY Deanna Avina M.D.        Followed by   • [START ON 5/13/2022] predniSONE (DELTASONE) tablet 10 mg  10 mg DAILY Deanna Avina M.D.       • ibuprofen (MOTRIN) tablet 400 mg  400 mg Q6HRS PRN Kehinde Charles M.D.   400 mg at 04/26/22 2022   • omeprazole (PRILOSEC) capsule 20 mg  20 mg DAILY Josh Obrien M.D.   20 mg at 04/28/22 0600   • ondansetron (ZOFRAN ODT) dispertab 8 mg  8 mg Q4HRS PRN Josh Obrien M.D.       • senna-docusate (PERICOLACE or SENOKOT S) 8.6-50 MG per tablet 2 Tablet  2 Tablet BID Josh Obrien M.D.        And   • polyethylene glycol/lytes (MIRALAX) PACKET 1 Packet  1 Packet QDAY PRN Josh Obrien M.D.        And   • magnesium hydroxide (MILK OF MAGNESIA) suspension 30 mL  30 mL QDAY PRN Josh Obrien M.D.        And   • bisacodyl (DULCOLAX) suppository 10 mg  10 mg QDAY PRN Josh Obrien M.D.        • labetalol (NORMODYNE/TRANDATE) injection 10 mg  10 mg Q4HRS PRN Josh Obrien M.D.       • acetaminophen (Tylenol) tablet 650 mg  650 mg Q6HRS PRN Josh Obrien M.D.   650 mg at 04/27/22 0603   • ondansetron (ZOFRAN) syringe/vial injection 4 mg  4 mg Q4HRS PRESTRELLA Obrien M.D.   4 mg at 04/26/22 0552   • promethazine (PHENERGAN) tablet 12.5-25 mg  12.5-25 mg Q4HRS PRESTRELLA Obrien M.D.       • promethazine (PHENERGAN) suppository 12.5-25 mg  12.5-25 mg Q4HRS PRESTRELLA Obrien M.D.       • prochlorperazine (COMPAZINE) injection 5-10 mg  5-10 mg Q4HRS PRESTRELLA Obrien M.D.       • insulin infusion pump (patient's own)   Continuous Moiz Pena M.D.   Stopped at 04/25/22 0830   • guaiFENesin ER (MUCINEX) tablet 600 mg  600 mg Q12HRS PRN Richard Hsu M.D.   600 mg at 04/25/22 2023   Last reviewed on 4/25/2022  7:39 AM by Pamela Mcdermott BRONWYN        Current Outpatient Medications:  Medications Prior to Admission   Medication Sig Dispense Refill Last Dose   • omeprazole (PRILOSEC) 20 MG delayed-release capsule Take 1 Capsule by mouth 2 times a day for 30 days. 60 Capsule 0 4/24/2022 at AM   • scopolamine (TRANSDERM-SCOP) 1 mg/72hr PATCH 72 HR Place 1 Patch on the skin every 72 hours. 4 Patch 3 PATCH OFF at [facility]   • promethazine (PHENERGAN) 25 MG Tab Take 1 Tablet by mouth every four hours as needed for Nausea/Vomiting. 6 Tablet 0 ABOUT 1 WEEK AGO at [facility]   • prochlorperazine (COMPAZINE) 10 MG Tab Take 1 Tablet by mouth every 6 hours as needed for Nausea/Vomiting for up to 30 days. 90 Tablet 0 FEW DAYS AGO at [facility]   • ondansetron (ZOFRAN ODT) 4 MG TABLET DISPERSIBLE Take 2 Tablets by mouth every four hours as needed for Nausea. 120 Tablet 0 4/24/2022 at PRN   • predniSONE (DELTASONE) 10 MG Tab Take 6 Tablets by mouth every day for 2 days, THEN 5 Tablets every day for 7 days, THEN 4 Tablets every day for 7 days, THEN 3 Tablets every day for 7 days, THEN 2 Tablets every day for 7 days, THEN 1 Tablet every  day for 7 days. 117 Tablet 0 2022 at AM   • Belimumab 200 MG/ML Solution Auto-injector Inject 200 mg under the skin every 7 days. 3.92 mL 1 2022 at [facility]   • hydroxychloroquine (PLAQUENIL) 200 MG Tab Take 2 Tablets by mouth every day. 90 Tablet 3 ABOUT 2 WEEKS AGO at [facility]   • insulin infusion pump Device Inject 0.7 Units/hr under the skin continuous. Patient's own SQ insulin pump    Type of Insulin: Humalog 100 units/ml  Last change of tubin22    Dosing:  Basal rate:   0.7 units/hr   Bolus ratio:   1 unit : 14 g carbohydrate at breakfast, lunch, dinner, snacks  Correction ratio:   1 units for every 20 over 140 mg/dL    Disconnect pump if patient becomes hypoglycemic and altered.   NOT CONNECTED at NOT CONNECTED   • vitamin D2, Ergocalciferol, (DRISDOL) 1.25 MG (60798 UT) Cap capsule Take 50,000 Units by mouth every 72 hours. EVERY 3 DAYS   2022 at [facility]   • cyanocobalamin (VITAMIN B-12) 1000 MCG/ML Solution Inject 1 mL intramuscularly every 14 days. 12 mL 0 ABOUT 10 DAYS AGO at [facility]         Medication Allergies:  Allergies   Allergen Reactions   • Cefdinir Rash     rash   • Erythromycin Vomiting and Swelling     Vomiting and face swelling    • Gluten Meal Diarrhea, Vomiting and Nausea     Celiac          Family Medical History:  Family History   Problem Relation Age of Onset   • Other Maternal Grandfather         Parkinson's   • Genetic Disorder Maternal Grandfather         Parkinsons, and crouzon syndrome   • Cancer Maternal Grandfather         Skin Cancer   • Heart Disease Other    • Cancer Other         bile duct cancer   • Other Mother         sphincter of salud, pancreatic insufficiency   • Cancer Mother 53        cervical ca   • Cancer Other         great uncle brain   • Arthritis Maternal Grandmother    • Cancer Maternal Grandmother         Skin Cancer   • Heart Disease Maternal Grandmother    • Hypertension Maternal Grandmother    • Hyperlipidemia Maternal Grandmother    • Stroke Maternal  Grandmother    • Genetic Disorder Father         Factor V Liden and Tourettes   • Heart Disease Father    • Hyperlipidemia Father    • Genetic Disorder Maternal Uncle         Crouzon Syndrome   • Cancer Maternal Uncle         Bile Duct, and Skin cancer   • Cancer Maternal Aunt         Multiple Aunts and Uncles passed from cancer   • Heart Disease Maternal Aunt         Open heart surgery occuring   • Heart Disease Paternal Grandmother    • Hypertension Paternal Grandmother    • Stroke Paternal Grandmother    • Heart Disease Maternal Uncle         multiple uncles.         Social History:  Social History     Socioeconomic History   • Marital status: Single     Spouse name: Not on file   • Number of children: Not on file   • Years of education: Not on file   • Highest education level: Bachelor's degree (e.g., BA, AB, BS)   Occupational History   • Not on file   Tobacco Use   • Smoking status: Never Smoker   • Smokeless tobacco: Never Used   Vaping Use   • Vaping Use: Never used   Substance and Sexual Activity   • Alcohol use: No     Alcohol/week: 0.0 oz   • Drug use: No   • Sexual activity: Yes     Partners: Male     Birth control/protection: I.U.D.   Other Topics Concern   • Behavioral problems Not Asked   • Interpersonal relationships Not Asked   • Sad or not enjoying activities Not Asked   • Suicidal thoughts Not Asked   • Poor school performance Not Asked   • Reading difficulties Not Asked   • Speech difficulties Not Asked   • Writing difficulties Not Asked   • Inadequate sleep Not Asked   • Excessive TV viewing Not Asked   • Excessive video game use Not Asked   • Inadequate exercise Not Asked   • Sports related Not Asked   • Poor diet Not Asked   • Family concerns for drug/alcohol abuse Not Asked   • Poor oral hygiene Not Asked   • Bike safety Not Asked   • Family concerns vehicle safety Not Asked   Social History Narrative   • Not on file     Social Determinants of Health     Financial Resource Strain: Low Risk   "  • Difficulty of Paying Living Expenses: Not very hard   Food Insecurity: No Food Insecurity   • Worried About Running Out of Food in the Last Year: Never true   • Ran Out of Food in the Last Year: Never true   Transportation Needs: No Transportation Needs   • Lack of Transportation (Medical): No   • Lack of Transportation (Non-Medical): No   Physical Activity: Sufficiently Active   • Days of Exercise per Week: 4 days   • Minutes of Exercise per Session: 40 min   Stress: No Stress Concern Present   • Feeling of Stress : Not at all   Social Connections: Moderately Isolated   • Frequency of Communication with Friends and Family: More than three times a week   • Frequency of Social Gatherings with Friends and Family: Twice a week   • Attends Cheondoism Services: Never   • Active Member of Clubs or Organizations: No   • Attends Club or Organization Meetings: Never   • Marital Status: Living with partner   Intimate Partner Violence: Not on file   Housing Stability: Low Risk    • Unable to Pay for Housing in the Last Year: No   • Number of Places Lived in the Last Year: 2   • Unstable Housing in the Last Year: No         Vital signs:  Weight/BMI: Body mass index is 32.84 kg/m².  /71   Pulse 83   Temp 36.2 °C (97.1 °F) (Temporal)   Resp 17   Ht 1.676 m (5' 6\")   Wt 92.3 kg (203 lb 7.8 oz)   SpO2 98%   Vitals:    04/27/22 1917 04/28/22 0417 04/28/22 0500 04/28/22 0740   BP: 103/69 107/75 100/80 108/71   Pulse: 96 76 72 83   Resp: 18 18 18 17   Temp: 36.1 °C (97 °F) 35.9 °C (96.6 °F) 36.2 °C (97.1 °F) 36.2 °C (97.1 °F)   TempSrc: Temporal Temporal Temporal Temporal   SpO2: 95% 91% 92% 98%   Weight:       Height:         Oxygen Therapy:  Pulse Oximetry: 98 %, O2 (LPM): 0, O2 Delivery Device: None - Room Air    Intake/Output Summary (Last 24 hours) at 4/28/2022 1529  Last data filed at 4/28/2022 1400  Gross per 24 hour   Intake 717 ml   Output --   Net 717 ml         Physical Exam:  Physical Exam  Vitals and " nursing note reviewed.   Constitutional:       Appearance: Normal appearance.   HENT:      Head: Normocephalic and atraumatic.      Right Ear: External ear normal.      Left Ear: External ear normal.   Eyes:      General: No scleral icterus.     Extraocular Movements: Extraocular movements intact.      Pupils: Pupils are equal, round, and reactive to light.   Cardiovascular:      Rate and Rhythm: Normal rate and regular rhythm.      Pulses: Normal pulses.      Heart sounds: Normal heart sounds. No murmur heard.  Pulmonary:      Effort: Pulmonary effort is normal. No respiratory distress.      Breath sounds: Normal breath sounds.   Abdominal:      General: Abdomen is flat. Bowel sounds are normal. There is no distension.      Palpations: Abdomen is soft.   Musculoskeletal:      Right lower leg: No edema.      Left lower leg: No edema.   Skin:     General: Skin is warm and dry.   Neurological:      General: No focal deficit present.      Mental Status: She is alert and oriented to person, place, and time.   Psychiatric:         Behavior: Behavior normal.         Thought Content: Thought content normal.           Labs:  Recent Labs     04/26/22 0037 04/27/22 0136 04/28/22 0035   SODIUM 138 136 138   POTASSIUM 3.8 3.9 3.6   CHLORIDE 103 102 104   CO2 23 23 21   BUN 9 13 11   CREATININE 0.73 0.66 0.53   MAGNESIUM 2.3  --   --    CALCIUM 9.1 8.8 9.4     Recent Labs     04/26/22 0037 04/27/22 0136 04/28/22 0035   ALTSGPT 95*  --  59*   ASTSGOT 26  --  18   ALKPHOSPHAT 85  --  89   TBILIRUBIN 0.4  --  0.3   GAMMAGT  --   --  61*   GLUCOSE 100* 101* 155*     Recent Labs     04/26/22 0037 04/27/22 0136 04/28/22 0035   WBC 5.1 5.8 7.1   NEUTSPOLYS 30.90*  --  74.90*   LYMPHOCYTES 45.40*  --  17.50*   MONOCYTES 19.30*  --  6.90   EOSINOPHILS 3.20  --  0.30   BASOPHILS 0.60  --  0.10   ASTSGOT 26  --  18   ALTSGPT 95*  --  59*   ALKPHOSPHAT 85  --  89   TBILIRUBIN 0.4  --  0.3     Recent Labs     04/26/22 0037  04/27/22  0136 04/28/22  0035   RBC 4.06* 4.21 4.29   HEMOGLOBIN 12.4 12.6 12.9   HEMATOCRIT 37.4 38.8 39.4   PLATELETCT 197 183 243     Recent Results (from the past 24 hour(s))   CBC WITH DIFFERENTIAL    Collection Time: 04/28/22 12:35 AM   Result Value Ref Range    WBC 7.1 4.8 - 10.8 K/uL    RBC 4.29 4.20 - 5.40 M/uL    Hemoglobin 12.9 12.0 - 16.0 g/dL    Hematocrit 39.4 37.0 - 47.0 %    MCV 91.8 81.4 - 97.8 fL    MCH 30.1 27.0 - 33.0 pg    MCHC 32.7 (L) 33.6 - 35.0 g/dL    RDW 41.2 35.9 - 50.0 fL    Platelet Count 243 164 - 446 K/uL    MPV 9.8 9.0 - 12.9 fL    Neutrophils-Polys 74.90 (H) 44.00 - 72.00 %    Lymphocytes 17.50 (L) 22.00 - 41.00 %    Monocytes 6.90 0.00 - 13.40 %    Eosinophils 0.30 0.00 - 6.90 %    Basophils 0.10 0.00 - 1.80 %    Immature Granulocytes 0.30 0.00 - 0.90 %    Nucleated RBC 0.00 /100 WBC    Neutrophils (Absolute) 5.35 2.00 - 7.15 K/uL    Lymphs (Absolute) 1.25 1.00 - 4.80 K/uL    Monos (Absolute) 0.49 0.00 - 0.85 K/uL    Eos (Absolute) 0.02 0.00 - 0.51 K/uL    Baso (Absolute) 0.01 0.00 - 0.12 K/uL    Immature Granulocytes (abs) 0.02 0.00 - 0.11 K/uL    NRBC (Absolute) 0.00 K/uL   Comp Metabolic Panel    Collection Time: 04/28/22 12:35 AM   Result Value Ref Range    Sodium 138 135 - 145 mmol/L    Potassium 3.6 3.6 - 5.5 mmol/L    Chloride 104 96 - 112 mmol/L    Co2 21 20 - 33 mmol/L    Anion Gap 13.0 7.0 - 16.0    Glucose 155 (H) 65 - 99 mg/dL    Bun 11 8 - 22 mg/dL    Creatinine 0.53 0.50 - 1.40 mg/dL    Calcium 9.4 8.5 - 10.5 mg/dL    AST(SGOT) 18 12 - 45 U/L    ALT(SGPT) 59 (H) 2 - 50 U/L    Alkaline Phosphatase 89 30 - 99 U/L    Total Bilirubin 0.3 0.1 - 1.5 mg/dL    Albumin 4.1 3.2 - 4.9 g/dL    Total Protein 7.3 6.0 - 8.2 g/dL    Globulin 3.2 1.9 - 3.5 g/dL    A-G Ratio 1.3 g/dL   GAMMA GT (GGT)    Collection Time: 04/28/22 12:35 AM   Result Value Ref Range    Gamma Gt 61 (H) 7 - 34 U/L   ESTIMATED GFR    Collection Time: 04/28/22 12:35 AM   Result Value Ref Range    GFR (CKD-EPI)  134 >60 mL/min/1.73 m 2         Radiology Review:  EC-ECHOCARDIOGRAM COMPLETE W/O CONT   Final Result      CT-CTA CHEST PULMONARY ARTERY W/ RECONS   Final Result         1. No CT evidence of pulmonary embolism.      2. Minimal airspace opacity in the bilateral lung bases, right more than left, atelectasis or mild infection.         DX-CHEST-PORTABLE (1 VIEW)   Final Result      No acute cardiopulmonary abnormality.               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  She does not report vomiting during these episodes.  Her hemoglobin is currently within normal limits.  No melena or hematochezia.  Previous endoscopy demonstrated mild to moderate inflammation in the esophagus, however no active bleeding or ulcerations.  She did not have a Tisha-Henderson tear.  Dr. Multani thought she might have seen small esophageal varices in the distal esophageal edema, however ultrasound did not demonstrate any evidence of cirrhosis, portal hypertension, or splenic thrombosis.  Additionally, her persistently on and off elevated liver enzymes suspect are from nonalcoholic fatty liver disease or possibly component of her celiac disease if not well controlled with diet.  Her smooth muscle antibody and her IgG are negative making  autoimmune hepatitis unlikely.  I had a long discussion with the patient at bedside today discussing her previous findings and attempting to clear up any confusion with her previous evaluation or testing.  I also offered repeat endoscopic evaluation to evaluate her hemoptysis, however patient declined as she felt it would not be useful.    Assessment:  -Hemoptysis-unclear etiology  -History of LA grade A/B esophagitis without bleeding currently on PPI therapy  -Elevated liver enzymes-suspect secondary to nonalcoholic fatty liver disease and possibly component of her known celiac disease  -Lupus  -Celiac disease  -Type 1 diabetes mellitus    Plan:  -Continue daily PPI  -Consider repeat ENT or pulmonology evaluation for continued hemoptysis  -Recommend healthy, slow, sustained weight loss  -Avoid hepatotoxins  -Gluten-free diet  -If patient changes her mind about repeat endoscopic evaluation GI is happy to assist in the future.  This could be performed inpatient or outpatient if the patient prefers.    GI will sign off considering patient has declined endoscopic evaluation.  Recommend follow-up with [facility] in 4 to 10 weeks for ongoing monitoring of her liver function and consider repeat EGD.      DWAYNE Mahoney.      Thank you for inviting me to participate in the care of this patient. Please do not hesitate to call GI consultants with additional questions/concerns or changes in the patient's clinical status at [phone].      Core Quality Measures   Reviewed items:  Labs, Medications and Radiology reports reviewed

## 2022-04-28 NOTE — PROGRESS NOTES
[facility] Medicine Daily Progress Note    Date of Service  4/28/2022    Chief Complaint  Malena Bennett is a 21 y.o. female admitted 4/25/2022 with with recurrent hemoptysis.    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  Patient underwent extensive work-up for hematemesis/hemoptysis approximately 2 weeks ago discharged 4/13/2022 for similar symptoms.  She underwent extensive work-up from March 21 to March 22 during her hospitalization, which included consultation with pulmonology, gastroenterology, and rheumatology.  She had an EGD, which showed esophagitis with suggestion of possible small varices in the distal esophagus where there was edema.  There was no active bleeding visualized.  Bronchoscopy showed no return of blood and BAL.  ENT was also consulted and performed endoscopy through the nasopharynx with no visualization of bleeding.  She also was was hospitalized from March 31 to April 13 and declined any invasive diagnostic testing at that time.  CTA of the chest performed on March 27 and March 31 was unremarkable.    Patient return to the ER with 1 day history of hemoptysis and feeling unwell with a cough.  Pulmonology was consulted due to concern for recurrent hemoptysis.      Interval Problem Update  Pt seen and examined, overall feels the same, no hemoptysis overnight, did have another episode while in the bathroom this afternoon (medial uploaded)   Vitals remain stable, labs remain stable including hematocrit and hemoglobin, ALT elevated but down trending   Blood sugars well controlled, pt has home insulin pump, states she has 60 units still available   Benlysta brought in by parents, weekly injection given   Continue PPI    I did ask GI to re-evaluate given her persistent symptoms and unclear source for blood, EGD was  offered however pt declined. I discussed again with pulm who saw her early this admission and they did not feel repeat bronch would be helpful given multiple negative imaging studies and a recent neg bronchoscopy. I discuss with ENT that saw her on her previous visitm Dr. Hoffman on the evening of 4/27 via phone who agreed to see her in clinic for repeat evaluation as well but without overt epistaxis or post nasal drip/bleeding inpatient evaluation wasn't urgent which I agree with, I also visually inspected the anterior nares and posterior pharynx with light and no evidence of blood was present to suggest this is source. I also spoke with her scheduled rheumatologist (whom has not yet seen the patient as she is newly established) who did not feel that her current presentation in related to lupus and did not feel transfer to a hospital with inpatient rheum would change any management.     Through out this admission I have done a thorough chart review dating back to 2016, in hopes to find some link or etiology to help explain her current presentation, pt has a complex medical history with many diagnoses including Type 1 DM, celiac disease, adrenal insuffiencey, POTS, syncope, touretts syndrome, OCD, vitamin deficiency, SLE, Factor V leiden, Factor VIII disorder, Mittelschmerz, IBS, melena.  She has been seen by endocrinology, rheumatology, cardiology, gastroenterology. She has undergone tavo and appendectomy in 2017, holter monitoring, tilt table testing, cortisol/ACTH testing which were normal.   I do not believe her current diagnosis of Type I DM is correct as she does appear to produce insulin per insulin testing (2020 and previously normal C-peptide) and JANEEN neg ab, I spoke with her previous endocrine doc as well who did not feel she had type 1, her current endocrine doc was unable to be reached but per documents I have she carried a dx of type II vs STEW thus her problem list may need to be updated once this can  be further confirmed, however she may have had testing I don't have access to thus I will wait to speak with him prior to making any changes to her problem list or insulin management.       Patient continues to have intermittent episodes of hemoptysis, which I, and all the physicians involved in her care before me have been unable to explain or find source of, although this is rightfully concerning to the patient and family it has not caused anemia nor vital sign changes and may be safely followed outpatient. I have attempted to transfer the patient and request of the patient and parents and was declined by [facility] due to hospital being at capacity. I offered to attempt transfer to another facility however they wished to hold off as they have a friend who works at [facility] that they are in contact with and attempting to make a connection that way    Given that the patient is stable and has declined interventions that this facility has available such as repeat EGD I am not sure what else we can provide in terms of testing and/or treatment. In meantime will attempt to provide supportive care to the patient in any way that I can.       I have personally seen and examined the patient at bedside. I discussed the plan of care with patient and bedside RN.      Consultants/Specialty  pulmonary    Code Status  Full Code    Disposition  Patient is medically cleared for discharge.   Anticipate discharge to to home with close outpatient follow-up.  I have placed the appropriate orders for post-discharge needs.    Review of Systems  Review of Systems   Constitutional: Positive for malaise/fatigue. Negative for chills and fever.   Respiratory: Positive for cough and hemoptysis. Negative for shortness of breath.    Cardiovascular: Negative for chest pain and palpitations.   Gastrointestinal: Positive for nausea. Negative for abdominal pain, constipation, diarrhea and vomiting.   Genitourinary: Negative for dysuria and  frequency.   Musculoskeletal: Negative for joint pain and myalgias.   Neurological: Positive for dizziness and headaches.   Psychiatric/Behavioral: The patient is not nervous/anxious and does not have insomnia.         Physical Exam  Temp:  [35.9 °C (96.6 °F)-36.4 °C (97.6 °F)] 36.4 °C (97.6 °F)  Pulse:  [] 101  Resp:  [17-18] 17  BP: ()/(54-80) 91/62  SpO2:  [91 %-98 %] 95 %    Physical Exam  Vitals and nursing note reviewed.   Constitutional:       Appearance: Normal appearance. She is obese. She is not ill-appearing, toxic-appearing or diaphoretic.   HENT:      Head: Normocephalic and atraumatic.      Nose: Rhinorrhea present. No congestion.      Comments: No signs of epistaxis     Mouth/Throat:      Mouth: Mucous membranes are moist.      Pharynx: Oropharynx is clear. No oropharyngeal exudate or posterior oropharyngeal erythema.   Eyes:      General:         Right eye: No discharge.         Left eye: No discharge.      Conjunctiva/sclera: Conjunctivae normal.      Comments: Wears corrective lenses   Cardiovascular:      Rate and Rhythm: Normal rate and regular rhythm.      Pulses: Normal pulses.      Heart sounds: Normal heart sounds. No murmur heard.  Pulmonary:      Effort: Pulmonary effort is normal. No respiratory distress.      Breath sounds: Normal breath sounds. No wheezing or rales.   Abdominal:      General: Abdomen is flat. Bowel sounds are normal. There is no distension.      Palpations: Abdomen is soft.      Tenderness: There is no abdominal tenderness.   Musculoskeletal:      Cervical back: Neck supple. No rigidity or tenderness.      Right lower leg: No edema.      Left lower leg: No edema.   Lymphadenopathy:      Cervical: No cervical adenopathy.   Skin:     General: Skin is warm and dry.      Findings: Bruising (scattered from previous IVs) present.   Neurological:      General: No focal deficit present.      Mental Status: She is alert and oriented to person, place, and time.       Motor: No weakness.   Psychiatric:         Mood and Affect: Mood normal.         Thought Content: Thought content normal.         Judgment: Judgment normal.         Fluids    Intake/Output Summary (Last 24 hours) at 4/28/2022 1708  Last data filed at 4/28/2022 1400  Gross per 24 hour   Intake 480 ml   Output --   Net 480 ml       Laboratory  Recent Labs     04/26/22  0037 04/27/22  0136 04/28/22  0035   WBC 5.1 5.8 7.1   RBC 4.06* 4.21 4.29   HEMOGLOBIN 12.4 12.6 12.9   HEMATOCRIT 37.4 38.8 39.4   MCV 92.1 92.2 91.8   MCH 30.5 29.9 30.1   MCHC 33.2* 32.5* 32.7*   RDW 43.8 42.9 41.2   PLATELETCT 197 183 243   MPV 10.0 10.1 9.8     Recent Labs     04/26/22  0037 04/27/22  0136 04/28/22  0035   SODIUM 138 136 138   POTASSIUM 3.8 3.9 3.6   CHLORIDE 103 102 104   CO2 23 23 21   GLUCOSE 100* 101* 155*   BUN 9 13 11   CREATININE 0.73 0.66 0.53   CALCIUM 9.1 8.8 9.4                   Imaging  EC-ECHOCARDIOGRAM COMPLETE W/O CONT   Final Result      CT-CTA CHEST PULMONARY ARTERY W/ RECONS   Final Result         1. No CT evidence of pulmonary embolism.      2. Minimal airspace opacity in the bilateral lung bases, right more than left, atelectasis or mild infection.         DX-CHEST-PORTABLE (1 VIEW)   Final Result      No acute cardiopulmonary abnormality.              Assessment/Plan  * Hemoptysis- (present on admission)  Assessment & Plan  Etiology unclear.  Had extensive work-up in the past including multiple subspecialties.  Previous EGD showing esophagitis with possible small varices in the distal esophagus where there was edema, biopsy with mild chronic gastritis, H pylori neg   Previous bronchoscopy with normal airways and no no blood in BAL, noted to have nasal passage way bleeding  ENT did bedside evaluation, not obvious trauma/bleeding in nares/oropharynx, reached out to them again, recommended outpatient follow up if no obvious epistaxis on exam   CTA with PE protocol negative.  Inflammatory markers including CRP  and ESR negative.  TEG study unremarkable.  Patient continues to have intermittent hemoptysis.  This is a complicated situation with no clear source.  Pulmonology consulted  No intervention planned, source is likeley not pulm   Spoke to rheumatologist, he does not feel this is related to Lupus and recommend continued care as is in terms of her lupus treatment  Did attempt to transfer at patient request [facility] declined, they did not wish for me to reach out to other institutions at this time   Supportive care, holding DVT prophylaxis   Vitals and H/H remain stable  If any further significant or continuous bleeding, may benefit from tagged RBC scan     Lupus (HCC)- (present on admission)  Assessment & Plan  On Benlysta, per pt started 3 weeks ago  ESR, CRP normal   Continue Plaguenil and resume home steroid taper     Elevated antinuclear antibody (SHANI) level- (present on admission)  Assessment & Plan  As per history. Known SLE, follows with rheum, on chronic suppressive therapy   Will need continued outpatient follow up    Celiac disease- (present on admission)  Assessment & Plan  As per history.    Gluten-free diet carbohydrate consistent.    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  TEG study and coagu panel stable     Controlled type 1 diabetes mellitus (HCC)- (present on admission)  Assessment & Plan  As per history.  Hemoglobin A1c of 4.63 weeks ago.    Cont. home insulin pump  accuchecks  Diabetic diet    Esophagitis determined by endoscopy  Assessment & Plan  As per history.  Previous EGD showing esophagitis, biopsy with mild chronic gastritis   Continue omeprazole    Factor V Leiden (HCC)- (present on admission)  Assessment & Plan  As per history.  Heterozygous mutation.    Resume chemo DVT prophylaxis when stable.       VTE prophylaxis: SCDs/TEDs and pharmacologic prophylaxis contraindicated due to Hematemesis    I have performed a physical exam and  reviewed and updated ROS and Plan today (4/28/2022). In review of yesterday's note (4/27/2022), there are no changes except as documented above.

## 2022-04-28 NOTE — PROGRESS NOTES
Pt had episode of hemoptysis in bathroom after taking shower, RN called in to assess, photo taken and saved to chart. Pt stating 6/10 pain from headache which commonly occurs after episode, pt requesting Tylenol. RN notified MD to change parameters of Tylenol due to parameters being out of current pain scale for pt.

## 2022-04-28 NOTE — PROGRESS NOTES
Assumed care of patient at 0700 from Jan RN. Patient is A&Ox 4, states pain level is 6/10, declines intervention at this time. Bed locked in lowest position with 2 rails up. Call light in place, belongings at bedside. Patient expresses zero needs at this time and hourly rounding is in place. Pt is a low fall risk.

## 2022-04-28 NOTE — PROGRESS NOTES
Report received from day shift RN Oral. Assumed care of patient at 1900, patient is A&O x4. Patient is a no Fall risk, bed locked and in lowest position, bed alarm on. Patient reports pain 6/10; pt denies med intervention and would like to rest. Plan of care reviewed with patient, will implement and continue to monitor. Hourly rounding is in place and call light/belongings within reach.

## 2022-04-28 NOTE — CARE PLAN
The patient is Stable - Low risk of patient condition declining or worsening    Shift Goals  Clinical Goals: Monitor hemoptysis, pain control  Patient Goals: Pain control, more frequent check-ins from staff  Family Goals: More frequent check in's from staff    Progress made toward(s) clinical / shift goals:      Patient is not progressing towards the following goals: Pt had episode of hemoptysis during the afternoon, pain was consistent throughout the day 6/10, declining intervention.       Problem: Discharge Barriers/Planning  Goal: Patient's continuum of care needs are met  Outcome: Not Progressing     Problem: Nutrition  Goal: Patient's nutritional and fluid intake will be adequate or improve  Outcome: Not Progressing

## 2022-04-29 VITALS
HEART RATE: 74 BPM | TEMPERATURE: 97.1 F | DIASTOLIC BLOOD PRESSURE: 62 MMHG | HEIGHT: 66 IN | RESPIRATION RATE: 16 BRPM | SYSTOLIC BLOOD PRESSURE: 95 MMHG | BODY MASS INDEX: 32.7 KG/M2 | WEIGHT: 203.48 LBS | OXYGEN SATURATION: 98 %

## 2022-04-29 PROCEDURE — 700102 HCHG RX REV CODE 250 W/ 637 OVERRIDE(OP): Performed by: STUDENT IN AN ORGANIZED HEALTH CARE EDUCATION/TRAINING PROGRAM

## 2022-04-29 PROCEDURE — A9270 NON-COVERED ITEM OR SERVICE: HCPCS | Performed by: INTERNAL MEDICINE

## 2022-04-29 PROCEDURE — 700102 HCHG RX REV CODE 250 W/ 637 OVERRIDE(OP): Performed by: INTERNAL MEDICINE

## 2022-04-29 PROCEDURE — G0378 HOSPITAL OBSERVATION PER HR: HCPCS

## 2022-04-29 PROCEDURE — 99217 PR OBSERVATION CARE DISCHARGE: CPT | Performed by: STUDENT IN AN ORGANIZED HEALTH CARE EDUCATION/TRAINING PROGRAM

## 2022-04-29 PROCEDURE — A9270 NON-COVERED ITEM OR SERVICE: HCPCS | Performed by: STUDENT IN AN ORGANIZED HEALTH CARE EDUCATION/TRAINING PROGRAM

## 2022-04-29 RX ORDER — ACETAMINOPHEN 500 MG
500 TABLET ORAL EVERY 6 HOURS PRN
COMMUNITY
Start: 2022-04-29

## 2022-04-29 RX ADMIN — HYDROXYCHLOROQUINE SULFATE 400 MG: 200 TABLET ORAL at 08:34

## 2022-04-29 RX ADMIN — OMEPRAZOLE 20 MG: 20 CAPSULE, DELAYED RELEASE ORAL at 08:34

## 2022-04-29 ASSESSMENT — PAIN DESCRIPTION - PAIN TYPE: TYPE: ACUTE PAIN

## 2022-04-29 NOTE — PROGRESS NOTES
Took over pt care from [facility] JORDI Tobin. Introduced myself to pt and family in room. Warmed up dinner for pt. Updated her that I will be back to complete assessment.

## 2022-04-29 NOTE — DISCHARGE SUMMARY
Discharge Summary    CHIEF COMPLAINT ON ADMISSION  Chief Complaint   Patient presents with   • Cough     Coughing up blood.        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 This is patients 3rd hospitalization for this complaint. She had underwent extensive work-up for hematemesis/hemoptysis including bronchoscopy, EGD, bedside endoscopy by ENT, multiple CT PE studies and extensive lab workup, including consultation with pulmonlogy, gastroenterology and otolaryngology as well as multiple phone discussions with patients outpatient rheumatologist during these hospitalizations and unfortunately we have been unable to find the etiology or source of patients continued symptoms. She has been monitored in the hospital with frequent vital checks, daily lab evaluations without any instability in her vitals and without any drop in her blood levels. This admission she had re-evaluation by both pulmonology who did not recommend repeat bronch given her recent benign one and negative imaging studies and GI given her previous EGD did suggest some mild esophagitis/gastritis and possible varices (not confirmed on other imaging modalities) however patient declined repeat EGD.   I did attempt to transfer the patient to another hospital at their request for further evaluation however transfer was declined.     Through out this admission I have done a thorough chart review dating back to 2016, in hopes to find some link or etiology to help explain her current presentation, she has a complex medical history with many diagnoses for such a young patient, including Type 1 DM, celiac disease, adrenal insuffiencey, POTS, syncope, touretts syndrome, OCD, vitamin  deficiency, SLE, Factor V leiden, Factor VIII disorder, Mittelschmerz, IBS and melena.  She has been seen by multiple subspecialties including endocrinology, rheumatology, cardiology, gastroenterology and general surgery with history of lap tavo for biliary dyskinsia and appendectomy in 2017, holter monitoring, tilt table testing, cortisol/ACTH testing which were unremarkable.  She has a current diagnosis of Type I DM however this may be incorrect as she does produce insulin per insulin testing (2020 and previously normal C-peptide) and JANEEN neg antibody. I was unable to speak with her current endocrinologist as he was not in office, per his documentation that I can see, she carries a diagnosis of  type II Diabetes vs STEW thus her problem list may need to be updated, however she may have had testing I don't have access to thus should be confirmed with her endocrinologist prior to updating her complete medical history list. I think having a complete and accurate problem list is important to her care moving forward to avoid misdiagnosis and improper treatment and ultimately possible patient harm.     At that time of discharge patient is stable and requesting discharge, physical examination was attempted by myself and declined by patient and thus not preformed, however visually she is sitting up in bed speaking clearly and in no distress, her vitals are stable.   I recommend she follow up closely with her established providers, and information for outpatient ENT services was provided to her. I recommend she continue her steroid taper, she was educated on the current dosing taper she is to resume at that time of discharge.       Therefore, she is discharged in fair and stable condition to home with close outpatient follow-up.    The patient recovered much more quickly than anticipated on admission.    Discharge Date  4/29/2022    FOLLOW UP ITEMS POST DISCHARGE  Hemoptysis   Chronic medical conditions     DISCHARGE  DIAGNOSES  Principal Problem:    Hemoptysis POA: Yes  Active Problems:    Factor V Leiden (HCC) (Chronic) POA: Yes      Overview: Heterozygous, single copy of [id number] mutation    Esophagitis determined by endoscopy POA: Unknown    Controlled type 1 diabetes mellitus (HCC) POA: Yes    Factor VIII inhibitor disorder (HCC) POA: Yes    Celiac disease POA: Yes    Elevated antinuclear antibody (SHANI) level POA: Yes    Lupus (HCC) POA: Yes    Anemia of chronic disease POA: Yes  Resolved Problems:    * No resolved hospital problems. *      FOLLOW UP  No future appointments.  NADER Wilkerson  [address] [facility] [address]  [facility] [id number] [phone]    Schedule an appointment as soon as possible for a visit in 1 week  hospital follow up    Tressa Hoffman M.D.  [address]  [facility] [address]  [phone]    Schedule an appointment as soon as possible for a visit  to schedule follow up       MEDICATIONS ON DISCHARGE     Medication List      START taking these medications      Instructions   acetaminophen 500 MG Tabs  Commonly known as: TYLENOL   Take 1 Tablet by mouth every 6 hours as needed for Mild Pain or Moderate Pain.  Dose: 500 mg        CONTINUE taking these medications      Instructions   Benlysta 200 MG/ML Soaj  Generic drug: Belimumab   Inject 200 mg under the skin every 7 days.  Dose: 200 mg     cyanocobalamin 1000 MCG/ML Soln  Commonly known as: VITAMIN B-12   Inject 1 mL intramuscularly every 14 days.     hydroxychloroquine 200 MG Tabs  Commonly known as: Plaquenil   Take 2 Tablets by mouth every day.  Dose: 400 mg     insulin infusion pump Aria   Inject 0.7 Units/hr under the skin continuous. Patient's own SQ insulin pump    Type of Insulin: Humalog 100 units/ml  Last change of tubin22    Dosing:  Basal rate:   0.7 units/hr   Bolus ratio:   1 unit : 14 g carbohydrate at breakfast, lunch, dinner, snacks  Correction ratio:   1 units for every 20 over 140 mg/dL    Disconnect pump if  patient becomes hypoglycemic and altered.  Dose: 0.7 Units/hr     omeprazole 20 MG delayed-release capsule  Commonly known as: PRILOSEC   Take 1 Capsule by mouth 2 times a day for 30 days.  Dose: 20 mg     ondansetron 4 MG Tbdp  Commonly known as: Zofran ODT   Take 2 Tablets by mouth every four hours as needed for Nausea.  Dose: 8 mg     predniSONE 10 MG Tabs  Start taking on: April 14, 2022  Commonly known as: DELTASONE   Take 6 Tablets by mouth every day for 2 days, THEN 5 Tablets every day for 7 days, THEN 4 Tablets every day for 7 days, THEN 3 Tablets every day for 7 days, THEN 2 Tablets every day for 7 days, THEN 1 Tablet every day for 7 days.     prochlorperazine 10 MG Tabs  Commonly known as: COMPAZINE   Take 1 Tablet by mouth every 6 hours as needed for Nausea/Vomiting for up to 30 days.  Dose: 10 mg     promethazine 25 MG Tabs  Commonly known as: PHENERGAN   Take 1 Tablet by mouth every four hours as needed for Nausea/Vomiting.  Dose: 25 mg     scopolamine 1 mg/72hr Pt72  Commonly known as: TRANSDERM-SCOP   Place 1 Patch on the skin every 72 hours.  Dose: 1 Patch     vitamin D2 (Ergocalciferol) 1.25 MG (39951 UT) Caps capsule  Commonly known as: Drisdol   Take 50,000 Units by mouth every 72 hours. EVERY 3 DAYS  Dose: 50,000 Units            Allergies  Allergies   Allergen Reactions   • Cefdinir Rash     rash   • Erythromycin Vomiting and Swelling     Vomiting and face swelling    • Gluten Meal Diarrhea, Vomiting and Nausea     Celiac        DIET  Orders Placed This Encounter   Procedures   • Diet Order Diet: Consistent CHO (Diabetic); Miscellaneous modifications: (optional): Gluten Free     Standing Status:   Standing     Number of Occurrences:   1     Order Specific Question:   Diet:     Answer:   Consistent CHO (Diabetic) [4]     Order Specific Question:   Miscellaneous modifications: (optional)     Answer:   Gluten Free [9]       ACTIVITY  As tolerated.      CONSULTATIONS  Pulmonology   Gastroenterology      PROCEDURES  NA    LABORATORY  Lab Results   Component Value Date    SODIUM 138 04/28/2022    POTASSIUM 3.6 04/28/2022    CHLORIDE 104 04/28/2022    CO2 21 04/28/2022    GLUCOSE 155 (H) 04/28/2022    BUN 11 04/28/2022    CREATININE 0.53 04/28/2022        Lab Results   Component Value Date    WBC 7.1 04/28/2022    HEMOGLOBIN 12.9 04/28/2022    HEMATOCRIT 39.4 04/28/2022    PLATELETCT 243 04/28/2022        Total time of the discharge process exceeds 40 minutes.

## 2022-04-29 NOTE — DISCHARGE INSTRUCTIONS
Take medications as directed   Follow your steroid taper, you should start 30 mg dose tomorrow 4/30 and taper from there as directed, recommend that you take this with food to avoid upset stomach   I would recommend against any NSAID use until you are cleared by a doctor to resume   Follow up with GI, Endocrine and rheumatology as scheduled   The information for ENT has been provided, you can call their office to schedule an appointment for follow up  If you develop severe bleeding that is persistent and unable to be controlled, seek medical attention     Discharge Instructions    Discharged to home by car with relative. Discharged via walking, hospital escort: Refused.  Special equipment needed: Not Applicable    Be sure to schedule a follow-up appointment with your primary care doctor or any specialists as instructed.     Discharge Plan:   Diet Plan: Discussed  Activity Level: Discussed  Confirmed Follow up Appointment: No (Comments)  Confirmed Symptoms Management: Discussed  Medication Reconciliation Updated: No (Comments) (per MD)    I understand that a diet low in cholesterol, fat, and sodium is recommended for good health. Unless I have been given specific instructions below for another diet, I accept this instruction as my diet prescription.   Other diet: CHO    Special Instructions: None    · Is patient discharged on Warfarin / Coumadin?   No     Depression / Suicide Risk    As you are discharged from this [facility] facility, it is important to learn how to keep safe from harming yourself.    Recognize the warning signs:  · Abrupt changes in personality, positive or negative- including increase in energy   · Giving away possessions  · Change in eating patterns- significant weight changes-  positive or negative  · Change in sleeping patterns- unable to sleep or sleeping all the time   · Unwillingness or inability to communicate  · Depression  · Unusual sadness, discouragement and loneliness  · Talk of  wanting to die  · Neglect of personal appearance   · Rebelliousness- reckless behavior  · Withdrawal from people/activities they love  · Confusion- inability to concentrate     If you or a loved one observes any of these behaviors or has concerns about self-harm, here's what you can do:  · Talk about it- your feelings and reasons for harming yourself  · Remove any means that you might use to hurt yourself (examples: pills, rope, extension cords, firearm)  · Get professional help from the community (Mental Health, Substance Abuse, psychological counseling)  · Do not be alone:Call your Safe Contact- someone whom you trust who will be there for you.  · Call your local CRISIS HOTLINE 420-4033 or 126-841-8611  · Call your local Children's Mobile Crisis Response Team [place] (223) 313-1030 or www.liveMag.ro  · Call the toll free National Suicide Prevention Hotlines   · National Suicide Prevention Lifeline 746-036-WZTQ (5421)  · [employer] Line Network 800-SUICIDE (655-3428)      Hemoptysis    Hemoptysis is when you cough up blood. It can be mild or serious. If it is mild, you may cough up bloody spit and mucus (sputum). If you cough up 1-2 cups (240-480 mL) of blood within 24 hours (massive hemoptysis), it is an emergency.  If you cough up blood, it is important to go and see your doctor.  Follow these instructions at home:  · Watch your condition for any changes.  · Take over-the-counter and prescription medicines only as told by your doctor.  · If you were prescribed an antibiotic medicine, take it as told by your doctor. Do not stop taking the antibiotic even if you start to feel better.  · Go back to your normal activities as told by your doctor. Ask your doctor what activities are safe for you to do.  · Do not use any products that contain nicotine or tobacco. These include cigarettes and e-cigarettes. If you need help quitting, ask your doctor.  · Keep all follow-up visits as told by your doctor. This  is important.  Contact a doctor if:  · You have a fever.  · You cough up bloody spit and mucus.  Get help right away if:  · You cough up fresh blood or blood clots.  · You have trouble breathing.  · You have chest pain.  This information is not intended to replace advice given to you by your health care provider. Make sure you discuss any questions you have with your health care provider.  Document Released: 12/04/2013 Document Revised: 11/30/2018 Document Reviewed: 09/15/2017  Elsevier Patient Education © 2020 [employer]

## 2022-04-29 NOTE — PROGRESS NOTES
Patient is A&O x4. Patient is a no Fall risk, bed locked and in lowest position. Patient reports pain 5/10; pt denies needing pain intervention at this time. Assessment completed. Plan of care reviewed with patient, will implement and continue to monitor. Call light/belongings within reach.

## 2022-04-29 NOTE — PROGRESS NOTES
Patient discharged, to leave hospital room ambulating and with parents.  Education provided per d/c packet, denies further questions or concerns.

## 2022-04-29 NOTE — PROGRESS NOTES
Patient report received from night shift RN, patient blood sugar 102 reported from patient insulin pump, declines any needs or pain at this time

## 2022-04-29 NOTE — CARE PLAN
The patient is Stable - Low risk of patient condition declining or worsening    Shift Goals  Clinical Goals: monitor hemopytsis  Patient Goals: rest  Family Goals: NA    Progress made toward(s) clinical / shift goals:  pt updated on poc for tonight. Pt states pain 5/10 but denies needing pain intervention right now. Pt shows not signs of anxiety.      Problem: Knowledge Deficit - Standard  Goal: Patient and family/care givers will demonstrate understanding of plan of care, disease process/condition, diagnostic tests and medications  Outcome: Progressing     Problem: Pain - Standard  Goal: Alleviation of pain or a reduction in pain to the patient’s comfort goal  Outcome: Progressing     Problem: Psychosocial  Goal: Patient's level of anxiety will decrease  Outcome: Progressing       Patient is not progressing towards the following goals:

## 2022-04-29 NOTE — CARE PLAN
The patient is Stable - Low risk of patient condition declining or worsening    Shift Goals  Clinical Goals: Monitor hemoptysis  Patient Goals: rest  Family Goals: N/A    Progress made toward(s) clinical / shift goals:      Patient is not progressing towards the following goals: Pt pain remains the same throughout day, episode of hemoptysis after shower see previous note, pt requesting tylenol after hemoptysis episode.      Problem: Pain - Standard  Goal: Alleviation of pain or a reduction in pain to the patient’s comfort goal  Outcome: Not Progressing     Problem: Discharge Barriers/Planning  Goal: Patient's continuum of care needs are met  Outcome: Not Progressing

## 2022-04-29 NOTE — PROGRESS NOTES
Requested pt [facility], left room and RN forgot pt stated [facility]. Re entered room requested [facility]. Per pt [facility]

## 2022-04-30 LAB
ENA SM IGG SER-ACNC: 0 AU/ML (ref 0–40)
MITOCHONDRIA M2 IGG SER-ACNC: 2.6 UNITS (ref 0–24.9)
SMA IGG SER-ACNC: 4 UNITS (ref 0–19)

## 2022-05-01 NOTE — PATIENT INSTRUCTIONS
AFTER VISIT INSTRUCTIONS    Below are important information to help you navigate your healthcare needs and help us serve you safely and effectively:  • If laboratory tests and/or imaging studies were ordered, remember to go get them done.  • If new prescriptions or refills were sent to the pharmacy, remember to go pick them up.  • Take your medications exactly as prescribed unless instructed otherwise.  • Follow up with your primary care provider and/or specialists as scheduled.  • If there are significant findings from your lab tests and imaging studies, I will notify you with explanations via [employer] or phone call, otherwise you can view them on [employer] and let me know if you have any questions.  • Sign up for [employer] if you have not already done so, in order to have access to the results of your lab tests and imaging studies, and to be able to send and receive messages from me.  • Please note that [employer] messaging is for non-urgent matters that do not require immediate attention and are typically read only during office hours, so do not expect immediate responses from me.

## 2022-05-01 NOTE — PROGRESS NOTES
[facility]  [address] [place] [address], [address], [place] [address]  Phone: [phone] / Fax: [phone]    RHEUMATOLOGY NEW PATIENT VISIT NOTE      DATE OF SERVICE: 05/03/2022    REFERRING PROVIDER:  NADER Schultz [facility]   [place] [id number]      SUBJECTIVE:     CHIEF COMPLAINT:   Chief Complaint   Patient presents with   • New Patient     Evaluation for Lupus       HISTORY OF PRESENT ILLNESS:  Malena Bennett is a 21 y.o. female with pertinent history notable for SLE diagnosed in 2018 (based on photosensitive malar rash, oral sores, pleurisy, inflammatory arthritis, Raynaud's syndrome, sicca complex, chronic fatigue, positive SHANI with anti-dsDNA and hypocomplementemia), celiac disease, type 1 diabetes, factor V Leiden, and factor VIII inhibitor disorder. She was previously under the care of a local rheumatologist and now presents for evaluation to establish care following recent hospitalizations at [facility]. She was recently hospitalized 3/31/22-4/13/22 and 4/25/22-4/29/22 for hematemesis suspected to be due to possible arteriovenous malformation in the setting of eosinophilic esophagitis and concern for lupus flare. Her lupus treatments have included methotrexate (discontinued due to intolerable GI upset and fatigue even after switching to the SC form), hydroxychloroquine (taking since diagnosis), Benlysta (started during her hospitalization based on my recommendation), and high-dose prednisone taper (started during her hospitalization based on my recommendation). She reports recent episode of mild pain on her hands (MCP joints), shoulders, and right hip, as well as fatigue, rash, mouth sores, and pleurisy all of which have improved after starting Benlysta (over received 3 doses) and high-dose prednisone 60mg taper (currently at 50mg for 3 days).  Pertinent labs as of 4/4/22: Positive SHANI 1:80 speckled with normal  anti-dsDNA, mildly low C4 of 17.5 with normal C3, UA with protein 30 mg/dL but no RBC (with few bacteria), positive IgM anti-CL of 13, mildly low Hgb of 11.9; negative/normal ESR, CRP, anti-MPO, anti-PR3, AMA, anti-F actin/ASMA, IgA anti-tissue TG, CMP, HBV, TSH w/fT4, and anti-TPO in 6/2021.    need to benlysta refill    REVIEW OF SYSTEMS:  Except as noted in the history above, a complete review of systems with emphasis on autoimmune inflammatory conditions was otherwise negative for any significant symptoms.      ACTIVE PROBLEM LIST:  Patient Active Problem List   Diagnosis   • Factor V Leiden (HCC)   • Mittelschmerz   • Tourette syndrome   • OCD (obsessive compulsive disorder)   • Chronic tension-type headache, not intractable   • Esophagitis determined by endoscopy   • Salt craving   • Other irritable bowel syndrome   • Hyperglycemia   • POTS (postural orthostatic tachycardia syndrome)   • Controlled type 1 diabetes mellitus (HCC)   • Factor VIII inhibitor disorder (HCC)   • History of cholecystectomy   • Celiac disease   • Elevated antinuclear antibody (SHANI) level   • Vitamin D deficiency   • Other chronic pain   • Acute midline thoracic back pain   • SOB (shortness of breath)   • History of asthma   • Muscle tenderness   • Hospital discharge follow-up   • Systemic lupus erythematosus (HCC)   • Polymyalgia (HCC)   • Cough   • Flu-like symptoms   • Hypokalemia due to excessive gastrointestinal loss of potassium   • Hemoptysis   • Intractable nausea and vomiting   • Class 1 obesity due to excess calories with serious comorbidity in adult   • Hematemesis of unknown etiology   • Anemia of chronic disease   • Long-term use of hydroxychloroquine   No problem-specific Assessment & Plan notes found for this encounter.      PAST MEDICAL HISTORY:  Past Medical History:   Diagnosis Date   • Asthma     resolved   • Celiac disease    • Clotting disorder (HCC)     Factor VIII per patient   • Diabetes (HCC)    • Factor 5  Leiden mutation, heterozygous (HCC)    • Lupus (HCC)    • OCD (obsessive compulsive disorder)    • Tourette disease        PAST SURGICAL HISTORY:  Past Surgical History:   Procedure Laterality Date   • AL BRONCHOSCOPY,DIAGNOSTIC N/A 3/25/2022    Procedure: BRONCHOSCOPY-WITH BRONCHOALVEOLAR LAVAGE AND TRANSBRONCHIAL BIOPSY;  Surgeon: Denice Lucas M.D.;  Location: [facility];  Service: Ent   • AL UPPER GI ENDOSCOPY,DIAGNOSIS N/A 3/22/2022    Procedure: GASTROSCOPY;  Surgeon: Sharda Multani D.O.;  Location: SURGERY [facility];  Service: Gastroenterology   • APPENDECTOMY  2018   • APPENDECTOMY LAPAROSCOPIC  7/1/2017    Procedure: APPENDECTOMY LAPAROSCOPIC;  Surgeon: Derick Arthur M.D.;  Location: SURGERY [facility];  Service:    • ELIGIO BY LAPAROSCOPY  1/30/2017    Procedure: ELIGIO BY LAPAROSCOPY;  Surgeon: Fina Perkins M.D.;  Location: SURGERY SAME DAY [facility];  Service:        MEDICATIONS:  Current Outpatient Medications   Medication Sig Dispense Refill   • Belimumab 200 MG/ML Solution Auto-injector Inject 200 mg under the skin every 7 days. 4 mL 5   • acetaminophen (TYLENOL) 500 MG Tab Take 1 Tablet by mouth every 6 hours as needed for Mild Pain or Moderate Pain.     • omeprazole (PRILOSEC) 20 MG delayed-release capsule Take 1 Capsule by mouth 2 times a day for 30 days. 60 Capsule 0   • scopolamine (TRANSDERM-SCOP) 1 mg/72hr PATCH 72 HR Place 1 Patch on the skin every 72 hours. 4 Patch 3   • promethazine (PHENERGAN) 25 MG Tab Take 1 Tablet by mouth every four hours as needed for Nausea/Vomiting. 6 Tablet 0   • prochlorperazine (COMPAZINE) 10 MG Tab Take 1 Tablet by mouth every 6 hours as needed for Nausea/Vomiting for up to 30 days. 90 Tablet 0   • ondansetron (ZOFRAN ODT) 4 MG TABLET DISPERSIBLE Take 2 Tablets by mouth every four hours as needed for Nausea. 120 Tablet 0   • predniSONE (DELTASONE) 10 MG Tab Take 6 Tablets by mouth every day for 2 days, THEN 5 Tablets every day for  7 days, THEN 4 Tablets every day for 7 days, THEN 3 Tablets every day for 7 days, THEN 2 Tablets every day for 7 days, THEN 1 Tablet every day for 7 days. 117 Tablet 0   • hydroxychloroquine (PLAQUENIL) 200 MG Tab Take 2 Tablets by mouth every day. 90 Tablet 3   • insulin infusion pump Device Inject 0.7 Units/hr under the skin continuous. Patient's own SQ insulin pump    Type of Insulin: Humalog 100 units/ml  Last change of tubin22    Dosing:  Basal rate:   0.7 units/hr   Bolus ratio:   1 unit : 14 g carbohydrate at breakfast, lunch, dinner, snacks  Correction ratio:   1 units for every 20 over 140 mg/dL    Disconnect pump if patient becomes hypoglycemic and altered.     • vitamin D2, Ergocalciferol, (DRISDOL) 1.25 MG (96170 UT) Cap capsule Take 50,000 Units by mouth every 72 hours. EVERY 3 DAYS     • cyanocobalamin (VITAMIN B-12) 1000 MCG/ML Solution Inject 1 mL intramuscularly every 14 days. 12 mL 0     No current facility-administered medications for this visit.       ALLERGIES:   Allergies   Allergen Reactions   • Cefdinir Rash     rash   • Erythromycin Vomiting and Swelling     Vomiting and face swelling    • Gluten Meal Diarrhea, Vomiting and Nausea     Celiac        IMMUNIZATIONS:  Immunization History   Administered Date(s) Administered   • 9VHPV VACCINE 2-3 DOSE IM (GARDASIL 9) 2017, 2017, 2017   • COVID-19 Vaccine, unspecified - HISTORICAL DATA 2022   • Dtap Vaccine 2000, 2000, 2000, 2001, 2005   • Hepatitis A Vaccine, Adult 10/09/2008   • Hepatitis A Vaccine, Ped/Adol 10/09/2008, 2010   • Hepatitis B Vaccine (Adol/Adult) 2019, 2019   • Hepatitis B Vaccine Adolescent/Pediatric 2000, 2000, 2001, 2018   • IPV 2000, 2000, 2001, 2005   • Influenza Vaccine Pediatric Split - Historical Data 10/27/2009   • Influenza Vaccine Quad Inj (Pf) 2017, 10/17/2018, 10/22/2019, 10/22/2020,  03/08/2022   • MENING VAC SERO B 2-3 DOSE SCHED IM (TRUMENBA) 08/10/2021   • MMR Vaccine 08/09/2001, 06/13/2005   • Meningococcal Conjugate Vaccine MCV4 (MENVEO) 08/12/2014, 05/19/2017   • Meningococcal Conjugate Vaccine MCV4 (Menactra) 08/12/2014, 05/19/2017   • PFIZER PURPLE CAP SARS-COV-2 VACCINATION (12+) 12/19/2020, 01/12/2021   • Pneumococcal polysaccharide vaccine (PPSV-23) 03/03/2020   • QUANTIFERON GOLD - HISTORICAL DATA 10/22/2020, 01/20/2022   • Tdap Vaccine 08/23/2012, 08/23/2012   • Tuberculin Skin Test 01/06/2020   • Varicella Vaccine Live 12/06/2009, 12/06/2009       SOCIAL HISTORY:   Social History     Tobacco Use   • Smoking status: Never Smoker   • Smokeless tobacco: Never Used   Vaping Use   • Vaping Use: Never used   Substance Use Topics   • Alcohol use: No     Alcohol/week: 0.0 oz   • Drug use: No       FAMILY HISTORY:  Family History   Problem Relation Age of Onset   • Other Maternal Grandfather         Parkinson's   • Genetic Disorder Maternal Grandfather         Parkinsons, and crouzon syndrome   • Cancer Maternal Grandfather         Skin Cancer   • Heart Disease Other    • Cancer Other         bile duct cancer   • Other Mother         sphincter of salud, pancreatic insufficiency   • Cancer Mother 53        cervical ca   • Cancer Other         great uncle brain   • Arthritis Maternal Grandmother    • Cancer Maternal Grandmother         Skin Cancer   • Heart Disease Maternal Grandmother    • Hypertension Maternal Grandmother    • Hyperlipidemia Maternal Grandmother    • Stroke Maternal Grandmother    • Genetic Disorder Father         Factor V Liden and Tourettes   • Heart Disease Father    • Hyperlipidemia Father    • Genetic Disorder Maternal Uncle         Crouzon Syndrome   • Cancer Maternal Uncle         Bile Duct, and Skin cancer   • Cancer Maternal Aunt         Multiple Aunts and Uncles passed from cancer   • Heart Disease Maternal Aunt         Open heart surgery occuring   • Heart Disease  "Paternal Grandmother    • Hypertension Paternal Grandmother    • Stroke Paternal Grandmother    • Heart Disease Maternal Uncle         multiple uncles.        OBJECTIVE:     Vital Signs: /60 (BP Location: Left arm, Patient Position: Sitting, BP Cuff Size: Adult)   Pulse 94   Temp 36.6 °C (97.8 °F) (Temporal)   Resp 16   Ht 1.676 m (5' 6\")   Wt 86.2 kg (190 lb)   SpO2 96% Body mass index is 30.67 kg/m².    General: Appears well and comfortable; no acute distress  Eyes: Extraocular muscles and vision intact; no conjunctival lesions  ENT: Wearing facemask but no visible lesions  Head/Neck: No scalp lesions; neck supple; no cervical lymphadenopathy  Cardiovascular: Regular rate and rhythm; no murmurs  Respiratory: Clear to auscultation bilaterally; no wheezes, rubs, rales, or rhonchi  Gastrointestinal: Abdomen soft and non-tender; no masses or organomegaly  Integumentary: Skin soft and supple; no significant cutaneous lesions  Musculoskeletal: Minimal poorly localized tenderness of hands (on MCP squeeze) and shoulders; no periarticular soft tissue swelling, warmth, erythema, or overt signs of synovitis; no significant restriction in ROM  Neurologic: No focal sensory or motor deficits  Psychiatric: Mood and affect appropriate      LABORATORY RESULTS REVIEWED AND INTERPRETED BY ME:  Lab Results   Component Value Date/Time    WBC 7.1 04/28/2022 12:35 AM    RBC 4.29 04/28/2022 12:35 AM    HEMOGLOBIN 12.9 04/28/2022 12:35 AM    HEMATOCRIT 39.4 04/28/2022 12:35 AM    MCV 91.8 04/28/2022 12:35 AM    MCH 30.1 04/28/2022 12:35 AM    MCHC 32.7 (L) 04/28/2022 12:35 AM    RDW 41.2 04/28/2022 12:35 AM    PLATELETCT 243 04/28/2022 12:35 AM    MPV 9.8 04/28/2022 12:35 AM    NEUTS 5.35 04/28/2022 12:35 AM    POLYS 47 03/25/2022 10:19 AM    LYMPHOCYTES 17.50 (L) 04/28/2022 12:35 AM    MONOCYTES 6.90 04/28/2022 12:35 AM    EOSINOPHILS 0.30 04/28/2022 12:35 AM    BASOPHILS 0.10 04/28/2022 12:35 AM     Lab Results   Component " Value Date/Time    SODIUM 138 04/28/2022 12:35 AM    POTASSIUM 3.6 04/28/2022 12:35 AM    CHLORIDE 104 04/28/2022 12:35 AM    CO2 21 04/28/2022 12:35 AM    GLUCOSE 155 (H) 04/28/2022 12:35 AM    BUN 11 04/28/2022 12:35 AM    CREATININE 0.53 04/28/2022 12:35 AM     Lab Results   Component Value Date/Time    ASTSGOT 18 04/28/2022 12:35 AM    ALTSGPT 59 (H) 04/28/2022 12:35 AM    ALKPHOSPHAT 89 04/28/2022 12:35 AM    TBILIRUBIN 0.3 04/28/2022 12:35 AM    TOTPROTEIN 7.3 04/28/2022 12:35 AM    ALBUMIN 4.1 04/28/2022 12:35 AM    CALCIUM 9.4 04/28/2022 12:35 AM    MAGNESIUM 2.3 04/26/2022 12:37 AM     Lab Results   Component Value Date/Time    FLTYPE Bronch Lavage 03/25/2022 10:19 AM     Lab Results   Component Value Date/Time    SEDRATEWES 13 04/25/2022 03:31 AM    CREACTPROT 0.69 04/25/2022 03:15 AM     Lab Results   Component Value Date/Time    ANTINUCAB Detected (A) 03/22/2022 02:00 PM     Lab Results   Component Value Date/Time    ANTIDNADS 6 04/04/2022 12:12 PM    SMITHAB 0 04/28/2022 12:35 AM    RNPAB 3 03/22/2022 02:00 PM    CQTKKVH07 3 03/22/2022 02:00 PM    SSA60 3 03/22/2022 02:00 PM    SSA52 0 03/22/2022 02:00 PM    ANTISSBSJ 0 03/22/2022 02:00 PM     Lab Results   Component Value Date/Time    JO1AB 3 03/22/2022 02:00 PM     Lab Results   Component Value Date/Time    IGACARDIOLI <10 03/26/2022 02:20 PM    IGMCARDIOLI 13 (H) 03/26/2022 02:20 PM    IGGCARDIOLI <10 03/26/2022 02:20 PM    RUSSELVIPER 36.7 03/26/2022 02:20 PM    DRVVTINTERP Not Present 03/26/2022 02:20 PM    PROTHROMBTM 14.0 04/25/2022 03:31 AM    INR 1.12 04/25/2022 03:31 AM     Lab Results   Component Value Date/Time     03/22/2022 02:00 PM    IGG 1067 03/22/2022 02:00 PM     Lab Results   Component Value Date/Time    E2ZXMNXQJDJ 106.2 04/04/2022 02:02 AM    S2MWIJYWUBI 17.5 (L) 04/04/2022 02:02 AM    CRYOGLOBULIN NEG 72Hour 01/11/2020 08:15 AM     Lab Results   Component Value Date/Time    COLORURINE Yellow 04/02/2022 01:43 PM     SPECGRAVITY 1.022 04/02/2022 01:43 PM    PHURINE 6.5 04/02/2022 01:43 PM    GLUCOSEUR Negative 04/02/2022 01:43 PM    KETONES Negative 04/02/2022 01:43 PM    PROTEINURIN 30 (A) 04/02/2022 01:43 PM     Lab Results   Component Value Date/Time    TOTALVOLUME 700 11/23/2018 02:30 PM    TOTALVOLUME 1575 10/20/2018 10:00 AM    TOTPROTUR 5.0 03/08/2022 02:13 PM    MICROALBUR <1.2 06/07/2021 12:19 PM    CREATININEU 61.70 03/08/2022 02:13 PM    MALBCRT see below 06/07/2021 12:19 PM     Lab Results   Component Value Date/Time    ANTIMITOCHO 2.6 04/28/2022 12:35 AM    FACTIN 4 04/28/2022 12:35 AM     03/22/2022 02:00 PM    TTRANSIGA <2 03/22/2022 02:00 PM     Lab Results   Component Value Date/Time    TSHULTRASEN 1.790 03/22/2022 02:00 PM    FREET4 1.23 03/08/2022 02:14 PM    MICROSOMALA 0.9 06/07/2021 12:19 PM     Lab Results   Component Value Date/Time    25HYDROXY 14 (L) 03/08/2022 02:14 PM     Lab Results   Component Value Date/Time    FERRITIN 76.9 04/06/2022 02:48 AM    IRON 74 04/06/2022 02:48 AM    FOLATE 5.9 03/08/2022 02:14 PM     Lab Results   Component Value Date/Time    CHOLSTRLTOT 166 01/12/2021 04:33 PM     (H) 01/12/2021 04:33 PM    HDL 46 01/12/2021 04:33 PM    TRIGLYCERIDE 86 01/12/2021 04:33 PM    HBA1C 4.8 04/25/2022 03:31 AM     Lab Results   Component Value Date/Time    QNTTBGOLD Negative 04/05/2017 11:24 AM     Lab Results   Component Value Date/Time    HEPBSAG Non-Reactive 03/22/2022 02:00 PM    HEPBCORTOT NonReactive 03/22/2022 02:00 PM       RADIOLOGY RESULTS REVIEWED AND INTERPRETED BY ME:    Results for orders placed during the hospital encounter of 09/21/20    DX-SACROILIAC JOINTS 3+    Impression  No SI joint sclerosis or erosions identified.    Results for orders placed in visit on 02/17/22    DX-ANKLE 3+ VIEWS RIGHT    Results for orders placed during the hospital encounter of 06/19/18    ECHOCARDIOGRAM COMP W/O CONT    All relevant laboratory and imaging results reported on this  note were reviewed and interpreted by me.      ASSESSMENT AND PLAN:     Malena Bennett is a 21 y.o. female with history as noted above whose presentation merits the following diagnostic and clinical status impressions and recommendations:    1. Systemic lupus erythematosus with other organ involvement, unspecified SLE type (HCC)  Based on objective assessment of her history, physical, and recent laboratory tests in accordance with the SLE disease activity index (SLEDAI reference below), her overall clinical picture suggests relatively low disease activity that is presently controlled on the current regimen. Irrespective of her recent hospitalizations for episodic hematemesis of unknown etiology and concern for lupus flare, there is really no significant evidence of that at this time, so no need for further escalation of her treatment.  - Belimumab 200 MG/ML Solution Auto-injector; Inject 200 mg under the skin every 7 days.  Dispense: 4 mL; Refill: 5  - Continue hydroxychloroquine 400mg daily  - Continue prednisone 50mg taper by 10mg every 7 days until finished    2. Episodic hematemesis of unknown etiology  Etiology unclear but suspected to be due to possible arteriovenous malformation in the setting of eosinophilic esophagitis. Notably, this is not a manifestation of her lupus disease activity which appears to be relatively under control at this time.  - Follow up with gastroenterology and pulmonology    3. Long-term use of hydroxychloroquine  Risk of retinal toxicity is considered minimal in this case given the low dose of hydroxychloroquine prescribed (less than 5 mg/kg of body weight) per rheumatology/ophthalmology guidelines. That said, counseled on the importance of following up with ophthalmology for the recommended routine eye exams, typically annually or every other year.  - Follow up with ophthalmology as planned    REFERENCE:  SLE Disease Activity Index (TANIKA-SLEDAI)  · Edith GUIDRY et al;  OC-TANIKA Trial. Combined oral contraceptives in women with systemic lupus erythematosus. N Engl J Med. 2005 Dec 15;35324):2550-8. doi: 10.1056/ODBSfk862558. PMID: 44210767.    FOLLOW-UP: Return in about 3 months (around 8/3/2022) for [place].           Thank you for giving me the opportunity to participate in the care of Malena Mckeediaz Bennett.    Quang De Los Santos MD, MS  Rheumatologist, [facility]  , Department of Internal Medicine  [facility]

## 2022-05-02 DIAGNOSIS — M32.9 LUPUS (HCC): ICD-10-CM

## 2022-05-03 ENCOUNTER — OFFICE VISIT (OUTPATIENT)
Dept: RHEUMATOLOGY | Facility: MEDICAL CENTER | Age: 22
End: 2022-05-03
Payer: COMMERCIAL

## 2022-05-03 VITALS
HEIGHT: 66 IN | OXYGEN SATURATION: 96 % | DIASTOLIC BLOOD PRESSURE: 60 MMHG | BODY MASS INDEX: 30.53 KG/M2 | WEIGHT: 190 LBS | RESPIRATION RATE: 16 BRPM | HEART RATE: 94 BPM | TEMPERATURE: 97.8 F | SYSTOLIC BLOOD PRESSURE: 104 MMHG

## 2022-05-03 DIAGNOSIS — M32.19 SYSTEMIC LUPUS ERYTHEMATOSUS WITH OTHER ORGAN INVOLVEMENT, UNSPECIFIED SLE TYPE (HCC): Primary | ICD-10-CM

## 2022-05-03 DIAGNOSIS — K92.0 HEMATEMESIS OF UNKNOWN ETIOLOGY: ICD-10-CM

## 2022-05-03 DIAGNOSIS — Z79.899 LONG-TERM USE OF HYDROXYCHLOROQUINE: ICD-10-CM

## 2022-05-03 PROCEDURE — 99204 OFFICE O/P NEW MOD 45 MIN: CPT | Performed by: STUDENT IN AN ORGANIZED HEALTH CARE EDUCATION/TRAINING PROGRAM

## 2022-05-03 RX ORDER — BELIMUMAB 200 MG/ML
200 SOLUTION SUBCUTANEOUS
Qty: 3.92 ML | Refills: 1 | OUTPATIENT
Start: 2022-05-03

## 2022-05-03 ASSESSMENT — FIBROSIS 4 INDEX: FIB4 SCORE: 0.21

## 2022-05-03 NOTE — TELEPHONE ENCOUNTER
1. Caller Name: Malena PhanMosesMarguerite Bennett                                             Call Back Number: [phone] (home)         Patient approves a detailed voicemail message: no    Patient called stating she has been having high blood sugar readings. The highest she got today was in the 400's. This just started today. She is worried and is not sure how to lower them.      Please advise    
I spoke to the patient over the phone she states that since last night her blood sugars have been running in 200s and now it is really very high at around 400s.  She is also having a lot of polyuria and polydipsia.  I discussed that she may possibly have diabetes.  She is not in [place] currently.  She is in [facility] at the current time.  I have advised her to go to the urgent care as soon as possible and get the following tests done #1 basic metabolic panel #2 C-peptide measurement #3 get 65 antibodies #4 high A2 insulin autoantibodies.    I also advised her to stop doing a lot of water and if she can has access to Pedialyte to keep sipping on it until she reaches the urgent care.  If she does not have access to Pedialyte advised her to add some pinches of salt in water and keep sipping on that water.  
PAST MEDICAL HISTORY:  No pertinent past medical history

## 2022-05-05 ENCOUNTER — TELEPHONE (OUTPATIENT)
Dept: MEDICAL GROUP | Facility: PHYSICIAN GROUP | Age: 22
End: 2022-05-05
Payer: COMMERCIAL

## 2022-05-05 NOTE — TELEPHONE ENCOUNTER
Called pt to schedule appointment regarding referral to [facility]. Pt did not answer, LVM to schedule appt [phone]

## 2022-05-05 NOTE — TELEPHONE ENCOUNTER
Patient called requesting referral to [facility], adrian requesting the referral sent as stat. LOV 4/19/22

## 2022-05-06 ENCOUNTER — TELEPHONE (OUTPATIENT)
Dept: MEDICAL GROUP | Facility: PHYSICIAN GROUP | Age: 22
End: 2022-05-06

## 2022-05-09 DIAGNOSIS — M32.9 LUPUS (HCC): ICD-10-CM

## 2022-05-09 PROCEDURE — RXMED WILLOW AMBULATORY MEDICATION CHARGE: Performed by: STUDENT IN AN ORGANIZED HEALTH CARE EDUCATION/TRAINING PROGRAM

## 2022-05-09 RX ORDER — BELIMUMAB 200 MG/ML
200 SOLUTION SUBCUTANEOUS
Qty: 3.92 ML | Refills: 1 | OUTPATIENT
Start: 2022-05-09

## 2022-05-10 ENCOUNTER — OFFICE VISIT (OUTPATIENT)
Dept: MEDICAL GROUP | Facility: PHYSICIAN GROUP | Age: 22
End: 2022-05-10
Payer: COMMERCIAL

## 2022-05-10 VITALS
SYSTOLIC BLOOD PRESSURE: 122 MMHG | HEART RATE: 101 BPM | RESPIRATION RATE: 16 BRPM | HEIGHT: 66 IN | BODY MASS INDEX: 30.67 KG/M2 | TEMPERATURE: 97.7 F | DIASTOLIC BLOOD PRESSURE: 72 MMHG | OXYGEN SATURATION: 98 %

## 2022-05-10 DIAGNOSIS — M32.9 SYSTEMIC LUPUS ERYTHEMATOSUS, UNSPECIFIED SLE TYPE, UNSPECIFIED ORGAN INVOLVEMENT STATUS (HCC): ICD-10-CM

## 2022-05-10 DIAGNOSIS — F42.2 MIXED OBSESSIONAL THOUGHTS AND ACTS: ICD-10-CM

## 2022-05-10 PROCEDURE — 99214 OFFICE O/P EST MOD 30 MIN: CPT | Performed by: NURSE PRACTITIONER

## 2022-05-10 RX ORDER — FLUVOXAMINE MALEATE 50 MG/1
50 TABLET, COATED ORAL NIGHTLY
Qty: 60 TABLET | Refills: 1 | Status: SHIPPED | OUTPATIENT
Start: 2022-05-10 | End: 2023-01-10 | Stop reason: SDUPTHER

## 2022-05-10 NOTE — ASSESSMENT & PLAN NOTE
Chronic problem.  This was diagnosed in early childhood, around the age of 6.  Previously tried Abilify.  Admits today obsessive-compulsive thoughts, mainly about performing in school.  Has been managing her symptoms with supportive care, admits to certain rituals.  Currently on prednisone for lupus control.

## 2022-05-10 NOTE — PROGRESS NOTES
Chief Complaint   Patient presents with   • Referral Needed       HISTORY OF PRESENT ILLNESS: Patient is a 21 y.o. female, established patient who presents today to discuss medical problems as listed below:      Systemic lupus erythematosus (HCC)  Patient is here needing referral to rheumatology at [place].  She was recently hospitalized 4/25/2022 at [facility] for suspected lupus flareup.  Patient had exacerbated flareup symptoms, as well as diabetic hemoptysis.  That is post EGD.   Doing much better for about a week and a half, elevating fluid and food intake.  Currently on prednisone 60 mg daily, BG's are well controlled, on a pump.  Continuing Plaquenil.  In the hospital started belimumab injections weekly.  During hospitalization was referred to rheumatology at [place].  Vitals are within normal limits except slightly elevated pulse at 101, no CP, dyspnea, dizziness.  Labs from 4/28/2022 with stable CBC.    Mixed obsessional thoughts and acts  Chronic problem.  This was diagnosed in early childhood, around the age of 6.  Previously tried Abilify.  Admits today obsessive-compulsive thoughts, mainly about performing in school.  Has been managing her symptoms with supportive care, admits to certain rituals.  Currently on prednisone for lupus control.      Patient Active Problem List    Diagnosis Date Noted   • Mixed obsessional thoughts and acts 05/10/2022   • Long-term use of hydroxychloroquine 05/03/2022   • Anemia of chronic disease 04/06/2022   • Episodic hematemesis of unknown etiology 04/02/2022   • Class 1 obesity due to excess calories with serious comorbidity in adult 03/31/2022   • Intractable nausea and vomiting 03/27/2022   • Hemoptysis 03/22/2022   • Hypokalemia due to excessive gastrointestinal loss of potassium 03/21/2022   • Cough 03/08/2022   • Flu-like symptoms 03/08/2022   • Systemic lupus erythematosus (HCC) 08/10/2021   • Polymyalgia (HCC) 08/10/2021   • SOB (shortness of  breath) 03/03/2021   • History of asthma 03/03/2021   • Muscle tenderness 03/03/2021   • Hospital discharge follow-up 03/03/2021   • Acute midline thoracic back pain 02/26/2021   • Other chronic pain 01/15/2021   • Vitamin D deficiency 12/14/2020   • Elevated antinuclear antibody (SHANI) level 03/03/2020   • 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   • History of cholecystectomy 08/22/2019   • Celiac disease 08/22/2019   • Controlled type 1 diabetes mellitus (HCC) 08/12/2019   • POTS (postural orthostatic tachycardia syndrome) 12/17/2018   • Hyperglycemia 12/14/2018   • Other irritable bowel syndrome 08/10/2018   • Salt craving 02/02/2018   • Chronic tension-type headache, not intractable 11/09/2016   • Esophagitis determined by endoscopy 11/09/2016   • Mittelschmerz 09/19/2016   • Tourette syndrome 09/19/2016   • OCD (obsessive compulsive disorder) 09/19/2016   • Factor V Leiden ([facility])         Allergies: Cefdinir, Erythromycin, and Gluten meal    Current Outpatient Medications   Medication Sig Dispense Refill   • fluvoxAMINE (LUVOX) 50 MG Tab Take 1 Tablet by mouth every evening. 60 Tablet 1   • Belimumab 200 MG/ML Solution Auto-injector Inject 200 mg under the skin every 7 days. 4 mL 5   • acetaminophen (TYLENOL) 500 MG Tab Take 1 Tablet by mouth every 6 hours as needed for Mild Pain or Moderate Pain.     • omeprazole (PRILOSEC) 20 MG delayed-release capsule Take 1 Capsule by mouth 2 times a day for 30 days. 60 Capsule 0   • scopolamine (TRANSDERM-SCOP) 1 mg/72hr PATCH 72 HR Place 1 Patch on the skin every 72 hours. 4 Patch 3   • promethazine (PHENERGAN) 25 MG Tab Take 1 Tablet by mouth every four hours as needed for Nausea/Vomiting. 6 Tablet 0   • prochlorperazine (COMPAZINE) 10 MG Tab Take 1 Tablet by mouth every 6 hours as needed for Nausea/Vomiting for up to 30 days. 90 Tablet 0   • ondansetron (ZOFRAN ODT) 4 MG TABLET DISPERSIBLE Take 2 Tablets by mouth every four hours as needed for Nausea. 120 Tablet 0    • predniSONE (DELTASONE) 10 MG Tab Take 6 Tablets by mouth every day for 2 days, THEN 5 Tablets every day for 7 days, THEN 4 Tablets every day for 7 days, THEN 3 Tablets every day for 7 days, THEN 2 Tablets every day for 7 days, THEN 1 Tablet every day for 7 days. 117 Tablet 0   • hydroxychloroquine (PLAQUENIL) 200 MG Tab Take 2 Tablets by mouth every day. 90 Tablet 3   • insulin infusion pump Device Inject 0.7 Units/hr under the skin continuous. Patient's own SQ insulin pump    Type of Insulin: Humalog 100 units/ml  Last change of tubin22    Dosing:  Basal rate:   0.7 units/hr   Bolus ratio:   1 unit : 14 g carbohydrate at breakfast, lunch, dinner, snacks  Correction ratio:   1 units for every 20 over 140 mg/dL    Disconnect pump if patient becomes hypoglycemic and altered.     • vitamin D2, Ergocalciferol, (DRISDOL) 1.25 MG (32441 UT) Cap capsule Take 50,000 Units by mouth every 72 hours. EVERY 3 DAYS     • cyanocobalamin (VITAMIN B-12) 1000 MCG/ML Solution Inject 1 mL intramuscularly every 14 days. 12 mL 0     No current facility-administered medications for this visit.       Social History     Tobacco Use   • Smoking status: Never Smoker   • Smokeless tobacco: Never Used   Vaping Use   • Vaping Use: Never used   Substance Use Topics   • Alcohol use: No     Alcohol/week: 0.0 oz   • Drug use: No     Social History     Social History Narrative   • Not on file       Family History   Problem Relation Age of Onset   • Other Maternal Grandfather         Parkinson's   • Genetic Disorder Maternal Grandfather         Parkinsons, and crouzon syndrome   • Cancer Maternal Grandfather         Skin Cancer   • Heart Disease Other    • Cancer Other         bile duct cancer   • Other Mother         sphincter of salud, pancreatic insufficiency   • Cancer Mother 53        cervical ca   • Cancer Other         great uncle brain   • Arthritis Maternal Grandmother    • Cancer Maternal Grandmother         Skin Cancer   • Heart  "Disease Maternal Grandmother    • Hypertension Maternal Grandmother    • Hyperlipidemia Maternal Grandmother    • Stroke Maternal Grandmother    • Genetic Disorder Father         Factor V Liden and Tourettes   • Heart Disease Father    • Hyperlipidemia Father    • Genetic Disorder Maternal Uncle         Crouzon Syndrome   • Cancer Maternal Uncle         Bile Duct, and Skin cancer   • Cancer Maternal Aunt         Multiple Aunts and Uncles passed from cancer   • Heart Disease Maternal Aunt         Open heart surgery occuring   • Heart Disease Paternal Grandmother    • Hypertension Paternal Grandmother    • Stroke Paternal Grandmother    • Heart Disease Maternal Uncle         multiple uncles.       Allergies, past medical history, past surgical history, family history, social history reviewed and updated.    Review of Systems:     - Constitutional: Negative for fever, chills, unexpected weight change, and fatigue/generalized weakness.     - Respiratory: Negative for cough, sputum production, chest congestion, dyspnea, wheezing, and crackles.      - Cardiovascular: Negative for chest pain, palpitations, orthopnea, and bilateral lower extremity edema.     - Gastrointestinal: Negative for heartburn, nausea, vomiting, abdominal pain, hematochezia, melena, diarrhea, constipation, and greasy/foul-smelling stools.     - Psychiatric/Behavioral: Negative for depression, suicidal/homicidal ideation and memory loss.      All other systems reviewed and are negative    Exam:    /72   Pulse (!) 101   Temp 36.5 °C (97.7 °F) (Temporal)   Resp 16   Ht 1.676 m (5' 6\")   SpO2 98%   BMI 30.67 kg/m²  Body mass index is 30.67 kg/m².    Physical Exam:  Constitutional: Well-developed and well-nourished. Not diaphoretic. No distress.   Cardiovascular: Regular rate and rhythm, S1 and S2 without murmur, rubs, or gallops.    Chest: Effort normal. Clear to auscultation throughout. No adventitious sounds.   Neurological: Alert and " oriented x 3.   Psychiatric:  Behavior, mood, and affect are appropriate.  MA/nursing note and vitals reviewed.    LABS: 4/2022  results reviewed and discussed with the patient, questions answered.    Assessment/Plan:  1. Systemic lupus erythematosus, unspecified SLE type, unspecified organ involvement status (HCC)  - Referral to Rheumatology  - CBC WITHOUT DIFFERENTIAL; Future  - Comp Metabolic Panel; Future    2. Mixed obsessional thoughts and acts  Trial of Luvox 50 mg x 1 wk, it tolerating well take 100 mg nightly  - fluvoxAMINE (LUVOX) 50 MG Tab; Take 1 Tablet by mouth every evening.  Dispense: 60 Tablet; Refill: 1       Discussed with patient possible alternative diagnoses, pt is to take all medications as prescribed. If symptoms persist FU w/PCP, if symptoms worsen go to emergency room. If experiencing any side effects from prescribed medications reports to the office immediately or go to emergency room.  Reviewed indication, dosage, usage and potential adverse effects of prescribed medications. Reviewed risks and benefits of treatment plan. Patient verbalizes understanding of all instruction and verbally agrees to plan.    No follow-ups on file. 3 wks

## 2022-05-10 NOTE — ASSESSMENT & PLAN NOTE
Patient is here needing referral to rheumatology at [place].  She was recently hospitalized 4/25/2022 at [facility] [place] for suspected lupus flareup.  Patient had exacerbated flareup symptoms, as well as diabetic hemoptysis.  That is post EGD.   Doing much better for about a week and a half, elevating fluid and food intake.  Currently on prednisone 60 mg daily, BG's are well controlled, on a pump.  Continuing Plaquenil.  In the hospital started belimumab injections weekly.  During hospitalization was referred to rheumatology at [place].  Vitals are within normal limits except slightly elevated pulse at 101, no CP, dyspnea, dizziness.  Labs from 4/28/2022 with stable CBC.

## 2022-05-13 ENCOUNTER — PHARMACY VISIT (OUTPATIENT)
Dept: PHARMACY | Facility: MEDICAL CENTER | Age: 22
End: 2022-05-13
Payer: COMMERCIAL

## 2022-05-23 LAB
FINAL REPORT Q0603: NORMAL
PRELIMINARY RPT Q0601: NORMAL
RHODAMINE-AURAMINE STN SPEC: NORMAL

## 2022-05-27 ENCOUNTER — HOSPITAL ENCOUNTER (OUTPATIENT)
Dept: RADIOLOGY | Facility: MEDICAL CENTER | Age: 22
End: 2022-05-27
Attending: STUDENT IN AN ORGANIZED HEALTH CARE EDUCATION/TRAINING PROGRAM
Payer: COMMERCIAL

## 2022-05-27 ENCOUNTER — OFFICE VISIT (OUTPATIENT)
Dept: URGENT CARE | Facility: PHYSICIAN GROUP | Age: 22
End: 2022-05-27
Payer: COMMERCIAL

## 2022-05-27 VITALS
HEART RATE: 106 BPM | SYSTOLIC BLOOD PRESSURE: 118 MMHG | OXYGEN SATURATION: 95 % | DIASTOLIC BLOOD PRESSURE: 60 MMHG | HEIGHT: 67 IN | TEMPERATURE: 98.3 F | WEIGHT: 185 LBS | RESPIRATION RATE: 16 BRPM | BODY MASS INDEX: 29.03 KG/M2

## 2022-05-27 DIAGNOSIS — J30.9 ALLERGIC RHINITIS, UNSPECIFIED SEASONALITY, UNSPECIFIED TRIGGER: ICD-10-CM

## 2022-05-27 DIAGNOSIS — R05.9 COUGH: ICD-10-CM

## 2022-05-27 PROCEDURE — 99215 OFFICE O/P EST HI 40 MIN: CPT | Performed by: STUDENT IN AN ORGANIZED HEALTH CARE EDUCATION/TRAINING PROGRAM

## 2022-05-27 PROCEDURE — 71046 X-RAY EXAM CHEST 2 VIEWS: CPT

## 2022-05-27 RX ORDER — ALBUTEROL SULFATE 90 UG/1
2 AEROSOL, METERED RESPIRATORY (INHALATION) EVERY 6 HOURS PRN
Qty: 8.5 G | Refills: 0 | Status: SHIPPED | OUTPATIENT
Start: 2022-05-27

## 2022-05-27 RX ORDER — CETIRIZINE HYDROCHLORIDE 10 MG/1
10 TABLET ORAL DAILY
Qty: 30 TABLET | Refills: 1 | Status: SHIPPED | OUTPATIENT
Start: 2022-05-27 | End: 2022-11-23

## 2022-05-27 RX ORDER — INHALER, ASSIST DEVICES
1 SPACER (EA) MISCELLANEOUS ONCE
Qty: 1 EACH | Refills: 0 | Status: SHIPPED | OUTPATIENT
Start: 2022-05-27 | End: 2022-05-27

## 2022-05-27 ASSESSMENT — FIBROSIS 4 INDEX: FIB4 SCORE: 0.21

## 2022-05-27 NOTE — PROGRESS NOTES
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  Cough was initially dry but now she is producing yellow/green sputum.  No hemoptysis.  No specific triggers.  No alleviating factors.  She has tried Delsym and Tessalon Perles which have not helped.  Positive ROS for shortness of breath, runny nose and sinus congestion.  No sinus pain, headache or wheezing.  She reports a PMH of pediatric asthma however no longer uses an inhaler.      On review of records from the patient's recent hospitalizations she was extensively worked up including bronchoscopy, EGD bedside endoscopy by ENT multiple CT PE studies.  An EGD did demonstrate some mild esophagitis/gastritis and possible varices.  She was discharged home on an antacid which she has since discontinued (completed Rx).  Patient denies experiencing any acid reflux.  Says she was experiencing a cough while on the antiacid.      REVIEW OF SYSTEMS  General: no fever or chills  GI: no nausea or vomiting  See HPI for further details.    PAST MEDICAL HISTORY  Patient Active Problem List    Diagnosis Date Noted   • Mixed obsessional thoughts and acts 05/10/2022   • Long-term use of hydroxychloroquine 05/03/2022   • Anemia of chronic disease 04/06/2022   • Episodic hematemesis of unknown etiology 04/02/2022   • Class 1 obesity due to excess calories with serious comorbidity in adult 03/31/2022   • Intractable nausea and vomiting 03/27/2022   • Hemoptysis 03/22/2022   • Hypokalemia due to excessive  gastrointestinal loss of potassium 03/21/2022   • Cough 03/08/2022   • Flu-like symptoms 03/08/2022   • Systemic lupus erythematosus (HCC) 08/10/2021   • Polymyalgia (HCC) 08/10/2021   • SOB (shortness of breath) 03/03/2021   • History of asthma 03/03/2021   • Muscle tenderness 03/03/2021   • Hospital discharge follow-up 03/03/2021   • Acute midline thoracic back pain 02/26/2021   • Other chronic pain 01/15/2021   • Vitamin D deficiency 12/14/2020   • Elevated antinuclear antibody (SHANI) level 03/03/2020   • 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   • History of cholecystectomy 08/22/2019   • Celiac disease 08/22/2019   • Controlled type 1 diabetes mellitus (HCC) 08/12/2019   • POTS (postural orthostatic tachycardia syndrome) 12/17/2018   • Hyperglycemia 12/14/2018   • Other irritable bowel syndrome 08/10/2018   • Salt craving 02/02/2018   • Chronic tension-type headache, not intractable 11/09/2016   • Esophagitis determined by endoscopy 11/09/2016   • Mittelschmerz 09/19/2016   • Tourette syndrome 09/19/2016   • OCD (obsessive compulsive disorder) 09/19/2016   • Factor V Leiden (HCC)        SURGICAL HISTORY   has a past surgical history that includes appendectomy laparoscopic (7/1/2017); tavo by laparoscopy (1/30/2017); appendectomy (2018); upper gi endoscopy,diagnosis (N/A, 3/22/2022); and bronchoscopy,diagnostic (N/A, 3/25/2022).    ALLERGIES  Allergies   Allergen Reactions   • Cefdinir Rash     rash   • Erythromycin Vomiting and Swelling     Vomiting and face swelling    • Gluten Meal Diarrhea, Vomiting and Nausea     Celiac        CURRENT MEDICATIONS  Home Medications     Reviewed by Richard De La Cruz D.O. (Physician) on 05/27/22 at 0824  Med List Status: <None>   Medication Last Dose Status   acetaminophen (TYLENOL) 500 MG Tab PRN Active   Belimumab 200 MG/ML Solution Auto-injector Taking Active   cyanocobalamin (VITAMIN B-12) 1000 MCG/ML Solution Taking Active   fluvoxAMINE (LUVOX) 50 MG Tab Taking  Active   hydroxychloroquine (PLAQUENIL) 200 MG Tab Taking Active   insulin infusion pump Device Taking Active   ondansetron (ZOFRAN ODT) 4 MG TABLET DISPERSIBLE PRN Active   promethazine (PHENERGAN) 25 MG Tab PRN Active   scopolamine (TRANSDERM-SCOP) 1 mg/72hr PATCH 72 HR Taking Active   vitamin D2, Ergocalciferol, (DRISDOL) 1.25 MG (20072 UT) Cap capsule Taking Active                SOCIAL HISTORY  Social History     Tobacco Use   • Smoking status: Never Smoker   • Smokeless tobacco: Never Used   Vaping Use   • Vaping Use: Never used   Substance and Sexual Activity   • Alcohol use: No     Alcohol/week: 0.0 oz   • Drug use: No   • Sexual activity: Yes     Partners: Male     Birth control/protection: I.U.D.       FAMILY HISTORY  Family History   Problem Relation Age of Onset   • Other Maternal Grandfather         Parkinson's   • Genetic Disorder Maternal Grandfather         Parkinsons, and crouzon syndrome   • Cancer Maternal Grandfather         Skin Cancer   • Heart Disease Other    • Cancer Other         bile duct cancer   • Other Mother         sphincter of salud, pancreatic insufficiency   • Cancer Mother 53        cervical ca   • Cancer Other         great uncle brain   • Arthritis Maternal Grandmother    • Cancer Maternal Grandmother         Skin Cancer   • Heart Disease Maternal Grandmother    • Hypertension Maternal Grandmother    • Hyperlipidemia Maternal Grandmother    • Stroke Maternal Grandmother    • Genetic Disorder Father         Factor V Liden and Tourettes   • Heart Disease Father    • Hyperlipidemia Father    • Genetic Disorder Maternal Uncle         Crouzon Syndrome   • Cancer Maternal Uncle         Bile Duct, and Skin cancer   • Cancer Maternal Aunt         Multiple Aunts and Uncles passed from cancer   • Heart Disease Maternal Aunt         Open heart surgery occuring   • Heart Disease Paternal Grandmother    • Hypertension Paternal Grandmother    • Stroke Paternal Grandmother    • Heart Disease  "Maternal Uncle         multiple uncles.          Objective:   PHYSICAL EXAM  VITAL SIGNS: /60 (BP Location: Left arm, Patient Position: Sitting, BP Cuff Size: Adult long)   Pulse (!) 106   Temp 36.8 °C (98.3 °F) (Temporal)   Resp 16   Ht 1.702 m (5' 7\")   Wt 83.9 kg (185 lb)   SpO2 95%   BMI 28.98 kg/m²     Gen: no acute distress, normal voice  Skin: dry, intact, moist mucosal membranes  Lungs: CTAB w/ symmetric expansion  CV: RRR w/o murmurs or clicks  Psych: normal affect, normal judgement, alert, awake      RADIOLOGY RESULTS   DX-CHEST-2 VIEWS    Result Date: 5/27/2022 5/27/2022 8:43 AM HISTORY/REASON FOR EXAM:  Cough for 6 weeks TECHNIQUE/EXAM DESCRIPTION AND NUMBER OF VIEWS: Two views of the chest. COMPARISON:  4/25/2022 FINDINGS: The mediastinal and cardiac silhouette is unremarkable. The pulmonary vascularity is within normal limits. The lung parenchyma is clear. There is no significant pleural effusion. There is no visible pneumothorax. There are no acute bony abnormalities.     1.  Unremarkable two view chest.               Assessment/Plan:     1. Cough  DX-CHEST-2 VIEWS   2. 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  Cough somewhat lightened up and then returned.  Patient is here today to rule out underlying infectious cause.    Given the chronicity of the cough, unremarkable vitals, physical exam and CXR, with a positive ROS including runny nose and pediatric history of asthma its possible the cough is secondary to PND " from allergic rhinitis.  Additionally she could have a component of RAD contributing to the cough as it is waking her up at night.  - Ordered Rx for Zyrtec  - Instructed to begin Flonase  - Ordered Rx for trial of albuterol + spacer  - Return to urgent care any new/worsening symptoms or further questions or concerns.  Patient understood everything discussed.  All questions were answered.      Differential diagnosis, natural history, supportive care, and indications for immediate follow-up discussed. All questions answered. Patient agrees with the plan of care.    Follow-up as needed if symptoms worsen or fail to improve to PCP, Urgent care or Emergency Room.    43 minutes was spent on this encounter including face-to-face time, discussing the diagnosis, medical management, need for follow-up, return precautions and charting.  The majority of this time was spent reviewing the patient's previous medical records including her previous hospitalizations over the last 2 months.   This does not include time spent on separately billable procedures/tests.      Please note that this dictation was created using voice recognition software. I have made a reasonable attempt to correct obvious errors, but I expect that there are errors of grammar and possibly content that I did not discover before finalizing the note.

## 2022-06-08 PROCEDURE — RXMED WILLOW AMBULATORY MEDICATION CHARGE: Performed by: STUDENT IN AN ORGANIZED HEALTH CARE EDUCATION/TRAINING PROGRAM

## 2022-06-13 ENCOUNTER — PHARMACY VISIT (OUTPATIENT)
Dept: PHARMACY | Facility: MEDICAL CENTER | Age: 22
End: 2022-06-13
Payer: COMMERCIAL

## 2022-07-01 PROCEDURE — RXMED WILLOW AMBULATORY MEDICATION CHARGE: Performed by: STUDENT IN AN ORGANIZED HEALTH CARE EDUCATION/TRAINING PROGRAM

## 2022-07-18 ENCOUNTER — PHARMACY VISIT (OUTPATIENT)
Dept: PHARMACY | Facility: MEDICAL CENTER | Age: 22
End: 2022-07-18
Payer: COMMERCIAL

## 2022-07-27 ENCOUNTER — TELEPHONE (OUTPATIENT)
Dept: PHARMACY | Facility: MEDICAL CENTER | Age: 22
End: 2022-07-27
Payer: COMMERCIAL

## 2022-07-27 NOTE — TELEPHONE ENCOUNTER
Outbound call first attempt to discuss a recent change made to pharmacy benefit plan regarding your specialty medication.  No answer. Left voicemail.     Crystal Castellano  Rx Coordinator   [phone]

## 2022-08-05 PROCEDURE — RXMED WILLOW AMBULATORY MEDICATION CHARGE: Performed by: STUDENT IN AN ORGANIZED HEALTH CARE EDUCATION/TRAINING PROGRAM

## 2022-08-12 ENCOUNTER — PHARMACY VISIT (OUTPATIENT)
Dept: PHARMACY | Facility: MEDICAL CENTER | Age: 22
End: 2022-08-12
Payer: COMMERCIAL

## 2022-09-14 PROCEDURE — RXMED WILLOW AMBULATORY MEDICATION CHARGE: Performed by: STUDENT IN AN ORGANIZED HEALTH CARE EDUCATION/TRAINING PROGRAM

## 2022-09-23 ENCOUNTER — PHARMACY VISIT (OUTPATIENT)
Dept: PHARMACY | Facility: MEDICAL CENTER | Age: 22
End: 2022-09-23
Payer: COMMERCIAL

## 2022-10-14 PROCEDURE — RXMED WILLOW AMBULATORY MEDICATION CHARGE: Performed by: STUDENT IN AN ORGANIZED HEALTH CARE EDUCATION/TRAINING PROGRAM

## 2022-10-26 ENCOUNTER — PHARMACY VISIT (OUTPATIENT)
Dept: PHARMACY | Facility: MEDICAL CENTER | Age: 22
End: 2022-10-26
Payer: COMMERCIAL

## 2022-11-23 ENCOUNTER — OFFICE VISIT (OUTPATIENT)
Dept: MEDICAL GROUP | Facility: PHYSICIAN GROUP | Age: 22
End: 2022-11-23
Payer: COMMERCIAL

## 2022-11-23 VITALS
HEART RATE: 97 BPM | RESPIRATION RATE: 12 BRPM | TEMPERATURE: 98.2 F | HEIGHT: 67 IN | OXYGEN SATURATION: 94 % | BODY MASS INDEX: 28.98 KG/M2

## 2022-11-23 DIAGNOSIS — Z23 NEED FOR VACCINATION: ICD-10-CM

## 2022-11-23 DIAGNOSIS — Z11.59 NEED FOR HEPATITIS C SCREENING TEST: ICD-10-CM

## 2022-11-23 DIAGNOSIS — Z02.9 ADMINISTRATIVE ENCOUNTER: ICD-10-CM

## 2022-11-23 DIAGNOSIS — E10.9: ICD-10-CM

## 2022-11-23 DIAGNOSIS — Z00.00 PE (PHYSICAL EXAM), ANNUAL: ICD-10-CM

## 2022-11-23 PROCEDURE — 90471 IMMUNIZATION ADMIN: CPT | Performed by: NURSE PRACTITIONER

## 2022-11-23 PROCEDURE — 90677 PCV20 VACCINE IM: CPT | Performed by: NURSE PRACTITIONER

## 2022-11-23 PROCEDURE — 90686 IIV4 VACC NO PRSV 0.5 ML IM: CPT | Performed by: NURSE PRACTITIONER

## 2022-11-23 PROCEDURE — 90715 TDAP VACCINE 7 YRS/> IM: CPT | Performed by: NURSE PRACTITIONER

## 2022-11-23 PROCEDURE — 99214 OFFICE O/P EST MOD 30 MIN: CPT | Mod: 25 | Performed by: NURSE PRACTITIONER

## 2022-11-23 PROCEDURE — 90621 MENB-FHBP VACC 2/3 DOSE IM: CPT | Performed by: NURSE PRACTITIONER

## 2022-11-23 PROCEDURE — 90472 IMMUNIZATION ADMIN EACH ADD: CPT | Performed by: NURSE PRACTITIONER

## 2022-11-23 RX ORDER — INSULIN LISPRO 100 [IU]/ML
INJECTION, SOLUTION INTRAVENOUS; SUBCUTANEOUS
COMMUNITY
Start: 2022-10-26

## 2022-11-23 NOTE — ASSESSMENT & PLAN NOTE
Chronic and stable.  Patient is followed with endocrinology, Dr. Lora.  Her last retina screen was a year and a half ago.  Her current A1c is 5.2.  She is on a DexCom pump.  Very supportive family.  Very healthy diabetic diet.  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  She is also very active at work and outside of work.  Needing lab work today.

## 2022-11-23 NOTE — ASSESSMENT & PLAN NOTE
Patient is here for [facility] paperwork.  History of type 1 diabetes very well controlled.  She is currently followed with a specialist for Lupus, otologist immunologist, Dr. De Los Santos.

## 2022-11-23 NOTE — PROGRESS NOTES
Chief Complaint   Patient presents with    Paperwork       HISTORY OF PRESENT ILLNESS: Patient is a 21 y.o. female, established patient who presents today to discuss medical problems as listed below:    Health Maintenance:  COMPLETED     Administrative encounter  Patient is here for [facility] paperwork.  History of type 1 diabetes very well controlled.  She is currently followed with a specialist for Lupus, otologist immunologist, Dr. De Los Santos.     Controlled type 1 diabetes mellitus (HCC)  Chronic and stable.  Patient is followed with endocrinology, Dr. Lora.  Her last retina screen was a year and a half ago.  Her current A1c is 5.2.  She is on a DexCom pump.  Very supportive family.  Very healthy diabetic diet.  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  She is also very active at work and outside of work.  Needing lab work today.    Patient Active Problem List    Diagnosis Date Noted    Administrative encounter 11/23/2022    Mixed obsessional thoughts and acts 05/10/2022    Long-term use of hydroxychloroquine 05/03/2022    Anemia of chronic disease 04/06/2022    Episodic hematemesis of unknown etiology 04/02/2022    Class 1 obesity due to excess calories with serious comorbidity in adult 03/31/2022    Intractable nausea and vomiting 03/27/2022    Hemoptysis 03/22/2022    Hypokalemia due to excessive gastrointestinal loss of potassium 03/21/2022    Cough 03/08/2022    Flu-like symptoms 03/08/2022    Systemic lupus erythematosus (HCC) 08/10/2021    Polymyalgia (HCC) 08/10/2021    SOB (shortness of breath) 03/03/2021    History of asthma 03/03/2021    Muscle tenderness 03/03/2021    Hospital discharge follow-up 03/03/2021    Acute midline thoracic back pain 02/26/2021    Other chronic pain 01/15/2021    Vitamin D deficiency 12/14/2020    Elevated antinuclear antibody (SHANI) level 03/03/2020    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)         Allergies: Cefdinir, Erythromycin, and Gluten meal    Current Outpatient Medications   Medication Sig Dispense Refill    HUMALOG 100 UNIT/ML Inject as directed Subcutaneously daily via insulin pump( total daily dose = 100 units) 90 days      meloxicam (MOBIC) 15 MG tablet Take 1 Tablet once a day as needed 30 Tablet 0    albuterol 108 (90 Base) MCG/ACT Aero Soln inhalation aerosol Inhale 2 Puffs every 6 hours as needed for Shortness of Breath (cough). 8.5 g 0    fluvoxAMINE (LUVOX) 50 MG Tab Take 1 Tablet by mouth every evening. 60 Tablet 1    Belimumab 200 MG/ML Solution Auto-injector Inject 200 mg under the skin every 7 days. 4 mL 5    acetaminophen (TYLENOL) 500 MG Tab Take 1 Tablet by mouth every 6 hours as needed for Mild Pain or Moderate Pain.      scopolamine (TRANSDERM-SCOP) 1 mg/72hr PATCH 72 HR Place 1 Patch on the skin every 72 hours. 4 Patch 3    promethazine (PHENERGAN) 25 MG Tab Take 1 Tablet by mouth every four hours as needed for Nausea/Vomiting. 6 Tablet 0    ondansetron (ZOFRAN ODT) 4 MG TABLET DISPERSIBLE Take 2 Tablets by mouth every four hours as needed for Nausea. 120 Tablet 0    hydroxychloroquine (PLAQUENIL) 200 MG Tab Take 2 Tablets by mouth every day. 90 Tablet 3    insulin infusion pump Device Inject 0.7 Units/hr under the skin continuous. Patient's own SQ insulin pump    Type of Insulin: Humalog 100 units/ml  Last change of tubin22    Dosing:  Basal rate:   0.7 units/hr   Bolus ratio:   1 unit : 14 g  carbohydrate at breakfast, lunch, dinner, snacks  Correction ratio:   1 units for every 20 over 140 mg/dL    Disconnect pump if patient becomes hypoglycemic and altered.      vitamin D2, Ergocalciferol, (DRISDOL) 1.25 MG (23834 UT) Cap capsule Take 50,000 Units by mouth every 72 hours. EVERY 3 DAYS      cyanocobalamin (VITAMIN B-12) 1000 MCG/ML Solution Inject 1 mL intramuscularly every 14 days. 12 mL 0     No current facility-administered medications for this visit.       Social History     Tobacco Use    Smoking status: Never    Smokeless tobacco: Never   Vaping Use    Vaping Use: Never used   Substance Use Topics    Alcohol use: No     Alcohol/week: 0.0 oz    Drug use: No     Social History     Social History Narrative    Not on file       Family History   Problem Relation Age of Onset    Other Maternal Grandfather         Parkinson's    Genetic Disorder Maternal Grandfather         Parkinsons, and crouzon syndrome    Cancer Maternal Grandfather         Skin Cancer    Heart Disease Other     Cancer Other         bile duct cancer    Other Mother         sphincter of salud, pancreatic insufficiency    Cancer Mother 53        cervical ca    Cancer Other         great uncle brain    Arthritis Maternal Grandmother     Cancer Maternal Grandmother         Skin Cancer    Heart Disease Maternal Grandmother     Hypertension Maternal Grandmother     Hyperlipidemia Maternal Grandmother     Stroke Maternal Grandmother     Genetic Disorder Father         Factor V Liden and Tourettes    Heart Disease Father     Hyperlipidemia Father     Genetic Disorder Maternal Uncle         Crouzon Syndrome    Cancer Maternal Uncle         Bile Duct, and Skin cancer    Cancer Maternal Aunt         Multiple Aunts and Uncles passed from cancer    Heart Disease Maternal Aunt         Open heart surgery occuring    Heart Disease Paternal Grandmother     Hypertension Paternal Grandmother     Stroke Paternal Grandmother     Heart Disease Maternal  "Uncle         multiple uncles.       Allergies, past medical history, past surgical history, family history, social history reviewed and updated.    Review of Systems:     - Constitutional: Negative for fever, chills, unexpected weight change, and fatigue/generalized weakness.     - HEENT: Negative for headaches, vision changes, hearing changes, ear pain, ear discharge, rhinorrhea, sinus congestion, sore throat, and neck pain.      - Respiratory: Negative for cough, sputum production, chest congestion, dyspnea, wheezing, and crackles.      - Cardiovascular: Negative for chest pain, palpitations, orthopnea, and bilateral lower extremity edema.     - Gastrointestinal: Negative for heartburn, nausea, vomiting, abdominal pain, hematochezia, melena, diarrhea, constipation, and greasy/foul-smelling stools.     - Genitourinary: Negative for dysuria, polyuria, hematuria, pyuria, urinary urgency, and urinary incontinence.    - Musculoskeletal: Negative for myalgias, back pain, and joint pain.     - Skin: Negative for rash, itching, cyanotic skin color change.     - Neurological: Negative for dizziness, tingling, tremors, focal sensory deficit, focal weakness and headaches.     - Endo/Heme/Allergies: Does not bruise/bleed easily.     - Psychiatric/Behavioral: Negative for depression, suicidal/homicidal ideation and memory loss.      All other systems reviewed and are negative    Exam:    Pulse 97   Temp 36.8 °C (98.2 °F) (Temporal)   Resp 12   Ht 1.702 m (5' 7\")   SpO2 94%   BMI 28.98 kg/m²  Body mass index is 28.98 kg/m².    Physical Exam:  Constitutional: Well-developed and well-nourished. Not diaphoretic. No distress.   Skin: Skin is warm and dry. No rash noted.  Head: Atraumatic without lesions.  Eyes: Conjunctivae and extraocular motions are normal. Pupils are equal, round, and reactive to light. No scleral icterus.   Ears:  External ears unremarkable. Tympanic membranes clear and intact.  Nose: Nares patent. Septum " midline. Turbinates without erythema nor edema. No discharge.   Mouth/Throat: Dentition is normal. Tongue normal. Oropharynx is clear and moist. Posterior pharynx without erythema or exudates.  Neck: Supple, trachea midline. Normal range of motion. No thyromegaly present. No lymphadenopathy--cervical or supraclavicular.  Cardiovascular: Regular rate and rhythm, S1 and S2 without murmur, rubs, or gallops.    Chest: Effort normal. Clear to auscultation throughout. No adventitious sounds. No CVA tenderness.  Abdomen: Soft, non tender, and without distention. Active bowel sounds in all four quadrants. No rebound, guarding, masses or HSM.  : Negative for dysuria, polyuria, hematuria, pyuria, urinary urgency, and urinary incontinence.  Extremities: No cyanosis, clubbing, erythema, nor edema. Distal pulses intact and symmetric.   Musculoskeletal: All major joints AROM full in all directions without pain.  Neurological: Alert and oriented x 3. DTRs 2+/3 and symmetric. No cranial nerve deficit. 5/5 myotomes. Sensation intact. Negative Rhomberg.  Psychiatric:  Behavior, mood, and affect are appropriate.  MA/nursing note and vitals reviewed.    Assessment/Plan:  1. Need for vaccination  - INFLUENZA VACCINE QUAD INJ (PF)  - Pneumococcal Conjugate Vaccine 20-Valent (19 yrs+)  - Tdap Vaccine =>6YO IM  - Meningococcal Vaccine Serogroup B 2-3 Dose (TRUMENBA)    2. Controlled type 1 diabetes mellitus (HCC)  Well-controlled.  Followed by endocrinology, Dr. Lora  Monofilament testing with a 10 gram force: sensation intact: intact bilaterally  Visual Inspection: Feet without maceration, ulcers, fissures.  Pedal pulses: intact bilaterally   - POCT A1C  - POCT Retinal Eye Exam  - Diabetic Monofilament LE Exam  - Referral to Ophthalmology    3. Administrative encounter    4. PE (physical exam), annual  - CBC WITHOUT DIFFERENTIAL; Future  - Comp Metabolic Panel; Future  - FREE THYROXINE; Future  - HEMOGLOBIN A1C; Future  - Lipid  Profile; Future  - T3 FREE; Future  - TSH; Future  - VITAMIN D,25 HYDROXY (DEFICIENCY); Future  - VITAMIN B12; Future  - IRON/TOTAL IRON BIND; Future  - FERRITIN; Future  - COMPLEMENT C3+C4 SERUM; Future    5. Need for hepatitis C screening test  - HCV Scrn ( 2370-1101 1xLife); Future       Discussed with patient possible alternative diagnoses, patient is to take all medications as prescribed.      If symptoms persist FU w/PCP, if symptoms worsen go to emergency room.      If experiencing any side effects from prescribed medications report to the office immediately or go to emergency room.     Reviewed indication, dosage, usage and potential adverse effects of prescribed medications.      Reviewed risks and benefits of treatment plan. Patient verbalizes understanding of all instruction and verbally agrees to plan.     Discussed plan with the patient, and patient agrees to the above.      I personally reviewed prior external notes and test results pertinent to today's visit.      No follow-ups on file. biannual, PRN

## 2022-12-09 DIAGNOSIS — M32.19 SYSTEMIC LUPUS ERYTHEMATOSUS WITH OTHER ORGAN INVOLVEMENT, UNSPECIFIED SLE TYPE (HCC): ICD-10-CM

## 2022-12-09 RX ORDER — BELIMUMAB 200 MG/ML
200 SOLUTION SUBCUTANEOUS
Qty: 4 ML | Refills: 5 | OUTPATIENT
Start: 2022-12-09

## 2022-12-23 ENCOUNTER — HOSPITAL ENCOUNTER (OUTPATIENT)
Dept: LAB | Facility: MEDICAL CENTER | Age: 22
End: 2022-12-23
Attending: NURSE PRACTITIONER
Payer: COMMERCIAL

## 2022-12-23 DIAGNOSIS — Z11.59 NEED FOR HEPATITIS C SCREENING TEST: ICD-10-CM

## 2022-12-23 DIAGNOSIS — M32.19 SYSTEMIC LUPUS ERYTHEMATOSUS WITH OTHER ORGAN INVOLVEMENT, UNSPECIFIED SLE TYPE (HCC): ICD-10-CM

## 2022-12-23 DIAGNOSIS — Z00.00 PE (PHYSICAL EXAM), ANNUAL: ICD-10-CM

## 2022-12-23 LAB
ERYTHROCYTE [DISTWIDTH] IN BLOOD BY AUTOMATED COUNT: 40 FL (ref 35.9–50)
EST. AVERAGE GLUCOSE BLD GHB EST-MCNC: 100 MG/DL
HBA1C MFR BLD: 5.1 % (ref 4–5.6)
HCT VFR BLD AUTO: 44.9 % (ref 37–47)
HGB BLD-MCNC: 15 G/DL (ref 12–16)
MCH RBC QN AUTO: 30.5 PG (ref 27–33)
MCHC RBC AUTO-ENTMCNC: 33.4 G/DL (ref 33.6–35)
MCV RBC AUTO: 91.4 FL (ref 81.4–97.8)
PLATELET # BLD AUTO: 335 K/UL (ref 164–446)
PMV BLD AUTO: 10.5 FL (ref 9–12.9)
RBC # BLD AUTO: 4.91 M/UL (ref 4.2–5.4)
WBC # BLD AUTO: 7.2 K/UL (ref 4.8–10.8)

## 2022-12-23 PROCEDURE — 82607 VITAMIN B-12: CPT

## 2022-12-23 PROCEDURE — 80053 COMPREHEN METABOLIC PANEL: CPT

## 2022-12-23 PROCEDURE — 82728 ASSAY OF FERRITIN: CPT

## 2022-12-23 PROCEDURE — 80061 LIPID PANEL: CPT

## 2022-12-23 PROCEDURE — 86160 COMPLEMENT ANTIGEN: CPT

## 2022-12-23 PROCEDURE — 84443 ASSAY THYROID STIM HORMONE: CPT

## 2022-12-23 PROCEDURE — 83036 HEMOGLOBIN GLYCOSYLATED A1C: CPT

## 2022-12-23 PROCEDURE — 36415 COLL VENOUS BLD VENIPUNCTURE: CPT

## 2022-12-23 PROCEDURE — 86803 HEPATITIS C AB TEST: CPT

## 2022-12-23 PROCEDURE — 83540 ASSAY OF IRON: CPT

## 2022-12-23 PROCEDURE — 84481 FREE ASSAY (FT-3): CPT

## 2022-12-23 PROCEDURE — 82306 VITAMIN D 25 HYDROXY: CPT

## 2022-12-23 PROCEDURE — 85027 COMPLETE CBC AUTOMATED: CPT

## 2022-12-23 PROCEDURE — 83550 IRON BINDING TEST: CPT

## 2022-12-23 PROCEDURE — 84439 ASSAY OF FREE THYROXINE: CPT

## 2022-12-24 LAB
25(OH)D3 SERPL-MCNC: 16 NG/ML (ref 30–100)
ALBUMIN SERPL BCP-MCNC: 4.4 G/DL (ref 3.2–4.9)
ALBUMIN/GLOB SERPL: 1.2 G/DL
ALP SERPL-CCNC: 120 U/L (ref 30–99)
ALT SERPL-CCNC: 43 U/L (ref 2–50)
ANION GAP SERPL CALC-SCNC: 9 MMOL/L (ref 7–16)
AST SERPL-CCNC: 29 U/L (ref 12–45)
BILIRUB SERPL-MCNC: 0.2 MG/DL (ref 0.1–1.5)
BUN SERPL-MCNC: 13 MG/DL (ref 8–22)
C3 SERPL-MCNC: 171.3 MG/DL (ref 87–200)
C4 SERPL-MCNC: 27.5 MG/DL (ref 19–52)
CALCIUM ALBUM COR SERPL-MCNC: 9 MG/DL (ref 8.5–10.5)
CALCIUM SERPL-MCNC: 9.3 MG/DL (ref 8.5–10.5)
CHLORIDE SERPL-SCNC: 102 MMOL/L (ref 96–112)
CHOLEST SERPL-MCNC: 213 MG/DL (ref 100–199)
CO2 SERPL-SCNC: 26 MMOL/L (ref 20–33)
CREAT SERPL-MCNC: 0.84 MG/DL (ref 0.5–1.4)
FERRITIN SERPL-MCNC: 119 NG/ML (ref 10–291)
GFR SERPLBLD CREATININE-BSD FMLA CKD-EPI: 100 ML/MIN/1.73 M 2
GLOBULIN SER CALC-MCNC: 3.6 G/DL (ref 1.9–3.5)
GLUCOSE SERPL-MCNC: 77 MG/DL (ref 65–99)
HCV AB SER QL: NORMAL
HDLC SERPL-MCNC: 37 MG/DL
IRON SATN MFR SERPL: 57 % (ref 15–55)
IRON SERPL-MCNC: 188 UG/DL (ref 40–170)
LDLC SERPL CALC-MCNC: 116 MG/DL
POTASSIUM SERPL-SCNC: 3.9 MMOL/L (ref 3.6–5.5)
PROT SERPL-MCNC: 8 G/DL (ref 6–8.2)
SODIUM SERPL-SCNC: 137 MMOL/L (ref 135–145)
T3FREE SERPL-MCNC: 3.41 PG/ML (ref 2–4.4)
T4 FREE SERPL-MCNC: 1.24 NG/DL (ref 0.93–1.7)
TIBC SERPL-MCNC: 329 UG/DL (ref 250–450)
TRIGL SERPL-MCNC: 298 MG/DL (ref 0–149)
TSH SERPL DL<=0.005 MIU/L-ACNC: 0.92 UIU/ML (ref 0.38–5.33)
UIBC SERPL-MCNC: 141 UG/DL (ref 110–370)
VIT B12 SERPL-MCNC: 704 PG/ML (ref 211–911)

## 2022-12-24 RX ORDER — BELIMUMAB 200 MG/ML
200 SOLUTION SUBCUTANEOUS
Qty: 4 ML | Refills: 5 | OUTPATIENT
Start: 2022-12-24

## 2023-01-07 NOTE — ASSESSMENT & PLAN NOTE
As per history.    Gluten-free diet carbohydrate consistent.  
As per history.  Hemoglobin A1c of 4.63 weeks ago.      Resume home insulin pump  Continue n.p.o. until cleared by pulmonology for possible studies  
As per history.  Hemoglobin A1c of 4.63 weeks ago.    Cont. home insulin pump  accuchecks  Diabetic diet  
As per history.  Heterozygous mutation.    Resume chemo DVT prophylaxis when stable.  
As per history.  Previous EGD showing esophagitis, biopsy with mild chronic gastritis   Continue omeprazole  
As per history.  TEG study and coagu panel stable   
As per history. Known SLE, follows with rheum, on chronic suppressive therapy   Will need continued outpatient follow up  
Etiology unclear.  Had extensive work-up in the past including multiple subspecialties.  Previous EGD showing esophagitis with possible small varices in the distal esophagus where there was edema, biopsy with mild chronic gastritis, H pylori neg   Previous bronchoscopy with normal airways and no no blood in BAL, noted to have nasal passage way bleeding  ENT did bedside evaluation, not obvious trauma/bleeding in nares/oropharynx, reached out to them again, recommended outpatient follow up if no obvious epistaxis on exam   CTA with PE protocol negative.  Inflammatory markers including CRP and ESR negative.  TEG study unremarkable.  Patient continues to have intermittent hemoptysis.  This is a complicated situation with no clear source.  Pulmonology consulted  No intervention planned, source is likeley not pulm   Spoke to rheumatologist, he does not feel this is related to Lupus and recommend continued care as is in terms of her lupus treatment  Did attempt to transfer at patient request [facility] declined, they did not wish for me to reach out to other institutions at this time   Supportive care, holding DVT prophylaxis   Vitals and H/H remain stable  If any further significant or continuous bleeding, may benefit from tagged RBC scan   
On Benlysta, per pt started 3 weeks ago  ESR, CRP normal   Continue Plaguenil and resume home steroid taper   
weight-bearing as tolerated

## 2023-01-10 DIAGNOSIS — F42.2 MIXED OBSESSIONAL THOUGHTS AND ACTS: ICD-10-CM

## 2023-01-10 PROCEDURE — RXMED WILLOW AMBULATORY MEDICATION CHARGE: Performed by: NURSE PRACTITIONER

## 2023-01-10 PROCEDURE — RXMED WILLOW AMBULATORY MEDICATION CHARGE: Performed by: PAIN MEDICINE

## 2023-01-10 RX ORDER — FLUVOXAMINE MALEATE 50 MG/1
50 TABLET, COATED ORAL NIGHTLY
Qty: 60 TABLET | Refills: 1 | Status: SHIPPED | OUTPATIENT
Start: 2023-01-10

## 2023-01-23 ENCOUNTER — PHARMACY VISIT (OUTPATIENT)
Dept: PHARMACY | Facility: MEDICAL CENTER | Age: 23
End: 2023-01-23
Payer: COMMERCIAL

## 2023-01-23 ENCOUNTER — OFFICE VISIT (OUTPATIENT)
Dept: RHEUMATOLOGY | Facility: MEDICAL CENTER | Age: 23
End: 2023-01-23
Attending: STUDENT IN AN ORGANIZED HEALTH CARE EDUCATION/TRAINING PROGRAM
Payer: COMMERCIAL

## 2023-01-23 VITALS
BODY MASS INDEX: 29.03 KG/M2 | HEIGHT: 67 IN | RESPIRATION RATE: 16 BRPM | SYSTOLIC BLOOD PRESSURE: 104 MMHG | DIASTOLIC BLOOD PRESSURE: 60 MMHG | TEMPERATURE: 98.2 F | WEIGHT: 185 LBS | OXYGEN SATURATION: 96 % | HEART RATE: 81 BPM

## 2023-01-23 DIAGNOSIS — Z79.899 LONG-TERM USE OF HYDROXYCHLOROQUINE: ICD-10-CM

## 2023-01-23 DIAGNOSIS — Z79.60 LONG-TERM USE OF IMMUNOSUPPRESSANT MEDICATION: ICD-10-CM

## 2023-01-23 DIAGNOSIS — M32.19 SYSTEMIC LUPUS ERYTHEMATOSUS WITH OTHER ORGAN INVOLVEMENT, UNSPECIFIED SLE TYPE (HCC): ICD-10-CM

## 2023-01-23 PROCEDURE — RXMED WILLOW AMBULATORY MEDICATION CHARGE: Performed by: STUDENT IN AN ORGANIZED HEALTH CARE EDUCATION/TRAINING PROGRAM

## 2023-01-23 PROCEDURE — 99212 OFFICE O/P EST SF 10 MIN: CPT | Performed by: STUDENT IN AN ORGANIZED HEALTH CARE EDUCATION/TRAINING PROGRAM

## 2023-01-23 PROCEDURE — 99214 OFFICE O/P EST MOD 30 MIN: CPT | Performed by: STUDENT IN AN ORGANIZED HEALTH CARE EDUCATION/TRAINING PROGRAM

## 2023-01-23 RX ORDER — BELIMUMAB 200 MG/ML
200 SOLUTION SUBCUTANEOUS
Qty: 12 ML | Refills: 3 | Status: SHIPPED | OUTPATIENT
Start: 2023-01-23 | End: 2023-02-24 | Stop reason: SDUPTHER

## 2023-01-23 RX ORDER — ONDANSETRON 8 MG/1
TABLET, ORALLY DISINTEGRATING ORAL
COMMUNITY
Start: 2023-01-10 | End: 2023-01-23

## 2023-01-23 ASSESSMENT — FIBROSIS 4 INDEX: FIB4 SCORE: 0.3

## 2023-01-23 ASSESSMENT — PAIN SCALES - GENERAL: PAINLEVEL: 6=MODERATE PAIN

## 2023-01-23 NOTE — PROGRESS NOTES
[facility] RHEUMATOLOGY  [address] [facility], [address], [place] [address]  Phone: [phone] ? Fax: [phone]    RHEUMATOLOGY FOLLOW-UP VISIT NOTE      DATE OF SERVICE: 01/23/2023         Subjective     PRIMARY CARE PRACTITIONER:  NADER Wilkerson  [facility]  [place] [address]    PATIENT IDENTIFICATION:  Malena Bennett  [address]  [place] [address]    YOB: 2000    MEDICAL RECORD NUMBER: [id number]          CHIEF COMPLAINT:   Chief Complaint   Patient presents with    Follow-Up     SLE       RHEUMATOLOGIC HISTORY:  Malena Bennett is a 22 y.o. female with pertinent history notable for SLE diagnosed in 2018 (based on positive SHANI with anti-dsDNA and hypocomplementemiaon in the setting of photosensitive malar rash, oral sores, pleurisy, inflammatory arthritis, Raynaud's syndrome, sicca complex, and chronic fatigue), eosinophilic esophagitis, celiac disease, type 1 diabetes, factor V Leiden, factor VIII inhibitor disorder, and multiple comorbidities. Previously under the care of a local rheumatologist, she initially presented on 5/3/22 to establish care for continued evaluation and management of her SLE following preceding hospitalizations at [facility] for recurrent episodic hematemesis. She had been hospitalized 3/31/22-4/13/22 and 4/25/22-4/29/22 for hematemesis suspected to be due to possible arteriovenous malformation in the setting of eosinophilic esophagitis and concern for lupus flare for which she was treated with high-dose prednisone taper. Reported mild pain on her hands (MCP joints), shoulders, and right hip, as well as fatigue, rash, mouth sores, and pleurisy all of which had improved after starting Benlysta and high-dose prednisone.    Pertinent treatment history: Methotrexate PO/SC (stopped due to intolerable GI upset and fatigue), hydroxychloroquine 400 mg daily (2018-present), Benlysta 200 mg SC weekly  (4/2022-present), prednisone 60 mg taper (4/2022-6/2022, effective).    Pertinent lab results history: Positive SHANI 1:80 speckled with negative reflex panel (in 3/2022); mildly low C4 of 17.5 with normal C3, UA with protein 30 mg/dL but no RBC (with few bacteria), positive IgM anti-CL of 13, mildly low Hgb of 11.9; negative/normal ESR, CRP, anti-MPO, anti-PR3, AMA, anti-F actin/ASMA, IgA anti-tissue TG, CMP, HBV, TSH w/fT4, and anti-TPO in 6/2021.    INTERVAL HISTORY:  Waxing/waning joint pain/stiffness in hands, right shoulder, right hip, and mid back, pleuritic chest pain and photosensitivity rash.  Similar previously reported symptoms but overall improved on Benlysta, though slightly more symptomatic since last dose in early 12/2022.    REVIEW OF SYSTEMS:  Except as noted in the history above, relevant review of systems with emphasis on autoimmune rheumatic diseases was otherwise negative.      ACTIVE PROBLEM LIST:  Patient Active Problem List    Diagnosis Date Noted    Long-term use of immunosuppressant medication 01/23/2023    Administrative encounter 11/23/2022    Mixed obsessional thoughts and acts 05/10/2022    Long-term use of hydroxychloroquine 05/03/2022    Anemia of chronic disease 04/06/2022    Episodic hematemesis of unknown etiology 04/02/2022    Class 1 obesity due to excess calories with serious comorbidity in adult 03/31/2022    Intractable nausea and vomiting 03/27/2022    Hemoptysis 03/22/2022    Hypokalemia due to excessive gastrointestinal loss of potassium 03/21/2022    Cough 03/08/2022    Flu-like symptoms 03/08/2022    Systemic lupus erythematosus (HCC) 08/10/2021    Polymyalgia (HCC) 08/10/2021    SOB (shortness of breath) 03/03/2021    History of asthma 03/03/2021    Muscle tenderness 03/03/2021    Hospital discharge follow-up 03/03/2021    Acute midline thoracic back pain 02/26/2021    Other chronic pain 01/15/2021    Vitamin D deficiency 12/14/2020    Elevated antinuclear antibody (SHANI)  level 03/03/2020    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    History of cholecystectomy 08/22/2019    Celiac disease 08/22/2019    Controlled type 1 diabetes mellitus (HCC) 08/12/2019    POTS (postural orthostatic tachycardia syndrome) 12/17/2018    Hyperglycemia 12/14/2018    Other irritable bowel syndrome 08/10/2018    Salt craving 02/02/2018    Chronic tension-type headache, not intractable 11/09/2016    Esophagitis determined by endoscopy 11/09/2016    Mittelschmerz 09/19/2016    Tourette syndrome 09/19/2016    OCD (obsessive compulsive disorder) 09/19/2016    Factor V Leiden (HCC)        PAST MEDICAL HISTORY:  Past Medical History:   Diagnosis Date    Asthma     resolved    Celiac disease     Clotting disorder (HCC)     Factor VIII per patient    Diabetes (HCC)     Factor 5 Leiden mutation, heterozygous (HCC)     Lupus (HCC)     OCD (obsessive compulsive disorder)     Tourette disease        PAST SURGICAL HISTORY:  Past Surgical History:   Procedure Laterality Date    NE BRONCHOSCOPY,DIAGNOSTIC N/A 3/25/2022    Procedure: BRONCHOSCOPY-WITH BRONCHOALVEOLAR LAVAGE AND TRANSBRONCHIAL BIOPSY;  Surgeon: Denice Lucas M.D.;  Location: SURGERY [facility];  Service: Ent    NE UPPER GI ENDOSCOPY,DIAGNOSIS N/A 3/22/2022    Procedure: GASTROSCOPY;  Surgeon: Sharda Multani D.O.;  Location: [facility];  Service: Gastroenterology    APPENDECTOMY  2018    APPENDECTOMY LAPAROSCOPIC  7/1/2017    Procedure: APPENDECTOMY LAPAROSCOPIC;  Surgeon: Derick Arthur M.D.;  Location: SURGERY [facility];  Service:     ELIGIO BY LAPAROSCOPY  1/30/2017    Procedure: ELIGIO BY LAPAROSCOPY;  Surgeon: Fina Perkins M.D.;  Location: SURGERY SAME DAY [facility];  Service:        MEDICATIONS:  Current Outpatient Medications   Medication Sig    Belimumab (BENLYSTA) 200 MG/ML Solution Auto-injector Inject 200 mg under the skin every 7 days.    fluvoxAMINE (LUVOX) 50 MG Tab Take 1 Tablet by mouth every evening.  Start with 50 mg nightly (1 tablet) x7 days, if no side effects increase to 200 mg (4 tablets) nightly x2 weeks    HUMALOG 100 UNIT/ML Inject as directed Subcutaneously daily via insulin pump( total daily dose = 100 units) 90 days    meloxicam (MOBIC) 15 MG tablet Take 1 Tablet once a day as needed    albuterol 108 (90 Base) MCG/ACT Aero Soln inhalation aerosol Inhale 2 Puffs every 6 hours as needed for Shortness of Breath (cough).    acetaminophen (TYLENOL) 500 MG Tab Take 1 Tablet by mouth every 6 hours as needed for Mild Pain or Moderate Pain.    scopolamine (TRANSDERM-SCOP) 1 mg/72hr PATCH 72 HR Place 1 Patch on the skin every 72 hours.    promethazine (PHENERGAN) 25 MG Tab Take 1 Tablet by mouth every four hours as needed for Nausea/Vomiting.    ondansetron (ZOFRAN ODT) 4 MG TABLET DISPERSIBLE Take 2 Tablets by mouth every four hours as needed for Nausea.    hydroxychloroquine (PLAQUENIL) 200 MG Tab Take 2 Tablets by mouth every day.    insulin infusion pump Device Inject 0.7 Units/hr under the skin continuous. Patient's own SQ insulin pump    Type of Insulin: Humalog 100 units/ml  Last change of tubin22    Dosing:  Basal rate:   0.7 units/hr   Bolus ratio:   1 unit : 14 g carbohydrate at breakfast, lunch, dinner, snacks  Correction ratio:   1 units for every 20 over 140 mg/dL    Disconnect pump if patient becomes hypoglycemic and altered.    vitamin D2, Ergocalciferol, (DRISDOL) 1.25 MG (52048 UT) Cap capsule Take 50,000 Units by mouth every 72 hours. EVERY 3 DAYS    cyanocobalamin (VITAMIN B-12) 1000 MCG/ML Solution Inject 1 mL intramuscularly every 14 days.       ALLERGIES:   Allergies   Allergen Reactions    Cefdinir Rash     rash    Erythromycin Vomiting and Swelling     Vomiting and face swelling     Gluten Meal Diarrhea, Vomiting and Nausea     Celiac        IMMUNIZATIONS:  Immunization History   Administered Date(s) Administered    9VHPV VACCINE 2-3 DOSE IM (GARDASIL 9) 2017, 2017,  11/03/2017    COVID-19 Vaccine, unspecified - HISTORICAL DATA 01/20/2022    Dtap Vaccine 02/29/2000, 05/02/2000, 08/01/2000, 08/09/2001, 06/13/2005    Hepatitis A Vaccine, Adult 10/09/2008    Hepatitis A Vaccine, Ped/Adol 10/09/2008, 04/23/2010    Hepatitis B Vaccine (Adol/Adult) 02/17/2019, 05/19/2019    Hepatitis B Vaccine Adolescent/Pediatric 02/29/2000, 05/02/2000, 01/09/2001, 12/31/2018    IPV 02/29/2000, 05/02/2000, 08/09/2001, 06/13/2005    Influenza Vaccine Pediatric Split - Historical Data 10/27/2009    Influenza Vaccine Quad Inj (Pf) 11/03/2017, 10/17/2018, 10/22/2019, 10/22/2020, 03/08/2022, 11/23/2022    MENING VAC SERO B 2-3 DOSE SCHED IM (TRUMENBA) 08/10/2021, 11/23/2022    MMR Vaccine 08/09/2001, 06/13/2005    Meningococcal Conjugate Vaccine MCV4 (MENVEO) 08/12/2014, 05/19/2017    Meningococcal Conjugate Vaccine MCV4 (Menactra) 08/12/2014, 05/19/2017    PFIZER PURPLE CAP SARS-COV-2 VACCINATION (12+) 12/19/2020, 01/12/2021    Pneumococcal Conjugate Vaccine (PCV20) 11/23/2022    Pneumococcal polysaccharide vaccine (PPSV-23) 03/03/2020    QUANTIFERON GOLD - HISTORICAL DATA 10/22/2020, 01/20/2022    Tdap Vaccine 08/23/2012, 08/23/2012, 11/23/2022    Tuberculin Skin Test 01/06/2020    Varicella Vaccine Live 12/06/2009, 12/06/2009       SOCIAL HISTORY:   Social History     Socioeconomic History    Marital status: Single    Highest education level: Bachelor's degree (e.g., BA, AB, BS)   Tobacco Use    Smoking status: Never    Smokeless tobacco: Never   Vaping Use    Vaping Use: Never used   Substance and Sexual Activity    Alcohol use: No     Alcohol/week: 0.0 oz    Drug use: No    Sexual activity: Yes     Partners: Male     Birth control/protection: I.U.D.     Social Determinants of Health     Financial Resource Strain: Low Risk     Difficulty of Paying Living Expenses: Not very hard   Food Insecurity: No Food Insecurity    Worried About Running Out of Food in the Last Year: Never true    Ran Out of Food in  the Last Year: Never true   Transportation Needs: No Transportation Needs    Lack of Transportation (Medical): No    Lack of Transportation (Non-Medical): No   Physical Activity: Sufficiently Active    Days of Exercise per Week: 4 days    Minutes of Exercise per Session: 40 min   Stress: No Stress Concern Present    Feeling of Stress : Not at all   Social Connections: Moderately Isolated    Frequency of Communication with Friends and Family: More than three times a week    Frequency of Social Gatherings with Friends and Family: Twice a week    Attends Buddhism Services: Never    Active Member of Clubs or Organizations: No    Attends Club or Organization Meetings: Never    Marital Status: Living with partner   Housing Stability: Low Risk     Unable to Pay for Housing in the Last Year: No    Number of Places Lived in the Last Year: 2    Unstable Housing in the Last Year: No       FAMILY HISTORY:  Family History   Problem Relation Age of Onset    Other Maternal Grandfather         Parkinson's    Genetic Disorder Maternal Grandfather         Parkinsons, and crouzon syndrome    Cancer Maternal Grandfather         Skin Cancer    Heart Disease Other     Cancer Other         bile duct cancer    Other Mother         sphincter of salud, pancreatic insufficiency    Cancer Mother 53        cervical ca    Cancer Other         great uncle brain    Arthritis Maternal Grandmother     Cancer Maternal Grandmother         Skin Cancer    Heart Disease Maternal Grandmother     Hypertension Maternal Grandmother     Hyperlipidemia Maternal Grandmother     Stroke Maternal Grandmother     Genetic Disorder Father         Factor V Liden and Tourettes    Heart Disease Father     Hyperlipidemia Father     Genetic Disorder Maternal Uncle         Crouzon Syndrome    Cancer Maternal Uncle         Bile Duct, and Skin cancer    Cancer Maternal Aunt         Multiple Aunts and Uncles passed from cancer    Heart Disease Maternal Aunt         Open heart  "surgery occuring    Heart Disease Paternal Grandmother     Hypertension Paternal Grandmother     Stroke Paternal Grandmother     Heart Disease Maternal Uncle         multiple uncles.            Objective     Vital Signs: /60 (BP Location: Left arm, Patient Position: Sitting, BP Cuff Size: Large adult)   Pulse 81   Temp 36.8 °C (98.2 °F) (Temporal)   Resp 16   Ht 1.702 m (5' 7\")   Wt 83.9 kg (185 lb)   SpO2 96% Body mass index is 28.98 kg/m².    General: Appears well and comfortable  Eyes: No scleral or conjunctival lesions  ENT: No apparent oral or nasal lesions  Head/Neck: No apparent scalp or neck lesions  Cardiovascular: Regular rate and rhythm  Respiratory: Breathing quiet and unlabored  Gastrointestinal: No organomegaly or abdominal masses  Integumentary: Few erythematous macules on dorsum of hands  Musculoskeletal: Minimal poorly localized tenderness of hands; no periarticular soft tissue swelling, warmth, erythema, or overt signs of synovitis; no significant restriction in range of motion of joints examined  Neurologic: No focal sensory or motor deficits  Psychiatric: Mood and affect appropriate      LABORATORY RESULTS REVIEWED AND INTERPRETED BY ME:  Lab Results   Component Value Date/Time    SEDRATEWES 13 04/25/2022 03:31 AM    CREACTPROT 0.69 04/25/2022 03:15 AM    FIBRINOGEN 402 12/30/2019 09:05 AM     Lab Results   Component Value Date/Time    ANTINUCAB Detected (A) 03/22/2022 02:00 PM     Lab Results   Component Value Date/Time    ANTIDNADS 6 04/04/2022 12:12 PM    SMITHAB 0 04/28/2022 12:35 AM    RNPAB 3 03/22/2022 02:00 PM    ZKNPNTT18 3 03/22/2022 02:00 PM    SSA60 3 03/22/2022 02:00 PM    SSA52 0 03/22/2022 02:00 PM    ANTISSBSJ 0 03/22/2022 02:00 PM    JO1AB 3 03/22/2022 02:00 PM     Lab Results   Component Value Date/Time    IGACARDIOLI <10 03/26/2022 02:20 PM    IGMCARDIOLI 13 (H) 03/26/2022 02:20 PM    IGGCARDIOLI <10 03/26/2022 02:20 PM    RUSSELVIPER 36.7 03/26/2022 02:20 PM    " PROTHROMBTM 14.0 04/25/2022 03:31 AM    INR 1.12 04/25/2022 03:31 AM     Lab Results   Component Value Date/Time     03/22/2022 02:00 PM    IGG 1067 03/22/2022 02:00 PM     Lab Results   Component Value Date/Time    ANTIMITOCHO 2.6 04/28/2022 12:35 AM    FACTIN 4 04/28/2022 12:35 AM    TTRANSIGA <2 03/22/2022 02:00 PM     Lab Results   Component Value Date/Time    MICROSOMALA 0.9 06/07/2021 12:19 PM    TSHULTRASEN 0.920 12/23/2022 05:27 PM    FREET4 1.24 12/23/2022 05:27 PM     Lab Results   Component Value Date/Time    V9XNECMIGPN 171.3 12/23/2022 05:27 PM    F8GZMPFVCSK 27.5 12/23/2022 05:27 PM    CRYOGLOBULIN NEG 72Hour 01/11/2020 08:15 AM     Lab Results   Component Value Date/Time    25HYDROXY 16 (L) 12/23/2022 05:27 PM     Lab Results   Component Value Date/Time    FERRITIN 119.0 12/23/2022 05:27 PM    IRON 188 (H) 12/23/2022 05:27 PM    FOLATE 5.9 03/08/2022 02:14 PM     Lab Results   Component Value Date/Time    WBC 7.2 12/23/2022 05:27 PM    RBC 4.91 12/23/2022 05:27 PM    HEMOGLOBIN 15.0 12/23/2022 05:27 PM    HEMATOCRIT 44.9 12/23/2022 05:27 PM    MCV 91.4 12/23/2022 05:27 PM    MCH 30.5 12/23/2022 05:27 PM    MCHC 33.4 (L) 12/23/2022 05:27 PM    RDW 40.0 12/23/2022 05:27 PM    PLATELETCT 335 12/23/2022 05:27 PM    MPV 10.5 12/23/2022 05:27 PM    NEUTS 5.35 04/28/2022 12:35 AM    POLYS 47 03/25/2022 10:19 AM    LYMPHOCYTES 17.50 (L) 04/28/2022 12:35 AM    MONOCYTES 6.90 04/28/2022 12:35 AM    EOSINOPHILS 0.30 04/28/2022 12:35 AM    BASOPHILS 0.10 04/28/2022 12:35 AM     Lab Results   Component Value Date/Time    ASTSGOT 29 12/23/2022 05:27 PM    ALTSGPT 43 12/23/2022 05:27 PM    ALKPHOSPHAT 120 (H) 12/23/2022 05:27 PM    TBILIRUBIN 0.2 12/23/2022 05:27 PM    TOTPROTEIN 8.0 12/23/2022 05:27 PM    ALBUMIN 4.4 12/23/2022 05:27 PM     Lab Results   Component Value Date/Time    SODIUM 137 12/23/2022 05:27 PM    POTASSIUM 3.9 12/23/2022 05:27 PM    CHLORIDE 102 12/23/2022 05:27 PM    CO2 26 12/23/2022  05:27 PM    GLUCOSE 77 12/23/2022 05:27 PM    BUN 13 12/23/2022 05:27 PM    CREATININE 0.84 12/23/2022 05:27 PM    CALCIUM 9.3 12/23/2022 05:27 PM    MAGNESIUM 2.3 04/26/2022 12:37 AM     Lab Results   Component Value Date/Time    TOTALVOLUME 700 11/23/2018 02:30 PM    TOTALVOLUME 1575 10/20/2018 10:00 AM    TOTPROTUR 5.0 03/08/2022 02:13 PM    MICROALBUR <1.2 06/07/2021 12:19 PM    CREATININEU 61.70 03/08/2022 02:13 PM    MALBCRT see below 06/07/2021 12:19 PM     Lab Results   Component Value Date/Time    COLORURINE Yellow 04/02/2022 01:43 PM    SPECGRAVITY 1.022 04/02/2022 01:43 PM    PHURINE 6.5 04/02/2022 01:43 PM    GLUCOSEUR Negative 04/02/2022 01:43 PM    KETONES Negative 04/02/2022 01:43 PM    PROTEINURIN 30 (A) 04/02/2022 01:43 PM     Lab Results   Component Value Date/Time    FLTYPE Bronch Lavage 03/25/2022 10:19 AM     Lab Results   Component Value Date/Time    QNTTBGOLD Negative 04/05/2017 11:24 AM     Lab Results   Component Value Date/Time    HEPBSAG Non-Reactive 03/22/2022 02:00 PM    HEPBCORTOT NonReactive 03/22/2022 02:00 PM    HEPCAB Non-Reactive 12/23/2022 05:27 PM     Lab Results   Component Value Date/Time    CHOLSTRLTOT 213 (H) 12/23/2022 05:27 PM     (H) 12/23/2022 05:27 PM    HDL 37 (A) 12/23/2022 05:27 PM    TRIGLYCERIDE 298 (H) 12/23/2022 05:27 PM    HBA1C 5.1 12/23/2022 05:27 PM       RADIOLOGY RESULTS REVIEWED AND INTERPRETED BY ME:    Results for orders placed in visit on 02/17/22    DX-ANKLE 3+ VIEWS RIGHT    Results for orders placed during the hospital encounter of 09/21/20    DX-SACROILIAC JOINTS 3+    Impression  No SI joint sclerosis or erosions identified.      All relevant laboratory and imaging results reported on this note were reviewed and interpreted by me.         Assessment & Plan     Malena Bennett is a 22 y.o. female with history as noted above whose presentation merits the following diagnostic and clinical status impressions and  recommendations:    1. Systemic lupus erythematosus with other organ involvement, unspecified SLE type (HCC)  Clinically relatively low-to-moderate disease activity without significant evidence of evolving or impending flare on the current regimen of Benlysta and hydroxychloroquine. No need for modification of treatment, but rather need to resume Benlysta as soon as received. In any case, given the potential for discordance between immunologic activity and clinical disease manifestations, reasonable to routinely check serologic markers of disease activity for complete assessment.  - DSDNA AB, IGG W/RFLX TO IFA TITER; Future  - COMPLEMENT C3; Future  - COMPLEMENT C4; Future  - PROTEIN/CREAT RATIO URINE; Future  - Sed Rate; Future  - CRP QUANTITIVE (NON-CARDIAC); Future  - Belimumab (BENLYSTA) 200 MG/ML Solution Auto-injector; Inject 200 mg under the skin every 7 days.  Dispense: 12 mL; Refill: 3  - Continue hydroxychloroquine 400 mg daily    2. Long-term use of immunosuppressant medication  No present historical, physical, or laboratory evidence to suggest significant adverse drug effects or opportunistic infections. However, need to routinely monitor blood counts and metabolic profile per guidelines.  - Up to date on age-appropriate vaccines, but may need zoster vaccine    3. Long-term use of hydroxychloroquine  Minimal to no risk of retinal toxicity given the low dose of hydroxychloroquine prescribed (less than 5 mg/kg of body weight) per rheumatology and ophthalmology guidelines. However, ophthalmologic evaluation is still recommended with the frequency of routine follow-up eye exams determined by the ophthalmologist, typically annually or every other year.  - Routine ophthalmology evaluation as recommended      FOLLOW-UP: Return in about 4 months (around 5/23/2023) for Short.         Thank you for the opportunity to participate in the care of Malena Bennett.    Quang De Los Santos MD,  MS  Rheumatologist, [facility] Rheumatology ? [facility]   of Clinical Medicine, Department of Internal Medicine  [facility] ? [facility]

## 2023-01-24 NOTE — PATIENT INSTRUCTIONS
AFTER VISIT INSTRUCTIONS    Below are important information to help you navigate your healthcare needs and help us serve you safely and effectively:  If laboratory tests and/or imaging studies were ordered, remember to go get them done as instructed.  If new prescriptions and/or refills were sent, remember to go pick them up from your local pharmacy, or call the specialty pharmacy to request shipment.  Always take your prescription medications exactly as prescribed unless instructed otherwise.  Note that antirheumatic drugs and steroids are immunosuppressive which means they increase your risk of infections and have multiple potential adverse effects on various organ systems in your body, though most of them are uncommon.  It is important that you are up-to-date on age-appropriate immunizations, particularly shingles and bacterial/viral pneumonia vaccines, which you can request from me or your primary care provider.  Be sure to read the drug package inserts to learn about the potential side effects of your medications before you start taking them.  If you experience any significant drug side effects, stop taking the medication and notify me promptly, and depending on the severity of the side effects, consider going to an urgent care or emergency department for immediate attention.  If there are significant findings on your lab tests and imaging studies that warrant further action, I will notify you with explanations via [employer] or phone call, otherwise you can view them on [employer] and let me know if you have any questions.  Note that [employer] messages are typically read during office hours and may take 1-7 business days before a response depending on the urgency of the situation and how busy my clinic schedule is.  In general, [employer] messaging is for non-urgent matters that do not require immediate attention, so for urgent matters that cannot wait, you are advised to go to an urgent care.  Lastly, you are granted  MyChart access to my documentation of your visit and are encouraged to read my note which details my assessment and plan for your condition.

## 2023-01-28 ENCOUNTER — DOCUMENTATION (OUTPATIENT)
Dept: PHARMACY | Facility: MEDICAL CENTER | Age: 23
End: 2023-01-28
Payer: COMMERCIAL

## 2023-01-28 NOTE — PROGRESS NOTES
PHARMACIST PRE SCREEN - **New/TRF Onboarding**  Diagnosis:  Systemic lupus erythematosus with other organ involvement, unspecified SLE type (M32.19)      Drug & Non-Drug Allergies: EMR Reviewed. No concerning drug or non-drug allergies.                                                                                          Drug Therapy (name/formulation/dose/route of admin/frequency):  Benlysta 200mg/mL auto-injector, 1 pen SC Q week    EMR Reviewed   - Dose Appropriateness (Y/N):  Y   - Renal or Hepatic Adjustments (Y/N):  N   - Any comorbidities, PMH, precautions, or contraindications that pose a medication safety concern? (Y/N):  N   - Any relevant lab work needed that affects the initial start date? (Y/N):      N/A   - List Past Treatments: MTX, HCQ, prednisone    - List DDI’s: no clinically significant DDIs     - Patient’s ability to self-administer medication:  No issues identified per EMR  List Goals of Therapy:      - Prevent and reduce flares and inflammation   - Alleviate pain  - Maintain and/or improve Quality of Life  - Minimize side effects from therapy   - Maintain function of Activities of Daily Living (ADLs)     Patient pre-emptively opt out of clinical services.

## 2023-01-31 ENCOUNTER — PHARMACY VISIT (OUTPATIENT)
Dept: PHARMACY | Facility: MEDICAL CENTER | Age: 23
End: 2023-01-31
Payer: COMMERCIAL

## 2023-01-31 ENCOUNTER — OFFICE VISIT (OUTPATIENT)
Dept: MEDICAL GROUP | Facility: MEDICAL CENTER | Age: 23
End: 2023-01-31
Payer: COMMERCIAL

## 2023-01-31 VITALS — SYSTOLIC BLOOD PRESSURE: 100 MMHG | HEIGHT: 67 IN | DIASTOLIC BLOOD PRESSURE: 62 MMHG | BODY MASS INDEX: 28.98 KG/M2

## 2023-01-31 DIAGNOSIS — E55.9 VITAMIN D DEFICIENCY: ICD-10-CM

## 2023-01-31 DIAGNOSIS — Z80.41 FAMILY HISTORY OF OVARIAN CANCER: ICD-10-CM

## 2023-01-31 DIAGNOSIS — E78.49 OTHER HYPERLIPIDEMIA: ICD-10-CM

## 2023-01-31 DIAGNOSIS — R07.89 STERNAL PAIN: ICD-10-CM

## 2023-01-31 DIAGNOSIS — Z02.9 ADMINISTRATIVE ENCOUNTER: ICD-10-CM

## 2023-01-31 PROCEDURE — 99214 OFFICE O/P EST MOD 30 MIN: CPT | Performed by: NURSE PRACTITIONER

## 2023-01-31 RX ORDER — ERGOCALCIFEROL 1.25 MG/1
50000 CAPSULE ORAL
Qty: 12 CAPSULE | Refills: 3 | Status: SHIPPED | OUTPATIENT
Start: 2023-01-31

## 2023-01-31 ASSESSMENT — PATIENT HEALTH QUESTIONNAIRE - PHQ9: CLINICAL INTERPRETATION OF PHQ2 SCORE: 0

## 2023-01-31 NOTE — ASSESSMENT & PLAN NOTE
Latest Reference Range & Units 12/23/22 17:27   Cholesterol,Tot 100 - 199 mg/dL 213 (H)   Triglycerides 0 - 149 mg/dL 298 (H)   HDL >=40 mg/dL 37 !   LDL <100 mg/dL 116 (H)   (H): Data is abnormally high  !: Data is abnormal    Patient is type I diabetic, very well controlled with the most recent A1c at 5.1.  She is followed by endocrinology and would want endocrinology to explore this.  Otherwise she is following a healthy lifestyle, non-smoker.  She is a nurse.

## 2023-01-31 NOTE — ASSESSMENT & PLAN NOTE
FH of breast and ovarian cancer in mother at the age of 38 and sister with the same diagnoses at the age of 26.

## 2023-01-31 NOTE — PROGRESS NOTES
Chief Complaint   Patient presents with    Paperwork       HISTORY OF PRESENT ILLNESS: Patient is a 22 y.o. female, established patient who presents today to discuss medical problems as listed below:    Health Maintenance:  COMPLETED     Sternal pain  New problem. Pain in sternum x month. Pain noted on deep palpation and unprovoked. Not improving. No other s/s. Hx of lupus, no changes in medication regimen. Tried heat and Tylenol, no improvement. FH of br and ovarian ca in mom. No nipple discharge, no dimpling. She did note rash on upper chest which has resolved.    Family history of ovarian cancer  FH of breast and ovarian cancer in mother at the age of 38 and sister with the same diagnoses at the age of 26.    Other hyperlipidemia   Latest Reference Range & Units 12/23/22 17:27   Cholesterol,Tot 100 - 199 mg/dL 213 (H)   Triglycerides 0 - 149 mg/dL 298 (H)   HDL >=40 mg/dL 37 !   LDL <100 mg/dL 116 (H)   (H): Data is abnormally high  !: Data is abnormal    Patient is type I diabetic, very well controlled with the most recent A1c at 5.1.  She is followed by endocrinology and would want endocrinology to explore this.  Otherwise she is following a healthy lifestyle, non-smoker.  She is a nurse.    Vitamin D deficiency  Labs from 12/2022 vit D 15    Patient Active Problem List    Diagnosis Date Noted    Sternal pain 01/31/2023    Family history of ovarian cancer 01/31/2023    Other hyperlipidemia 01/31/2023    Long-term use of immunosuppressant medication 01/23/2023    Administrative encounter 11/23/2022    Mixed obsessional thoughts and acts 05/10/2022    Long-term use of hydroxychloroquine 05/03/2022    Anemia of chronic disease 04/06/2022    Episodic hematemesis of unknown etiology 04/02/2022    Class 1 obesity due to excess calories with serious comorbidity in adult 03/31/2022    Intractable nausea and vomiting 03/27/2022    Hemoptysis 03/22/2022    Hypokalemia due to excessive gastrointestinal loss of potassium  03/21/2022    Cough 03/08/2022    Flu-like symptoms 03/08/2022    Systemic lupus erythematosus (HCC) 08/10/2021    Polymyalgia (HCC) 08/10/2021    SOB (shortness of breath) 03/03/2021    History of asthma 03/03/2021    Muscle tenderness 03/03/2021    Hospital discharge follow-up 03/03/2021    Acute midline thoracic back pain 02/26/2021    Other chronic pain 01/15/2021    Vitamin D deficiency 12/14/2020    Elevated antinuclear antibody (SHANI) level 03/03/2020    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    History of cholecystectomy 08/22/2019    Celiac disease 08/22/2019    Controlled type 1 diabetes mellitus (HCC) 08/12/2019    POTS (postural orthostatic tachycardia syndrome) 12/17/2018    Hyperglycemia 12/14/2018    Other irritable bowel syndrome 08/10/2018    Salt craving 02/02/2018    Chronic tension-type headache, not intractable 11/09/2016    Esophagitis determined by endoscopy 11/09/2016    [place]itt 09/19/2016    Tourette syndrome 09/19/2016    OCD (obsessive compulsive disorder) 09/19/2016    Factor V Leiden ([facility])         Allergies: Cefdinir, Erythromycin, and Gluten meal    Current Outpatient Medications   Medication Sig Dispense Refill    vitamin D2, Ergocalciferol, (DRISDOL) 1.25 MG (49134 UT) Cap capsule Take 1 Capsule by mouth every 7 days. 12 Capsule 3    Belimumab (BENLYSTA) 200 MG/ML Solution Auto-injector Inject 200 mg under the skin every 7 days. 12 mL 3    fluvoxAMINE (LUVOX) 50 MG Tab Take 1 Tablet by mouth every evening. Start with 50 mg nightly (1 tablet) x7 days, if no side effects increase to 200 mg (4 tablets) nightly x2 weeks 60 Tablet 1    HUMALOG 100 UNIT/ML Inject as directed Subcutaneously daily via insulin pump( total daily dose = 100 units) 90 days      meloxicam (MOBIC) 15 MG tablet Take 1 Tablet once a day as needed 30 Tablet 0    albuterol 108 (90 Base) MCG/ACT Aero Soln inhalation aerosol Inhale 2 Puffs every 6 hours as needed for Shortness of Breath (cough). 8.5 g 0     acetaminophen (TYLENOL) 500 MG Tab Take 1 Tablet by mouth every 6 hours as needed for Mild Pain or Moderate Pain.      scopolamine (TRANSDERM-SCOP) 1 mg/72hr PATCH 72 HR Place 1 Patch on the skin every 72 hours. 4 Patch 3    promethazine (PHENERGAN) 25 MG Tab Take 1 Tablet by mouth every four hours as needed for Nausea/Vomiting. 6 Tablet 0    ondansetron (ZOFRAN ODT) 4 MG TABLET DISPERSIBLE Take 2 Tablets by mouth every four hours as needed for Nausea. 120 Tablet 0    hydroxychloroquine (PLAQUENIL) 200 MG Tab Take 2 Tablets by mouth every day. 90 Tablet 3    insulin infusion pump Device Inject 0.7 Units/hr under the skin continuous. Patient's own SQ insulin pump    Type of Insulin: Humalog 100 units/ml  Last change of tubin22    Dosing:  Basal rate:   0.7 units/hr   Bolus ratio:   1 unit : 14 g carbohydrate at breakfast, lunch, dinner, snacks  Correction ratio:   1 units for every 20 over 140 mg/dL    Disconnect pump if patient becomes hypoglycemic and altered.      vitamin D2, Ergocalciferol, (DRISDOL) 1.25 MG (28226 UT) Cap capsule Take 50,000 Units by mouth every 72 hours. EVERY 3 DAYS      cyanocobalamin (VITAMIN B-12) 1000 MCG/ML Solution Inject 1 mL intramuscularly every 14 days. 12 mL 0     No current facility-administered medications for this visit.       Social History     Tobacco Use    Smoking status: Never    Smokeless tobacco: Never   Vaping Use    Vaping Use: Never used   Substance Use Topics    Alcohol use: No     Alcohol/week: 0.0 oz    Drug use: No     Social History     Social History Narrative    Not on file       Family History   Problem Relation Age of Onset    Other Maternal Grandfather         Parkinson's    Genetic Disorder Maternal Grandfather         Parkinsons, and crouzon syndrome    Cancer Maternal Grandfather         Skin Cancer    Heart Disease Other     Cancer Other         bile duct cancer    Other Mother         sphincter of salud, pancreatic insufficiency    Cancer Mother 53  "       cervical ca    Cancer Other         great uncle brain    Arthritis Maternal Grandmother     Cancer Maternal Grandmother         Skin Cancer    Heart Disease Maternal Grandmother     Hypertension Maternal Grandmother     Hyperlipidemia Maternal Grandmother     Stroke Maternal Grandmother     Genetic Disorder Father         Factor V Liden and Tourettes    Heart Disease Father     Hyperlipidemia Father     Genetic Disorder Maternal Uncle         Crouzon Syndrome    Cancer Maternal Uncle         Bile Duct, and Skin cancer    Cancer Maternal Aunt         Multiple Aunts and Uncles passed from cancer    Heart Disease Maternal Aunt         Open heart surgery occuring    Heart Disease Paternal Grandmother     Hypertension Paternal Grandmother     Stroke Paternal Grandmother     Heart Disease Maternal Uncle         multiple uncles.       Allergies, past medical history, past surgical history, family history, social history reviewed and updated.    Review of Systems:     - Constitutional: Negative for fever, chills, unexpected weight change, and fatigue/generalized weakness.       - Respiratory: Negative for cough, sputum production, chest congestion, dyspnea, wheezing, and crackles.      - Cardiovascular: pain at sternum. Negative for chest pain, palpitations, orthopnea, and bilateral lower extremity edema.       - Psychiatric/Behavioral: Negative for depression, suicidal/homicidal ideation and memory loss.      All other systems reviewed and are negative    Exam:    /62 (BP Location: Left arm, Patient Position: Sitting, BP Cuff Size: Adult)   Ht 1.702 m (5' 7\")   BMI 28.98 kg/m²  Body mass index is 28.98 kg/m².    Physical Exam:  Constitutional: Well-developed and well-nourished. Not diaphoretic. No distress.   Cardiovascular: tenderness on deep palpation at sternum, no lump, no rash. Regular rate and rhythm, S1 and S2 without murmur, rubs, or gallops.    Chest: Effort normal. Clear to auscultation throughout. " No adventitious sounds.   Neurological: Alert and oriented x 3.   Psychiatric:  Behavior, mood, and affect are appropriate.  MA/nursing note and vitals reviewed.    LABS: 12/2022  results reviewed and discussed with the patient, questions answered.    Assessment/Plan:  1. Vitamin D deficiency  - vitamin D2, Ergocalciferol, (DRISDOL) 1.25 MG (33035 UT) Cap capsule; Take 1 Capsule by mouth every 7 days.  Dispense: 12 Capsule; Refill: 3    2. Sternal pain  - US-BREAST BILAT-LIMITED; Future    3. Family history of ovarian cancer  - US-PELVIC COMPLETE (TRANSABDOMINAL/TRANSVAGINAL) (COMBO); Future    4. Administrative encounter  Paperwork filled out, scanned into media and given to patient.       Discussed with patient possible alternative diagnoses, patient is to take all medications as prescribed.      If symptoms persist FU w/PCP, if symptoms worsen go to emergency room.      If experiencing any side effects from prescribed medications report to the office immediately or go to emergency room.     Reviewed indication, dosage, usage and potential adverse effects of prescribed medications.      Reviewed risks and benefits of treatment plan. Patient verbalizes understanding of all instruction and verbally agrees to plan.     Discussed plan with the patient, and patient agrees to the above.      I personally reviewed prior external notes and test results pertinent to today's visit.      No follow-ups on file. Annual, PRN

## 2023-01-31 NOTE — ASSESSMENT & PLAN NOTE
New problem. Pain in sternum x month. Pain noted on deep palpation and unprovoked. Not improving. No other s/s. Hx of lupus, no changes in medication regimen. Tried heat and Tylenol, no improvement. FH of br and ovarian ca in mom. No nipple discharge, no dimpling. She did note rash on upper chest which has resolved.

## 2023-02-09 ENCOUNTER — HOSPITAL ENCOUNTER (OUTPATIENT)
Dept: RADIOLOGY | Facility: MEDICAL CENTER | Age: 23
End: 2023-02-09
Attending: NURSE PRACTITIONER

## 2023-02-09 DIAGNOSIS — R07.89 STERNAL PAIN: ICD-10-CM

## 2023-02-09 PROCEDURE — 76642 ULTRASOUND BREAST LIMITED: CPT | Mod: RT

## 2023-02-22 ENCOUNTER — PATIENT MESSAGE (OUTPATIENT)
Dept: RHEUMATOLOGY | Facility: MEDICAL CENTER | Age: 23
End: 2023-02-22
Payer: COMMERCIAL

## 2023-02-22 DIAGNOSIS — M32.19 SYSTEMIC LUPUS ERYTHEMATOSUS (SLE) WITH MULTISYSTEM INVOLVEMENT (HCC): ICD-10-CM

## 2023-02-24 RX ORDER — BELIMUMAB 200 MG/ML
200 SOLUTION SUBCUTANEOUS
Qty: 4 ML | Refills: 5 | Status: SHIPPED | OUTPATIENT
Start: 2023-02-24

## 2024-02-15 NOTE — ED NOTES
"Attempted to medicate pt. Pt states \"I can't take that without anything.\" After clarification, pt states \"I can't take that without eating anything. I'm not going to take it until I can have something.\" Pt refusing PO meds at this time. MD notified.   " LOV 12-5-23

## (undated) DEVICE — TUBE SUCTION YANKAUER  1/4 X 6FT (20EA/CA)"

## (undated) DEVICE — MASK WITH FACE SHIELD (25/BX 4BX/CA)

## (undated) DEVICE — SODIUM CHL IRRIGATION 0.9% 1000ML (12EA/CA)

## (undated) DEVICE — SLEEVE, VASO, THIGH, MED

## (undated) DEVICE — SET LEADWIRE 5 LEAD BEDSIDE DISPOSABLE ECG (1SET OF 5/EA)

## (undated) DEVICE — CANISTER SUCTION 3000ML MECHANICAL FILTER AUTO SHUTOFF MEDI-VAC NONSTERILE LF DISP  (40EA/CA)

## (undated) DEVICE — APPLIER ENDOCLIP 5MM - (6EA/CA)

## (undated) DEVICE — SENSOR SPO2 ADULT LNCS ADTX (20/BX) ORDER ITEM #19593

## (undated) DEVICE — GLOVE BIOGEL INDICATOR SZ 6.5 SURGICAL PF LTX - (50PR/BX 4BX/CA)

## (undated) DEVICE — CANNULA O2 COMFORT SOFT EAR ADULT 7 FT TUBING (50/CA)

## (undated) DEVICE — KIT ROOM DECONTAMINATION

## (undated) DEVICE — HEAD HOLDER JUNIOR/ADULT

## (undated) DEVICE — KIT  I.V. START (100EA/CA)

## (undated) DEVICE — GOWN SURGEONS LARGE - (32/CA)

## (undated) DEVICE — BLADE SURGICAL #15 - (50/BX 3BX/CA)

## (undated) DEVICE — SYRINGE 12 CC LUER TIP - (80/BX) OBSOLETE ITEM

## (undated) DEVICE — TUBING CLEARLINK DUO-VENT - C-FLO (48EA/CA)

## (undated) DEVICE — TUBING INSUFFLATION - (10/BX)

## (undated) DEVICE — SUCTION INSTRUMENT YANKAUER BULBOUS TIP W/O VENT (50EA/CA)

## (undated) DEVICE — TUBING SETDISPOS HIGH FLOW II - (10/BX)

## (undated) DEVICE — KIT ANESTHESIA W/CIRCUIT & 3/LT BAG W/FILTER (20EA/CA)

## (undated) DEVICE — SYRINGE SAFETY 3 ML 18 GA X 1 1/2 BLUNT LL (100/BX 8BX/CA)

## (undated) DEVICE — NEPTUNE 4 PORT MANIFOLD - (20/PK)

## (undated) DEVICE — GOWN WARMING STANDARD FLEX - (30/CA)

## (undated) DEVICE — SYRINGE SAFETY 10 ML 18 GA X 1 1/2 BLUNT LL (100/BX 4BX/CA)

## (undated) DEVICE — CANNULA W/SEAL 5X100 Z-THRE - ADED KII (12/BX)

## (undated) DEVICE — PROTECTOR ULNA NERVE - (36PR/CA)

## (undated) DEVICE — WATER IRRIG. STER. 1500 ML - (9/CA)

## (undated) DEVICE — CLIP MED LG INTNL HRZN TI ESCP - (20/BX)

## (undated) DEVICE — SENSOR SPO2 NEO LNCS ADHESIVE (20/BX) SEE USER NOTES

## (undated) DEVICE — LACTATED RINGERS INJ 1000 ML - (14EA/CA 60CA/PF)

## (undated) DEVICE — GLOVE BIOGEL SZ 7 SURGICAL PF LTX - (50PR/BX 4BX/CA)

## (undated) DEVICE — LEAD SET 6 DISP. EKG NIHON KOHDEN

## (undated) DEVICE — ELECTRODE DUAL RETURN W/ CORD - (50/PK)

## (undated) DEVICE — DRESSING TRANSPARENT FILM TEGADERM 2.375 X 2.75"  (100EA/BX)"

## (undated) DEVICE — TOWEL STOP TIMEOUT SAFETY FLAG (40EA/CA)

## (undated) DEVICE — SUTURE 4-0 VICRYL PLUS FS-2 - 27 INCH (36/BX)

## (undated) DEVICE — SYRINGE SAFETY 5 ML 18 GA X 1-1/2 BLUNT LL (100/BX 4BX/CA)

## (undated) DEVICE — SYRINGE DISP. 60 CC LL - (30/BX, 12BX/CA)**WHEN THESE ARE GONE ORDER #500206**

## (undated) DEVICE — TROCAR 5X100 NON BLADED Z-TH - READ KII (6/BX)

## (undated) DEVICE — KIT CUSTOM PROCEDURE SINGLE FOR ENDO  (15/CA)

## (undated) DEVICE — TROCAR Z THREAD12MM OPTICAL - NON BLADED (6/BX)

## (undated) DEVICE — PACK LAP CHOLE OR - (2EA/CA)

## (undated) DEVICE — TUBE E-T HI-LO CUFF 7.0MM (10EA/PK)

## (undated) DEVICE — GLOVE BIOGEL PI ORTHO SZ 7 PF LF (40PR/BX)

## (undated) DEVICE — TRAY SKIN SCRUB PVP WET (20EA/CA) PART #DYND70356 DISCONTINUED

## (undated) DEVICE — BLOCK BITE ENDOSCOPIC 2809 - (100/BX) INTERMEDIATE

## (undated) DEVICE — GLOVE BIOGEL SZ 6.5 SURGICAL PF LTX (50PR/BX 4BX/CA)

## (undated) DEVICE — DETERGENT RENUZYME PLUS 10 OZ PACKET (50/BX)

## (undated) DEVICE — SPONGE GAUZESTER. 2X2 4-PL - (2/PK 50PK/BX 30BX/CS)

## (undated) DEVICE — SUTURE 0 VICRYL PLUS UR-6 - 27 INCH (36/BX)

## (undated) DEVICE — SUTURE GENERAL

## (undated) DEVICE — CANISTER SUCTION RIGID RED 1500CC (40EA/CA)

## (undated) DEVICE — GLOVE BIOGEL PI INDICATOR SZ 6.5 SURGICAL PF LF - (50/BX 4BX/CA)

## (undated) DEVICE — TROCAR Z THREAD 11 X 100 - BLADED (6/BX)

## (undated) DEVICE — GLOVE SZ 6.5 BIOGEL PI MICRO - PF LF (50PR/BX)

## (undated) DEVICE — FORCEP RADIAL JAW 4 STANDARD CAPACITY W/NEEDLE 240CM (40EA/BX)

## (undated) DEVICE — TROCAR5X55 KII SHIELDED SYS - (6/BX)

## (undated) DEVICE — TUBE NG SALEM SUMP 16FR (50EA/CA)

## (undated) DEVICE — TROCARCANN&SEAL 5X55 ZTHREAD - 12/BX

## (undated) DEVICE — SET SUCTION/IRRIGATION WITH DISPOSABLE TIP (6/CA )PART #0250-070-520 IS A SUB

## (undated) DEVICE — GOWN SURGEONS X-LARGE - DISP. (30/CA)

## (undated) DEVICE — GLOVE BIOGEL SZ 8 SURGICAL PF LTX - (50PR/BX 4BX/CA)

## (undated) DEVICE — MASK ANESTHESIA ADULT  - (100/CA)

## (undated) DEVICE — TUBE CONNECTING SUCTION - CLEAR PLASTIC STERILE 72 IN (50EA/CA)

## (undated) DEVICE — WATER IRRIGATION STERILE 1000ML (12EA/CA)

## (undated) DEVICE — NEEDLE INSFL 120MM 14GA VRRS - (20/BX)

## (undated) DEVICE — CHLORAPREP 26 ML APPLICATOR - ORANGE TINT(25/CA)

## (undated) DEVICE — ELECTRODE 850 FOAM ADHESIVE - HYDROGEL RADIOTRNSPRNT (50/PK)

## (undated) DEVICE — BENZOIN TINCTURE AMPULE

## (undated) DEVICE — SCISSORS 5MM CVD (6EA/BX)

## (undated) DEVICE — LEAD SET 6 DISP. EKG NIHON KOHDEN (100EA/CA) [9859].

## (undated) DEVICE — SUTURE 4-0 MONOCRYL PLUS PS-2 - 27 INCH (36/BX)

## (undated) DEVICE — BAG RETRIEVAL 10ML (10EA/BX)

## (undated) DEVICE — GLOVE, LITE (PAIR)

## (undated) DEVICE — SET EXTENSION WITH 2 PORTS (48EA/CA) ***PART #2C8610 IS A SUBSTITUTE*****

## (undated) DEVICE — GLOVE BIOGEL M SZ 8 SURGICAL PF LTX - (50/BX 4BX/CA)

## (undated) DEVICE — CATHETER IV 20 GA X 1-1/4 ---SURG.& SDS ONLY--- (50EA/BX)

## (undated) DEVICE — DERMABOND ADVANCED - (12EA/BX)

## (undated) DEVICE — SET I.V. EXTENSION DIAL-A- - FLOW REGULATOR (48EA/CA)

## (undated) DEVICE — SPONGE GAUZE NON-STERILE 4X4 - (2000/CA 10PK/CA)

## (undated) DEVICE — DRAPESURG STERI-DRAPE LONG - (10/BX 4BX/CA)